# Patient Record
Sex: FEMALE | Race: BLACK OR AFRICAN AMERICAN | NOT HISPANIC OR LATINO | Employment: OTHER | URBAN - METROPOLITAN AREA
[De-identification: names, ages, dates, MRNs, and addresses within clinical notes are randomized per-mention and may not be internally consistent; named-entity substitution may affect disease eponyms.]

---

## 2017-02-07 ENCOUNTER — HOSPITAL ENCOUNTER (EMERGENCY)
Facility: HOSPITAL | Age: 47
Discharge: HOME | End: 2017-02-07
Attending: EMERGENCY MEDICINE
Payer: MEDICAID

## 2017-02-07 VITALS
OXYGEN SATURATION: 94 % | HEIGHT: 62 IN | TEMPERATURE: 99 F | DIASTOLIC BLOOD PRESSURE: 80 MMHG | BODY MASS INDEX: 33.13 KG/M2 | WEIGHT: 180 LBS | RESPIRATION RATE: 18 BRPM | HEART RATE: 83 BPM | SYSTOLIC BLOOD PRESSURE: 113 MMHG

## 2017-02-07 DIAGNOSIS — J98.11 ATELECTASIS: ICD-10-CM

## 2017-02-07 DIAGNOSIS — N63.0 BREAST MASS: Primary | ICD-10-CM

## 2017-02-07 LAB
ALBUMIN SERPL BCP-MCNC: 3.3 G/DL
ALP SERPL-CCNC: 145 U/L
ALT SERPL W/O P-5'-P-CCNC: 26 U/L
ANION GAP SERPL CALC-SCNC: 7 MMOL/L
AST SERPL-CCNC: 26 U/L
B-HCG UR QL: NEGATIVE
BACTERIA #/AREA URNS HPF: NORMAL /HPF
BASOPHILS # BLD AUTO: 0.01 K/UL
BASOPHILS NFR BLD: 0.2 %
BILIRUB SERPL-MCNC: 0.5 MG/DL
BILIRUB UR QL STRIP: NEGATIVE
BUN SERPL-MCNC: 29 MG/DL
CALCIUM SERPL-MCNC: 9.9 MG/DL
CHLORIDE SERPL-SCNC: 104 MMOL/L
CLARITY UR: ABNORMAL
CO2 SERPL-SCNC: 31 MMOL/L
COLOR UR: YELLOW
CREAT SERPL-MCNC: 1.1 MG/DL
CTP QC/QA: YES
DIFFERENTIAL METHOD: ABNORMAL
EOSINOPHIL # BLD AUTO: 0.1 K/UL
EOSINOPHIL NFR BLD: 1.5 %
ERYTHROCYTE [DISTWIDTH] IN BLOOD BY AUTOMATED COUNT: 17.5 %
EST. GFR  (AFRICAN AMERICAN): >60 ML/MIN/1.73 M^2
EST. GFR  (NON AFRICAN AMERICAN): >60 ML/MIN/1.73 M^2
GLUCOSE SERPL-MCNC: 109 MG/DL
GLUCOSE UR QL STRIP: NEGATIVE
HCT VFR BLD AUTO: 52.7 %
HGB BLD-MCNC: 15.9 G/DL
HGB UR QL STRIP: ABNORMAL
KETONES UR QL STRIP: NEGATIVE
LEUKOCYTE ESTERASE UR QL STRIP: ABNORMAL
LYMPHOCYTES # BLD AUTO: 2.1 K/UL
LYMPHOCYTES NFR BLD: 34.6 %
MCH RBC QN AUTO: 27.7 PG
MCHC RBC AUTO-ENTMCNC: 30.2 %
MCV RBC AUTO: 92 FL
MICROSCOPIC COMMENT: NORMAL
MONOCYTES # BLD AUTO: 0.6 K/UL
MONOCYTES NFR BLD: 9.3 %
NEUTROPHILS # BLD AUTO: 3.3 K/UL
NEUTROPHILS NFR BLD: 54.2 %
NITRITE UR QL STRIP: NEGATIVE
PH UR STRIP: 5 [PH] (ref 5–8)
PLATELET # BLD AUTO: 127 K/UL
PMV BLD AUTO: 12.7 FL
POTASSIUM SERPL-SCNC: 4.5 MMOL/L
PROT SERPL-MCNC: 7.9 G/DL
PROT UR QL STRIP: NEGATIVE
RBC # BLD AUTO: 5.75 M/UL
RBC #/AREA URNS HPF: 0 /HPF (ref 0–4)
SODIUM SERPL-SCNC: 142 MMOL/L
SP GR UR STRIP: 1.02 (ref 1–1.03)
SQUAMOUS #/AREA URNS HPF: 4 /HPF
URN SPEC COLLECT METH UR: ABNORMAL
UROBILINOGEN UR STRIP-ACNC: NEGATIVE EU/DL
WBC # BLD AUTO: 6.15 K/UL
WBC #/AREA URNS HPF: 2 /HPF (ref 0–5)

## 2017-02-07 PROCEDURE — 80053 COMPREHEN METABOLIC PANEL: CPT

## 2017-02-07 PROCEDURE — 81000 URINALYSIS NONAUTO W/SCOPE: CPT

## 2017-02-07 PROCEDURE — 25500020 PHARM REV CODE 255: Performed by: EMERGENCY MEDICINE

## 2017-02-07 PROCEDURE — 94799 UNLISTED PULMONARY SVC/PX: CPT

## 2017-02-07 PROCEDURE — 85025 COMPLETE CBC W/AUTO DIFF WBC: CPT

## 2017-02-07 PROCEDURE — 99284 EMERGENCY DEPT VISIT MOD MDM: CPT | Mod: 25

## 2017-02-07 PROCEDURE — 81025 URINE PREGNANCY TEST: CPT | Performed by: EMERGENCY MEDICINE

## 2017-02-07 RX ADMIN — IOHEXOL 70 ML: 350 INJECTION, SOLUTION INTRAVENOUS at 11:02

## 2017-02-07 NOTE — DISCHARGE INSTRUCTIONS
Use the incentive spirometer to take 10 deep breaths each hour.  You should also make a point to get up and walk around more often.  Go see your primary doctor tomorrow at 1:15 PM; we have already made this appointment for you.    Return to the emergency department if you develop difficulty breathing, chest pain, fever higher than 100.4°, or for any new or worsening symptoms.

## 2017-02-07 NOTE — ED AVS SNAPSHOT
[facility]  [address] [place] [address]               Radha Pritchett   2017  9:39 AM   ED    Description:  Female : 1970   Department:  [facility]           Your Care was Coordinated By:     Provider Role From To    Liam Sylvester III, MD Attending Provider 17 0942 --      Reason for Visit     Breast Mass           Diagnoses this Visit        Comments    Breast mass    -  Primary     Atelectasis           ED Disposition     None           To Do List           Follow-up Information     Follow up with Ferny Iglesias MD In 1 day.    Specialty:  Family Medicine    Why:  at 1:15pm    Contact information:    [address] [facility] [address] [address]  [facility] [address]  [phone]        [facility] On Call     [facility] Nurse Care [facility] -  Assistance  Registered nurses in the [facility] provide clinical advisement, health education, appointment booking, and other advisory services.  Call for this free service at [phone].             Medications           Message regarding Medications     Verify the changes and/or additions to your medication regime listed below are the same as discussed with your clinician today.  If any of these changes or additions are incorrect, please notify your healthcare provider.        These medications were administered today        Dose Freq    omnipaque 350 iohexol 70 mL 70 mL IMG once as needed    Sig: Inject 70 mLs into the vein ONCE PRN for contrast.    Class: Normal    Route: Intravenous           Verify that the below list of medications is an accurate representation of the medications you are currently taking.  If none reported, the list may be blank. If incorrect, please contact your healthcare provider. Carry this list with you in case of emergency.           Current Medications     carvedilol (COREG) 3.125 MG tablet Take 1 tablet (3.125 mg total) by mouth 2 (two) times daily with meals.     "hydrochlorothiazide (HYDRODIURIL) 12.5 MG Tab Take 1 tablet (12.5 mg total) by mouth once daily.    lisinopril (PRINIVIL,ZESTRIL) 5 MG tablet Take 1 tablet (5 mg total) by mouth once daily.           Clinical Reference Information           Your Vitals Were     BP Pulse Temp Resp Height Weight    135/85 (BP Location: Right arm, Patient Position: Lying, BP Method: Automatic) 78 98.6 °F (37 °C) (Oral) 18 5' 2" (1.575 m) 81.6 kg (180 lb)    SpO2 BMI             98% 32.92 kg/m2         Allergies as of 2/7/2017     No Known Allergies      Immunizations Administered on Date of Encounter - 2/7/2017     None      ED Micro, Lab, POCT     Start Ordered       Status Ordering Provider    02/07/17 1014 02/07/17 1014  CBC auto differential  STAT      Final result     02/07/17 1014 02/07/17 1014  Comprehensive metabolic panel  STAT      Final result     02/07/17 1014 02/07/17 1014  Urinalysis  STAT      Final result     02/07/17 1014 02/07/17 1014  POCT urine pregnancy  Once      Final result     02/07/17 1014 02/07/17 1014  Urinalysis Microscopic  Once      Final result       ED Imaging Orders     Start Ordered       Status Ordering Provider    02/07/17 1123 02/07/17 1123  CTA Chest Non-Coronary  1 time imaging      Final result     02/07/17 1014 02/07/17 1014  X-Ray Chest PA And Lateral  1 time imaging      Final result         Discharge Instructions       Use the incentive spirometer to take 10 deep breaths each hour.  You should also make a point to get up and walk around more often.  Go see your primary doctor tomorrow at 1:15 PM; we have already made this appointment for you.    Return to the emergency department if you develop difficulty breathing, chest pain, fever higher than 100.4°, or for any new or worsening symptoms.    [facility] Sign-Up     Activating your [facility] account is as easy as 1-2-3!     1) Visit [email or web address], select Sign Up Now, enter this activation code and your date of birth, then select " Next.  [email or web address]  Expires: 3/24/2017  1:35 PM      2) Create a username and password to use when you visit [facility] in the future and select a security question in case you lose your password and select Next.    3) Enter your e-mail address and click Sign Up!    Additional Information  If you have questions, please e-mail [email or web address] or call [phone] to talk to our [facility] staff. Remember, [email or web address] is NOT to be used for urgent needs. For medical emergencies, dial 911.          [facility] complies with applicable Federal civil rights laws and does not discriminate on the basis of race, color, national origin, age, disability, or sex.        Language Assistance Services     ATTENTION: Language assistance services are available, free of charge. Please call [phone].      ATENCIÓN: Si habla adaliañol, tiene a frederick disposición servicios gratuitos de asistencia lingüística. Llame al [phone].     [facility] Ý: N?u b?n nói Ti?ng Vi?t, có các d?ch v? h? tr? ngôn ng? mi?n phí dành cho b?n. G?i s? [phone].        Medications Administered     omnipaque 350 iohexol 70 mL                    Administrations This Visit        Admin Date Action                   omnipaque 350 iohexol 70 mL 02/07/2017 Given                  Administrations This Visit     omnipaque 350 iohexol 70 mL     Admin Date Action Dose Route Administered By             02/07/2017 Given 70 mL Intravenous Tru Harley, RT

## 2017-02-07 NOTE — ED PROVIDER NOTES
Encounter Date: 2/7/2017    SCRIBE #1 NOTE: I, Chay Bagley, am scribing for, and in the presence of,  Liam Sylvester III, MD. I have scribed the following portions of the note - Other sections scribed: HPI and ROS.       History     Chief Complaint   Patient presents with    Breast Mass     pt states she has had a large painless lump in her left breast for 2-3 months.      Review of patient's allergies indicates:  No Known Allergies  HPI Comments: Chief Complaint: Breast Mass    HPI: This 46 y.o. Female with CHF and a learning disability presents to the ED c/o a left breast mass. Mass has been present for approximately 2-3 months. There's no associated pain. Family members noticed symptoms yesterday. Patient has not been evaluated for symptoms. Patient denies SOB.     The history is provided by the patient. No  was used.     Past Medical History   Diagnosis Date    CHF (congestive heart failure)     Learning difficulty      Past Medical History Pertinent Negatives   Diagnosis Date Noted    Coronary artery disease 12/26/2014     Past Surgical History   Procedure Laterality Date    Cyst removal       shoulder      Family History   Problem Relation Age of Onset    Diabetes      Coronary artery disease       Social History   Substance Use Topics    Smoking status: Never Smoker    Smokeless tobacco: Never Used    Alcohol use Yes      Comment: OCCASSIONAL     Review of Systems   Constitutional: Negative for chills and fever.   HENT: Negative for ear pain and sore throat.    Eyes: Negative for visual disturbance.   Respiratory: Negative for cough and shortness of breath.    Cardiovascular: Negative for chest pain.   Gastrointestinal: Negative for abdominal pain, constipation, diarrhea, nausea and vomiting.   Genitourinary: Negative for difficulty urinating and dysuria.   Musculoskeletal: Negative for back pain.   Skin: Negative for rash.   Neurological: Negative for dizziness, weakness,  light-headedness and headaches.       Physical Exam   Initial Vitals   BP Pulse Resp Temp SpO2   02/07/17 0936 02/07/17 0936 02/07/17 0936 02/07/17 0936 02/07/17 0936   135/80 87 18 98.6 °F (37 °C) 84 %     Physical Exam    Nursing note and vitals reviewed.  Constitutional: She appears well-developed and well-nourished. She is not diaphoretic. No distress.   HENT:   Head: Normocephalic and atraumatic.   Nose: Nose normal.   Mouth/Throat: Oropharynx is clear and moist. No oropharyngeal exudate.   Eyes: Conjunctivae and EOM are normal. Pupils are equal, round, and reactive to light. No scleral icterus.   Neck: Normal range of motion. Neck supple. No thyromegaly present. No tracheal deviation present.   Cardiovascular: Normal rate, regular rhythm and normal heart sounds. Exam reveals no gallop and no friction rub.    No murmur heard.  Pulmonary/Chest: Breath sounds normal. No respiratory distress. She has no wheezes. She has no rhonchi. She has no rales.       Abdominal: Soft. Bowel sounds are normal. She exhibits no distension and no mass. There is no tenderness. There is no rebound and no guarding.   Musculoskeletal: Normal range of motion. She exhibits no edema or tenderness.   Lymphadenopathy:     She has no cervical adenopathy.   Neurological: She is alert and oriented to person, place, and time. She has normal strength. No cranial nerve deficit or sensory deficit.   Skin: Skin is warm and dry. No rash noted. No erythema. No pallor.   Psychiatric: She has a normal mood and affect. Her behavior is normal. Thought content normal.         ED Course   Procedures  Labs Reviewed   CBC W/ AUTO DIFFERENTIAL - Abnormal; Notable for the following:        Result Value    RBC 5.75 (*)     Hematocrit 52.7 (*)     MCHC 30.2 (*)     RDW 17.5 (*)     Platelets 127 (*)     All other components within normal limits   COMPREHENSIVE METABOLIC PANEL - Abnormal; Notable for the following:     CO2 31 (*)     BUN, Bld 29 (*)     Albumin  3.3 (*)     Alkaline Phosphatase 145 (*)     Anion Gap 7 (*)     All other components within normal limits   URINALYSIS - Abnormal; Notable for the following:     Appearance, UA Hazy (*)     Occult Blood UA 2+ (*)     Leukocytes, UA Trace (*)     All other components within normal limits   URINALYSIS MICROSCOPIC   POCT URINE PREGNANCY             Medical Decision Making:   Initial Assessment:   46-year-old female brought in by her aunt for evaluation of a painless left breast mass that has reportedly been present for the last 2-3 months.  Patient does not reports the patient never has any medical complaints, even when she is sick, and there is also some kind of learning disability, so history is somewhat limited.  However, currently the patient denies shortness of breath, cough, chest pain, or any other complaint, despite the fact that her room air oxygen saturation is in the low 80s.  No signs of DVT on exam.  Work of breathing normal.  Differential Diagnosis:   Pneumonia, pleural effusion, pneumothorax, pulmonary embolism, breast malignancy  Independently Interpreted Test(s):   I have ordered and independently interpreted X-rays - see summary below.       <> Summary of X-Ray Reading(s): Chest x-ray: No acute abnormality    CT angiogram of the chest: No pulmonary embolism, dependent atelectasis, large breast mass      ED Management:  Chest x-ray unremarkable.  CT angiogram of the chest reveals dependent consolidative changes most suggestive of atelectasis and a breast mass, but no other pulmonary pathology.  Clinically, patient symptoms are not consistent with pneumonia or pulmonary embolism.  Additionally, her oxygen saturation improves with ambulation, increasing to 97% on room air, and also improves with volitional deep breathing and coughing.  Strongly suspect her hypoxia is a result of atelectasis.  Her family provides additional history which seems to confirm this, specifically that the patient spends all day  lying on the couch or sitting in a recliner, never really getting up to move around.  Suspect she has chronic atelectasis and has become accustomed to the resulting hypoxia.    I have spoken with Dr. Iglesias nurse and schedule pt for urgent f/u tomorrow for her large, firm breast mass.             Scribe Attestation:   Scribe #1: I performed the above scribed service and the documentation accurately describes the services I performed. I attest to the accuracy of the note.    Attending Attestation:           Physician Attestation for Scribe:  Physician Attestation Statement for Scribe #1: I, Liam Sylvester III, MD, reviewed documentation, as scribed by Chay Bagley in my presence, and it is both accurate and complete.                 ED Course     Clinical Impression:   The primary encounter diagnosis was Breast mass. A diagnosis of Atelectasis was also pertinent to this visit.          Liam Sylvester III, MD  02/10/17 1987

## 2017-02-07 NOTE — ED NOTES
Pt ambulated on RA.  O2 sats taken while pt standing after ambulating and taking deep breaths.  Pt's O2 sats 94-96% while standing.  Physician notified and at bedside.

## 2017-02-07 NOTE — ED TRIAGE NOTES
Pt presents to ER with left breast mass.  Room air sats 85%.  Pt denies any pain or shortness of breath.

## 2017-02-20 ENCOUNTER — HOSPITAL ENCOUNTER (OUTPATIENT)
Dept: RADIOLOGY | Facility: HOSPITAL | Age: 47
Discharge: HOME | End: 2017-02-20
Attending: GENERAL PRACTICE
Payer: MEDICAID

## 2017-02-20 DIAGNOSIS — D05.82: ICD-10-CM

## 2017-02-20 DIAGNOSIS — R92.8 ABNORMAL MAMMOGRAM, UNSPECIFIED: ICD-10-CM

## 2017-02-20 PROCEDURE — 77062 BREAST TOMOSYNTHESIS BI: CPT | Mod: TC

## 2017-02-20 PROCEDURE — 77062 BREAST TOMOSYNTHESIS BI: CPT | Mod: 26,,, | Performed by: RADIOLOGY

## 2017-02-20 PROCEDURE — 76642 ULTRASOUND BREAST LIMITED: CPT | Mod: TC,LT

## 2017-02-20 PROCEDURE — 77066 DX MAMMO INCL CAD BI: CPT | Mod: 26,,, | Performed by: RADIOLOGY

## 2017-02-20 PROCEDURE — 76642 ULTRASOUND BREAST LIMITED: CPT | Mod: 26,LT,, | Performed by: RADIOLOGY

## 2017-03-10 ENCOUNTER — HOSPITAL ENCOUNTER (OUTPATIENT)
Dept: RADIOLOGY | Facility: HOSPITAL | Age: 47
Discharge: HOME | End: 2017-03-10
Attending: GENERAL PRACTICE
Payer: MEDICAID

## 2017-03-10 DIAGNOSIS — N63.0 LUMP OR MASS IN BREAST: ICD-10-CM

## 2017-03-10 PROCEDURE — 77065 DX MAMMO INCL CAD UNI: CPT | Mod: 26,LT,, | Performed by: RADIOLOGY

## 2017-03-10 PROCEDURE — 19083 BX BREAST 1ST LESION US IMAG: CPT | Mod: ,,, | Performed by: RADIOLOGY

## 2017-03-10 PROCEDURE — 77065 DX MAMMO INCL CAD UNI: CPT | Mod: TC,LT

## 2017-03-10 PROCEDURE — 27201068 US BREAST BIOPSY WITH IMAGING 1ST SITE LEFT

## 2017-03-10 PROCEDURE — 88305 TISSUE EXAM BY PATHOLOGIST: CPT | Mod: 26,,, | Performed by: PATHOLOGY

## 2017-03-10 PROCEDURE — 88305 TISSUE EXAM BY PATHOLOGIST: CPT | Performed by: PATHOLOGY

## 2017-03-10 NOTE — H&P
[facility]  History & Physical    Subjective:      Chief Complaint/Reason for Admission: Left breast mass    Radha Pritchett is a 46 y.o. female.    Past Medical History:   Diagnosis Date    CHF (congestive heart failure)     Learning difficulty      Past Surgical History:   Procedure Laterality Date    CYST REMOVAL      shoulder      Family History   Problem Relation Age of Onset    Diabetes      Coronary artery disease       Social History   Substance Use Topics    Smoking status: Never Smoker    Smokeless tobacco: Never Used    Alcohol use Yes      Comment: OCCASSIONAL         (Not in a hospital admission)  Review of patient's allergies indicates:  No Known Allergies     ROS    Objective:      Vital Signs (Most Recent)       Vital Signs Range (Last 24H):  BP: ()/()   Arterial Line BP: ()/()     Physical Exam         Assessment:      There are no hospital problems to display for this patient.      Plan:    Ultrasound guided core needle biopsy

## 2017-03-10 NOTE — DISCHARGE SUMMARY
14g core needle x 5  Left breast mass vs old hematoma  Clip placed   No immediate complications      OK to D/C

## 2017-03-24 PROBLEM — N64.89 HEMATOMA OF BREAST: Status: ACTIVE | Noted: 2017-03-24

## 2017-03-29 ENCOUNTER — HOSPITAL ENCOUNTER (EMERGENCY)
Facility: HOSPITAL | Age: 47
Discharge: HOME | End: 2017-03-30
Attending: EMERGENCY MEDICINE
Payer: MEDICAID

## 2017-03-29 VITALS
RESPIRATION RATE: 18 BRPM | HEIGHT: 62 IN | HEART RATE: 84 BPM | OXYGEN SATURATION: 95 % | WEIGHT: 180 LBS | TEMPERATURE: 98 F | SYSTOLIC BLOOD PRESSURE: 178 MMHG | DIASTOLIC BLOOD PRESSURE: 98 MMHG | BODY MASS INDEX: 33.13 KG/M2

## 2017-03-29 DIAGNOSIS — N63.20 BREAST MASS, LEFT: ICD-10-CM

## 2017-03-29 DIAGNOSIS — J18.9 PNEUMONIA OF LEFT LOWER LOBE DUE TO INFECTIOUS ORGANISM: Primary | ICD-10-CM

## 2017-03-29 DIAGNOSIS — N64.4 BREAST PAIN, LEFT: ICD-10-CM

## 2017-03-29 DIAGNOSIS — I10 ESSENTIAL HYPERTENSION: ICD-10-CM

## 2017-03-29 LAB
ALBUMIN SERPL BCP-MCNC: 3 G/DL
ALP SERPL-CCNC: 101 U/L
ALT SERPL W/O P-5'-P-CCNC: 36 U/L
ANION GAP SERPL CALC-SCNC: 8 MMOL/L
ANISOCYTOSIS BLD QL SMEAR: SLIGHT
APTT BLDCRRT: 28.4 SEC
AST SERPL-CCNC: 42 U/L
BASOPHILS # BLD AUTO: ABNORMAL K/UL
BASOPHILS NFR BLD: 1 %
BILIRUB SERPL-MCNC: 0.6 MG/DL
BNP SERPL-MCNC: 85 PG/ML
BUN SERPL-MCNC: 14 MG/DL
CALCIUM SERPL-MCNC: 9.7 MG/DL
CHLORIDE SERPL-SCNC: 103 MMOL/L
CO2 SERPL-SCNC: 36 MMOL/L
CREAT SERPL-MCNC: 1 MG/DL
DIFFERENTIAL METHOD: ABNORMAL
EOSINOPHIL # BLD AUTO: ABNORMAL K/UL
EOSINOPHIL NFR BLD: 0 %
ERYTHROCYTE [DISTWIDTH] IN BLOOD BY AUTOMATED COUNT: 17.7 %
EST. GFR  (AFRICAN AMERICAN): >60 ML/MIN/1.73 M^2
EST. GFR  (NON AFRICAN AMERICAN): >60 ML/MIN/1.73 M^2
GIANT PLATELETS BLD QL SMEAR: PRESENT
GLUCOSE SERPL-MCNC: 114 MG/DL
HCT VFR BLD AUTO: 50.6 %
HGB BLD-MCNC: 13.9 G/DL
HYPOCHROMIA BLD QL SMEAR: ABNORMAL
INR PPP: 1.1
LYMPHOCYTES # BLD AUTO: ABNORMAL K/UL
LYMPHOCYTES NFR BLD: 22 %
MCH RBC QN AUTO: 25.7 PG
MCHC RBC AUTO-ENTMCNC: 27.5 %
MCV RBC AUTO: 94 FL
METAMYELOCYTES NFR BLD MANUAL: 1 %
MONOCYTES # BLD AUTO: ABNORMAL K/UL
MONOCYTES NFR BLD: 7 %
NEUTROPHILS NFR BLD: 60 %
NEUTS BAND NFR BLD MANUAL: 9 %
NRBC BLD-RTO: 4 /100 WBC
PLATELET # BLD AUTO: 132 K/UL
PMV BLD AUTO: ABNORMAL FL
POLYCHROMASIA BLD QL SMEAR: ABNORMAL
POTASSIUM SERPL-SCNC: 5.2 MMOL/L
PROT SERPL-MCNC: 7.6 G/DL
PROTHROMBIN TIME: 11.3 SEC
RBC # BLD AUTO: 5.4 M/UL
SODIUM SERPL-SCNC: 147 MMOL/L
TROPONIN I SERPL DL<=0.01 NG/ML-MCNC: 0.01 NG/ML
WBC # BLD AUTO: 5.21 K/UL

## 2017-03-29 PROCEDURE — 85007 BL SMEAR W/DIFF WBC COUNT: CPT

## 2017-03-29 PROCEDURE — 96365 THER/PROPH/DIAG IV INF INIT: CPT

## 2017-03-29 PROCEDURE — 80053 COMPREHEN METABOLIC PANEL: CPT

## 2017-03-29 PROCEDURE — 83880 ASSAY OF NATRIURETIC PEPTIDE: CPT

## 2017-03-29 PROCEDURE — 99285 EMERGENCY DEPT VISIT HI MDM: CPT | Mod: 25

## 2017-03-29 PROCEDURE — 93005 ELECTROCARDIOGRAM TRACING: CPT

## 2017-03-29 PROCEDURE — 87040 BLOOD CULTURE FOR BACTERIA: CPT

## 2017-03-29 PROCEDURE — 63600175 PHARM REV CODE 636 W HCPCS: Performed by: EMERGENCY MEDICINE

## 2017-03-29 PROCEDURE — 25000003 PHARM REV CODE 250: Performed by: EMERGENCY MEDICINE

## 2017-03-29 PROCEDURE — 85610 PROTHROMBIN TIME: CPT

## 2017-03-29 PROCEDURE — 85027 COMPLETE CBC AUTOMATED: CPT

## 2017-03-29 PROCEDURE — 85730 THROMBOPLASTIN TIME PARTIAL: CPT

## 2017-03-29 PROCEDURE — 84484 ASSAY OF TROPONIN QUANT: CPT

## 2017-03-29 RX ORDER — AZITHROMYCIN 250 MG/1
500 TABLET, FILM COATED ORAL
Status: COMPLETED | OUTPATIENT
Start: 2017-03-29 | End: 2017-03-29

## 2017-03-29 RX ADMIN — AZITHROMYCIN 500 MG: 250 TABLET, FILM COATED ORAL at 09:03

## 2017-03-29 RX ADMIN — CEFTRIAXONE 1 G: 1 INJECTION, SOLUTION INTRAVENOUS at 09:03

## 2017-03-29 NOTE — ED AVS SNAPSHOT
[facility]  [address] [place] [address]               Radha Banks   3/29/2017  6:53 PM   ED    Description:  Female : 1970   Department:  [facility]           Your Care was Coordinated By:     Provider Role From To    Rosanna May MD Attending Provider 17 1804 --      Reason for Visit     Breast Pain           Diagnoses this Visit        Comments    Pneumonia of left lower lobe due to infectious organism    -  Primary     Breast pain, left         Breast mass, left           ED Disposition     ED Disposition Condition Comment    Discharge             To Do List           Follow-up Information     Follow up with Ferny Iglesias MD In 1 day.    Specialty:  Family Medicine    Why:  call tomorrow for appointment    Contact information:    [address] [facility] [address] [facility] [id number] [phone]         These Medications        Disp Refills Start End    azithromycin (Z-REGULO) 250 MG tablet 6 tablet 0 3/30/2017     Take 2 tablets on day 1, then one tablet on day 2-5    albuterol 90 mcg/actuation inhaler 18 g 0 3/30/2017 3/30/2018    Inhale 2 puffs into the lungs every 6 (six) hours as needed for Wheezing. Rescue - Inhalation    etodolac (LODINE) 200 MG Cap 10 capsule 0 3/30/2017     Take 1 capsule (200 mg total) by mouth every 8 (eight) hours as needed (pain). - Oral      [facility]     [facility] On Call Nurse Care Line -  Assistance  Registered nurses in the [facility] provide clinical advisement, health education, appointment booking, and other advisory services.  Call for this free service at [phone].             Medications           Message regarding Medications     Verify the changes and/or additions to your medication regime listed below are the same as discussed with your clinician today.  If any of these changes or additions are incorrect, please notify your healthcare provider.         START taking these NEW medications        Refills    azithromycin (Z-REGULO) 250 MG tablet 0    Sig: Take 2 tablets on day 1, then one tablet on day 2-5    Class: Print    albuterol 90 mcg/actuation inhaler 0    Sig: Inhale 2 puffs into the lungs every 6 (six) hours as needed for Wheezing. Rescue    Class: Print    Route: Inhalation    etodolac (LODINE) 200 MG Cap 0    Sig: Take 1 capsule (200 mg total) by mouth every 8 (eight) hours as needed (pain).    Class: Print    Route: Oral      These medications were administered today        Dose Freq    cefTRIAXone (ROCEPHIN) 1 g in dextrose 5 % 50 mL IVPB 1 g ED 1 Time    Sig: Inject 50 mLs (1 g total) into the vein ED 1 Time.    Class: Normal    Route: Intravenous    azithromycin tablet 500 mg 500 mg ED 1 Time    Sig: Take 2 tablets (500 mg total) by mouth ED 1 Time.    Class: Normal    Route: Oral    hydrocodone-acetaminophen 5-325mg per tablet 1 tablet 1 tablet ED 1 Time    Sig: Take 1 tablet by mouth ED 1 Time.    Class: Normal    Route: Oral           Verify that the below list of medications is an accurate representation of the medications you are currently taking.  If none reported, the list may be blank. If incorrect, please contact your healthcare provider. Carry this list with you in case of emergency.           Current Medications     albuterol 90 mcg/actuation inhaler Inhale 2 puffs into the lungs every 6 (six) hours as needed for Wheezing. Rescue    azithromycin (Z-REGULO) 250 MG tablet Take 2 tablets on day 1, then one tablet on day 2-5    carvedilol (COREG) 3.125 MG tablet Take 1 tablet (3.125 mg total) by mouth 2 (two) times daily with meals.    etodolac (LODINE) 200 MG Cap Take 1 capsule (200 mg total) by mouth every 8 (eight) hours as needed (pain).    hydrochlorothiazide (HYDRODIURIL) 12.5 MG Tab Take 1 tablet (12.5 mg total) by mouth once daily.    hydrocodone-acetaminophen 5-325mg per tablet 1 tablet Take 1 tablet by mouth ED 1 Time.    lisinopril  "(PRINIVIL,ZESTRIL) 5 MG tablet Take 1 tablet (5 mg total) by mouth once daily.           Clinical Reference Information           Your Vitals Were     BP Pulse Temp Resp Height Weight    178/98 84 98.2 °F (36.8 °C) (Oral) 18 5' 2" (1.575 m) 81.6 kg (180 lb)    SpO2 BMI             95% 32.92 kg/m2         Allergies as of 3/30/2017     No Known Allergies      Immunizations Administered on Date of Encounter - 3/30/2017     None      ED Micro, Lab, POCT     Start Ordered       Status Ordering Provider    03/29/17 2105 03/29/17 2105  Blood culture  STAT      In process     03/29/17 2105 03/29/17 2105  Blood Culture #2 **CANNOT BE ORDERED STAT**  Once      In process     03/29/17 1932 03/29/17 1931  CBC auto differential  STAT      Final result     03/29/17 1932 03/29/17 1931  Comprehensive metabolic panel  STAT      Final result     03/29/17 1932 03/29/17 1931  Brain natriuretic peptide  STAT      Final result     03/29/17 1932 03/29/17 1931  Troponin I  STAT      Final result     03/29/17 1932 03/29/17 1931  APTT  STAT      Final result     03/29/17 1932 03/29/17 1931  Protime-INR  STAT      Final result       ED Imaging Orders     Start Ordered       Status Ordering Provider    03/29/17 1932 03/29/17 1931  X-Ray Chest 1 View  1 time imaging      Final result         Discharge Instructions       Follow-up with general surgeon for definitive management of left breast mass.  Take antibiotics for pneumonia until all gone.  Use albuterol inhaler as needed for cough.  Try over-the-counter Robitussin as needed for cough.  Follow-up with primary care doctor in 24-48 hours.  Return to emergency department for worsening symptoms, worsening pain, shortness of breath, chest pain, fever, or any other concerns.    Discharge References/Attachments     ADULT, PNEUMONIA (ENGLISH)      Your Scheduled Appointments     Apr 04, 2017 12:00 PM CDT   Pre-Admit Testing Visit with PRE-ADMIT 2, [facility] " ([facility])    [address] [place] [id number] [phone]              Your Future Surgeries/Procedures     Apr 10, 2017   Surgery with Fabio Sandoval MD   [facility] ([facility])    [address] [place] [address]   [phone]              [facility] Sign-Up     Activating your [facility] account is as easy as 1-2-3!     1) Visit [email or web address], select Sign Up Now, enter this activation code and your date of birth, then select Next.  [email or web address]  Expires: 5/14/2017 12:16 AM      2) Create a username and password to use when you visit [facility] in the future and select a security question in case you lose your password and select Next.    3) Enter your e-mail address and click Sign Up!    Additional Information  If you have questions, please e-mail [email or web address] or call [phone] to talk to our [employer] [facility] [employer] staff. Remember, [employer] is NOT to be used for urgent needs. For medical emergencies, dial 911.          [facility] complies with applicable Federal civil rights laws and does not discriminate on the basis of race, color, national origin, age, disability, or sex.        Language Assistance Services     ATTENTION: Language assistance services are available, free of charge. Please call [phone].      ATENCIÓN: Si habla español, tiene a frederick disposición servicios gratuitos de asistencia lingüística. Llame al [phone].     CHÚ Ý: N?u b?n nói Ti?ng Vi?t, có các d?ch v? h? tr? ngôn ng? mi?n phí dành cho b?n. G?i s? [phone].

## 2017-03-30 RX ORDER — HYDROCODONE BITARTRATE AND ACETAMINOPHEN 5; 325 MG/1; MG/1
1 TABLET ORAL
Status: DISCONTINUED | OUTPATIENT
Start: 2017-03-30 | End: 2017-03-30 | Stop reason: HOSPADM

## 2017-03-30 RX ORDER — ETODOLAC 200 MG/1
200 CAPSULE ORAL EVERY 8 HOURS PRN
Qty: 10 CAPSULE | Refills: 0 | Status: ON HOLD | OUTPATIENT
Start: 2017-03-30 | End: 2017-05-08 | Stop reason: HOSPADM

## 2017-03-30 RX ORDER — ALBUTEROL SULFATE 90 UG/1
2 AEROSOL, METERED RESPIRATORY (INHALATION) EVERY 6 HOURS PRN
Qty: 18 G | Refills: 0 | Status: ON HOLD | OUTPATIENT
Start: 2017-03-30 | End: 2017-05-08 | Stop reason: HOSPADM

## 2017-03-30 RX ORDER — AZITHROMYCIN 250 MG/1
TABLET, FILM COATED ORAL
Qty: 6 TABLET | Refills: 0 | Status: ON HOLD | OUTPATIENT
Start: 2017-03-30 | End: 2017-05-08 | Stop reason: HOSPADM

## 2017-03-30 NOTE — ED TRIAGE NOTES
Pt presents to the ed with complaints of left sided breast pain after a fall. Pt states she got dizzy and fell on her breast which has a recent hx of I&D due to cyst. After roomed, pt found to be in the 60's on room air. Pt placed on oxygen and improved to nineties with 6L NC. MD made aware. Pt has a non productive frequent cough. Pt reports 10/10 breast pain. Will continue to be monitored.

## 2017-03-30 NOTE — DISCHARGE INSTRUCTIONS
Follow-up with general surgeon for definitive management of left breast mass.  Take antibiotics for pneumonia until all gone.  Use albuterol inhaler as needed for cough.  Try over-the-counter Robitussin as needed for cough.  Follow-up with primary care doctor in 24-48 hours.  Return to emergency department for worsening symptoms, worsening pain, shortness of breath, chest pain, fever, or any other concerns.

## 2017-03-30 NOTE — ED PROVIDER NOTES
"Encounter Date: 3/29/2017    SCRIBE #1 NOTE: I, Jun Templeton, am scribing for, and in the presence of,  Rosanna May MD. I have scribed the following portions of the note - Other sections scribed: ROS, HPI.       History     Chief Complaint   Patient presents with    Breast Pain     pt states she has a cyst on left breast and fell today and landed on it, now breast is swollen     Review of patient's allergies indicates:  No Known Allergies  HPI Comments: CC: Breast pain    HPI: This 46 y.o. F, who has a past medical history of CHF (congestive heart failure) and Learning difficulty, presents to the ED for evaluation of worsening left breast pain secondary to slip and fall 19.5 hours ago. She was getting up to use the restroom when she lost her balance and fell forward, landing on her chest. She was able to break her fall with both arms. Her breast pain is a constant 9/10 that is worse with palpation. She has a hx of multiple "cysts" to that breast that were biopsied a week ago. She denies head trauma. She denies syncope, shortness of breath, leg swelling, abdominal pain, numbness and weakness. She also reports a cough and subjective fevers x1 week.     The history is provided by the patient.     Past Medical History:   Diagnosis Date    CHF (congestive heart failure)     Learning difficulty      Past Surgical History:   Procedure Laterality Date    BREAST SURGERY Left     biopsy    CYST REMOVAL      shoulder      Family History   Problem Relation Age of Onset    Diabetes      Coronary artery disease       Social History   Substance Use Topics    Smoking status: Never Smoker    Smokeless tobacco: Never Used    Alcohol use Yes      Comment: OCCASSIONAL     Review of Systems   Constitutional: Positive for fever (subjective).   HENT: Negative for sore throat.    Eyes: Negative for visual disturbance.   Respiratory: Positive for cough. Negative for shortness of breath.    Cardiovascular: Positive " for chest pain (left breast). Negative for leg swelling.   Gastrointestinal: Negative for abdominal pain, diarrhea, nausea and vomiting.   Genitourinary: Negative for dysuria.   Musculoskeletal: Negative for back pain.   Skin: Negative for rash.   Neurological: Negative for headaches.       Physical Exam   Initial Vitals   BP Pulse Resp Temp SpO2   03/29/17 1853 03/29/17 1853 03/29/17 1853 03/29/17 1853 03/29/17 1853   143/92 101 18 98.2 °F (36.8 °C) 99 %     Physical Exam    Nursing note and vitals reviewed.  Constitutional: Vital signs are normal. She appears well-developed and well-nourished.  Non-toxic appearance. She does not have a sickly appearance. She does not appear ill.   HENT:   Head: Normocephalic and atraumatic.   Right Ear: External ear normal.   Left Ear: External ear normal.   Nose: Nose normal.   Mouth/Throat: Oropharynx is clear and moist. No oropharyngeal exudate.   Eyes: EOM are normal.   Neck: Neck supple.   Cardiovascular: Normal rate and regular rhythm. Exam reveals no gallop and no friction rub.    No murmur heard.  Pulmonary/Chest: She has no wheezes. She has rales in the right middle field and the left middle field. She exhibits tenderness (left). Left breast exhibits mass (at the 11 o'clock position; indurated) and skin change (rash to left breast).   Left sided chest wall TTP, left breast mass present.   Abdominal: Soft. Bowel sounds are normal. She exhibits no distension and no pulsatile midline mass. There is no tenderness.   Musculoskeletal: Normal range of motion.        Right lower leg: She exhibits edema (1+ pitting).        Left lower leg: She exhibits edema (1+ pitting).   Neurological: She is alert.   Skin: Skin is warm and dry. No rash noted.   Psychiatric: She has a normal mood and affect.         ED Course   Procedures  Labs Reviewed   CBC W/ AUTO DIFFERENTIAL - Abnormal; Notable for the following:        Result Value    Hematocrit 50.6 (*)     MCH 25.7 (*)     MCHC 27.5 (*)      RDW 17.7 (*)     Platelets 132 (*)     nRBC 4 (*)     All other components within normal limits   COMPREHENSIVE METABOLIC PANEL - Abnormal; Notable for the following:     Sodium 147 (*)     Potassium 5.2 (*)     CO2 36 (*)     Glucose 114 (*)     Albumin 3.0 (*)     AST 42 (*)     All other components within normal limits   CULTURE, BLOOD   CULTURE, BLOOD   B-TYPE NATRIURETIC PEPTIDE   TROPONIN I   APTT   PROTIME-INR     EKG Readings: (Independently Interpreted)   Sinus rhythm, rate approximately 96.  No ST elevation or depression.  .  No evidence of acute ischemia or malignant arrhythmia.          Medical Decision Making:   History:   Old Medical Records: I decided to obtain old medical records.  Old Records Summarized: records from clinic visits.       <> Summary of Records: History of left breast mass.  Scheduled for surgery by Dr. Sandoval.   Independently Interpreted Test(s):   I have ordered and independently interpreted EKG Reading(s) - see summary below       <> Summary of EKG Reading(s): EKG independently interpreted by me, is negative for acute ischemia.   46 y.o. female presents emergency department after sustaining a mechanical fall, falling onto her left breast.  Patient has underlying mental disability, making it difficult to obtain history.  Patient reports that she slipped on her slippers today, causing her to fall.  Per chart review she has a history of left breast mass, and is scheduled for surgery by Dr. Sandoval.  She denies chest pain and shortness of breath.  On exam patient has large palpable mass to left breast,l which is not acute per chart review.  There are crackles present to bilateral lungs.  Patient notes a cough over the past week, productive of white sputum.  Therefore CXR and labs ordered. Labs ordered and reveal no leukocytosis.  Hemoglobin and hematocrit are benign.  Creatinine within normal limits.  Potassium mildly elevated at 5.2, no EKG changes.  Labs reveal no elevated  troponin or BNP.  Acute MI, CHF unlikely.  Coags are normal.  Chest x-ray independently interpreted by me shows possible infiltrate to left lower lung.  Due to patient's cough and subjective fever, I will cover for pneumonia.  Blood cultures ordered.  IV Rocephin, and oral azithromycin ordered.  Patient has no leukocytosis.  She is afebrile here.  Patient currently has normal pulse ox on room air.  Heart rate in the 80s.  I believe she is stable for outpatient management.  Azithromycin and albuterol inhaler prescribed.  Lodine prescribed for pain.  I advised close follow-up with primary care doctor and follow up with general surgery as scheduled.  Patient given strict return warnings.  She states understanding discharge plan and is comfortable going home at this time.            Scribe Attestation:   Scribe #1: I performed the above scribed service and the documentation accurately describes the services I performed. I attest to the accuracy of the note.    Attending Attestation:           Physician Attestation for Scribe:  Physician Attestation Statement for Scribe #1: I, Rosanna May MD, reviewed documentation, as scribed by Jun Templeton in my presence, and it is both accurate and complete.                 ED Course     Clinical Impression:   The primary encounter diagnosis was Pneumonia of left lower lobe due to infectious organism. Diagnoses of Breast pain, left, Breast mass, left, and Essential hypertension were also pertinent to this visit.          Rosanna May MD  03/30/17 0050       Rosanna May MD  03/30/17 0052

## 2017-03-30 NOTE — ED NOTES
Pt relative at nurses station stating that pt BP high; upon going in room and re checking pt blood pressure, family member complaining that no body knows her condition and is asking pt to move out of bed when she can't; family member upset; explained to family member that I did not ask her to get out of bed that I am just re assessing her BP; family member states that he wants people that is familiar with all her medical history and conditions to take care of her.

## 2017-04-03 LAB
BACTERIA BLD CULT: NORMAL
BACTERIA BLD CULT: NORMAL

## 2017-04-06 ENCOUNTER — HOSPITAL ENCOUNTER (INPATIENT)
Facility: HOSPITAL | Age: 47
LOS: 32 days | Discharge: NURSING, REHAB OR LONG-TERM CARE | DRG: 004 | End: 2017-05-08
Attending: EMERGENCY MEDICINE | Admitting: EMERGENCY MEDICINE
Payer: MEDICAID

## 2017-04-06 ENCOUNTER — HOSPITAL ENCOUNTER (OUTPATIENT)
Dept: PREADMISSION TESTING | Facility: HOSPITAL | Age: 47
Discharge: HOME | End: 2017-04-06
Attending: SURGERY
Payer: MEDICAID

## 2017-04-06 VITALS
WEIGHT: 176.69 LBS | SYSTOLIC BLOOD PRESSURE: 145 MMHG | HEIGHT: 55 IN | HEART RATE: 120 BPM | TEMPERATURE: 98 F | BODY MASS INDEX: 40.89 KG/M2 | DIASTOLIC BLOOD PRESSURE: 90 MMHG | RESPIRATION RATE: 20 BRPM

## 2017-04-06 DIAGNOSIS — E87.6 HYPOKALEMIA: ICD-10-CM

## 2017-04-06 DIAGNOSIS — R73.9 HYPERGLYCEMIA: ICD-10-CM

## 2017-04-06 DIAGNOSIS — R29.898 UPPER EXTREMITY WEAKNESS: ICD-10-CM

## 2017-04-06 DIAGNOSIS — J96.02 ACUTE RESPIRATORY FAILURE WITH HYPERCAPNIA: ICD-10-CM

## 2017-04-06 DIAGNOSIS — I50.32 CHRONIC DIASTOLIC HEART FAILURE: Chronic | ICD-10-CM

## 2017-04-06 DIAGNOSIS — Z71.89 COUNSELING REGARDING ADVANCED DIRECTIVES AND GOALS OF CARE: ICD-10-CM

## 2017-04-06 DIAGNOSIS — N63.20 MASS OF BREAST, LEFT: ICD-10-CM

## 2017-04-06 DIAGNOSIS — R09.2 RESPIRATORY ARREST: ICD-10-CM

## 2017-04-06 DIAGNOSIS — A41.9 SEPSIS, DUE TO UNSPECIFIED ORGANISM: ICD-10-CM

## 2017-04-06 DIAGNOSIS — I95.9 HYPOTENSION, UNSPECIFIED HYPOTENSION TYPE: ICD-10-CM

## 2017-04-06 DIAGNOSIS — N63.20 BREAST MASS, LEFT: ICD-10-CM

## 2017-04-06 DIAGNOSIS — J18.9 PNEUMONIA OF BOTH LUNGS DUE TO INFECTIOUS ORGANISM, UNSPECIFIED PART OF LUNG: Primary | ICD-10-CM

## 2017-04-06 DIAGNOSIS — E44.0 MODERATE PROTEIN MALNUTRITION: Chronic | ICD-10-CM

## 2017-04-06 DIAGNOSIS — R57.9 SHOCK CIRCULATORY: ICD-10-CM

## 2017-04-06 PROBLEM — I10 ESSENTIAL HYPERTENSION: Chronic | Status: ACTIVE | Noted: 2017-04-06

## 2017-04-06 LAB
ALBUMIN SERPL BCP-MCNC: 2.7 G/DL
ALLENS TEST: ABNORMAL
ALP SERPL-CCNC: 121 U/L
ALT SERPL W/O P-5'-P-CCNC: 41 U/L
ANION GAP SERPL CALC-SCNC: 7 MMOL/L
ANISOCYTOSIS BLD QL SMEAR: SLIGHT
AST SERPL-CCNC: 39 U/L
B-HCG UR QL: NEGATIVE
BASOPHILS # BLD AUTO: 0.04 K/UL
BASOPHILS NFR BLD: 0.5 %
BILIRUB SERPL-MCNC: 1.1 MG/DL
BNP SERPL-MCNC: 138 PG/ML
BUN SERPL-MCNC: 7 MG/DL
CALCIUM SERPL-MCNC: 9.8 MG/DL
CHLORIDE SERPL-SCNC: 100 MMOL/L
CO2 SERPL-SCNC: 36 MMOL/L
CREAT SERPL-MCNC: 0.9 MG/DL
CTP QC/QA: YES
DELSYS: ABNORMAL
DIFFERENTIAL METHOD: ABNORMAL
EOSINOPHIL # BLD AUTO: 0.1 K/UL
EOSINOPHIL NFR BLD: 0.9 %
EP: 5
ERYTHROCYTE [DISTWIDTH] IN BLOOD BY AUTOMATED COUNT: 18.1 %
ERYTHROCYTE [SEDIMENTATION RATE] IN BLOOD BY WESTERGREN METHOD: 20 MM/H
EST. GFR  (AFRICAN AMERICAN): >60 ML/MIN/1.73 M^2
EST. GFR  (NON AFRICAN AMERICAN): >60 ML/MIN/1.73 M^2
FIO2: 70
GLUCOSE SERPL-MCNC: 108 MG/DL
HCO3 UR-SCNC: 40.5 MMOL/L (ref 24–28)
HCT VFR BLD AUTO: 44 %
HGB BLD-MCNC: 12 G/DL
HYPOCHROMIA BLD QL SMEAR: ABNORMAL
INR PPP: 1.1
IP: 10
LYMPHOCYTES # BLD AUTO: 1.8 K/UL
LYMPHOCYTES NFR BLD: 23.2 %
MCH RBC QN AUTO: 24.8 PG
MCHC RBC AUTO-ENTMCNC: 27.3 %
MCV RBC AUTO: 91 FL
MODE: ABNORMAL
MONOCYTES # BLD AUTO: 0.8 K/UL
MONOCYTES NFR BLD: 9.7 %
NEUTROPHILS # BLD AUTO: 5 K/UL
NEUTROPHILS NFR BLD: 65.7 %
PCO2 BLDA: 109.4 MMHG (ref 35–45)
PH SMN: 7.18 [PH] (ref 7.35–7.45)
PLATELET # BLD AUTO: 164 K/UL
PMV BLD AUTO: ABNORMAL FL
PO2 BLDA: 90 MMHG (ref 80–100)
POC BE: 7 MMOL/L
POC SATURATED O2: 93 % (ref 95–100)
POC TCO2: 44 MMOL/L (ref 23–27)
POLYCHROMASIA BLD QL SMEAR: ABNORMAL
POTASSIUM SERPL-SCNC: 4.3 MMOL/L
PROT SERPL-MCNC: 7.5 G/DL
PROTHROMBIN TIME: 11.4 SEC
RBC # BLD AUTO: 4.83 M/UL
SAMPLE: ABNORMAL
SITE: ABNORMAL
SODIUM SERPL-SCNC: 143 MMOL/L
SP02: 99
SPONT RATE: 20
STOMATOCYTES BLD QL SMEAR: PRESENT
TROPONIN I SERPL DL<=0.01 NG/ML-MCNC: 0.03 NG/ML
WBC # BLD AUTO: 7.7 K/UL

## 2017-04-06 PROCEDURE — 81025 URINE PREGNANCY TEST: CPT | Performed by: EMERGENCY MEDICINE

## 2017-04-06 PROCEDURE — 25000003 PHARM REV CODE 250: Performed by: EMERGENCY MEDICINE

## 2017-04-06 PROCEDURE — 85025 COMPLETE CBC W/AUTO DIFF WBC: CPT

## 2017-04-06 PROCEDURE — 5A1955Z RESPIRATORY VENTILATION, GREATER THAN 96 CONSECUTIVE HOURS: ICD-10-PCS | Performed by: INTERNAL MEDICINE

## 2017-04-06 PROCEDURE — 25000003 PHARM REV CODE 250: Performed by: INTERNAL MEDICINE

## 2017-04-06 PROCEDURE — 93005 ELECTROCARDIOGRAM TRACING: CPT

## 2017-04-06 PROCEDURE — 25500020 PHARM REV CODE 255: Performed by: EMERGENCY MEDICINE

## 2017-04-06 PROCEDURE — 63600175 PHARM REV CODE 636 W HCPCS: Performed by: EMERGENCY MEDICINE

## 2017-04-06 PROCEDURE — 94640 AIRWAY INHALATION TREATMENT: CPT

## 2017-04-06 PROCEDURE — 84484 ASSAY OF TROPONIN QUANT: CPT

## 2017-04-06 PROCEDURE — 27000221 HC OXYGEN, UP TO 24 HOURS

## 2017-04-06 PROCEDURE — 96365 THER/PROPH/DIAG IV INF INIT: CPT

## 2017-04-06 PROCEDURE — 63600175 PHARM REV CODE 636 W HCPCS: Performed by: INTERNAL MEDICINE

## 2017-04-06 PROCEDURE — 96366 THER/PROPH/DIAG IV INF ADDON: CPT

## 2017-04-06 PROCEDURE — 80053 COMPREHEN METABOLIC PANEL: CPT

## 2017-04-06 PROCEDURE — 25000242 PHARM REV CODE 250 ALT 637 W/ HCPCS: Performed by: EMERGENCY MEDICINE

## 2017-04-06 PROCEDURE — 85610 PROTHROMBIN TIME: CPT

## 2017-04-06 PROCEDURE — 83880 ASSAY OF NATRIURETIC PEPTIDE: CPT

## 2017-04-06 PROCEDURE — 99285 EMERGENCY DEPT VISIT HI MDM: CPT | Mod: 25

## 2017-04-06 PROCEDURE — 0BH17EZ INSERTION OF ENDOTRACHEAL AIRWAY INTO TRACHEA, VIA NATURAL OR ARTIFICIAL OPENING: ICD-10-PCS | Performed by: INTERNAL MEDICINE

## 2017-04-06 PROCEDURE — 12000002 HC ACUTE/MED SURGE SEMI-PRIVATE ROOM

## 2017-04-06 RX ORDER — RAMELTEON 8 MG/1
8 TABLET ORAL NIGHTLY PRN
Status: DISCONTINUED | OUTPATIENT
Start: 2017-04-06 | End: 2017-05-01

## 2017-04-06 RX ORDER — FAMOTIDINE 20 MG/1
20 TABLET, FILM COATED ORAL DAILY
Status: DISCONTINUED | OUTPATIENT
Start: 2017-04-07 | End: 2017-04-06

## 2017-04-06 RX ORDER — AMOXICILLIN 250 MG
1 CAPSULE ORAL 2 TIMES DAILY
Status: DISCONTINUED | OUTPATIENT
Start: 2017-04-06 | End: 2017-04-06

## 2017-04-06 RX ORDER — CARVEDILOL 3.12 MG/1
3.12 TABLET ORAL 2 TIMES DAILY WITH MEALS
Status: DISCONTINUED | OUTPATIENT
Start: 2017-04-06 | End: 2017-04-22

## 2017-04-06 RX ORDER — HYDROCHLOROTHIAZIDE 12.5 MG/1
12.5 TABLET ORAL DAILY
Status: DISCONTINUED | OUTPATIENT
Start: 2017-04-07 | End: 2017-04-10

## 2017-04-06 RX ORDER — SODIUM CHLORIDE 0.9 % (FLUSH) 0.9 %
3 SYRINGE (ML) INJECTION EVERY 8 HOURS
Status: DISCONTINUED | OUTPATIENT
Start: 2017-04-06 | End: 2017-04-06

## 2017-04-06 RX ORDER — HEPARIN SODIUM 5000 [USP'U]/ML
5000 INJECTION, SOLUTION INTRAVENOUS; SUBCUTANEOUS EVERY 8 HOURS
Status: DISCONTINUED | OUTPATIENT
Start: 2017-04-06 | End: 2017-04-06

## 2017-04-06 RX ORDER — ETODOLAC 200 MG/1
200 CAPSULE ORAL EVERY 8 HOURS PRN
Status: DISCONTINUED | OUTPATIENT
Start: 2017-04-06 | End: 2017-04-06

## 2017-04-06 RX ORDER — CLONIDINE HYDROCHLORIDE 0.1 MG/1
0.1 TABLET ORAL 3 TIMES DAILY PRN
Status: DISCONTINUED | OUTPATIENT
Start: 2017-04-06 | End: 2017-05-01

## 2017-04-06 RX ORDER — ACETAMINOPHEN 325 MG/1
650 TABLET ORAL EVERY 6 HOURS PRN
Status: DISCONTINUED | OUTPATIENT
Start: 2017-04-06 | End: 2017-04-06

## 2017-04-06 RX ORDER — IPRATROPIUM BROMIDE AND ALBUTEROL SULFATE 2.5; .5 MG/3ML; MG/3ML
3 SOLUTION RESPIRATORY (INHALATION) EVERY 4 HOURS
Status: DISCONTINUED | OUTPATIENT
Start: 2017-04-06 | End: 2017-04-06

## 2017-04-06 RX ORDER — ACETAMINOPHEN 500 MG
500 TABLET ORAL EVERY 6 HOURS PRN
Status: DISCONTINUED | OUTPATIENT
Start: 2017-04-06 | End: 2017-05-01

## 2017-04-06 RX ORDER — HYDROCODONE BITARTRATE AND ACETAMINOPHEN 5; 325 MG/1; MG/1
1 TABLET ORAL EVERY 4 HOURS PRN
Status: DISCONTINUED | OUTPATIENT
Start: 2017-04-06 | End: 2017-04-11

## 2017-04-06 RX ORDER — ONDANSETRON 2 MG/ML
8 INJECTION INTRAMUSCULAR; INTRAVENOUS EVERY 8 HOURS PRN
Status: DISCONTINUED | OUTPATIENT
Start: 2017-04-06 | End: 2017-05-01

## 2017-04-06 RX ORDER — LISINOPRIL 5 MG/1
5 TABLET ORAL DAILY
Status: DISCONTINUED | OUTPATIENT
Start: 2017-04-07 | End: 2017-04-10

## 2017-04-06 RX ORDER — HYDROCODONE BITARTRATE AND ACETAMINOPHEN 10; 325 MG/1; MG/1
1 TABLET ORAL EVERY 4 HOURS PRN
Status: DISCONTINUED | OUTPATIENT
Start: 2017-04-06 | End: 2017-04-06

## 2017-04-06 RX ORDER — IPRATROPIUM BROMIDE AND ALBUTEROL SULFATE 2.5; .5 MG/3ML; MG/3ML
3 SOLUTION RESPIRATORY (INHALATION) EVERY 4 HOURS PRN
Status: DISCONTINUED | OUTPATIENT
Start: 2017-04-06 | End: 2017-05-01

## 2017-04-06 RX ORDER — ENOXAPARIN SODIUM 100 MG/ML
40 INJECTION SUBCUTANEOUS EVERY 24 HOURS
Status: DISCONTINUED | OUTPATIENT
Start: 2017-04-06 | End: 2017-05-01

## 2017-04-06 RX ORDER — ONDANSETRON 2 MG/ML
4 INJECTION INTRAMUSCULAR; INTRAVENOUS EVERY 12 HOURS PRN
Status: DISCONTINUED | OUTPATIENT
Start: 2017-04-06 | End: 2017-04-06

## 2017-04-06 RX ADMIN — IPRATROPIUM BROMIDE AND ALBUTEROL SULFATE 3 ML: .5; 3 SOLUTION RESPIRATORY (INHALATION) at 07:04

## 2017-04-06 RX ADMIN — Medication 3 ML: at 10:04

## 2017-04-06 RX ADMIN — IOHEXOL 70 ML: 350 INJECTION, SOLUTION INTRAVENOUS at 04:04

## 2017-04-06 RX ADMIN — PIPERACILLIN SODIUM AND TAZOBACTAM SODIUM 4.5 G: 4; .5 INJECTION, POWDER, LYOPHILIZED, FOR SOLUTION INTRAVENOUS at 06:04

## 2017-04-06 RX ADMIN — HYDROCODONE BITARTRATE AND ACETAMINOPHEN 1 TABLET: 5; 325 TABLET ORAL at 10:04

## 2017-04-06 RX ADMIN — ENOXAPARIN SODIUM 40 MG: 100 INJECTION SUBCUTANEOUS at 10:04

## 2017-04-06 RX ADMIN — CARVEDILOL 3.12 MG: 3.12 TABLET, FILM COATED ORAL at 10:04

## 2017-04-06 RX ADMIN — RAMELTEON 8 MG: 8 TABLET, FILM COATED ORAL at 10:04

## 2017-04-06 NOTE — BRIEF OP NOTE
Patient short of breath and complains of chest pain.    O2 sat   57%      Breathe sounds :crackles at bases          O2 at 2 L via N/C applied .     O2 sat up to 87 %       Transported to ER via W/C

## 2017-04-06 NOTE — ED NOTES
Pt in room lying on right side  Bed in lowest position, side rails up x2  With nonrebreather mask in use pt. NAD noted at this time.

## 2017-04-06 NOTE — IP AVS SNAPSHOT
[place] [address] [place] [address]  Phone: [phone]           Patient Discharge Instructions   Our goal is to set you up for success. This packet includes information on your condition, medications, and your home care.  It will help you care for yourself to prevent having to return to the hospital.     Please ask your nurse if you have any questions.      There are many details to remember when preparing to leave the hospital. Here is what you will need to do:    1. Take your medicine. If you are prescribed medications, review your Medication List on the following pages. You may have new medications to  at the pharmacy and others that you'll need to stop taking. Review the instructions for how and when to take your medications. Talk with your doctor or nurses if you are unsure of what to do.     2. Go to your follow-up appointments. Specific follow-up information is listed in the following pages. Your may be contacted by a nurse or clinical provider about future appointments. Be sure we have all of the phone numbers to reach you. Please contact your provider's office if you are unable to make an appointment.     3. Watch for warning signs. Your doctor or nurse will give you detailed warning signs to watch for and when to call for assistance. These instructions may also include educational information about your condition. If you experience any of warning signs to your health, call your doctor.               ** Verify the list of medication(s) below is accurate and up to date. Carry this with you in case of emergency. If your medications have changed, please notify your healthcare provider.             Medication List      START taking these medications        Additional Info                      acetaminophen 500 MG tablet   Commonly known as:  TYLENOL   Refills:  0   Dose:  500 mg    Last time this was given:  500 mg on 4/16/2017  8:23 AM   Instructions:  Take 1  tablet (500 mg total) by mouth every 6 (six) hours as needed.     Begin Date    AM    Noon    PM    Bedtime       * albuterol-ipratropium 2.5mg-0.5mg/3mL 0.5 mg-3 mg(2.5 mg base)/3 mL nebulizer solution   Commonly known as:  DUO-NEB   Refills:  0   Dose:  3 mL    Last time this was given:  3 mLs on 5/8/2017  8:08 AM   Instructions:  Take 3 mLs by nebulization every 6 (six) hours. Rescue     Begin Date    AM    Noon    PM    Bedtime       * albuterol-ipratropium 2.5mg-0.5mg/3mL 0.5 mg-3 mg(2.5 mg base)/3 mL nebulizer solution   Commonly known as:  DUO-NEB   Refills:  0   Dose:  3 mL    Last time this was given:  3 mLs on 5/8/2017  8:08 AM   Instructions:  Take 3 mLs by nebulization every 4 (four) hours as needed for Wheezing. Rescue     Begin Date    AM    Noon    PM    Bedtime       dextrose 5 % SolP 50 mL with promethazine 25 mg/mL Soln 12.5 mg   Refills:  0   Dose:  12.5 mg    Instructions:  Inject 12.5 mg into the vein every 6 (six) hours as needed.     Begin Date    AM    Noon    PM    Bedtime       enoxaparin 40 mg/0.4 mL Syrg   Commonly known as:  LOVENOX   Refills:  0   Dose:  40 mg    Last time this was given:  40 mg on 5/7/2017  4:48 PM   Instructions:  Inject 0.4 mLs (40 mg total) into the skin once daily at 6am.     Begin Date    AM    Noon    PM    Bedtime       insulin detemir 100 unit/mL (3 mL) Inpn pen   Commonly known as:  LEVEMIR FLEXTOUCH   Refills:  0   Dose:  25 Units    Last time this was given:  35 Units on 5/7/2017  9:11 PM   Instructions:  Inject 25 Units into the skin every evening.     Begin Date    AM    Noon    PM    Bedtime       midodrine 5 MG Tab   Commonly known as:  PROAMATINE   Refills:  0   Dose:  5 mg    Last time this was given:  5 mg on 5/8/2017  6:00 AM   Instructions:  Take 1 tablet (5 mg total) by mouth 3 (three) times daily.     Begin Date    AM    Noon    PM    Bedtime       pantoprazole 40 mg injection   Commonly known as:  PROTONIX   Refills:  0   Dose:  40 mg    Last  time this was given:  40 mg on 5/7/2017  8:33 AM   Instructions:  Inject 40 mg into the vein once daily.     Begin Date    AM    Noon    PM    Bedtime       * Notice:  This list has 2 medication(s) that are the same as other medications prescribed for you. Read the directions carefully, and ask your doctor or other care provider to review them with you.      STOP taking these medications     albuterol 90 mcg/actuation inhaler       azithromycin 250 MG tablet   Commonly known as:  Z-REGULO       carvedilol 3.125 MG tablet   Commonly known as:  COREG       etodolac 200 MG Cap   Commonly known as:  LODINE       hydrochlorothiazide 12.5 MG Tab   Commonly known as:  HYDRODIURIL       lisinopril 5 MG tablet   Commonly known as:  PRINIVIL,ZESTRIL       OXYGEN-AIR DELIVERY SYSTEMS [facility] [place]            Where to Get Your Medications      Information about where to get these medications is not yet available     ! Ask your nurse or doctor about these medications     acetaminophen 500 MG tablet    albuterol-ipratropium 2.5mg-0.5mg/3mL 0.5 mg-3 mg(2.5 mg base)/3 mL nebulizer solution    albuterol-ipratropium 2.5mg-0.5mg/3mL 0.5 mg-3 mg(2.5 mg base)/3 mL nebulizer solution    dextrose 5 % SolP 50 mL with promethazine 25 mg/mL Soln 12.5 mg    enoxaparin 40 mg/0.4 mL Syrg    insulin detemir 100 unit/mL (3 mL) Inpn pen    midodrine 5 MG Tab    pantoprazole 40 mg injection                  Please bring to all follow up appointments:    1. A copy of your discharge instructions.  2. All medicines you are currently taking in their original bottles.  3. Identification and insurance card.    Please arrive 15 minutes ahead of scheduled appointment time.    Please call 24 hours in advance if you must reschedule your appointment and/or time.        Follow-up Information     Follow up with Ferny Iglesias MD In 2 weeks.    Specialty:  Family Medicine    Contact information:    [address]  [facility] [address] [phone]          Follow  "up with Fabio Sandoval MD In 4 weeks.    Specialty:  General Surgery    Contact information:    [address] [place] [address]  [id number] [phone]          Discharge Instructions     Future Orders    Activity as tolerated     Diet general     Questions:    Total calories:      Fat restriction, if any:      Protein restriction, if any:      Na restriction, if any:      Fluid restriction:      Additional restrictions:          Primary Diagnosis     Your primary diagnosis was:  Respiratory Insufficiency      Admission Information     Date & Time Provider Department CSN    4/6/2017  3:19 PM Antonieta Carpenter MD [facility] [id number]      Care Providers     Provider Role Specialty Primary office phone    Antonieta Carpenter MD Attending Provider Hospitalist [phone]    Perico Solis MD Consulting Physician  Pulmonary Disease [phone]    Gus Phillips MD Consulting Physician  Neurology [phone]    Bird Vega MD Surgeon  Pulmonary Disease [phone]    Kory Escamilla MD Consulting Physician  Otolaryngology [phone]    Ricky Knutson MD Consulting Physician  Anesthesiology [phone]    Osvaldo Del Angel MD Consulting Physician  Anesthesiology [phone]    Elia Salcedo MD Surgeon  Otolaryngology [phone]    Kristin Sadler MD Consulting Physician  Infectious Diseases [phone]      Your Vitals Were     BP Pulse Temp Resp Height Weight    90/59 84 99.1 °F (37.3 °C) (Oral) 12 4' 2" (1.27 m) 76.2 kg (167 lb 15.9 oz)    SpO2 BMI             100% 47.24 kg/m2         Recent Lab Values        4/24/2017                           1:18 AM           A1C 7.3 (H)           Comment for A1C at  1:18 AM on 4/24/2017:  According to ADA guidelines, hemoglobin A1C <7.0% represents  optimal control in non-pregnant diabetic patients.  Different  metrics may apply to specific populations.   Standards of Medical Care in Diabetes - 2016.  For the " purpose of screening for the presence of diabetes:  <5.7%     Consistent with the absence of diabetes  5.7-6.4%  Consistent with increasing risk for diabetes   (prediabetes)  >or=6.5%  Consistent with diabetes  Currently no consensus exists for use of hemoglobin A1C  for diagnosis of diabetes for children.        Pending Labs     Order Current Status    Cortisol In process    Blood culture Preliminary result    Blood culture Preliminary result      Allergies as of 5/8/2017     No Known Allergies      [facility]     [facility] Nurse Care Line - 24/7 Assistance  Unless otherwise directed by your provider, please contact [facility], our nurse care line that is available for 24/7 assistance.     Registered nurses in the [facility] provide clinical advisement, health education, appointment booking, and other advisory services.  Call for this free service at [phone].        Advance Directives     An advance directive is a document which, in the event you are no longer able to make decisions for yourself, tells your healthcare team what kind of treatment you do or do not want to receive, or who you would like to make those decisions for you.  If you do not currently have an advance directive, [facility] encourages you to create one.  For more information call:  [phone]), [phone] ([phone]),  or log on to [email or web address].        Language Assistance Services     ATTENTION: Language assistance services are available, free of charge. Please call [phone].      ATENCIÓN: Si habla español, tiene a frederick disposición servicios gratuitos de asistencia lingüística. Llame al [phone].     CHÚ Ý: N?u b?n nói Ti?ng Vi?t, có các d?ch v? h? tr? ngôn ng? mi?n phí dành cho b?n. G?i s? [phone].        Heart Failure Education       Heart Failure: Being Active  You have a condition called heart failure. Being active doesnt mean that you have to wear yourself  out. Even a little movement each day helps to strengthen your heart. If you cant get out to exercise, you can do simple stretching and strengthening exercises at home. These are good ways to keep you well-conditioned and prevent you and your heart from becoming excessively weak.    Ideas to get you started  · Add a little movement to things you do now. Walk to mail letters. Park your car at the far end of the parking lot and walk to the store. Walk up a flight of stairs instead of taking the elevator.  · Choose activities you enjoy. You might walk, swim, or ride an exercise bike. Things like gardening and washing the car count, too. Other possibilities include: washing dishes, walking the dog, walking around the mall, and doing aerobic activities with friends.  · Join a group exercise program at a [facility] or [facility], a senior center, or a community center. Or look into a hospital cardiac rehabilitation program. Ask your doctor if you qualify.  Tips to keep you going  · Get up and get dressed each day. Go to a coffee shop and read a newspaper or go somewhere that you'll be in the presence of other active people. Youll feel more like being active.  · Make a plan. Choose one or more activities that you enjoy and that you can easily do. Then plan to do at least one each day. You might write your plan on a calendar.  · Go with a friend or a group if you like company. This can help you feel supported and stay motivated, too.  · Plan social events that you enjoy. This will keep you mentally engaged as well as physically motivated to do things you find pleasure in.  For your safety  · Talk with your healthcare provider before starting an exercise program.  · Exercise indoors when its too hot or too cold outside, or when the air quality is poor. Try walking at a shopping mall.  · Wear socks and sturdy shoes to maintain your balance and prevent falls.  · Start slowly. Do a few minutes several times a day at first. Increase your  time and speed little by little.  · Stop and rest whenever you feel tired or get short of breath.  · Dont push yourself on days when you dont feel well.  Date Last Reviewed: 3/20/2016  © 6986-1769 [employer]. [address] [place] [address]. All rights reserved. This information is not intended as a substitute for professional medical care. Always follow your healthcare professional's instructions.              Heart Failure: Evaluating Your Heart  You have a condition called heart failure. To evaluate your condition, your doctor will examine you, ask questions, and do some tests. Along with looking for signs of heart failure, the doctor looks for any other health problems that may have led to heart failure. The results of your evaluation will help your doctor form a treatment plan.  Health history and physical exam  Your visit will start with a health history. Tell the doctor about any symptoms youve noticed and about all medicines you take. Then youll have a physical exam. This includes listening to your heartbeat and breathing. Youll also be checked for swelling (edema) in your legs and neck. When you have fluid buildup or fluid in the lungs, it may be called congestive heart failure.  Diagnosing heart failure     During an echocardiogram, sound waves bounce off the heart. These are converted into a picture on the screen.   The following may be done to help your doctor form a diagnosis:  · X-rays show the size and shape of your heart. These pictures can also show fluid in your lungs.  · An electrocardiogram (ECG or EKG) shows the pattern of your heartbeat. Small pads (electrodes) are placed on your chest, arms, and legs. Wires connect the pads to the ECG machine, which records your hearts electrical signals. This can give the doctor information about heart function.  · An echocardiogram uses ultrasound waves to show the structure and movement of your heart muscle. This shows how  well the heart pumps. It also shows the thickness of the heart walls, and if the heart is enlarged. It is one of the most useful, non-invasive tests as it provides information about the heart's general function. This helps your doctor make treatment decisions.  · Lab tests evaluate small amounts of blood or urine for signs of problems. A BNP lab test can help diagnose and evaluate heart failure. BNP stands for B-type natriuretic peptide. The ventricles secrete more BNP when heart failure worsens. Lab tests can also provide information about metabolic dysfunction or heart dysfunction.  Your treatment plan  Based on the results of your evaluation and tests, your doctor will develop a treatment plan. This plan is designed to relieve some of your heart failure symptoms and help make you more comfortable. Your treatment plan may include:  · Medicine to help your heart work better and improve your quality of life  · Changes in what you eat and drink to help prevent fluid from backing up in your body  · Daily monitoring of your weight and heart failure symptoms to see how well your treatment plan is working  · Exercise to help you stay healthy  · Help with quitting smoking  · Emotional and psychological support to help adjust to the changes  · Referrals to other specialists to make sure you are being treated comprehensively  Date Last Reviewed: 3/21/2016  © 3062-2266 [employer]. [address] [place] [address], [place] [address]. All rights reserved. This information is not intended as a substitute for professional medical care. Always follow your healthcare professional's instructions.              Heart Failure: Making Changes to Your Diet  You have a condition called heart failure. When you have heart failure, excess fluid is more likely to build up in your body because your heart isn't working well. This makes the heart work harder to pump blood. Fluid buildup causes symptoms such as shortness of breath and  swelling (edema). This is often referred to as congestive heart failure or CHF. Controlling the amount of salt (sodium) you eat may help stop fluid from building up. Your doctor may also tell you to reduce the amount of fluid you drink.  Reading food labels    Your healthcare provider will tell you how much sodium you can eat each day. Read food labels to keep track. Keep in mind that certain foods are high in salt. These include canned, frozen, and processed foods. Check the amount of sodium in each serving. Watch out for high-sodium ingredients. These include MSG (monosodium glutamate), baking soda, and sodium phosphate.   Eating less salt  Give yourself time to get used to eating less salt. It may take a little while. Here are some tips to help:  · Take the saltshaker off the table. Replace it with salt-free herb mixes and spices.  · Eat fresh or plain frozen vegetables. These have much less salt than canned vegetables.  · Choose low-sodium snacks like sodium-free pretzels, crackers, or air-popped popcorn.  · Dont add salt to your food when youre cooking. Instead, season your foods with pepper, lemon, garlic, or onion.  · When you eat out, ask that your food be cooked without added salt.  · Avoid eating fried foods as these often have a great deal of salt.  If youre told to limit fluids  You may need to limit how much fluid you have to help prevent swelling. This includes anything that is liquid at room temperature, such as ice cream and soup. If your doctor tells you to limit fluid, try these tips:  · Measure drinks in a measuring cup before you drink them. This will help you meet daily goals.  · Chill drinks to make them more refreshing.  · Suck on frozen lemon wedges to quench thirst.  · Only drink when youre thirsty.  · Chew sugarless gum or suck on hard candy to keep your mouth moist.  · Weigh yourself daily to know if your body's fluid content is rising.  My sodium goal  Your healthcare provider may  give you a sodium goal to meet each day. This includes sodium found in food as well as salt that you add. My goal is to eat no more than ___________ mg of sodium per day.     When to call your doctor  Call your doctor right away if you have any symptoms of worsening heart failure. These can include:  · Sudden weight gain  · Increased swelling of your legs or ankles  · Trouble breathing when youre resting or at night  · Increase in the number of pillows you have to sleep on  · Chest pain, pressure, discomfort, or pain in the jaw, neck, or back   Date Last Reviewed: 3/21/2016  © 8744-7397 [employer]. [address] [place] [address], [place] [address]. All rights reserved. This information is not intended as a substitute for professional medical care. Always follow your healthcare professional's instructions.              Heart Failure: Medicines to Help Your Heart    You have a condition called heart failure (also known as congestive heart failure, or CHF). Your doctor will likely prescribe medicines for heart failure and any underlying health problems you have. Most heart failure patients take one or more types of medicinen. Your healthcare provider will work to find the combination of medicines that works best for you.  Heart failure medicines  Here are the most common heart failure medicines:  · ACE inhibitors lower blood pressure and decrease strain on the heart. This makes it easier for the heart to pump. Angiotensin receptor blockers have similar effects. These are prescribed for some patients instead of ACE inhibitors.  · Beta-blockers relieve stress on the heart. They also improve symptoms. They may also improve the heart's pumping action over time.  · Diuretics (also called water pills) help rid your body of excess water. This can help rid your body of swelling (edema). Having less fluid to pump means your heart doesnt have to work as hard. Some diuretics make your body lose a mineral called  potassium. Your doctor will tell you if you need to take supplements or eat more foods high in potassium.  · Digoxin helps your heart pump with more strength. This helps your heart pump more blood with each beat. So, more oxygen-rich blood travels to the rest of the body.  · Aldosterone antagonists help alter hormones and decrease strain on the heart.  · Hydralazine and nitrates are two separate medicines used together to treat heart failure. They may come in one combination pill. They lower blood pressure and decrease how hard the heart has to pump.  Medicines for related conditions  Controlling other heart problems helps keep heart failure under control, too. Depending on other heart problems you have, medicines may be prescribed to:  · Lower blood pressure (antihypertensives).  · Lower cholesterol levels (statins).  · Prevent blood clots (anticoagulants or aspirin).  · Keep the heartbeat steady (antiarrhythmics).  Date Last Reviewed: 3/5/2016  © 5035-4997 [employer]. [address] [place] [address]. All rights reserved. This information is not intended as a substitute for professional medical care. Always follow your healthcare professional's instructions.              Heart Failure: Procedures That May Help    The heart is a muscle that pumps oxygen-rich blood to all parts of the body. When you have heart failure, the heart is not able to pump as well as it should. Blood and fluid may back up into the lungs (congestive heart failure), and some parts of the body dont get enough oxygen-rich blood to work normally. These problems lead to the symptoms of heart failure.     Certain procedures may help the heart pump better in some cases of heart failure. Some procedures are done to treat health problems that may have caused the heart failure such as coronary artery disease or heart rhythm problems. For more serious heart failure, other options are available.  Treating artery and valve  problems  If you have coronary artery disease or valve disease, procedures may be done to improve blood flow. This helps the heart pump better, which can improve heart failure symptoms. First, your doctor may do a cardiac catheterization to help detect clogged blood vessels or valve damage. During this procedure, a  thin tube (catheter) in inserted into a blood vessel and guided to the heart. There a dye is injected and a special type of X-ray (angiogram) is taken of the blood vessels. Procedures to open a blocked artery or fix damaged valves can also be done using catheterization.  · Angioplasty uses a balloon-tipped instrument at the end of the catheter. The balloon is inflated to widen the narrowed artery. In many cases, a stent is expanded to further support the narrowed artery. A stent is a metal mesh tube.  · Valve surgery repairs or replacement of faulty valves can also be done during catheterization so blood can flow properly through the chambers of the heart.  Bypass surgery is another option to help treat blocked arteries. It uses a healthy blood vessel from elsewhere in the body. The healthy blood vessel is attached above and below the blocked area so that blood can flow around the blocked artery.  Treating heart rhythm problems  A device may be placed in the chest to help a weak heart maintain a healthy, heartbeat so the heart can pump more effectively:  · Pacemaker. A pacemaker is an implanted device that regulates your heartbeat electronically. It monitors your heart's rhythm and generates a painless electric impulse that helps the heart beat in a regular rhythm. A pacemaker is programmed to meet your specific heart rhythm needs.  · Biventricular pacing/cardiac resynchronization therapy. A type of pacemaker that paces both pumping chambers of the heart at the same time to coordinate contractions and to improve the heart's function. Some people with heart failure are candidates for this  therapy.  · Implantable cardioverter defibrillator. A device similar to a pacemaker that senses when the heart is beating too fast and delivers an electrical shock to convert the fast rhythm to a normal rhythm. This can be a life saving device.  In severe cases  In more serious cases of heart failure when other treatments no longer work, other options may include:  · Ventricular assist devices (VADs). These are mechanical devices used to take over the pumping function for one or both of the heart's ventricles, or pumping chambers. A VAD may be necessary when heart failure progresses to the point that medicines and other treatments no longer help. In some cases, a VAD may be used as a bridge to transplant.  · Heart transplant. This is replacing the diseased heart with a healthy one from a donor. This is an option for a few people who are very sick. A heart transplant is very serious and not an option for all patients. Your doctor can tell you more.  Date Last Reviewed: 3/20/2016  © 0704-0637 [employer]. [address] [place] [address], [place] [address]. All rights reserved. This information is not intended as a substitute for professional medical care. Always follow your healthcare professional's instructions.              Heart Failure: Tracking Your Weight  You have a condition called heart failure. When you have heart failure, a sudden weight gain or a steady rise in weight is a warning sign that your body is retaining too much water and salt. This could mean your heart failure is getting worse. If left untreated, it can cause problems for your lungs and result in shortness of breath. Weighing yourself each day is the best way to know if youre retaining water. If your weight goes up quickly, call your doctor. You will be given instructions on how to get rid of the excess water. You will likely need medicines and to avoid salt. This will help your heart work better.  Call your doctor if you gain more than 2  pounds in 1 day, more than 5 pounds in 1 week, or whatever weight gain you were told to report by your doctor. This is often a sign of worsening heart failure and needs to be evaluated and treated. Your doctor will tell you what to do next.   Tips for weighing yourself    · Weigh yourself at the same time each morning, wearing the same clothes. Weigh yourself after urinating and before eating.  · Use the same scale each day. Make sure the numbers are easy to read. Put the scale on a flat, hard surface -- not on a rug or carpet.  · Do not stop weighing yourself. If you forget one day, weigh again the next morning.  How to use your weight chart  · Keep your weight chart near the scale. Write your weight on the chart as soon as you get off the scale.  · Fill in the month and the start date on the chart. Then write down your weight each day. Your chart will look like this:    · If you miss a day, leave the space blank. Weigh yourself the next day and write your weight in the next space.  · Take your weight chart with you when you go to see your doctor.  Date Last Reviewed: 3/20/2016  © 6281-2816 [employer]. [address] [place] [address], [place] [address]. All rights reserved. This information is not intended as a substitute for professional medical care. Always follow your healthcare professional's instructions.              Heart Failure: Warning Signs of a Flare-Up  You have a condition called heart failure. Once you have heart failure, flare-ups can happen. Below are signs that can mean your heart failure is getting worse. If you notice any of these warning signs, call your healthcare provider.  Swelling    · Your feet, ankles, or lower legs get puffier.  · You notice skin changes on your lower legs.  · Your shoes feel too tight.  · Your clothes are tighter in the waist.  · You have trouble getting rings on or off your fingers.  Shortness of breath  · You have to breathe harder even when youre doing your  normal activities or when youre resting.  · You are short of breath walking up stairs or even short distances.  · You wake up at night short of breath or coughing.  · You need to use more pillows or sit up to sleep.  · You wake up tired or restless.  Other warning signs  · You feel weaker, dizzy, or more tired.  · You have chest pain or changes in your heartbeat.  · You have a cough that wont go away.  · You cant remember things or dont feel like eating.  Tracking your weight  Gaining weight is often the first warning sign that heart failure is getting worse. Gaining even a few pounds can be a sign that your body is retaining excess water and salt. Weighing yourself each day in the morning after you urinate and before you eat, is the best way to know if you're retaining water. Get a scale that is easy to read and make sure you wear the same clothes and use the same scale every time you weigh. Your healthcare provider will show you how to track your weight. Call your doctor if you gain more than 2 pounds in 1 day, 5 pounds in 1 week, or whatever weight gain you were told to report by your doctor. This is often a sign of worsening heart failure and needs to be evaluated and treated before it compromises your breathing. Your doctor will tell you what to do next.    Date Last Reviewed: 3/15/2016  © 3368-5131 [employer]. [address] [place] [address]. All rights reserved. This information is not intended as a substitute for professional medical care. Always follow your healthcare professional's instructions.              Pneumonmia Discharge Instructions                [facility] Sign-Up     Activating your [facility] account is as easy as 1-2-3!     1) Visit [email or web address], select Sign Up Now, enter this activation code and your date of birth, then select Next.  [email or web address]  Expires: 5/14/2017 12:16 AM      2) Create a username and password to use when you visit [facility] in the future  and select a security question in case you lose your password and select Next.    3) Enter your e-mail address and click Sign Up!    Additional Information  If you have questions, please e-mail [email or web address] or call [phone] to talk to our [facility] staff. Remember, [facility] is NOT to be used for urgent needs. For medical emergencies, dial 911.          [facility] complies with applicable Federal civil rights laws and does not discriminate on the basis of race, color, national origin, age, disability, or sex.

## 2017-04-06 NOTE — ED PROVIDER NOTES
Encounter Date: 4/6/2017    SCRIBE #1 NOTE: I, Chay Bagley, am scribing for, and in the presence of,  Earl Saunders MD. I have scribed the following portions of the note - Other sections scribed: HPI and ROS.       History     Chief Complaint   Patient presents with    Shortness of Breath     short of breath.. was getting preoped and told she had low 02 of 88 and tachycardia.. short of breath when speaking sentences.. patient thinks she has a touch of pneumonia    Chest Pain     midsternal chest pain... no cardiologist     Review of patient's allergies indicates:  No Known Allergies  HPI Comments: Chief Complaint: SOB    HPI: This 46 y.o. Female with CHF and mild MR presents to the ED c/o SOB. Patient was scheduled to received breast mass removal today. However, patient presented low O2 levels during pre-op. Patient was sent to this ED for further evaluation. There's an associated cough and fatigue. Further hx is limited, patient is a poor historian. No alleviating or exacerbating factors.     The history is provided by the patient and a relative. No  was used.     Past Medical History:   Diagnosis Date    CHF (congestive heart failure)     Learning difficulty      Past Surgical History:   Procedure Laterality Date    BREAST SURGERY Left     biopsy    CYST REMOVAL      shoulder      Family History   Problem Relation Age of Onset    Diabetes      Coronary artery disease       Social History   Substance Use Topics    Smoking status: Never Smoker    Smokeless tobacco: Never Used    Alcohol use Yes      Comment: OCCASSIONAL     Review of Systems   Constitutional: Positive for fatigue. Negative for chills and fever.   HENT: Negative for ear pain and sore throat.    Eyes: Negative for visual disturbance.   Respiratory: Positive for cough and shortness of breath.    Cardiovascular: Negative for chest pain.   Gastrointestinal: Negative for abdominal pain, constipation, diarrhea, nausea and  vomiting.   Genitourinary: Negative for difficulty urinating and dysuria.   Musculoskeletal: Negative for back pain.   Skin: Negative for rash.   Neurological: Negative for dizziness, weakness, light-headedness and headaches.       Physical Exam   Initial Vitals   BP Pulse Resp Temp SpO2   04/06/17 1420 04/06/17 1420 04/06/17 1420 04/06/17 1420 04/06/17 1420   131/80 109 24 98.8 °F (37.1 °C) 90 %     Physical Exam    Nursing note and vitals reviewed.  Constitutional: She appears well-developed and well-nourished. No distress.   HENT:   Head: Normocephalic and atraumatic.   Eyes: Conjunctivae and EOM are normal.   Neck: Neck supple.   Cardiovascular: Normal rate and regular rhythm.   Pulmonary/Chest: Breath sounds normal. No respiratory distress. She has no wheezes. She exhibits mass.   Left breast mass present.   Abdominal: Soft. Bowel sounds are normal. She exhibits no distension. There is no tenderness.   Musculoskeletal: Normal range of motion.        Right lower leg: She exhibits edema.        Left lower leg: She exhibits edema.   There's bilateral 2+ pitting edema   Neurological: She is alert and oriented to person, place, and time.   Skin: Skin is warm and dry.   Psychiatric: She has a normal mood and affect. Her behavior is normal.         ED Course   Critical Care  Date/Time: 4/6/2017 7:45 PM  Performed by: KIRAN PAIGE.  Authorized by: KIRAN PAIGE.   Direct patient critical care time: 10 minutes  Additional history critical care time: 10 minutes  Ordering / reviewing critical care time: 10 minutes  Documentation critical care time: 5 minutes  Consulting other physicians critical care time: 5 minutes  Total critical care time (exclusive of procedural time) : 40 minutes  Critical care was necessary to treat or prevent imminent or life-threatening deterioration of the following conditions: sepsis and respiratory failure.  Critical care was time spent personally by me on the following activities: pulse  oximetry, ordering and performing treatments and interventions, evaluation of patient's response to treatment, re-evaluation of patient's condition, ordering and review of laboratory studies, examination of patient, obtaining history from patient or surrogate and ordering and review of radiographic studies.        Labs Reviewed   CBC W/ AUTO DIFFERENTIAL - Abnormal; Notable for the following:        Result Value    MCH 24.8 (*)     MCHC 27.3 (*)     RDW 18.1 (*)     All other components within normal limits   COMPREHENSIVE METABOLIC PANEL - Abnormal; Notable for the following:     CO2 36 (*)     Albumin 2.7 (*)     Total Bilirubin 1.1 (*)     Anion Gap 7 (*)     All other components within normal limits   B-TYPE NATRIURETIC PEPTIDE - Abnormal; Notable for the following:      (*)     All other components within normal limits   TROPONIN I - Abnormal; Notable for the following:     Troponin I 0.034 (*)     All other components within normal limits   PROTIME-INR   POCT URINE PREGNANCY     EKG Readings: (Independently Interpreted)   Rhythm: Sinus Tachycardia. Heart Rate: 106. ST Segments: Normal ST Segments.          Medical Decision Making:   History:   Old Medical Records: I decided to obtain old medical records.  Clinical Tests:   Lab Tests: Ordered and Reviewed  Radiological Study: Ordered and Reviewed  Medical Tests: Ordered and Reviewed  ED Management:  This is an emergent evaluation of a critically ill patient.  The patient is a 46-year-old female with mild mental retardation he was seen at surgery clinic for evaluation for preop.  She was found to have sats of 88%.  She was transported immediately to the emergency department.  Currently, the patient is on a nonrebreather and satting 99%.  She does have coarse cough.  Lung sounds are coarse.  Labs were obtained and her white count is normal.  Chest x-ray shows multilobar airspace opacities.  A CT of the chest PE protocol reveals 3 areas of airspace  opacities concerning for pneumonia.  Given this finding and her cough and hypoxia, she will be admitted to the hospital medicine service for antibiotics and respiratory therapy.  She has no evidence of pneumothorax, pleural effusion, or asthma.  She is stable for the floor.            Scribe Attestation:   Scribe #1: I performed the above scribed service and the documentation accurately describes the services I performed. I attest to the accuracy of the note.    Attending Attestation:           Physician Attestation for Scribe:  Physician Attestation Statement for Scribe #1: I, Earl Saunders MD, reviewed documentation, as scribed by Chay Bagley in my presence, and it is both accurate and complete.                 ED Course     Clinical Impression:   The encounter diagnosis was Pneumonia of both lungs due to infectious organism, unspecified part of lung.    Disposition:   Disposition: Admitted  Condition: Stable       Earl Saunders MD  04/06/17 1948

## 2017-04-06 NOTE — ED TRIAGE NOTES
"Patient comes to ED from home. Patient reports supposed to be having sx today at noon to remove mass from left breast. Patient states, "my oxygen was too low they told me to come here". Patient denies sob or distress. Patient reports left sided chest pain where mass appears to be. Patient reports slight headache. Patient reports intermittent cough. Patient denies fever or chills. Patient states, "they were seen a few weeks ago for pneumonia". Will monitor.  "

## 2017-04-07 PROBLEM — J96.02 ACUTE RESPIRATORY FAILURE WITH HYPERCAPNIA: Status: ACTIVE | Noted: 2017-04-07

## 2017-04-07 LAB
ALBUMIN SERPL BCP-MCNC: 2.4 G/DL
ALLENS TEST: ABNORMAL
ALLENS TEST: ABNORMAL
ALP SERPL-CCNC: 113 U/L
ALT SERPL W/O P-5'-P-CCNC: 40 U/L
ANION GAP SERPL CALC-SCNC: 11 MMOL/L
ANISOCYTOSIS BLD QL SMEAR: SLIGHT
AST SERPL-CCNC: 41 U/L
BASOPHILS # BLD AUTO: ABNORMAL K/UL
BASOPHILS NFR BLD: 0 %
BILIRUB SERPL-MCNC: 1 MG/DL
BUN SERPL-MCNC: 7 MG/DL
CALCIUM SERPL-MCNC: 8.5 MG/DL
CHLORIDE SERPL-SCNC: 100 MMOL/L
CO2 SERPL-SCNC: 34 MMOL/L
CREAT SERPL-MCNC: 0.9 MG/DL
DELSYS: ABNORMAL
DELSYS: ABNORMAL
DIFFERENTIAL METHOD: ABNORMAL
EOSINOPHIL # BLD AUTO: ABNORMAL K/UL
EOSINOPHIL NFR BLD: 1 %
EP: 5
ERYTHROCYTE [DISTWIDTH] IN BLOOD BY AUTOMATED COUNT: 18 %
ERYTHROCYTE [SEDIMENTATION RATE] IN BLOOD BY WESTERGREN METHOD: 20 MM/H
ERYTHROCYTE [SEDIMENTATION RATE] IN BLOOD BY WESTERGREN METHOD: 22 MM/H
EST. GFR  (AFRICAN AMERICAN): >60 ML/MIN/1.73 M^2
EST. GFR  (NON AFRICAN AMERICAN): >60 ML/MIN/1.73 M^2
FIO2: 70
FIO2: 70
GLUCOSE SERPL-MCNC: 119 MG/DL
HCO3 UR-SCNC: 40.3 MMOL/L (ref 24–28)
HCO3 UR-SCNC: 41.5 MMOL/L (ref 24–28)
HCT VFR BLD AUTO: 43.8 %
HGB BLD-MCNC: 11.7 G/DL
HYPOCHROMIA BLD QL SMEAR: ABNORMAL
IP: 12
LYMPHOCYTES # BLD AUTO: ABNORMAL K/UL
LYMPHOCYTES NFR BLD: 16 %
MAGNESIUM SERPL-MCNC: 2 MG/DL
MCH RBC QN AUTO: 25.1 PG
MCHC RBC AUTO-ENTMCNC: 26.7 %
MCV RBC AUTO: 94 FL
MIN VOL: 10.3
MIN VOL: 3
MODE: ABNORMAL
MODE: ABNORMAL
MONOCYTES # BLD AUTO: ABNORMAL K/UL
MONOCYTES NFR BLD: 8 %
NEUTROPHILS NFR BLD: 72 %
NEUTS BAND NFR BLD MANUAL: 3 %
PCO2 BLDA: 125.1 MMHG (ref 35–45)
PCO2 BLDA: 56 MMHG (ref 35–45)
PEEP: 5
PH SMN: 7.12 [PH] (ref 7.35–7.45)
PH SMN: 7.48 [PH] (ref 7.35–7.45)
PHOSPHATE SERPL-MCNC: 4.8 MG/DL
PIP: 29
PLATELET # BLD AUTO: 146 K/UL
PMV BLD AUTO: ABNORMAL FL
PO2 BLDA: 264 MMHG (ref 80–100)
PO2 BLDA: 72 MMHG (ref 80–100)
POC BE: 15 MMOL/L
POC BE: 6 MMOL/L
POC SATURATED O2: 100 % (ref 95–100)
POC SATURATED O2: 85 % (ref 95–100)
POC TCO2: 43 MMOL/L (ref 23–27)
POC TCO2: 44 MMOL/L (ref 23–27)
POLYCHROMASIA BLD QL SMEAR: ABNORMAL
POTASSIUM SERPL-SCNC: 4.7 MMOL/L
PROT SERPL-MCNC: 6.2 G/DL
RBC # BLD AUTO: 4.66 M/UL
SAMPLE: ABNORMAL
SAMPLE: ABNORMAL
SITE: ABNORMAL
SITE: ABNORMAL
SODIUM SERPL-SCNC: 145 MMOL/L
SP02: 100
SP02: 95
VT: 440
WBC # BLD AUTO: 7.27 K/UL

## 2017-04-07 PROCEDURE — 63600175 PHARM REV CODE 636 W HCPCS: Performed by: INTERNAL MEDICINE

## 2017-04-07 PROCEDURE — 27200966 HC CLOSED SUCTION SYSTEM

## 2017-04-07 PROCEDURE — 36600 WITHDRAWAL OF ARTERIAL BLOOD: CPT

## 2017-04-07 PROCEDURE — 02HV33Z INSERTION OF INFUSION DEVICE INTO SUPERIOR VENA CAVA, PERCUTANEOUS APPROACH: ICD-10-PCS | Performed by: HOSPITALIST

## 2017-04-07 PROCEDURE — 20000000 HC ICU ROOM

## 2017-04-07 PROCEDURE — 25000003 PHARM REV CODE 250: Performed by: INTERNAL MEDICINE

## 2017-04-07 PROCEDURE — 27000221 HC OXYGEN, UP TO 24 HOURS

## 2017-04-07 PROCEDURE — 94640 AIRWAY INHALATION TREATMENT: CPT

## 2017-04-07 PROCEDURE — 94003 VENT MGMT INPAT SUBQ DAY: CPT

## 2017-04-07 PROCEDURE — 25000242 PHARM REV CODE 250 ALT 637 W/ HCPCS: Performed by: INTERNAL MEDICINE

## 2017-04-07 PROCEDURE — 25000003 PHARM REV CODE 250: Performed by: HOSPITALIST

## 2017-04-07 PROCEDURE — 80053 COMPREHEN METABOLIC PANEL: CPT

## 2017-04-07 PROCEDURE — 25000003 PHARM REV CODE 250: Performed by: EMERGENCY MEDICINE

## 2017-04-07 PROCEDURE — 99900035 HC TECH TIME PER 15 MIN (STAT)

## 2017-04-07 PROCEDURE — 36415 COLL VENOUS BLD VENIPUNCTURE: CPT

## 2017-04-07 PROCEDURE — B548ZZA ULTRASONOGRAPHY OF SUPERIOR VENA CAVA, GUIDANCE: ICD-10-PCS | Performed by: HOSPITALIST

## 2017-04-07 PROCEDURE — 85007 BL SMEAR W/DIFF WBC COUNT: CPT

## 2017-04-07 PROCEDURE — 27100171 HC OXYGEN HIGH FLOW UP TO 24 HOURS

## 2017-04-07 PROCEDURE — 63600175 PHARM REV CODE 636 W HCPCS

## 2017-04-07 PROCEDURE — 84100 ASSAY OF PHOSPHORUS: CPT

## 2017-04-07 PROCEDURE — 94761 N-INVAS EAR/PLS OXIMETRY MLT: CPT

## 2017-04-07 PROCEDURE — 83735 ASSAY OF MAGNESIUM: CPT

## 2017-04-07 PROCEDURE — 99900026 HC AIRWAY MAINTENANCE (STAT)

## 2017-04-07 PROCEDURE — 82803 BLOOD GASES ANY COMBINATION: CPT

## 2017-04-07 PROCEDURE — 85027 COMPLETE CBC AUTOMATED: CPT

## 2017-04-07 PROCEDURE — 94002 VENT MGMT INPAT INIT DAY: CPT

## 2017-04-07 RX ORDER — SCOLOPAMINE TRANSDERMAL SYSTEM 1 MG/1
1 PATCH, EXTENDED RELEASE TRANSDERMAL
Status: DISCONTINUED | OUTPATIENT
Start: 2017-04-07 | End: 2017-05-02 | Stop reason: SDUPTHER

## 2017-04-07 RX ORDER — SUCCINYLCHOLINE CHLORIDE 20 MG/ML
70 INJECTION INTRAMUSCULAR; INTRAVENOUS ONCE
Status: COMPLETED | OUTPATIENT
Start: 2017-04-07 | End: 2017-04-07

## 2017-04-07 RX ORDER — PANTOPRAZOLE SODIUM 40 MG/1
40 FOR SUSPENSION ORAL DAILY
Status: DISCONTINUED | OUTPATIENT
Start: 2017-04-07 | End: 2017-04-11

## 2017-04-07 RX ORDER — SODIUM CHLORIDE 9 MG/ML
INJECTION, SOLUTION INTRAVENOUS CONTINUOUS
Status: ACTIVE | OUTPATIENT
Start: 2017-04-07 | End: 2017-04-08

## 2017-04-07 RX ORDER — CHLORHEXIDINE GLUCONATE ORAL RINSE 1.2 MG/ML
15 SOLUTION DENTAL 2 TIMES DAILY
Status: DISCONTINUED | OUTPATIENT
Start: 2017-04-07 | End: 2017-04-25 | Stop reason: ALTCHOICE

## 2017-04-07 RX ORDER — SUCCINYLCHOLINE CHLORIDE 20 MG/ML
INJECTION INTRAMUSCULAR; INTRAVENOUS
Status: COMPLETED
Start: 2017-04-07 | End: 2017-04-07

## 2017-04-07 RX ORDER — ETOMIDATE 2 MG/ML
20 INJECTION INTRAVENOUS ONCE
Status: COMPLETED | OUTPATIENT
Start: 2017-04-07 | End: 2017-04-07

## 2017-04-07 RX ORDER — PROPOFOL 10 MG/ML
INJECTION, EMULSION INTRAVENOUS
Status: COMPLETED
Start: 2017-04-07 | End: 2017-04-07

## 2017-04-07 RX ORDER — PROPOFOL 10 MG/ML
5 INJECTION, EMULSION INTRAVENOUS CONTINUOUS
Status: DISCONTINUED | OUTPATIENT
Start: 2017-04-07 | End: 2017-04-21

## 2017-04-07 RX ORDER — ETOMIDATE 2 MG/ML
INJECTION INTRAVENOUS
Status: DISPENSED
Start: 2017-04-07 | End: 2017-04-07

## 2017-04-07 RX ADMIN — PROPOFOL 5 MCG/KG/MIN: 10 INJECTION, EMULSION INTRAVENOUS at 01:04

## 2017-04-07 RX ADMIN — PROPOFOL 40 MCG/KG/MIN: 10 INJECTION, EMULSION INTRAVENOUS at 07:04

## 2017-04-07 RX ADMIN — SUCCINYLCHOLINE CHLORIDE 70 MG: 20 INJECTION, SOLUTION INTRAMUSCULAR; INTRAVENOUS at 01:04

## 2017-04-07 RX ADMIN — ENOXAPARIN SODIUM 40 MG: 100 INJECTION SUBCUTANEOUS at 05:04

## 2017-04-07 RX ADMIN — IPRATROPIUM BROMIDE AND ALBUTEROL SULFATE 3 ML: .5; 3 SOLUTION RESPIRATORY (INHALATION) at 08:04

## 2017-04-07 RX ADMIN — CARVEDILOL 3.12 MG: 3.12 TABLET, FILM COATED ORAL at 09:04

## 2017-04-07 RX ADMIN — SUCCINYLCHOLINE CHLORIDE 70 MG: 20 INJECTION INTRAMUSCULAR; INTRAVENOUS at 01:04

## 2017-04-07 RX ADMIN — SCOPOLAMINE 1.5 MG: 1 PATCH, EXTENDED RELEASE TRANSDERMAL at 07:04

## 2017-04-07 RX ADMIN — LISINOPRIL 5 MG: 5 TABLET ORAL at 09:04

## 2017-04-07 RX ADMIN — AZITHROMYCIN MONOHYDRATE 500 MG: 500 INJECTION, POWDER, LYOPHILIZED, FOR SOLUTION INTRAVENOUS at 09:04

## 2017-04-07 RX ADMIN — PROPOFOL 40 MCG/KG/MIN: 10 INJECTION, EMULSION INTRAVENOUS at 04:04

## 2017-04-07 RX ADMIN — HYDROCHLOROTHIAZIDE 12.5 MG: 12.5 TABLET ORAL at 09:04

## 2017-04-07 RX ADMIN — CEFTRIAXONE 1 G: 1 INJECTION, SOLUTION INTRAVENOUS at 09:04

## 2017-04-07 RX ADMIN — IPRATROPIUM BROMIDE AND ALBUTEROL SULFATE 3 ML: .5; 3 SOLUTION RESPIRATORY (INHALATION) at 07:04

## 2017-04-07 RX ADMIN — CHLORHEXIDINE GLUCONATE 15 ML: 1.2 RINSE ORAL at 09:04

## 2017-04-07 RX ADMIN — PANTOPRAZOLE SODIUM 40 MG: 40 GRANULE, DELAYED RELEASE ORAL at 09:04

## 2017-04-07 RX ADMIN — SODIUM CHLORIDE: 0.9 INJECTION, SOLUTION INTRAVENOUS at 05:04

## 2017-04-07 RX ADMIN — CARVEDILOL 3.12 MG: 3.12 TABLET, FILM COATED ORAL at 05:04

## 2017-04-07 RX ADMIN — PROPOFOL 40 MCG/KG/MIN: 10 INJECTION, EMULSION INTRAVENOUS at 11:04

## 2017-04-07 RX ADMIN — CHLORHEXIDINE GLUCONATE 15 ML: 1.2 RINSE ORAL at 08:04

## 2017-04-07 RX ADMIN — PROPOFOL 30 MCG/KG/MIN: 10 INJECTION, EMULSION INTRAVENOUS at 05:04

## 2017-04-07 RX ADMIN — IPRATROPIUM BROMIDE AND ALBUTEROL SULFATE 3 ML: .5; 3 SOLUTION RESPIRATORY (INHALATION) at 11:04

## 2017-04-07 RX ADMIN — ETOMIDATE 20 MG: 2 INJECTION INTRAVENOUS at 01:04

## 2017-04-07 RX ADMIN — SODIUM CHLORIDE: 0.9 INJECTION, SOLUTION INTRAVENOUS at 04:04

## 2017-04-07 NOTE — CONSULTS
Consult Note  Pulmonology    Consult Requested By: Lakesha Greer MD  Reason for Consult : vent management    SUBJECTIVE: dyspnea     History of Present Illness:  Patient unresponsive;information obtained from chart      Review of patient's allergies indicates:  No Known Allergies    Past Medical History:   Diagnosis Date    CHF (congestive heart failure)     Learning difficulty      Past Surgical History:   Procedure Laterality Date    BREAST SURGERY Left     biopsy    CYST REMOVAL      shoulder      Family History   Problem Relation Age of Onset    Diabetes      Coronary artery disease       Review of systems not obtained due to patient factors ; patient intubated and unable to communicate.    OBJECTIVE:     Vital Signs (Most Recent)  Temp: 98.3 °F (36.8 °C) (04/07/17 0315)  Pulse: 103 (04/07/17 0748)  Resp: 18 (04/07/17 0748)  BP: 122/74 (04/07/17 0630)  SpO2: 100 % (04/07/17 0748)    Vital Signs Range (Last 24H):  Temp:  [97.2 °F (36.2 °C)-98.8 °F (37.1 °C)]   Pulse:  []   Resp:  [12-28]   BP: ()/()   SpO2:  [87 %-100 %]     Physical Exam:    General: no distress,.on the ventilator  Neck: no jugular venous distention and no carotid bruit  Lungs:  diminished breath sounds bilaterally and 7.5 ett in place and rhonchi bilaterally  Heart: regular rate and rhythm and no murmur  Abdomen: soft, non-tender non-distended; bowel sounds normal and right NGT in place  Extremities: no cyanosis or edema, or clubbing  Neurologic: sedated.  Skin-warm, moist. No rash      Laboratory:  Recent Results (from the past 24 hour(s))   CBC auto differential    Collection Time: 04/06/17  2:40 PM   Result Value Ref Range    WBC 7.70 3.90 - 12.70 K/uL    RBC 4.83 4.00 - 5.40 M/uL    Hemoglobin 12.0 12.0 - 16.0 g/dL    Hematocrit 44.0 37.0 - 48.5 %    MCV 91 82 - 98 fL    MCH 24.8 (L) 27.0 - 31.0 pg    MCHC 27.3 (L) 32.0 - 36.0 %    RDW 18.1 (H) 11.5 - 14.5 %    Platelets 164 150 - 350 K/uL    MPV SEE COMMENT 9.2 -  12.9 fL    Gran # 5.0 1.8 - 7.7 K/uL    Lymph # 1.8 1.0 - 4.8 K/uL    Mono # 0.8 0.3 - 1.0 K/uL    Eos # 0.1 0.0 - 0.5 K/uL    Baso # 0.04 0.00 - 0.20 K/uL    Gran% 65.7 38.0 - 73.0 %    Lymph% 23.2 18.0 - 48.0 %    Mono% 9.7 4.0 - 15.0 %    Eosinophil% 0.9 0.0 - 8.0 %    Basophil% 0.5 0.0 - 1.9 %    Aniso Slight     Poly Occasional     Hypo Occasional     Stomatocytes Present     Differential Method Automated    Comprehensive metabolic panel    Collection Time: 04/06/17  2:40 PM   Result Value Ref Range    Sodium 143 136 - 145 mmol/L    Potassium 4.3 3.5 - 5.1 mmol/L    Chloride 100 95 - 110 mmol/L    CO2 36 (H) 23 - 29 mmol/L    Glucose 108 70 - 110 mg/dL    BUN, Bld 7 6 - 20 mg/dL    Creatinine 0.9 0.5 - 1.4 mg/dL    Calcium 9.8 8.7 - 10.5 mg/dL    Total Protein 7.5 6.0 - 8.4 g/dL    Albumin 2.7 (L) 3.5 - 5.2 g/dL    Total Bilirubin 1.1 (H) 0.1 - 1.0 mg/dL    Alkaline Phosphatase 121 55 - 135 U/L    AST 39 10 - 40 U/L    ALT 41 10 - 44 U/L    Anion Gap 7 (L) 8 - 16 mmol/L    eGFR if African American >60 >60 mL/min/1.73 m^2    eGFR if non African American >60 >60 mL/min/1.73 m^2   Brain natriuretic peptide    Collection Time: 04/06/17  2:40 PM   Result Value Ref Range     (H) 0 - 99 pg/mL   Troponin I    Collection Time: 04/06/17  2:40 PM   Result Value Ref Range    Troponin I 0.034 (H) 0.000 - 0.026 ng/mL   Protime-INR    Collection Time: 04/06/17  2:40 PM   Result Value Ref Range    Prothrombin Time 11.4 9.0 - 12.5 sec    INR 1.1 0.8 - 1.2   POCT urine pregnancy    Collection Time: 04/06/17  4:25 PM   Result Value Ref Range    POC Preg Test, Ur Negative Negative     Acceptable Yes    ISTAT PROCEDURE    Collection Time: 04/06/17 11:49 PM   Result Value Ref Range    POC PH 7.177 (LL) 7.35 - 7.45    POC PCO2 109.4 (HH) 35 - 45 mmHg    POC PO2 90 80 - 100 mmHg    POC HCO3 40.5 (H) 24 - 28 mmol/L    POC BE 7 -2 to 2 mmol/L    POC SATURATED O2 93 (L) 95 - 100 %    POC TCO2 44 (H) 23 - 27 mmol/L     Rate 20     Sample ARTERIAL     Site RR     Allens Test Pass     DelSys CPAP/BiPAP     Mode BiPAP     FiO2 70     Spont Rate 20     Sp02 99     IP 10     EP 5    ISTAT PROCEDURE    Collection Time: 04/07/17  1:07 AM   Result Value Ref Range    POC PH 7.116 (LL) 7.35 - 7.45    POC PCO2 125.1 (HH) 35 - 45 mmHg    POC PO2 72 (L) 80 - 100 mmHg    POC HCO3 40.3 (H) 24 - 28 mmol/L    POC BE 6 -2 to 2 mmol/L    POC SATURATED O2 85 (L) 95 - 100 %    POC TCO2 44 (H) 23 - 27 mmol/L    Rate 20     Sample ARTERIAL     Site RR     Allens Test Pass     DelSys CPAP/BiPAP     Mode BiPAP     FiO2 70     Min Vol 3     Sp02 95     IP 12     EP 5    Magnesium    Collection Time: 04/07/17  2:11 AM   Result Value Ref Range    Magnesium 2.0 1.6 - 2.6 mg/dL   Phosphorus    Collection Time: 04/07/17  2:11 AM   Result Value Ref Range    Phosphorus 4.8 (H) 2.7 - 4.5 mg/dL   Comprehensive Metabolic Panel (CMP)    Collection Time: 04/07/17  2:11 AM   Result Value Ref Range    Sodium 145 136 - 145 mmol/L    Potassium 4.7 3.5 - 5.1 mmol/L    Chloride 100 95 - 110 mmol/L    CO2 34 (H) 23 - 29 mmol/L    Glucose 119 (H) 70 - 110 mg/dL    BUN, Bld 7 6 - 20 mg/dL    Creatinine 0.9 0.5 - 1.4 mg/dL    Calcium 8.5 (L) 8.7 - 10.5 mg/dL    Total Protein 6.2 6.0 - 8.4 g/dL    Albumin 2.4 (L) 3.5 - 5.2 g/dL    Total Bilirubin 1.0 0.1 - 1.0 mg/dL    Alkaline Phosphatase 113 55 - 135 U/L    AST 41 (H) 10 - 40 U/L    ALT 40 10 - 44 U/L    Anion Gap 11 8 - 16 mmol/L    eGFR if African American >60 >60 mL/min/1.73 m^2    eGFR if non African American >60 >60 mL/min/1.73 m^2   CBC auto differential    Collection Time: 04/07/17  2:11 AM   Result Value Ref Range    WBC 7.27 3.90 - 12.70 K/uL    RBC 4.66 4.00 - 5.40 M/uL    Hemoglobin 11.7 (L) 12.0 - 16.0 g/dL    Hematocrit 43.8 37.0 - 48.5 %    MCV 94 82 - 98 fL    MCH 25.1 (L) 27.0 - 31.0 pg    MCHC 26.7 (L) 32.0 - 36.0 %    RDW 18.0 (H) 11.5 - 14.5 %    Platelets 146 (L) 150 - 350 K/uL    MPV SEE COMMENT 9.2 -  12.9 fL    Lymph # CANCELED 1.0 - 4.8 K/uL    Mono # CANCELED 0.3 - 1.0 K/uL    Eos # CANCELED 0.0 - 0.5 K/uL    Baso # CANCELED 0.00 - 0.20 K/uL    Gran% 72.0 38.0 - 73.0 %    Lymph% 16.0 (L) 18.0 - 48.0 %    Mono% 8.0 4.0 - 15.0 %    Eosinophil% 1.0 0.0 - 8.0 %    Basophil% 0.0 0.0 - 1.9 %    Bands 3.0 %    Aniso Slight     Poly Occasional     Hypo Occasional     Differential Method Manual    ISTAT PROCEDURE    Collection Time: 04/07/17  2:41 AM   Result Value Ref Range    POC PH 7.478 (H) 7.35 - 7.45    POC PCO2 56.0 (HH) 35 - 45 mmHg    POC PO2 264 (H) 80 - 100 mmHg    POC HCO3 41.5 (H) 24 - 28 mmol/L    POC BE 15 -2 to 2 mmol/L    POC SATURATED O2 100 95 - 100 %    POC TCO2 43 (H) 23 - 27 mmol/L    Rate 22     Sample ARTERIAL     Site RR     Allens Test Pass     DelSys Adult Vent     Mode AC/PRVC     Vt 440     PEEP 5     PiP 29     FiO2 70     Min Vol 10.3     Sp02 100      CBC:   Recent Labs  Lab 04/07/17 0211   WBC 7.27   RBC 4.66   HGB 11.7*   HCT 43.8   *   MCV 94   MCH 25.1*   MCHC 26.7*     BMP:   Recent Labs  Lab 04/07/17 0211   *      K 4.7      CO2 34*   BUN 7   CREATININE 0.9   CALCIUM 8.5*   MG 2.0     CMP:   Recent Labs  Lab 04/07/17 0211   *   CALCIUM 8.5*   ALBUMIN 2.4*   PROT 6.2      K 4.7   CO2 34*      BUN 7   CREATININE 0.9   ALKPHOS 113   ALT 40   AST 41*   BILITOT 1.0     LFTs:   Recent Labs  Lab 04/07/17 0211   ALT 40   AST 41*   ALKPHOS 113   BILITOT 1.0   PROT 6.2   ALBUMIN 2.4*     Coagulation:   Recent Labs  Lab 04/06/17  1440   INR 1.1     Cardiac markers: No results for input(s): CKMB, TROPONINT, MYOGLOBIN in the last 168 hours.  Microbiology Results (last 7 days)     ** No results found for the last 168 hours. **        ABGs:   Recent Labs  Lab 04/07/17  0241   PH 7.478*   PCO2 56.0*   PO2 264*   HCO3 41.5*   POCSATURATED 100   BE 15       Ventilator Setting:  Vent Mode: PRVC  Oxygen Concentration (%):  [] 40  Resp Rate Total:   [18 br/min-22 br/min] 18 br/min  Vt Set:  [440 mL] 440 mL  PEEP/CPAP:  [5 cmH20] 5 cmH20  Mean Airway Pressure:  [13.1 xrA85-91 cmH20] 14 cmH20     Diagnostic Results:   Imaging Results         X-Ray Chest 1 View (Final result) Result time:  04/07/17 02:25:46    Final result by Victor Manuel Tavarez MD (04/07/17 02:25:46)    Impression:        As above.      Electronically signed by: VICTOR MANUEL TAVAREZ MD, MD  Date:     04/07/17  Time:    02:25     Narrative:    COMPARISON: CT thorax and chest radiograph 4/6/17    FINDINGS: AP portable supine view of the chest. Monitoring leads overlie the chest. Resolution is limited by body habitus with underpenetration. Interval placement of endotracheal tube with tip at the level of the clavicular heads. Interval placement of enteric tube with port and tip below the diaphragm coursing caudally out of field-of-view. No large pneumothorax. Slight improved aeration of the bilateral lungs. Otherwise, grossly stable radiographic appearance of the chest.            CTA Chest Non-Coronary (Final result) Result time:  04/06/17 16:47:30    Final result by Vero Cash MD (04/06/17 16:47:30)    Impression:      1. There is a 12 cm left breast hematoma.  2. There are low lung volumes with bibasilar dependent regions of consolidation and patchy opacification of the right upper lung zone. The findings may represent developing pneumonia.  3. There is no evidence pulmonary embolus.          Electronically signed by: VERO CASH MD  Date:     04/06/17  Time:    16:47     Narrative:    CTA chest    There is a 9.7 x 9.2 x 12.5 cm mixed density left breast fluid collection seen within the medial portion of the breast. As result of expansion of the left breast tissue.    There is no evidence of aneurysmal dilatation or dissection of the thoracic aorta. There are no pulmonary arterial filling defects are seen. There are bilateral dependent regions of consolidation.    There is patchy consolidation seen  within the right upper lobe of the lung.    There is no evidence of axillary lymphadenopathy. The cardiac silhouette is enlarged.    This is present the superior abdomen are unremarkable.    The osseous structures are unremarkable.            X-Ray Chest PA And Lateral (Final result) Result time:  04/06/17 15:18:50    Final result by Michael Smith DO (04/06/17 15:18:50)    Impression:      See above      Electronically signed by: MICHAEL SMITH DO  Date:     04/06/17  Time:    15:18     Narrative:    PA and lateral views of the chest    Comparison: 03/29/2017    Results:    Continued ill-defined perihilar and basilar lung opacities similar prior which may represent vascular congestion and edema.  There continued poor definition of the left lung base concerning for superimposed effusion with atelectasis.  No large pneumothorax..  Cardiac mediastinal sella partially obscured by opacities.  Visualized osseous structures grossly intact.  Clinical correlation and followup advised.                  ASSESSMENT/PLAN:     1. Pneumonia of both lungs due to infectious organism, unspecified part of lung    2.      acute respiratory failure  3.      Vent management  4.       Hypoxemia  5.       Hypercapnea      Plan:case reviewed ; pt now on vent with probable pneumonia ?chf. Will wean as tolerated.  Critically ill ;  Critical care 35 min

## 2017-04-07 NOTE — PLAN OF CARE
Problem: Patient Care Overview  Goal: Individualization & Mutuality  Outcome: Ongoing (interventions implemented as appropriate)  Pt resting well, sedated on Propofol. Frequent oral care needed for copious oral secretions. Repositioned Q2H and passive ROM done to extremities. Bed in low position with alarm on.

## 2017-04-07 NOTE — ASSESSMENT & PLAN NOTE
This patient meets criteria for sepsis given tachycardia, tachypnea, and pneumonia; there is no evidence of endo-organ dysfunction.  Blood cultures are pending; will start IV fluid hydration and broad spectrum antibiotics

## 2017-04-07 NOTE — H&P
[facility] Hospital Medicine  History & Physical    Patient Name: Radha Pritchett  MRN: [id number]  Admission Date: 4/6/2017  Attending Physician: Antonieta Carpenter MD   Primary Care Provider: Ferny Iglesias MD         Patient information was obtained from patient.     Subjective:     Principal Problem:Pneumonia of both lungs due to infectious organism    Chief Complaint: Hypoxia today.    HPI: Ms. Radha Pritchett is a 46 y.o. female with essential hypertension, chronic diastolic heart failure (LVEF 55-60% Dec 2014), moderate protein malnutrition who presents to [facility] ED with complaints of dyspnea today.  She was scheduled to undergo removal of a breast mass today but was found to be hypoxic.  She does report that she has been short of breath but cannot specify how long.  She has also had some non-productive coughing and some chills without fevers.  Further history is otherwise limited at this time.    Chart Review:  Previous Hospitalizations  Date Hospital Diagnosis   Dec 2014 [facility] Hypercapnic respiratory failure - BPAP     Outpatient Follow-Up  Date of Visit Physician Service   Mar 2017 Fabio Sandoval MD General Surgery    Jan 2015 Elton Lopez MD Oncology      Past Medical History:   Diagnosis Date    CHF (congestive heart failure)     Learning difficulty        Past Surgical History:   Procedure Laterality Date    BREAST SURGERY Left     biopsy    CYST REMOVAL      shoulder        Review of patient's allergies indicates:  No Known Allergies    No current facility-administered medications on file prior to encounter.      Current Outpatient Prescriptions on File Prior to Encounter   Medication Sig    albuterol 90 mcg/actuation inhaler Inhale 2 puffs into the lungs every 6 (six) hours as needed for Wheezing. Rescue    azithromycin (Z-REGULO) 250 MG tablet Take 2 tablets on day 1, then one tablet on day 2-5    carvedilol (COREG) 3.125 MG tablet Take 1 tablet (3.125 mg total) by  mouth 2 (two) times daily with meals.    etodolac (LODINE) 200 MG Cap Take 1 capsule (200 mg total) by mouth every 8 (eight) hours as needed (pain).    hydrochlorothiazide (HYDRODIURIL) 12.5 MG Tab Take 1 tablet (12.5 mg total) by mouth once daily.    lisinopril (PRINIVIL,ZESTRIL) 5 MG tablet Take 1 tablet (5 mg total) by mouth once daily.    OXYGEN-AIR DELIVERY SYSTEMS MISC by Misc.(Non-Drug; Combo Route) route.     Family History     Problem Relation (Age of Onset)    Coronary artery disease     Diabetes         Social History Main Topics    Smoking status: Never Smoker    Smokeless tobacco: Never Used    Alcohol use Yes      Comment: OCCASSIONAL    Drug use: No    Sexual activity: Not on file     Review of Systems   Unable to perform ROS: Acuity of condition     Objective:     Vital Signs (Most Recent):  Temp: 97.7 °F (36.5 °C) (04/06/17 2131)  Pulse: 100 (04/06/17 2131)  Resp: 20 (04/06/17 2131)  BP: (!) 155/91 (04/06/17 2131)  SpO2: 97 % (04/06/17 2131) Vital Signs (24h Range):  Temp:  [97.7 °F (36.5 °C)-98.8 °F (37.1 °C)] 97.7 °F (36.5 °C)  Pulse:  [] 100  Resp:  [18-26] 20  SpO2:  [90 %-99 %] 97 %  BP: (131-194)/() 155/91     Weight: 78.3 kg (172 lb 9.9 oz)  Body mass index is 48.55 kg/(m^2).    Physical Exam   Constitutional: She appears well-developed and well-nourished. She appears distressed.   obese   HENT:   Head: Normocephalic and atraumatic.   Right Ear: External ear normal.   Left Ear: External ear normal.   Nose: Nose normal.   Eyes: Right eye exhibits no discharge. Left eye exhibits no discharge.   Bilateral scleral edema   Neck: Normal range of motion.   Cardiovascular:   Tachycardic without murmurs   Pulmonary/Chest:   Poor respiratory effort with decreased breath sound bilaterally and inspiratory crackles    Abdominal: Soft. Bowel sounds are normal. She exhibits no distension. There is no tenderness. There is no rebound and no guarding.   Musculoskeletal: Normal range of  motion. She exhibits no edema.   Skin: Skin is warm and dry. She is not diaphoretic. No erythema.   Nursing note and vitals reviewed.       Significant Labs: All pertinent labs within the past 24 hours have been reviewed.    Significant Imaging: I have reviewed and interpreted all pertinent imaging results/findings within the past 24 hours.    Assessment/Plan:     * Pneumonia of both lungs due to infectious organism  The patient has a CURB-65 score of 0, and does not meet criteria for healthcare-associated pneumonia.  I have reviewed the chest X-ray and CT-A chest and it reveals bilateral airspace opacification concerning for pneumonia.  Oxygen saturations are improved but requiring a non-rebreather mask; will switch to BPAP.  Blood and sputum cultures are pending.  Will start empiric antibiotic therapy.    Sepsis  This patient meets criteria for sepsis given tachycardia, tachypnea, and pneumonia; there is no evidence of endo-organ dysfunction.  Blood cultures are pending; will start IV fluid hydration and broad spectrum antibiotics    Essential hypertension  Patient's blood pressure is poorly-controlled; will continue home regimen of carvedilol, hydrochlorothiazide, and lisinopril, and provide as-needed clonidine.    Chronic diastolic heart failure  Patient doesn't appear to be in acute heart failure; will continue to monitor.    Moderate protein malnutrition  Will provide protein supplementation with Boost Plus.    VTE Risk Mitigation         Ordered     enoxaparin injection 40 mg  Daily     Route:  Subcutaneous        04/06/17 2202     Medium Risk of VTE  Once      04/06/17 2202            Total time spent on case: 45 minutes.        Easton Aaron M.D.  Staff [facility] Hospitalist  Department of [facility]  [facility]  Pager: [phone]

## 2017-04-07 NOTE — CONSULTS
[facility]  Adult Nutrition  Consult Note    SUMMARY     Recommendations    Recommendation/Intervention:   1. Rec TF of Impact peptide to goal of 40 ml/hr + 2 packs beneprotein/day.       - Initiate @ 10 ml/hr and advance 10 ml q 3 hrs to goal of 40 ml/hr.       - Hold for residuals > 300 ml        - Fluid flushes for tube patency (30 ml q 6 hrs ) or per MD         -To provide:  1867 kcal (with propofol), 102 g pro, 739 ml fluid)    2. RD to monitor   Goals: Tolerance to TF  Nutrition Goal Status: new  Communication of RD Recs: discussed on rounds    Continuum of Care Plan    Referral to Outpatient Services:  (D/C planning: Too soon to determine)    Reason for Assessment    Reason for Assessment: physician consult  Diagnosis:  (SOB; pneumonia)  Relevent Medical History: CHF   Interdisciplinary Rounds: attended     General Information Comments: Intubated. On propofol. consulted for TF rec's.    Nutrition Prescription Ordered    Current Diet Order: NPO     Evaluation of Received Nutrients/Fluid Intake     Other Calories (kcal): 377 (propofol)      Energy Calories Required: not meeting needs  Protein Required: not meeting needs      IV Fluid (mL): 2400     Fluid Required:  (per MD)         Nutrition Risk Screen     Nutrition Risk Screen: no indicators present    Nutrition/Diet History    Food Preferences: DANIEL    Factors Affecting Nutritional Intake: NPO, on mechanical ventilation     Labs/Tests/Procedures/Meds    Diagnostic Test/Procedure Review: reviewed  Pertinent Labs Reviewed: reviewed  Pertinent Labs Comments: Phos 4.8  Pertinent Medications Reviewed: reviewed  Pertinent Medications Comments: propofol    Physical Findings    Overall Physical Appearance: obese, on ventilator support  Tubes: orogastric tube  Oral/Mouth Cavity: WDL  Skin:  (blister on foot)    Anthropometrics     Height (inches): 62.13 in  Weight Method: Bed Scale  Weight (kg): 79.6 kg     Ideal Body Weight (IBW), Female: 110.65  lb     % Ideal Body Weight, Female (lb): 158.6 lb  BMI (kg/m2): 31.97  BMI Grade: 35 - 39.9 - obesity - grade II     Estimated/Assessed Needs    Weight Used For Calorie Calculations: 79.6 kg (175 lb 7.8 oz)   Height (cm): 157.8 cm (noted possible EPIC error of ht. (5'2 vs. 4'2))     Energy Need Method: [place] (1791 kcal)     RMR (Stevens-St. Jeor Equation): 1394.13      Weight Used For Protein Calculations: 50 kg (110 lb 3.7 oz) (IBW)  Protein Requirements: 100-125 g    Fluid Need Method: RDA Method (per MD)     Nutrition Diagnosis    Problem: Inadequate Energy Intake  Etiology: Mechanical Ventilation  As Evidenced by: NPO/OGT  Nutrition Diagnosis Status: New       Monitor and Evaluation    Food and Nutrient Intake: energy intake, food and beverage intake, enteral nutrition intake  Food and Nutrient Adminstration: diet order, enteral and parenteral nutrition administration  Anthropometric Measurements: weight, weight change  Biochemical Data, Medical Tests and Procedures: electrolyte and renal panel, glucose/endocrine profile, gastrointestinal profile  Nutrition-Focused Physical Findings: overall appearance    Nutrition Risk    Level of Risk:  (2 x week)    Nutrition Follow-Up    RD Follow-up?: Yes    Assessment and Plan    No new Assessment & Plan notes have been filed under this hospital service since the last note was generated.  Service: Nutrition

## 2017-04-07 NOTE — PROGRESS NOTES
Results reported to Dr Aaron.   Results for PINKY OSUNA (MRN [id number]) as of 4/7/2017 04:34   Ref. Range 4/7/2017 02:41   POC PCO2 Latest Ref Range: 35 - 45 mmHg 56.0 (HH)   POC PO2 Latest Ref Range: 80 - 100 mmHg 264 (H)   POC BE Latest Ref Range: -2 to 2 mmol/L 15   POC HCO3 Latest Ref Range: 24 - 28 mmol/L 41.5 (H)   POC SATURATED O2 Latest Ref Range: 95 - 100 % 100   POC TCO2 Latest Ref Range: 23 - 27 mmol/L 43 (H)   FiO2 Unknown 70   Vt Unknown 440   PiP Unknown 29   PEEP Unknown 5   Sample Unknown ARTERIAL   DelSys Unknown Adult Vent   Allens Test Unknown Pass   Site Unknown RR   Mode Unknown AC/PRVC   POC PH Latest Ref Range: 7.35 - 7.45  7.478 (H)

## 2017-04-07 NOTE — PLAN OF CARE
Problem: Patient Care Overview  Goal: Plan of Care Review  Outcome: Ongoing (interventions implemented as appropriate)    04/06/17 2117   Coping/Psychosocial   Plan Of Care Reviewed With patient       Goal: Individualization & Mutuality  Outcome: Ongoing (interventions implemented as appropriate)    04/06/17 2113   Mutuality/Individual Preferences   What Anxieties, Fears, Concerns, or Questions Do You Have About Your Care? NONE   What Information Would Help Us Give You More Personalized Care? NONE       Goal: Discharge Needs Assessment  Outcome: Ongoing (interventions implemented as appropriate)    04/06/17 2117   Living Environment   Transportation Available family or friend will provide

## 2017-04-07 NOTE — PLAN OF CARE
Problem: Patient Care Overview  Goal: Plan of Care Review  Outcome: Ongoing (interventions implemented as appropriate)  Pt transferred from Tele floor to ICU due to respiratory status change. Pt on BiPAP on arrival, became more lethargic. Repeat ABGs showed worsening CO2, Pt then intubated. Propofol started for sedation, infusing at 30 mcg. Pt has large amount of secretions orally and from endotracheal suctioning. Vital signs stable. Urine output minimal after li insertion. No family at the bedside. Pt free of hospital acquired injuries and falls.

## 2017-04-07 NOTE — PLAN OF CARE
Problem: Pressure Ulcer Risk (Derrick Scale) (Adult,Obstetrics,Pediatric)  Goal: Skin Integrity  Patient will demonstrate the desired outcomes by discharge/transition of care.   Outcome: Ongoing (interventions implemented as appropriate)  Pt skin has no new breakdown.

## 2017-04-07 NOTE — PROGRESS NOTES
Pt intubated by Dr Aaron with a 7.5 ETT. Secured with ET Simpson at 23cm at the lip. CO2 detector used. Ambu bag used. Pt set up on Servo-I vent #2 with settings: PRVC, Rate 22, , PEEP 5, FIO2 70%. All alarms on and in use. Will repeat ABG in one hour.

## 2017-04-07 NOTE — PROGRESS NOTES
Cross-Cover Progress Note    Due to Ms. Radha Pritchett's lethargy and previous admission for hypercapnic respiratory failure, I decided to obtain an ABG and it was significant for 7.177  109.4  90  40.5 on BPAP 10  5  70%.  I have transferred her to the ICU and will increased her IPAP setting in hopes of avoiding having to mechanically ventilate her.  Will obtain a repeat ABG in one hour.    I will continue monitor the patient's progress throughout night.      Addendum 0148  Unfortunately, her ABG revealed worsening hypercapnia despite best efforts on BPAP.  Repeat ABG was revealing for 7.116  125.1  72  40.0 on BPAP 12  5  70%.  Her intubation proceeded without complications.  Will consult Pulmonology for ventilator management.      Addendum 0330  Morning ABG was significant for 7.478  56.0  264  41.5 on a ventilator setting of AC 22  440  +5  70%.  I have decreased the respiratory rate and FiO2.  Will defer to Pulmonology for further management.          Total time spent on case: 120 minutes.          Easton Aaron M.D.  Staff Nocturnist  Department of Hospital Medicine  [facility]  Pager: [phone]

## 2017-04-07 NOTE — ASSESSMENT & PLAN NOTE
Patient's blood pressure is poorly-controlled; will continue home regimen of carvedilol, hydrochlorothiazide, and lisinopril, and provide as-needed clonidine.

## 2017-04-07 NOTE — PLAN OF CARE
Recommendations     Recommendation/Intervention:   1. Rec TF of Impact peptide to goal of 40 ml/hr + 2 packs beneprotein/day.   - Initiate @ 10 ml/hr and advance 10 ml q 3 hrs to goal of 40 ml/hr.   - Hold for residuals > 300 ml   - Fluid flushes for tube patency (30 ml q 6 hrs ) or per MD   -To provide: 1867 kcal (with propofol), 102 g pro, 739 ml fluid)   2. RD to monitor   Goals: Tolerance to TF  Nutrition Goal Status: new  Communication of RD Recs: discussed on rounds     Continuum of Care Plan     Referral to Outpatient Services: (D/C planning: Too soon to determine)

## 2017-04-07 NOTE — PROCEDURES
"Radha Pritchett is a 46 y.o. female patient.    Temp: 100 °F (37.8 °C) (04/07/17 1100)  Pulse: 88 (04/07/17 1335)  Resp: 17 (04/07/17 1335)  BP: 127/79 (04/07/17 1335)  SpO2: 100 % (04/07/17 1335)  Weight: 79.6 kg (175 lb 7.8 oz) (04/07/17 0022)  Height: 4' 2" (127 cm) (04/06/17 2100)    PICC  Date/Time: 4/7/2017 3:20 PM  Consent Done: Yes  Time out: Immediately prior to procedure a time out was called to verify the correct patient, procedure, equipment, support staff and site/side marked as required  Indications: med administration and vascular access  Anesthesia: local infiltration  Local anesthetic: lidocaine 1% without epinephrine  Anesthetic Total (mL): 4  Preparation: skin prepped with ChloraPrep  Skin prep agent dried: skin prep agent completely dried prior to procedure  Sterile barriers: all five maximum sterile barriers used - cap, mask, sterile gown, sterile gloves, and large sterile sheet  Hand hygiene: hand hygiene performed prior to central venous catheter insertion  Location details: right basilic  Catheter type: double lumen  Catheter size: 5 Fr  Catheter Length: 34cm    Ultrasound guidance: yes  Vessel Caliber: medium and patent, compressibility normal  Needle advanced into vessel with real time Ultrasound guidance.  Guidewire confirmed in vessel.  Sterile sheath used.  Manometry: esophageal manometry  Number of attempts: 1  Post-procedure: blood return through all ports, chlorhexidine patch and sterile dressing applied  Estimated blood loss (mL): 2          Jun Dhillon  4/7/2017    "

## 2017-04-07 NOTE — PROCEDURES
"Radha Pritchett is a 46 y.o. female patient.    Temp: 97.2 °F (36.2 °C) (04/07/17 0022)  Pulse: 75 (04/07/17 0100)  Resp: 14 (04/07/17 0100)  BP: 93/62 (04/07/17 0100)  SpO2: 96 % (04/07/17 0100)  Weight: 79.6 kg (175 lb 7.8 oz) (04/07/17 0022)  Height: 4' 2" (127 cm) (04/06/17 2100)       Intubation  Date/Time: 4/7/2017 1:44 AM  Performed by: MODE AARON  Authorized by: MODE AARON   Consent Done: Emergent Situation  Indications: respiratory failure,  hypercapnia and  hypoxemia  Intubation method: video-assisted  Patient status: unconscious  Preoxygenation: BVM  Pretreatment medications: none  Sedatives: etomidate  Paralytic: succinylcholine  Laryngoscope size: Mac 3  Tube size: 7.5 mm  Tube type: cuffed  Number of attempts: 1  Cricoid pressure: no  Cords visualized: yes  Post-procedure assessment: CO2 detector  Breath sounds: rales/crackles and equal and absent over the epigastrium  Cuff inflated: yes  ETT to teeth: 23 cm  Tube secured with: ETT truong  Chest x-ray interpreted by me.  Chest x-ray findings: endotracheal tube in appropriate position  Patient tolerance: Patient tolerated the procedure well with no immediate complications  Complications: No  Estimated blood loss (mL): 0        View: Cormack-Lehane Grade II  Cuff inflated to minimally-occlusive pressure  Teeth intact  Complications: None      Mode Aaron M.D.  Staff Nocturnist  Department of Hospital Medicine  [facility]  Pager: [phone]    "

## 2017-04-07 NOTE — SUBJECTIVE & OBJECTIVE
Past Medical History:   Diagnosis Date    CHF (congestive heart failure)     Learning difficulty        Past Surgical History:   Procedure Laterality Date    BREAST SURGERY Left     biopsy    CYST REMOVAL      shoulder        Review of patient's allergies indicates:  No Known Allergies    No current facility-administered medications on file prior to encounter.      Current Outpatient Prescriptions on File Prior to Encounter   Medication Sig    albuterol 90 mcg/actuation inhaler Inhale 2 puffs into the lungs every 6 (six) hours as needed for Wheezing. Rescue    azithromycin (Z-REGULO) 250 MG tablet Take 2 tablets on day 1, then one tablet on day 2-5    carvedilol (COREG) 3.125 MG tablet Take 1 tablet (3.125 mg total) by mouth 2 (two) times daily with meals.    etodolac (LODINE) 200 MG Cap Take 1 capsule (200 mg total) by mouth every 8 (eight) hours as needed (pain).    hydrochlorothiazide (HYDRODIURIL) 12.5 MG Tab Take 1 tablet (12.5 mg total) by mouth once daily.    lisinopril (PRINIVIL,ZESTRIL) 5 MG tablet Take 1 tablet (5 mg total) by mouth once daily.    OXYGEN-AIR DELIVERY SYSTEMS MISC by Misc.(Non-Drug; Combo Route) route.     Family History     Problem Relation (Age of Onset)    Coronary artery disease     Diabetes         Social History Main Topics    Smoking status: Never Smoker    Smokeless tobacco: Never Used    Alcohol use Yes      Comment: OCCASSIONAL    Drug use: No    Sexual activity: Not on file     Review of Systems   Unable to perform ROS: Acuity of condition     Objective:     Vital Signs (Most Recent):  Temp: 97.7 °F (36.5 °C) (04/06/17 2131)  Pulse: 100 (04/06/17 2131)  Resp: 20 (04/06/17 2131)  BP: (!) 155/91 (04/06/17 2131)  SpO2: 97 % (04/06/17 2131) Vital Signs (24h Range):  Temp:  [97.7 °F (36.5 °C)-98.8 °F (37.1 °C)] 97.7 °F (36.5 °C)  Pulse:  [] 100  Resp:  [18-26] 20  SpO2:  [90 %-99 %] 97 %  BP: (131-194)/() 155/91     Weight: 78.3 kg (172 lb 9.9 oz)  Body mass  index is 48.55 kg/(m^2).    Physical Exam   Constitutional: She appears well-developed and well-nourished. She appears distressed.   obese   HENT:   Head: Normocephalic and atraumatic.   Right Ear: External ear normal.   Left Ear: External ear normal.   Nose: Nose normal.   Eyes: Right eye exhibits no discharge. Left eye exhibits no discharge.   Bilateral scleral edema   Neck: Normal range of motion.   Cardiovascular:   Tachycardic without murmurs   Pulmonary/Chest:   Poor respiratory effort with decreased breath sound bilaterally and inspiratory crackles    Abdominal: Soft. Bowel sounds are normal. She exhibits no distension. There is no tenderness. There is no rebound and no guarding.   Musculoskeletal: Normal range of motion. She exhibits no edema.   Skin: Skin is warm and dry. She is not diaphoretic. No erythema.   Nursing note and vitals reviewed.       Significant Labs: All pertinent labs within the past 24 hours have been reviewed.    Significant Imaging: I have reviewed and interpreted all pertinent imaging results/findings within the past 24 hours.

## 2017-04-07 NOTE — ASSESSMENT & PLAN NOTE
The patient has a CURB-65 score of 0, and does not meet criteria for healthcare-associated pneumonia.  I have reviewed the chest X-ray and CT-A chest and it reveals bilateral airspace opacification concerning for pneumonia.  Oxygen saturations are improved but requiring a non-rebreather mask; will switch to BPAP.  Blood and sputum cultures are pending.  Will start empiric antibiotic therapy.

## 2017-04-07 NOTE — PLAN OF CARE
Dynamic infusion, Crystal, notified of ordered PICC line per Dr. Greer. Two hour ETA provided. Assigned Nurse, Deedee Cadena RN, notified.

## 2017-04-07 NOTE — MEDICAL/APP STUDENT
[facility] Hospital Medicine  History & Physical    Patient Name: Radha Pritchett  MRN: [id number]  Admission Date: 4/6/2017  Attending Physician: Antonieta Carpenter MD   Primary Care Provider: Ferny Iglesias MD         Patient information was obtained from patient and ER records.     Subjective:     Principal Problem:Pneumonia of both lungs due to infectious organism    Chief Complaint:   Chief Complaint   Patient presents with    Shortness of Breath     short of breath.. was getting preoped and told she had low 02 of 88 and tachycardia.. short of breath when speaking sentences.. patient thinks she has a touch of pneumonia    Chest Pain     midsternal chest pain... no cardiologist        HPI: Ms. Pritchett is a 46 year old female with CHF, mild MR, and hematoma of breast c/o SOB. Patient was scheduled to receive breast mass removal today. However, patient presented with low O2 levels during pre-op. Patient was sent to ED for further evaluation. There is associated cough and fatigue. Further history is limited as patient was unwilling to speak to me during my evaluation.   I was not able to complete a full ROS as patient did not respond to my questions.       Past Medical History:   Diagnosis Date    CHF (congestive heart failure)     Learning difficulty        Past Surgical History:   Procedure Laterality Date    BREAST SURGERY Left     biopsy    CYST REMOVAL      shoulder        Review of patient's allergies indicates:  No Known Allergies    No current facility-administered medications on file prior to encounter.      Current Outpatient Prescriptions on File Prior to Encounter   Medication Sig    albuterol 90 mcg/actuation inhaler Inhale 2 puffs into the lungs every 6 (six) hours as needed for Wheezing. Rescue    azithromycin (Z-REGULO) 250 MG tablet Take 2 tablets on day 1, then one tablet on day 2-5    carvedilol (COREG) 3.125 MG tablet Take 1 tablet (3.125 mg total) by mouth 2 (two)  times daily with meals.    etodolac (LODINE) 200 MG Cap Take 1 capsule (200 mg total) by mouth every 8 (eight) hours as needed (pain).    hydrochlorothiazide (HYDRODIURIL) 12.5 MG Tab Take 1 tablet (12.5 mg total) by mouth once daily.    lisinopril (PRINIVIL,ZESTRIL) 5 MG tablet Take 1 tablet (5 mg total) by mouth once daily.    OXYGEN-AIR DELIVERY SYSTEMS MISC by Misc.(Non-Drug; Combo Route) route.     Family History     Problem Relation (Age of Onset)    Coronary artery disease     Diabetes         Social History Main Topics    Smoking status: Never Smoker    Smokeless tobacco: Never Used    Alcohol use Yes      Comment: OCCASSIONAL    Drug use: No    Sexual activity: Not on file     Review of Systems  Objective:     Vital Signs (Most Recent):  Temp: 97.7 °F (36.5 °C) (04/06/17 2131)  Pulse: 100 (04/06/17 2131)  Resp: 20 (04/06/17 2131)  BP: (!) 155/91 (04/06/17 2131)  SpO2: 97 % (04/06/17 2131) Vital Signs (24h Range):  Temp:  [97.7 °F (36.5 °C)-98.8 °F (37.1 °C)] 97.7 °F (36.5 °C)  Pulse:  [] 100  Resp:  [18-26] 20  SpO2:  [90 %-99 %] 97 %  BP: (131-194)/() 155/91     Weight: 78.3 kg (172 lb 9.9 oz)  Body mass index is 48.55 kg/(m^2).    Physical Exam   Constitutional: She appears well-developed and well-nourished. She appears listless.   HENT:   Head: Normocephalic and atraumatic.   Neck: Normal range of motion. Neck supple.   Cardiovascular: Normal rate, regular rhythm, S1 normal and S2 normal.    Pulmonary/Chest: Effort normal.   Patient on non-rebreather mask, 15L. Bilateral coarse crackles on auscultation     Abdominal: Soft. Bowel sounds are normal. There is no tenderness.   Musculoskeletal:   Bilateral lower leg 2+ pitting edema   Neurological: She appears listless.   Skin: Skin is warm and dry.   Psychiatric:   Patient did not listen to commands and was uncooperative during examination       Significant Labs:     Recent Labs  Lab 04/06/17  1440      K 4.3      CO2 36*    BUN 7   CREATININE 0.9   CALCIUM 9.8   PROT 7.5   BILITOT 1.1*   ALKPHOS 121   ALT 41   AST 39     BMP:   Recent Labs  Lab 04/06/17  1440         K 4.3      CO2 36*   BUN 7   CREATININE 0.9   CALCIUM 9.8     CBC:   Recent Labs  Lab 04/06/17  1440   WBC 7.70   HGB 12.0   HCT 44.0        Cardiac Markers:   Recent Labs  Lab 04/06/17  1440   *       Significant Imaging:   -CTA Chest Non-coronary Impression:  1. There is a 12 cm left breast hematoma.  2. There are low lung volumes with bibasilar dependent regions of consolidation and patchy opacification of the right upper lung zone. The findings may represent developing pneumonia.  3. There is no evidence pulmonary embolus.    -X-ray Chest PA and Lateral Impression:  Continued ill-defined perihilar and basilar lung opacities similar prior (3/29/17) which may represent vascular congestion and edema.  There continued poor definition of the left lung base concerning for superimposed effusion with atelectasis.  No large pneumothorax..  Cardiac mediastinal sella partially obscured by opacities.  Visualized osseous structures grossly intact.  Clinical correlation and followup advised.    Assessment/Plan:     Ms. Pritchett is a 46 year old female with intellectual disability, CHF, mild MR, and hematoma of breast c/o SOB.    SOB:   -ddx: pneumonia, PE, pleural effusion, and pneumothorax   -Chest x-ray shows multilobar airspace opacities. A CT of the chest PE protocol reveals 3 areas of airspace opacities concerning for community-associated pneumonia.   -no evidence of pneumothorax, pleural effusion, or PE  -normal WBC, blood culture done 3/29 with no growth   -given imaging and cough + hypoxia, continue antibiotics (Ceftriaxone and azithromycin) for pneumonia for 5-7 days if stable and febrile for 48-72 hours  -continue duo-nebs    Essential Hypertension:   -continue lisinopril, stable    CHF:  -stable, continue lisinopril and co-reg    Hematoma  of breast:   -surgery once pneumonia has resolved  -appreciate surgery team recommendations    Active Diagnoses:    Diagnosis Date Noted POA    PRINCIPAL PROBLEM:  Pneumonia of both lungs due to infectious organism [J18.9] 04/06/2017 Yes    Sepsis [A41.9] 04/06/2017 Yes    Essential hypertension [I10] 04/06/2017 Yes     Chronic    Chronic diastolic heart failure [I50.32] 04/06/2017 Yes     Chronic    Moderate protein malnutrition [E44.0] 04/06/2017 Yes     Chronic      Problems Resolved During this Admission:    Diagnosis Date Noted Date Resolved POA     VTE Risk Mitigation         Ordered     enoxaparin injection 40 mg  Daily     Route:  Subcutaneous        04/06/17 2202     Medium Risk of VTE  Once      04/06/17 2202        Nati Schaefer  Department of Hospital Medicine   [facility]

## 2017-04-07 NOTE — PLAN OF CARE
04/07/17 1832   Discharge Assessment   Assessment Type Discharge Planning Assessment   Confirmed/corrected address and phone number on facesheet? Yes   Assessment information obtained from? Caregiver;Medical Record   Expected Length of Stay (days) (uable to assess--ICU)   Communicated expected length of stay with patient/caregiver no   Type of Healthcare Directive Received (none)   Prior to hospitilization cognitive status: Alert/Oriented   Prior to hospitalization functional status: Independent;Assistive Equipment   Current cognitive status: Coma/Sedated/Intubated   Current Functional Status: Completely Dependent   Arrived From home or self-care   Lives With grandparent(s)   Able to Return to Prior Arrangements yes   Is patient able to care for self after discharge? Unable to determine at this time (comments)   How many people do you have in your home that can help with your care after discharge? 0   Who are your caregiver(s) and their phone number(s)? grandmother has been ill--just got out of hospital   Patient's perception of discharge disposition (unable to assess)   Readmission Within The Last 30 Days no previous admission in last 30 days;unable to assess   Patient currently being followed by outpatient case management? Unable to determine (comments)   Patient currently receives home health services? No   Does the patient currently use HME? Yes   Name and contact number for HME provider: private purchase   Patient currently receives private duty nursing? No   Patient currently receives any other outside agency services? No   Equipment Currently Used at Home rollator   Do you have any problems affording any of your prescribed medications? No   Is the patient taking medications as prescribed? (unable to assess)   Do you have any financial concerns preventing you from receiving the healthcare you need? No   Does the patient have transportation to healthcare appointments? (unable to assess which she uses)    Transportation Available other (see comments)  (uncertain which she uses--medicaid elegible)   On Dialysis? No   Does the patient receive services at the [facility]? No   Discharge Plan A Home with family   Discharge Plan B Other  (to be determined)   Patient/Family In Agreement With Plan yes   patient on vent in ICU. Spoke with grandmother on phone who had limited above information. Will continue to follow in ICU, update and assist as needed.

## 2017-04-07 NOTE — PROGRESS NOTES
Results reported to Dr Aaron.   Results for PINKY OSUNA (MRN [id number]) as of 4/7/2017 02:16   Ref. Range 4/7/2017 01:07   POC PCO2 Latest Ref Range: 35 - 45 mmHg 125.1 (HH)   POC PO2 Latest Ref Range: 80 - 100 mmHg 72 (L)   POC BE Latest Ref Range: -2 to 2 mmol/L 6   POC HCO3 Latest Ref Range: 24 - 28 mmol/L 40.3 (H)   POC SATURATED O2 Latest Ref Range: 95 - 100 % 85 (L)   POC TCO2 Latest Ref Range: 23 - 27 mmol/L 44 (H)   FiO2 Unknown 70   Sample Unknown ARTERIAL   DelSys Unknown CPAP/BiPAP   Allens Test Unknown Pass   Site Unknown RR   Mode Unknown BiPAP   POC PH Latest Ref Range: 7.35 - 7.45  7.116 (LL)

## 2017-04-07 NOTE — PLAN OF CARE
Problem: Fall Risk (Adult)  Goal: Absence of Falls  Patient will demonstrate the desired outcomes by discharge/transition of care.   Outcome: Ongoing (interventions implemented as appropriate)  Pt free of falls or other injuries.

## 2017-04-07 NOTE — PLAN OF CARE
Problem: Infection, Risk/Actual (Adult)  Goal: Infection Prevention/Resolution  Patient will demonstrate the desired outcomes by discharge/transition of care.   Outcome: Ongoing (interventions implemented as appropriate)  Patient being treated with Antibiotics. Low grade fever this A.M

## 2017-04-08 PROBLEM — N63.20 BREAST MASS, LEFT: Status: ACTIVE | Noted: 2017-04-08

## 2017-04-08 LAB
ALLENS TEST: ABNORMAL
DELSYS: ABNORMAL
DIASTOLIC DYSFUNCTION: YES
ERYTHROCYTE [SEDIMENTATION RATE] IN BLOOD BY WESTERGREN METHOD: 8 MM/H
ESTIMATED PA SYSTOLIC PRESSURE: 29.14
FIO2: 35
GLOBAL PERICARDIAL EFFUSION: ABNORMAL
HCO3 UR-SCNC: 33 MMOL/L (ref 24–28)
MITRAL VALVE MOBILITY: NORMAL
MODE: ABNORMAL
PCO2 BLDA: 64.8 MMHG (ref 35–45)
PEEP: 5
PH SMN: 7.32 [PH] (ref 7.35–7.45)
PIP: 30
PO2 BLDA: 82 MMHG (ref 80–100)
POC BE: 5 MMOL/L
POC SATURATED O2: 94 % (ref 95–100)
POC TCO2: 35 MMOL/L (ref 23–27)
PS: 10
RETIRED EF AND QEF - SEE NOTES: 65 (ref 55–65)
SAMPLE: ABNORMAL
SITE: ABNORMAL
SP02: 99
TRICUSPID VALVE REGURGITATION: ABNORMAL
VT: 440

## 2017-04-08 PROCEDURE — 82803 BLOOD GASES ANY COMBINATION: CPT

## 2017-04-08 PROCEDURE — 94003 VENT MGMT INPAT SUBQ DAY: CPT

## 2017-04-08 PROCEDURE — 94640 AIRWAY INHALATION TREATMENT: CPT

## 2017-04-08 PROCEDURE — 63600175 PHARM REV CODE 636 W HCPCS: Performed by: INTERNAL MEDICINE

## 2017-04-08 PROCEDURE — 36600 WITHDRAWAL OF ARTERIAL BLOOD: CPT

## 2017-04-08 PROCEDURE — 25000003 PHARM REV CODE 250: Performed by: INTERNAL MEDICINE

## 2017-04-08 PROCEDURE — 25000242 PHARM REV CODE 250 ALT 637 W/ HCPCS: Performed by: INTERNAL MEDICINE

## 2017-04-08 PROCEDURE — 25000003 PHARM REV CODE 250: Performed by: EMERGENCY MEDICINE

## 2017-04-08 PROCEDURE — 99900035 HC TECH TIME PER 15 MIN (STAT)

## 2017-04-08 PROCEDURE — 99900026 HC AIRWAY MAINTENANCE (STAT)

## 2017-04-08 PROCEDURE — 20000000 HC ICU ROOM

## 2017-04-08 PROCEDURE — 93306 TTE W/DOPPLER COMPLETE: CPT | Mod: 26,,, | Performed by: INTERNAL MEDICINE

## 2017-04-08 PROCEDURE — 94761 N-INVAS EAR/PLS OXIMETRY MLT: CPT

## 2017-04-08 PROCEDURE — 27200966 HC CLOSED SUCTION SYSTEM

## 2017-04-08 PROCEDURE — 93306 TTE W/DOPPLER COMPLETE: CPT

## 2017-04-08 RX ADMIN — CARVEDILOL 3.12 MG: 3.12 TABLET, FILM COATED ORAL at 04:04

## 2017-04-08 RX ADMIN — HYDROCHLOROTHIAZIDE 12.5 MG: 12.5 TABLET ORAL at 08:04

## 2017-04-08 RX ADMIN — IPRATROPIUM BROMIDE AND ALBUTEROL SULFATE 3 ML: .5; 3 SOLUTION RESPIRATORY (INHALATION) at 07:04

## 2017-04-08 RX ADMIN — PROPOFOL 30 MCG/KG/MIN: 10 INJECTION, EMULSION INTRAVENOUS at 07:04

## 2017-04-08 RX ADMIN — PANTOPRAZOLE SODIUM 40 MG: 40 GRANULE, DELAYED RELEASE ORAL at 08:04

## 2017-04-08 RX ADMIN — SODIUM CHLORIDE: 0.9 INJECTION, SOLUTION INTRAVENOUS at 01:04

## 2017-04-08 RX ADMIN — CHLORHEXIDINE GLUCONATE 15 ML: 1.2 RINSE ORAL at 08:04

## 2017-04-08 RX ADMIN — ENOXAPARIN SODIUM 40 MG: 100 INJECTION SUBCUTANEOUS at 04:04

## 2017-04-08 RX ADMIN — AZITHROMYCIN MONOHYDRATE 500 MG: 500 INJECTION, POWDER, LYOPHILIZED, FOR SOLUTION INTRAVENOUS at 09:04

## 2017-04-08 RX ADMIN — CEFTRIAXONE 1 G: 1 INJECTION, SOLUTION INTRAVENOUS at 08:04

## 2017-04-08 RX ADMIN — IPRATROPIUM BROMIDE AND ALBUTEROL SULFATE 3 ML: .5; 3 SOLUTION RESPIRATORY (INHALATION) at 08:04

## 2017-04-08 RX ADMIN — CARVEDILOL 3.12 MG: 3.12 TABLET, FILM COATED ORAL at 08:04

## 2017-04-08 RX ADMIN — HYDROCODONE BITARTRATE AND ACETAMINOPHEN 1 TABLET: 5; 325 TABLET ORAL at 10:04

## 2017-04-08 RX ADMIN — LISINOPRIL 5 MG: 5 TABLET ORAL at 08:04

## 2017-04-08 RX ADMIN — PROPOFOL 20 MCG/KG/MIN: 10 INJECTION, EMULSION INTRAVENOUS at 01:04

## 2017-04-08 RX ADMIN — PROPOFOL 29.94 MCG/KG/MIN: 10 INJECTION, EMULSION INTRAVENOUS at 02:04

## 2017-04-08 NOTE — SUBJECTIVE & OBJECTIVE
Interval History: Pt s/p intubation overnight and placed on vent.    Review of Systems   Unable to perform ROS: Intubated     Objective:     Vital Signs (Most Recent):  Temp: 98.4 °F (36.9 °C) (04/07/17 1901)  Pulse: 73 (04/08/17 0015)  Resp: (!) 8 (04/08/17 0015)  BP: (!) 150/85 (04/08/17 0000)  SpO2: 100 % (04/08/17 0015) Vital Signs (24h Range):  Temp:  [98.3 °F (36.8 °C)-100 °F (37.8 °C)] 98.4 °F (36.9 °C)  Pulse:  [] 73  Resp:  [8-28] 8  SpO2:  [95 %-100 %] 100 %  BP: ()/(49-88) 150/85     Weight: 79.6 kg (175 lb 7.8 oz)  Body mass index is 49.35 kg/(m^2).    Intake/Output Summary (Last 24 hours) at 04/08/17 0054  Last data filed at 04/08/17 0000   Gross per 24 hour   Intake          2567.59 ml   Output              965 ml   Net          1602.59 ml      Physical Exam   Constitutional: She appears well-developed and well-nourished.   obese   HENT:   Head: Normocephalic and atraumatic.   Eyes: Conjunctivae are normal.   Bilateral scleral edema   Neck: Neck supple.   Cardiovascular:   Tachycardic without murmurs. Anterior chest with hard, large left breast mass that is palpable.   Pulmonary/Chest:   Poor respiratory effort with decreased breath sound bilaterally and inspiratory crackles    Abdominal: Soft. Bowel sounds are normal. She exhibits no distension. There is no tenderness.   Musculoskeletal: Normal range of motion. She exhibits no edema.   Neurological:   Sedated on vent.   Skin: Skin is warm and dry. No erythema.   Nursing note and vitals reviewed.      Significant Labs: All pertinent labs within the past 24 hours have been reviewed.    Significant Imaging: I have reviewed and interpreted all pertinent imaging results/findings within the past 24 hours.

## 2017-04-08 NOTE — CONSULTS
Consult Note  Pulmonology    Consult Requested By: Lakesha Greer MD  Reason for Consult : vent management    SUBJECTIVE: dyspnea     History of Present Illness:  Patient unresponsive;information obtained from chart      Review of patient's allergies indicates:  No Known Allergies    Past Medical History:   Diagnosis Date    CHF (congestive heart failure)     Learning difficulty      Past Surgical History:   Procedure Laterality Date    BREAST SURGERY Left     biopsy    CYST REMOVAL      shoulder      Family History   Problem Relation Age of Onset    Diabetes      Coronary artery disease       Review of systems not obtained due to patient factors ; patient intubated and unable to communicate.    OBJECTIVE:     Vital Signs (Most Recent)  Temp: 97.9 °F (36.6 °C) (04/08/17 0730)  Pulse: 71 (04/08/17 1124)  Resp: 16 (04/08/17 1124)  BP: 138/85 (04/08/17 1003)  SpO2: 100 % (04/08/17 1124)    Vital Signs Range (Last 24H):  Temp:  [97.9 °F (36.6 °C)-98.9 °F (37.2 °C)]   Pulse:  []   Resp:  [5-56]   BP: (103-167)/()   SpO2:  [82 %-100 %]     Physical Exam:    General: no distress,.on the ventilator  Neck: no jugular venous distention and no carotid bruit  Lungs:  diminished breath sounds bilaterally and 7.5 ett in place and rhonchi bilaterally  Heart: regular rate and rhythm and no murmur  Abdomen: soft, non-tender non-distended; bowel sounds normal and right NGT in place  Extremities: no cyanosis or edema, or clubbing  Neurologic: sedated.  Skin-warm, moist. No rash      Laboratory:  Recent Results (from the past 24 hour(s))   ISTAT PROCEDURE    Collection Time: 04/08/17  4:40 AM   Result Value Ref Range    POC PH 7.315 (L) 7.35 - 7.45    POC PCO2 64.8 (HH) 35 - 45 mmHg    POC PO2 82 80 - 100 mmHg    POC HCO3 33.0 (H) 24 - 28 mmol/L    POC BE 5 -2 to 2 mmol/L    POC SATURATED O2 94 (L) 95 - 100 %    POC TCO2 35 (H) 23 - 27 mmol/L    Rate 8     Sample ARTERIAL     Site LR     Allens Test Pass     DelSys  Adult Vent     Mode SIMV     Vt 440     PEEP 5     PS 10     PiP 30     FiO2 35     Sp02 99      CBC:     Recent Labs  Lab 04/07/17 0211   WBC 7.27   RBC 4.66   HGB 11.7*   HCT 43.8   *   MCV 94   MCH 25.1*   MCHC 26.7*     BMP:     Recent Labs  Lab 04/07/17  0211   *      K 4.7      CO2 34*   BUN 7   CREATININE 0.9   CALCIUM 8.5*   MG 2.0     CMP:     Recent Labs  Lab 04/07/17 0211   *   CALCIUM 8.5*   ALBUMIN 2.4*   PROT 6.2      K 4.7   CO2 34*      BUN 7   CREATININE 0.9   ALKPHOS 113   ALT 40   AST 41*   BILITOT 1.0     LFTs:     Recent Labs  Lab 04/07/17 0211   ALT 40   AST 41*   ALKPHOS 113   BILITOT 1.0   PROT 6.2   ALBUMIN 2.4*     Coagulation:     Recent Labs  Lab 04/06/17  1440   INR 1.1     ABGs:     Recent Labs  Lab 04/08/17  0440   PH 7.315*   PCO2 64.8*   PO2 82   HCO3 33.0*   POCSATURATED 94*   BE 5       Ventilator Setting:  Vent Mode: SIMV (PRVC) + PS  Oxygen Concentration (%):  [] 35  Resp Rate Total:  [8 br/min-29 br/min] 8 br/min  Vt Set:  [440 mL] 440 mL  PEEP/CPAP:  [5 cmH20] 5 cmH20  Pressure Support:  [10 cmH20] 10 cmH20  Mean Airway Pressure:  [5.9 oqS23-29 cmH20] 7.6 cmH20     Diagnostic Results:   Imaging Results         X-Ray Chest AP Portable (Final result) Result time:  04/08/17 05:17:49    Final result by Viktoria Stallings MD (04/08/17 05:17:49)    Impression:        No interval detrimental change.      Electronically signed by: VIKTORIA STALLINGS MD  Date:     04/08/17  Time:    05:17     Narrative:    Technique: Single AP chest radiograph.    Comparison: Radiograph 04/07/2017.    Findings:    ET tube and right-sided PICC line in stable position.  Enteric catheter crosses the diaphragm with tip not seen. Cardiopulmonary status is unchanged noting persistent abnormal increased attenuation within the mid to lower lung zones, left greater than right, concerning for edema, pneumonia or aspiration. No pneumothorax.            X-Ray Chest  1 View (Final result) Result time:  04/07/17 15:52:52    Final result by Michael Smith DO (04/07/17 15:52:52)    Impression:        See Above       Electronically signed by: MICHAEL SMITH DO  Date:     04/07/17  Time:    15:52     Narrative:    Single portable frontal view of the chest    Comparison: 04/07/2017    Results: Interval placement right-sided PICC line with tip projected over the mid SVC.  ET tube and nasogastric tubes stable.  Continued ill-defined bilateral lung opacities somewhat greater on the left with poor definition left lung base suggestive for underlying effusion.  No large pneumothorax.  Clinical correlation and followup advised            US Lower Extremity Veins Bilateral (Final result) Result time:  04/07/17 10:02:35    Final result by Kristi Small MD (04/07/17 10:02:35)    Impression:      1.  No sonographic evidence for deep venous thrombosis in either lower extremity.      Electronically signed by: KRISTI SMALL MD  Date:     04/07/17  Time:    10:02     Narrative:    Comparison: None available    Technique: Transverse and longitudinal grayscale ultrasound images of the bilateral lower extremity veins with color and spectral Doppler analysis.    Findings: The interrogated bilateral common femoral, proximal GSV, proximal profunda, femoral, popliteal, peroneal, anterior and posterior tibial veins are normally compressible and demonstrate color and spectral Doppler patency.            X-Ray Chest 1 View (Final result) Result time:  04/07/17 02:25:46    Final result by Victor Manuel Grant MD (04/07/17 02:25:46)    Impression:        As above.      Electronically signed by: VICTOR MANUEL GRANT MD, MD  Date:     04/07/17  Time:    02:25     Narrative:    COMPARISON: CT thorax and chest radiograph 4/6/17    FINDINGS: AP portable supine view of the chest. Monitoring leads overlie the chest. Resolution is limited by body habitus with underpenetration. Interval placement of endotracheal tube with tip at the  level of the clavicular heads. Interval placement of enteric tube with port and tip below the diaphragm coursing caudally out of field-of-view. No large pneumothorax. Slight improved aeration of the bilateral lungs. Otherwise, grossly stable radiographic appearance of the chest.            CTA Chest Non-Coronary (Final result) Result time:  04/06/17 16:47:30    Final result by Vero Cash MD (04/06/17 16:47:30)    Impression:      1. There is a 12 cm left breast hematoma.  2. There are low lung volumes with bibasilar dependent regions of consolidation and patchy opacification of the right upper lung zone. The findings may represent developing pneumonia.  3. There is no evidence pulmonary embolus.          Electronically signed by: VERO CASH MD  Date:     04/06/17  Time:    16:47     Narrative:    CTA chest    There is a 9.7 x 9.2 x 12.5 cm mixed density left breast fluid collection seen within the medial portion of the breast. As result of expansion of the left breast tissue.    There is no evidence of aneurysmal dilatation or dissection of the thoracic aorta. There are no pulmonary arterial filling defects are seen. There are bilateral dependent regions of consolidation.    There is patchy consolidation seen within the right upper lobe of the lung.    There is no evidence of axillary lymphadenopathy. The cardiac silhouette is enlarged.    This is present the superior abdomen are unremarkable.    The osseous structures are unremarkable.            X-Ray Chest PA And Lateral (Final result) Result time:  04/06/17 15:18:50    Final result by Michael Smith DO (04/06/17 15:18:50)    Impression:      See above      Electronically signed by: MICHAEL SMITH DO  Date:     04/06/17  Time:    15:18     Narrative:    PA and lateral views of the chest    Comparison: 03/29/2017    Results:    Continued ill-defined perihilar and basilar lung opacities similar prior which may represent vascular congestion and edema.   There continued poor definition of the left lung base concerning for superimposed effusion with atelectasis.  No large pneumothorax..  Cardiac mediastinal sella partially obscured by opacities.  Visualized osseous structures grossly intact.  Clinical correlation and followup advised.                  ASSESSMENT/PLAN:     1. Pneumonia of both lungs due to infectious organism, unspecified part of lung    2.      acute respiratory failure  3.      Vent management  4.       Hypoxemia  5.       Hypercapnea      Plan:weaning underway ; did not tolerate this am.Returned to SIMV / RR 10/ PEEP +5/ PRESSURE SUPPORT 10/ FI02 35%. Critical care 35 min

## 2017-04-08 NOTE — PROGRESS NOTES
[facility] Hospital Medicine  Progress Note    Patient Name: Radha Pritchett  MRN: [id number]  Patient Class: IP- Inpatient   Admission Date: 4/6/2017  Length of Stay: 2 days  Attending Physician: Lakesha Greer MD  Primary Care Provider: Ferny Iglesias MD        Subjective:     Principal Problem:Acute respiratory failure with hypercapnia    HPI:  Ms. Radha Pritchett is a 46 y.o. female with essential hypertension, chronic diastolic heart failure (LVEF 55-60% Dec 2014), moderate protein malnutrition who presents to [facility] ED with complaints of dyspnea today.  She was scheduled to undergo removal of a breast mass today but was found to be hypoxic.  She does report that she has been short of breath but cannot specify how long.  She has also had some non-productive coughing and some chills without fevers.  Further history is otherwise limited at this time.    Hospital Course:   was admitted with pneumonia and acutely decompensated and was intubated and placed on vent.    Interval History: Pt s/p intubation overnight and placed on vent.    Review of Systems   Unable to perform ROS: Intubated     Objective:     Vital Signs (Most Recent):  Temp: 98.4 °F (36.9 °C) (04/07/17 1901)  Pulse: 73 (04/08/17 0015)  Resp: (!) 8 (04/08/17 0015)  BP: (!) 150/85 (04/08/17 0000)  SpO2: 100 % (04/08/17 0015) Vital Signs (24h Range):  Temp:  [98.3 °F (36.8 °C)-100 °F (37.8 °C)] 98.4 °F (36.9 °C)  Pulse:  [] 73  Resp:  [8-28] 8  SpO2:  [95 %-100 %] 100 %  BP: ()/(49-88) 150/85     Weight: 79.6 kg (175 lb 7.8 oz)  Body mass index is 49.35 kg/(m^2).    Intake/Output Summary (Last 24 hours) at 04/08/17 0054  Last data filed at 04/08/17 0000   Gross per 24 hour   Intake          2567.59 ml   Output              965 ml   Net          1602.59 ml      Physical Exam   Constitutional: She appears well-developed and well-nourished.   obese   HENT:   Head: Normocephalic and atraumatic.   Eyes: Conjunctivae  are normal.   Bilateral scleral edema   Neck: Neck supple.   Cardiovascular:   Tachycardic without murmurs. Anterior chest with hard, large left breast mass that is palpable.   Pulmonary/Chest:   Poor respiratory effort with decreased breath sound bilaterally and inspiratory crackles    Abdominal: Soft. Bowel sounds are normal. She exhibits no distension. There is no tenderness.   Musculoskeletal: Normal range of motion. She exhibits no edema.   Neurological:   Sedated on vent.   Skin: Skin is warm and dry. No erythema.   Nursing note and vitals reviewed.      Significant Labs: All pertinent labs within the past 24 hours have been reviewed.    Significant Imaging: I have reviewed and interpreted all pertinent imaging results/findings within the past 24 hours.    Assessment/Plan:      * Acute respiratory failure with hypercapnia  Mechanical ventilation  Assessment and Plan:  S/p intubation, possible secondary to pneumonia vs edema, will continue abx, check ECHO and Pulm following for vent management.    Pneumonia of both lungs due to infectious organism  The patient has a CURB-65 score of 0, and does not meet criteria for healthcare-associated pneumonia.  I have reviewed the chest X-ray and CT-A chest and it reveals bilateral airspace opacification concerning for pneumonia.  Oxygen saturations are improved but requiring a non-rebreather mask; then BIPAP and s/p intubation.  Blood culture NGTD..  Continue empiric antibiotic therapy.    Sepsis  This patient meets criteria for sepsis given tachycardia, tachypnea, and pneumonia; there is no evidence of endo-organ dysfunction.  Blood cultures are pending; will start IV fluid hydration and broad spectrum antibiotics    Essential hypertension  Patient's blood pressure is poorly-controlled; will continue home regimen of carvedilol, hydrochlorothiazide, and lisinopril, and provide as-needed clonidine.    Chronic diastolic heart failure  Patient doesn't appear to be in acute  heart failure; but will check ECHO.    Moderate protein malnutrition  Will start tube feedings soon if unable to extubate.    Breast mass, left  Planned for surgery, will place consult to Jaime (his patient).      VTE Risk Mitigation         Ordered     enoxaparin injection 40 mg  Daily     Route:  Subcutaneous        04/06/17 2202     Medium Risk of VTE  Once      04/06/17 2202        Critical care time spent: 40 minutes.      Lakesha Greer MD  Department of Hospital Medicine   [facility]

## 2017-04-08 NOTE — PLAN OF CARE
AT 0330 PATIENT WAS PLACE ON CPAP BUT AT 0415 PATIENT NURSE CALL ME BECAUSE PATIENT DESATED AND HER RR WAS INCREASING. PATIENT WAS PLACE BACK ON VENT WITH PERVIOUS SETTINGS AS FOLLOW SIMV / RR 10/ PEEP +5/ PRESSURE SUPPORT 10/ FI02 35%. LATER ABG WAS OBTAIN AND REPORTED TO DR. ACOSTA. NO CHANGES WAS MADE TO VENT SETTINGS.

## 2017-04-08 NOTE — PROGRESS NOTES
[facility] Hospital Medicine  Progress Note    Patient Name: Radha Pritchett  MRN: [id number]  Patient Class: IP- Inpatient   Admission Date: 4/6/2017  Length of Stay: 2 days  Attending Physician: Lakesha Greer MD  Primary Care Provider: Ferny Iglesias MD        Subjective:     Principal Problem:Acute respiratory failure with hypercapnia    HPI:  Ms. Radha Pritchett is a 46 y.o. female with essential hypertension, chronic diastolic heart failure (LVEF 55-60% Dec 2014), moderate protein malnutrition who presents to [facility] ED with complaints of dyspnea today.  She was scheduled to undergo removal of a breast mass today but was found to be hypoxic.  She does report that she has been short of breath but cannot specify how long.  She has also had some non-productive coughing and some chills without fevers.  Further history is otherwise limited at this time.    Hospital Course:   was admitted with pneumonia and acutely decompensated and was intubated and placed on vent.    Interval History: Pt failed weaning trial this morning; no acute issues.    Review of Systems   Unable to perform ROS: Intubated     Objective:     Vital Signs (Most Recent):  Temp: 98.3 °F (36.8 °C) (04/08/17 1230)  Pulse: 69 (04/08/17 1306)  Resp: (!) 8 (04/08/17 1306)  BP: 124/80 (04/08/17 1303)  SpO2: 100 % (04/08/17 1306) Vital Signs (24h Range):  Temp:  [97.9 °F (36.6 °C)-98.9 °F (37.2 °C)] 98.3 °F (36.8 °C)  Pulse:  [] 69  Resp:  [5-56] 8  SpO2:  [82 %-100 %] 100 %  BP: (100-167)/() 124/80     Weight: 79.6 kg (175 lb 7.8 oz)  Body mass index is 49.35 kg/(m^2).    Intake/Output Summary (Last 24 hours) at 04/08/17 1353  Last data filed at 04/08/17 1300   Gross per 24 hour   Intake          3064.42 ml   Output             1935 ml   Net          1129.42 ml      Physical Exam   Constitutional: She appears well-developed and well-nourished.   obese   HENT:   Head: Normocephalic and atraumatic.   Eyes:  Conjunctivae are normal.   Neck: Neck supple.   Cardiovascular:   Tachycardic without murmurs. Anterior chest with hard, large left breast mass that is palpable.   Pulmonary/Chest:   Poor respiratory effort with decreased breath sound bilaterally and inspiratory crackles    Abdominal: Soft. Bowel sounds are normal. She exhibits no distension. There is no tenderness.   Musculoskeletal: Normal range of motion. She exhibits no edema.   Neurological:   Sedated on vent.   Skin: Skin is warm and dry. No erythema.   Nursing note and vitals reviewed.      Significant Labs: All pertinent labs within the past 24 hours have been reviewed.    Significant Imaging: I have reviewed and interpreted all pertinent imaging results/findings within the past 24 hours.    Assessment/Plan:      * Acute respiratory failure with hypercapnia  Mechanical ventilation  Assessment and Plan:  S/p intubation, possible secondary to pneumonia vs edema, will continue abx, ECHO with diastolic dysfunction but normal EF, and Pulm following for vent management. Failed weaning trial this morning.    Pneumonia of both lungs due to infectious organism  The patient has a CURB-65 score of 0, and does not meet criteria for healthcare-associated pneumonia.  I have reviewed the chest X-ray and CT-A chest and it reveals bilateral airspace opacification concerning for pneumonia.  Oxygen saturations are improved but requiring a non-rebreather mask; then BIPAP and s/p intubation.  Blood culture NGTD.  Continue empiric antibiotic therapy.    Sepsis  This patient meets criteria for sepsis given tachycardia, tachypnea, and pneumonia; there is no evidence of endo-organ dysfunction.  Blood cultures are pending; continue broad spectrum antibiotics    Essential hypertension  Patient's blood pressure is poorly-controlled; will continue home regimen of carvedilol, hydrochlorothiazide, and lisinopril, and provide as-needed clonidine.    Chronic diastolic heart failure  Patient  doesn't appear to be in acute heart failure; checked ECHO.    Moderate protein malnutrition  Will start tube feedings today.    Breast mass, left  Planned for surgery, seen by Dr. Sandoval (his patient) and plan to reschedule as outpatient.      VTE Risk Mitigation         Ordered     enoxaparin injection 40 mg  Daily     Route:  Subcutaneous        04/06/17 2202     Medium Risk of VTE  Once      04/06/17 2202        Critical care time spent: 35 minutes.      Lakesha Greer MD  Department of Hospital Medicine   [facility]

## 2017-04-08 NOTE — ASSESSMENT & PLAN NOTE
Mechanical ventilation  Assessment and Plan:  S/p intubation, possible secondary to pneumonia vs edema, will continue abx, check ECHO and Pulm following for vent management.

## 2017-04-08 NOTE — ASSESSMENT & PLAN NOTE
Mechanical ventilation  Assessment and Plan:  S/p intubation, possible secondary to pneumonia vs edema, will continue abx, ECHO with diastolic dysfunction but normal EF, and Pulm following for vent management. Failed weaning trial this morning.

## 2017-04-08 NOTE — PLAN OF CARE
Problem: Ventilation, Mechanical Invasive (Adult)  Goal: Signs and Symptoms of Listed Potential Problems Will be Absent, Minimized or Managed (Ventilation, Mechanical Invasive)  Signs and symptoms of listed potential problems will be absent, minimized or managed by discharge/transition of care (reference Ventilation, Mechanical Invasive (Adult) CPG).   Outcome: Ongoing (interventions implemented as appropriate)  Patient received on servoi ventilator at 30 degree angle with 7.5 ETT secured and moved to center lip at 23cm with all ventilator alarms  On, set and functioning. ambu bag, mask at bedside. Oral care/suctioning/aerosol treatment give. Will continue to wean as tolerated. No adverse reactions noted.

## 2017-04-08 NOTE — SUBJECTIVE & OBJECTIVE
Interval History: Pt failed weaning trial this morning; no acute issues.    Review of Systems   Unable to perform ROS: Intubated     Objective:     Vital Signs (Most Recent):  Temp: 98.3 °F (36.8 °C) (04/08/17 1230)  Pulse: 69 (04/08/17 1306)  Resp: (!) 8 (04/08/17 1306)  BP: 124/80 (04/08/17 1303)  SpO2: 100 % (04/08/17 1306) Vital Signs (24h Range):  Temp:  [97.9 °F (36.6 °C)-98.9 °F (37.2 °C)] 98.3 °F (36.8 °C)  Pulse:  [] 69  Resp:  [5-56] 8  SpO2:  [82 %-100 %] 100 %  BP: (100-167)/() 124/80     Weight: 79.6 kg (175 lb 7.8 oz)  Body mass index is 49.35 kg/(m^2).    Intake/Output Summary (Last 24 hours) at 04/08/17 1353  Last data filed at 04/08/17 1300   Gross per 24 hour   Intake          3064.42 ml   Output             1935 ml   Net          1129.42 ml      Physical Exam   Constitutional: She appears well-developed and well-nourished.   obese   HENT:   Head: Normocephalic and atraumatic.   Eyes: Conjunctivae are normal.   Neck: Neck supple.   Cardiovascular:   Tachycardic without murmurs. Anterior chest with hard, large left breast mass that is palpable.   Pulmonary/Chest:   Poor respiratory effort with decreased breath sound bilaterally and inspiratory crackles    Abdominal: Soft. Bowel sounds are normal. She exhibits no distension. There is no tenderness.   Musculoskeletal: Normal range of motion. She exhibits no edema.   Neurological:   Sedated on vent.   Skin: Skin is warm and dry. No erythema.   Nursing note and vitals reviewed.      Significant Labs: All pertinent labs within the past 24 hours have been reviewed.    Significant Imaging: I have reviewed and interpreted all pertinent imaging results/findings within the past 24 hours.

## 2017-04-08 NOTE — ASSESSMENT & PLAN NOTE
The patient has a CURB-65 score of 0, and does not meet criteria for healthcare-associated pneumonia.  I have reviewed the chest X-ray and CT-A chest and it reveals bilateral airspace opacification concerning for pneumonia.  Oxygen saturations are improved but requiring a non-rebreather mask; then BIPAP and s/p intubation.  Blood culture NGTD..  Continue empiric antibiotic therapy.

## 2017-04-08 NOTE — ASSESSMENT & PLAN NOTE
This patient meets criteria for sepsis given tachycardia, tachypnea, and pneumonia; there is no evidence of endo-organ dysfunction.  Blood cultures are pending; continue broad spectrum antibiotics

## 2017-04-08 NOTE — PLAN OF CARE
Problem: Patient Care Overview  Goal: Plan of Care Review  Outcome: Ongoing (interventions implemented as appropriate)  Pt remains intubated, able to wean ventilator settings this shift; however, Pt failed CPAP trial. Propofol infusing for sedation. IVF infusing. VSS. Urine output adequate. No bowel movement this shift. Pt's family at the bedside at the beginning of the shift. Family took Pt's clothing home. Family updated on plan of care. Pt free of hospital acquired injuries and falls.

## 2017-04-08 NOTE — PLAN OF CARE
Problem: Patient Care Overview  Goal: Plan of Care Review  Outcome: Ongoing (interventions implemented as appropriate)  Pt continues to require mechanical ventilation.  Failed CPAP trial.  On propofol drip.  VSS with adequate urine output.  Tube feedings started.  Pressure ulcer & fall prevention measures on-going.  No injuries.

## 2017-04-08 NOTE — ASSESSMENT & PLAN NOTE
The patient has a CURB-65 score of 0, and does not meet criteria for healthcare-associated pneumonia.  I have reviewed the chest X-ray and CT-A chest and it reveals bilateral airspace opacification concerning for pneumonia.  Oxygen saturations are improved but requiring a non-rebreather mask; then BIPAP and s/p intubation.  Blood culture NGTD.  Continue empiric antibiotic therapy.

## 2017-04-08 NOTE — CONSULTS
45 yo female with large breast hematoma was scheduled to have surgical drainage.  I will see her and reschedule after she recovers from this episode.

## 2017-04-09 PROBLEM — E87.6 HYPOKALEMIA: Status: ACTIVE | Noted: 2017-04-09

## 2017-04-09 LAB
ALLENS TEST: ABNORMAL
ANION GAP SERPL CALC-SCNC: 9 MMOL/L
BASOPHILS # BLD AUTO: 0.01 K/UL
BASOPHILS NFR BLD: 0.2 %
BUN SERPL-MCNC: 7 MG/DL
CALCIUM SERPL-MCNC: 8.4 MG/DL
CHLORIDE SERPL-SCNC: 102 MMOL/L
CO2 SERPL-SCNC: 32 MMOL/L
CREAT SERPL-MCNC: 0.8 MG/DL
DELSYS: ABNORMAL
DIFFERENTIAL METHOD: ABNORMAL
EOSINOPHIL # BLD AUTO: 0.2 K/UL
EOSINOPHIL NFR BLD: 4.8 %
ERYTHROCYTE [DISTWIDTH] IN BLOOD BY AUTOMATED COUNT: 17.7 %
ERYTHROCYTE [SEDIMENTATION RATE] IN BLOOD BY WESTERGREN METHOD: 8 MM/H
EST. GFR  (AFRICAN AMERICAN): >60 ML/MIN/1.73 M^2
EST. GFR  (NON AFRICAN AMERICAN): >60 ML/MIN/1.73 M^2
FIO2: 35
GLUCOSE SERPL-MCNC: 113 MG/DL
HCO3 UR-SCNC: 34.8 MMOL/L (ref 24–28)
HCT VFR BLD AUTO: 40.8 %
HGB BLD-MCNC: 11.4 G/DL
LYMPHOCYTES # BLD AUTO: 1 K/UL
LYMPHOCYTES NFR BLD: 23.2 %
MAGNESIUM SERPL-MCNC: 1.7 MG/DL
MCH RBC QN AUTO: 25.1 PG
MCHC RBC AUTO-ENTMCNC: 27.9 %
MCV RBC AUTO: 90 FL
MODE: ABNORMAL
MONOCYTES # BLD AUTO: 0.5 K/UL
MONOCYTES NFR BLD: 10.2 %
NEUTROPHILS # BLD AUTO: 2.7 K/UL
NEUTROPHILS NFR BLD: 61.6 %
PCO2 BLDA: 65.4 MMHG (ref 35–45)
PEEP: 5
PH SMN: 7.33 [PH] (ref 7.35–7.45)
PHOSPHATE SERPL-MCNC: 4.7 MG/DL
PIP: 31
PLATELET # BLD AUTO: 135 K/UL
PMV BLD AUTO: ABNORMAL FL
PO2 BLDA: 85 MMHG (ref 80–100)
POC BE: 7 MMOL/L
POC SATURATED O2: 95 % (ref 95–100)
POC TCO2: 37 MMOL/L (ref 23–27)
POTASSIUM SERPL-SCNC: 3.2 MMOL/L
PS: 10
RBC # BLD AUTO: 4.54 M/UL
SAMPLE: ABNORMAL
SITE: ABNORMAL
SODIUM SERPL-SCNC: 143 MMOL/L
SP02: 100
VT: 440
WBC # BLD AUTO: 4.4 K/UL

## 2017-04-09 PROCEDURE — 63600175 PHARM REV CODE 636 W HCPCS: Performed by: HOSPITALIST

## 2017-04-09 PROCEDURE — 99900035 HC TECH TIME PER 15 MIN (STAT)

## 2017-04-09 PROCEDURE — 85025 COMPLETE CBC W/AUTO DIFF WBC: CPT

## 2017-04-09 PROCEDURE — 84100 ASSAY OF PHOSPHORUS: CPT

## 2017-04-09 PROCEDURE — 83735 ASSAY OF MAGNESIUM: CPT

## 2017-04-09 PROCEDURE — 25000003 PHARM REV CODE 250: Performed by: INTERNAL MEDICINE

## 2017-04-09 PROCEDURE — 36415 COLL VENOUS BLD VENIPUNCTURE: CPT

## 2017-04-09 PROCEDURE — 25000003 PHARM REV CODE 250: Performed by: EMERGENCY MEDICINE

## 2017-04-09 PROCEDURE — 94003 VENT MGMT INPAT SUBQ DAY: CPT

## 2017-04-09 PROCEDURE — 80048 BASIC METABOLIC PNL TOTAL CA: CPT

## 2017-04-09 PROCEDURE — 20000000 HC ICU ROOM

## 2017-04-09 PROCEDURE — 99900026 HC AIRWAY MAINTENANCE (STAT)

## 2017-04-09 PROCEDURE — 27100171 HC OXYGEN HIGH FLOW UP TO 24 HOURS

## 2017-04-09 PROCEDURE — 36600 WITHDRAWAL OF ARTERIAL BLOOD: CPT

## 2017-04-09 PROCEDURE — 27200966 HC CLOSED SUCTION SYSTEM

## 2017-04-09 PROCEDURE — 63600175 PHARM REV CODE 636 W HCPCS: Performed by: INTERNAL MEDICINE

## 2017-04-09 RX ORDER — POTASSIUM CHLORIDE 14.9 MG/ML
20 INJECTION INTRAVENOUS
Status: COMPLETED | OUTPATIENT
Start: 2017-04-09 | End: 2017-04-09

## 2017-04-09 RX ADMIN — LISINOPRIL 5 MG: 5 TABLET ORAL at 08:04

## 2017-04-09 RX ADMIN — ENOXAPARIN SODIUM 40 MG: 100 INJECTION SUBCUTANEOUS at 04:04

## 2017-04-09 RX ADMIN — PROPOFOL 40 MCG/KG/MIN: 10 INJECTION, EMULSION INTRAVENOUS at 01:04

## 2017-04-09 RX ADMIN — CARVEDILOL 3.12 MG: 3.12 TABLET, FILM COATED ORAL at 04:04

## 2017-04-09 RX ADMIN — PROPOFOL 39.99 MCG/KG/MIN: 10 INJECTION, EMULSION INTRAVENOUS at 04:04

## 2017-04-09 RX ADMIN — CHLORHEXIDINE GLUCONATE 15 ML: 1.2 RINSE ORAL at 08:04

## 2017-04-09 RX ADMIN — PROPOFOL 40 MCG/KG/MIN: 10 INJECTION, EMULSION INTRAVENOUS at 08:04

## 2017-04-09 RX ADMIN — CARVEDILOL 3.12 MG: 3.12 TABLET, FILM COATED ORAL at 08:04

## 2017-04-09 RX ADMIN — PROPOFOL 40 MCG/KG/MIN: 10 INJECTION, EMULSION INTRAVENOUS at 05:04

## 2017-04-09 RX ADMIN — AZITHROMYCIN MONOHYDRATE 500 MG: 500 INJECTION, POWDER, LYOPHILIZED, FOR SOLUTION INTRAVENOUS at 09:04

## 2017-04-09 RX ADMIN — POTASSIUM CHLORIDE 20 MEQ: 200 INJECTION, SOLUTION INTRAVENOUS at 12:04

## 2017-04-09 RX ADMIN — PANTOPRAZOLE SODIUM 40 MG: 40 GRANULE, DELAYED RELEASE ORAL at 08:04

## 2017-04-09 RX ADMIN — HYDROCHLOROTHIAZIDE 12.5 MG: 12.5 TABLET ORAL at 08:04

## 2017-04-09 RX ADMIN — PROPOFOL 39.99 MCG/KG/MIN: 10 INJECTION, EMULSION INTRAVENOUS at 11:04

## 2017-04-09 RX ADMIN — CEFTRIAXONE 1 G: 1 INJECTION, SOLUTION INTRAVENOUS at 08:04

## 2017-04-09 RX ADMIN — POTASSIUM CHLORIDE 20 MEQ: 200 INJECTION, SOLUTION INTRAVENOUS at 11:04

## 2017-04-09 NOTE — PLAN OF CARE
Problem: Patient Care Overview  Goal: Plan of Care Review  Outcome: Ongoing (interventions implemented as appropriate)  Pt remains intubated, unable to wean ventilator settings this shift. Propofol infusing for sedation. VSS. Urine output adequate. No bowel movement this shift. Pt's tube feeding increased per order, Pt tolerating well, minimal residuals. Pt free of hospital acquired injuries and falls.

## 2017-04-09 NOTE — SUBJECTIVE & OBJECTIVE
Interval History: Pt failed weaning trial this morning; no acute issues.    Review of Systems   Unable to perform ROS: Intubated     Objective:     Vital Signs (Most Recent):  Temp: 98.6 °F (37 °C) (04/09/17 0744)  Pulse: 76 (04/09/17 0942)  Resp: 13 (04/09/17 0942)  BP: 106/63 (04/09/17 0932)  SpO2: 100 % (04/09/17 0942) Vital Signs (24h Range):  Temp:  [97.8 °F (36.6 °C)-98.6 °F (37 °C)] 98.6 °F (37 °C)  Pulse:  [65-97] 76  Resp:  [8-59] 13  SpO2:  [95 %-100 %] 100 %  BP: ()/(58-87) 106/63     Weight: 79.6 kg (175 lb 7.8 oz)  Body mass index is 49.35 kg/(m^2).    Intake/Output Summary (Last 24 hours) at 04/09/17 1022  Last data filed at 04/09/17 0942   Gross per 24 hour   Intake           2164.9 ml   Output             2205 ml   Net            -40.1 ml      Physical Exam   Constitutional: She appears well-developed and well-nourished.   obese   HENT:   Head: Normocephalic and atraumatic.   Eyes: Conjunctivae are normal.   Neck: Neck supple.   Cardiovascular: Normal rate and regular rhythm.    Anterior chest with hard, large left breast mass that is palpable.   Pulmonary/Chest:   Poor respiratory effort with decreased breath sound bilaterally and inspiratory crackles    Abdominal: Soft. Bowel sounds are normal. She exhibits no distension. There is no tenderness.   Musculoskeletal: Normal range of motion. She exhibits no edema.   Neurological:   Sedated on vent.   Skin: Skin is warm and dry. No erythema.   Nursing note and vitals reviewed.      Significant Labs: All pertinent labs within the past 24 hours have been reviewed.    Significant Imaging: I have reviewed and interpreted all pertinent imaging results/findings within the past 24 hours.

## 2017-04-09 NOTE — CONSULTS
Consult Note  Pulmonology    Consult Requested By: Lakesha Greer MD  Reason for Consult : vent management    SUBJECTIVE: dyspnea     History of Present Illness:  Patient unresponsive;information obtained from chart      Review of patient's allergies indicates:  No Known Allergies    Past Medical History:   Diagnosis Date    CHF (congestive heart failure)     Learning difficulty      Past Surgical History:   Procedure Laterality Date    BREAST SURGERY Left     biopsy    CYST REMOVAL      shoulder      Family History   Problem Relation Age of Onset    Diabetes      Coronary artery disease       Review of systems not obtained due to patient factors ; patient intubated and unable to communicate.    OBJECTIVE:     Vital Signs (Most Recent)  Temp: 98.6 °F (37 °C) (04/09/17 0744)  Pulse: 74 (04/09/17 0744)  Resp: 10 (04/09/17 0744)  BP: 115/77 (04/09/17 0732)  SpO2: 100 % (04/09/17 0744)    Vital Signs Range (Last 24H):  Temp:  [97.8 °F (36.6 °C)-98.6 °F (37 °C)]   Pulse:  [65-97]   Resp:  [8-59]   BP: ()/(59-91)   SpO2:  [95 %-100 %]     Physical Exam:    General: no distress,.on the ventilator  Neck: no jugular venous distention and no carotid bruit  Lungs:  diminished breath sounds bilaterally and 7.5 ett in place and rhonchi bilaterally  Heart: regular rate and rhythm and no murmur  Abdomen: soft, non-tender non-distended; bowel sounds normal and right NGT in place  Extremities: no cyanosis or edema, or clubbing  Neurologic: sedated.  Skin-warm, moist. No rash      Laboratory:  Recent Results (from the past 24 hour(s))   2D echo with color flow doppler    Collection Time: 04/08/17 12:00 PM   Result Value Ref Range    EF 65 55 - 65    Diastolic Dysfunction Yes (A)     Est. PA Systolic Pressure 29.14     Pericardial Effusion NONE     Mitral Valve Mobility NORMAL     Tricuspid Valve Regurgitation TRIVIAL    Magnesium    Collection Time: 04/09/17  1:34 AM   Result Value Ref Range    Magnesium 1.7 1.6 - 2.6  mg/dL   Phosphorus    Collection Time: 04/09/17  1:34 AM   Result Value Ref Range    Phosphorus 4.7 (H) 2.7 - 4.5 mg/dL   Basic metabolic panel    Collection Time: 04/09/17  1:34 AM   Result Value Ref Range    Sodium 143 136 - 145 mmol/L    Potassium 3.2 (L) 3.5 - 5.1 mmol/L    Chloride 102 95 - 110 mmol/L    CO2 32 (H) 23 - 29 mmol/L    Glucose 113 (H) 70 - 110 mg/dL    BUN, Bld 7 6 - 20 mg/dL    Creatinine 0.8 0.5 - 1.4 mg/dL    Calcium 8.4 (L) 8.7 - 10.5 mg/dL    Anion Gap 9 8 - 16 mmol/L    eGFR if African American >60 >60 mL/min/1.73 m^2    eGFR if non African American >60 >60 mL/min/1.73 m^2   CBC auto differential    Collection Time: 04/09/17  1:34 AM   Result Value Ref Range    WBC 4.40 3.90 - 12.70 K/uL    RBC 4.54 4.00 - 5.40 M/uL    Hemoglobin 11.4 (L) 12.0 - 16.0 g/dL    Hematocrit 40.8 37.0 - 48.5 %    MCV 90 82 - 98 fL    MCH 25.1 (L) 27.0 - 31.0 pg    MCHC 27.9 (L) 32.0 - 36.0 %    RDW 17.7 (H) 11.5 - 14.5 %    Platelets 135 (L) 150 - 350 K/uL    MPV SEE COMMENT 9.2 - 12.9 fL    Gran # 2.7 1.8 - 7.7 K/uL    Lymph # 1.0 1.0 - 4.8 K/uL    Mono # 0.5 0.3 - 1.0 K/uL    Eos # 0.2 0.0 - 0.5 K/uL    Baso # 0.01 0.00 - 0.20 K/uL    Gran% 61.6 38.0 - 73.0 %    Lymph% 23.2 18.0 - 48.0 %    Mono% 10.2 4.0 - 15.0 %    Eosinophil% 4.8 0.0 - 8.0 %    Basophil% 0.2 0.0 - 1.9 %    Differential Method Automated    ISTAT PROCEDURE    Collection Time: 04/09/17  4:23 AM   Result Value Ref Range    POC PH 7.334 (L) 7.35 - 7.45    POC PCO2 65.4 (HH) 35 - 45 mmHg    POC PO2 85 80 - 100 mmHg    POC HCO3 34.8 (H) 24 - 28 mmol/L    POC BE 7 -2 to 2 mmol/L    POC SATURATED O2 95 95 - 100 %    POC TCO2 37 (H) 23 - 27 mmol/L    Rate 8     Sample ARTERIAL     Site LR     Allens Test Pass     DelSys Adult Vent     Mode SIMV     Vt 440     PEEP 5     PS 10     PiP 31     FiO2 35     Sp02 100      CBC:     Recent Labs  Lab 04/09/17  0134   WBC 4.40   RBC 4.54   HGB 11.4*   HCT 40.8   *   MCV 90   MCH 25.1*   MCHC 27.9*      BMP:     Recent Labs  Lab 04/09/17  0134   *      K 3.2*      CO2 32*   BUN 7   CREATININE 0.8   CALCIUM 8.4*   MG 1.7     CMP:     Recent Labs  Lab 04/07/17  0211 04/09/17  0134   * 113*   CALCIUM 8.5* 8.4*   ALBUMIN 2.4*  --    PROT 6.2  --     143   K 4.7 3.2*   CO2 34* 32*    102   BUN 7 7   CREATININE 0.9 0.8   ALKPHOS 113  --    ALT 40  --    AST 41*  --    BILITOT 1.0  --      LFTs:     Recent Labs  Lab 04/07/17  0211   ALT 40   AST 41*   ALKPHOS 113   BILITOT 1.0   PROT 6.2   ALBUMIN 2.4*     Coagulation:     Recent Labs  Lab 04/06/17  1440   INR 1.1     ABGs:     Recent Labs  Lab 04/09/17  0423   PH 7.334*   PCO2 65.4*   PO2 85   HCO3 34.8*   POCSATURATED 95   BE 7       Ventilator Setting:  Vent Mode: SIMV (PRVC) + PS  Oxygen Concentration (%):  [35] 35  Resp Rate Total:  [7 br/min-16 br/min] 8 br/min  Vt Set:  [440 mL] 440 mL  PEEP/CPAP:  [5 cmH20] 5 cmH20  Pressure Support:  [10 cmH20-15 cmH20] 15 cmH20  Mean Airway Pressure:  [7.6 cmH20-9.8 cmH20] 7.9 cmH20     Diagnostic Results:   Imaging Results         X-Ray Chest AP Portable (Final result) Result time:  04/09/17 07:34:35    Final result by Viktoria Stallings MD (04/09/17 07:34:35)    Impression:        No interval detrimental change.      Electronically signed by: VIKTORIA STALLINGS MD  Date:     04/09/17  Time:    07:34     Narrative:    Technique: Single AP chest radiograph.    Comparison: Radiograph 04/08/2017.    Findings:    ET tube, right-sided PICC line and enteric catheter are in similar position. Cardiopulmonary status is unchanged noting mild groundglass attenuation throughout both lungs and mild associated pulmonary vascular congestion suggesting edema.  There is persistent increased attenuation within the left lung base which may represent atelectasis, aspiration or pneumonia with possibly dependent pleural fluid. No pneumothorax.            X-Ray Chest AP Portable (Final result) Result time:   04/08/17 05:17:49    Final result by Viktoria Stallings MD (04/08/17 05:17:49)    Impression:        No interval detrimental change.      Electronically signed by: VIKTORIA STALLINGS MD  Date:     04/08/17  Time:    05:17     Narrative:    Technique: Single AP chest radiograph.    Comparison: Radiograph 04/07/2017.    Findings:    ET tube and right-sided PICC line in stable position.  Enteric catheter crosses the diaphragm with tip not seen. Cardiopulmonary status is unchanged noting persistent abnormal increased attenuation within the mid to lower lung zones, left greater than right, concerning for edema, pneumonia or aspiration. No pneumothorax.            X-Ray Chest 1 View (Final result) Result time:  04/07/17 15:52:52    Final result by Michael Smith DO (04/07/17 15:52:52)    Impression:        See Above       Electronically signed by: MICHAEL SMITH DO  Date:     04/07/17  Time:    15:52     Narrative:    Single portable frontal view of the chest    Comparison: 04/07/2017    Results: Interval placement right-sided PICC line with tip projected over the mid SVC.  ET tube and nasogastric tubes stable.  Continued ill-defined bilateral lung opacities somewhat greater on the left with poor definition left lung base suggestive for underlying effusion.  No large pneumothorax.  Clinical correlation and followup advised            US Lower Extremity Veins Bilateral (Final result) Result time:  04/07/17 10:02:35    Final result by Kristi Small MD (04/07/17 10:02:35)    Impression:      1.  No sonographic evidence for deep venous thrombosis in either lower extremity.      Electronically signed by: KRISTI SMALL MD  Date:     04/07/17  Time:    10:02     Narrative:    Comparison: None available    Technique: Transverse and longitudinal grayscale ultrasound images of the bilateral lower extremity veins with color and spectral Doppler analysis.    Findings: The interrogated bilateral common femoral, proximal GSV, proximal  profunda, femoral, popliteal, peroneal, anterior and posterior tibial veins are normally compressible and demonstrate color and spectral Doppler patency.            X-Ray Chest 1 View (Final result) Result time:  04/07/17 02:25:46    Final result by Victor Manuel Tavarez MD (04/07/17 02:25:46)    Impression:        As above.      Electronically signed by: VICTOR MANUEL TAVAREZ MD, MD  Date:     04/07/17  Time:    02:25     Narrative:    COMPARISON: CT thorax and chest radiograph 4/6/17    FINDINGS: AP portable supine view of the chest. Monitoring leads overlie the chest. Resolution is limited by body habitus with underpenetration. Interval placement of endotracheal tube with tip at the level of the clavicular heads. Interval placement of enteric tube with port and tip below the diaphragm coursing caudally out of field-of-view. No large pneumothorax. Slight improved aeration of the bilateral lungs. Otherwise, grossly stable radiographic appearance of the chest.            CTA Chest Non-Coronary (Final result) Result time:  04/06/17 16:47:30    Final result by Vero Cash MD (04/06/17 16:47:30)    Impression:      1. There is a 12 cm left breast hematoma.  2. There are low lung volumes with bibasilar dependent regions of consolidation and patchy opacification of the right upper lung zone. The findings may represent developing pneumonia.  3. There is no evidence pulmonary embolus.          Electronically signed by: VERO CASH MD  Date:     04/06/17  Time:    16:47     Narrative:    CTA chest    There is a 9.7 x 9.2 x 12.5 cm mixed density left breast fluid collection seen within the medial portion of the breast. As result of expansion of the left breast tissue.    There is no evidence of aneurysmal dilatation or dissection of the thoracic aorta. There are no pulmonary arterial filling defects are seen. There are bilateral dependent regions of consolidation.    There is patchy consolidation seen within the right upper lobe  of the lung.    There is no evidence of axillary lymphadenopathy. The cardiac silhouette is enlarged.    This is present the superior abdomen are unremarkable.    The osseous structures are unremarkable.            X-Ray Chest PA And Lateral (Final result) Result time:  04/06/17 15:18:50    Final result by Michael Smith DO (04/06/17 15:18:50)    Impression:      See above      Electronically signed by: MICHAEL SMITH DO  Date:     04/06/17  Time:    15:18     Narrative:    PA and lateral views of the chest    Comparison: 03/29/2017    Results:    Continued ill-defined perihilar and basilar lung opacities similar prior which may represent vascular congestion and edema.  There continued poor definition of the left lung base concerning for superimposed effusion with atelectasis.  No large pneumothorax..  Cardiac mediastinal sella partially obscured by opacities.  Visualized osseous structures grossly intact.  Clinical correlation and followup advised.                  ASSESSMENT/PLAN:     1. Pneumonia of both lungs due to infectious organism, unspecified part of lung    2. Respiratory arrest    2.      acute respiratory failure  3.      Vent management  4.       Hypoxemia  5.       Hypercapnea      Plan:clinically much the same ; failed cpap trial this am ; now on SIMV(PRVC)+/8/+5/PS10/FIO2 35% ETT

## 2017-04-09 NOTE — PROGRESS NOTES
Received patient on vent settings SIMV(PRVC)+/8/+5/PS10/FIO2 35% ETT size 7.5 secured at 23cm at the lip Ambu bag and mask at bed side no signs of any distress at this current time

## 2017-04-09 NOTE — PROGRESS NOTES
[facility] Hospital Medicine  Progress Note    Patient Name: Radha Pritchett  MRN: [id number]  Patient Class: IP- Inpatient   Admission Date: 4/6/2017  Length of Stay: 3 days  Attending Physician: Lakesha Greer MD  Primary Care Provider: Ferny Iglesias MD        Subjective:     Principal Problem:Acute respiratory failure with hypercapnia    HPI:  Ms. Radha Pritchett is a 46 y.o. female with essential hypertension, chronic diastolic heart failure (LVEF 55-60% Dec 2014), moderate protein malnutrition who presents to [facility] ED with complaints of dyspnea today.  She was scheduled to undergo removal of a breast mass today but was found to be hypoxic.  She does report that she has been short of breath but cannot specify how long.  She has also had some non-productive coughing and some chills without fevers.  Further history is otherwise limited at this time.    Hospital Course:   was admitted with pneumonia and acutely decompensated and was intubated and placed on vent.    Interval History: Pt failed weaning trial this morning; no acute issues.    Review of Systems   Unable to perform ROS: Intubated     Objective:     Vital Signs (Most Recent):  Temp: 98.6 °F (37 °C) (04/09/17 0744)  Pulse: 76 (04/09/17 0942)  Resp: 13 (04/09/17 0942)  BP: 106/63 (04/09/17 0932)  SpO2: 100 % (04/09/17 0942) Vital Signs (24h Range):  Temp:  [97.8 °F (36.6 °C)-98.6 °F (37 °C)] 98.6 °F (37 °C)  Pulse:  [65-97] 76  Resp:  [8-59] 13  SpO2:  [95 %-100 %] 100 %  BP: ()/(58-87) 106/63     Weight: 79.6 kg (175 lb 7.8 oz)  Body mass index is 49.35 kg/(m^2).    Intake/Output Summary (Last 24 hours) at 04/09/17 1022  Last data filed at 04/09/17 0942   Gross per 24 hour   Intake           2164.9 ml   Output             2205 ml   Net            -40.1 ml      Physical Exam   Constitutional: She appears well-developed and well-nourished.   obese   HENT:   Head: Normocephalic and atraumatic.   Eyes: Conjunctivae are  normal.   Neck: Neck supple.   Cardiovascular: Normal rate and regular rhythm.    Anterior chest with hard, large left breast mass that is palpable.   Pulmonary/Chest:   Poor respiratory effort with decreased breath sound bilaterally and inspiratory crackles    Abdominal: Soft. Bowel sounds are normal. She exhibits no distension. There is no tenderness.   Musculoskeletal: Normal range of motion. She exhibits no edema.   Neurological:   Sedated on vent.   Skin: Skin is warm and dry. No erythema.   Nursing note and vitals reviewed.      Significant Labs: All pertinent labs within the past 24 hours have been reviewed.    Significant Imaging: I have reviewed and interpreted all pertinent imaging results/findings within the past 24 hours.    Assessment/Plan:      * Acute respiratory failure with hypercapnia  Mechanical ventilation  Assessment and Plan:  S/p intubation, possible secondary to pneumonia vs edema, will continue abx, ECHO with diastolic dysfunction but normal EF, and Pulm following for vent management. Failed weaning trial this morning.      Pneumonia of both lungs due to infectious organism  The patient has a CURB-65 score of 0, and does not meet criteria for healthcare-associated pneumonia.  I have reviewed the chest X-ray and CT-A chest and it reveals bilateral airspace opacification concerning for pneumonia.  Oxygen saturations are improved but requiring a non-rebreather mask; then BIPAP and s/p intubation.  Blood culture NGTD.  Continue empiric antibiotic therapy.    Sepsis  This patient met criteria for sepsis given tachycardia, tachypnea, and pneumonia; there is no evidence of endo-organ dysfunction.  Blood cultures are NGTD; continue broad spectrum antibiotics    Essential hypertension  Patient's blood pressure is poorly-controlled; will continue home regimen of carvedilol, hydrochlorothiazide, and lisinopril, and provide as-needed clonidine.    Chronic diastolic heart failure  Patient doesn't appear  to be in acute heart failure; checked ECHO.    Moderate protein malnutrition  Tolerating tube feeds.    Breast mass, left  Planned for surgery, seen by Dr. Sandoval (his patient) and plan to reschedule as outpatient.    Hypokalemia  Will replete and monitor.      VTE Risk Mitigation         Ordered     enoxaparin injection 40 mg  Daily     Route:  Subcutaneous        04/06/17 2202     Medium Risk of VTE  Once      04/06/17 2202        Critical care time spent: 35 minutes.    Lakesha Greer MD  Department of Hospital Medicine   [facility]

## 2017-04-09 NOTE — ASSESSMENT & PLAN NOTE
This patient met criteria for sepsis given tachycardia, tachypnea, and pneumonia; there is no evidence of endo-organ dysfunction.  Blood cultures are NGTD; continue broad spectrum antibiotics

## 2017-04-09 NOTE — PLAN OF CARE
Problem: Fall Risk (Adult)  Goal: Identify Related Risk Factors and Signs and Symptoms  Related risk factors and signs and symptoms are identified upon initiation of Human Response Clinical Practice Guideline (CPG)   Outcome: Ongoing (interventions implemented as appropriate)  Remains on ventilator, unable to wean.  Failed CPAP trial.  Propofol drip as needed to help keep pt calm.  Soft wrist restraints in use bilaterally for safety with good circulation checks.  VSS, afebrile.  Adequate urine output.  Tolerating tube feedings.  No BM.  No injuries.  Pressure ulcer & fall prevention interventions on-going.

## 2017-04-10 LAB
ALLENS TEST: ABNORMAL
ANION GAP SERPL CALC-SCNC: 6 MMOL/L
BASOPHILS # BLD AUTO: 0.01 K/UL
BASOPHILS NFR BLD: 0.2 %
BNP SERPL-MCNC: 73 PG/ML
BUN SERPL-MCNC: 14 MG/DL
CALCIUM SERPL-MCNC: 8.8 MG/DL
CHLORIDE SERPL-SCNC: 103 MMOL/L
CK SERPL-CCNC: 21 U/L
CO2 SERPL-SCNC: 34 MMOL/L
CREAT SERPL-MCNC: 0.8 MG/DL
DELSYS: ABNORMAL
DIFFERENTIAL METHOD: ABNORMAL
EOSINOPHIL # BLD AUTO: 0.2 K/UL
EOSINOPHIL NFR BLD: 4.5 %
ERYTHROCYTE [DISTWIDTH] IN BLOOD BY AUTOMATED COUNT: 18.4 %
ERYTHROCYTE [SEDIMENTATION RATE] IN BLOOD BY WESTERGREN METHOD: 8 MM/H
EST. GFR  (AFRICAN AMERICAN): >60 ML/MIN/1.73 M^2
EST. GFR  (NON AFRICAN AMERICAN): >60 ML/MIN/1.73 M^2
FIO2: 35
GLUCOSE SERPL-MCNC: 139 MG/DL
HCO3 UR-SCNC: 36.3 MMOL/L (ref 24–28)
HCT VFR BLD AUTO: 41.5 %
HGB BLD-MCNC: 11.4 G/DL
LYMPHOCYTES # BLD AUTO: 1.4 K/UL
LYMPHOCYTES NFR BLD: 28 %
MAGNESIUM SERPL-MCNC: 1.8 MG/DL
MCH RBC QN AUTO: 24.7 PG
MCHC RBC AUTO-ENTMCNC: 27.5 %
MCV RBC AUTO: 90 FL
MODE: ABNORMAL
MONOCYTES # BLD AUTO: 0.5 K/UL
MONOCYTES NFR BLD: 9.9 %
NEUTROPHILS # BLD AUTO: 2.8 K/UL
NEUTROPHILS NFR BLD: 56.8 %
PCO2 BLDA: 63.5 MMHG (ref 35–45)
PEEP: 5
PH SMN: 7.37 [PH] (ref 7.35–7.45)
PHOSPHATE SERPL-MCNC: 3.6 MG/DL
PLATELET # BLD AUTO: 176 K/UL
PMV BLD AUTO: 12 FL
PO2 BLDA: 85 MMHG (ref 80–100)
POC BE: 9 MMOL/L
POC SATURATED O2: 96 % (ref 95–100)
POC TCO2: 38 MMOL/L (ref 23–27)
POTASSIUM SERPL-SCNC: 4.1 MMOL/L
PS: 15
RBC # BLD AUTO: 4.62 M/UL
SAMPLE: ABNORMAL
SITE: ABNORMAL
SODIUM SERPL-SCNC: 143 MMOL/L
VT: 440
WBC # BLD AUTO: 4.93 K/UL

## 2017-04-10 PROCEDURE — 94003 VENT MGMT INPAT SUBQ DAY: CPT

## 2017-04-10 PROCEDURE — 36600 WITHDRAWAL OF ARTERIAL BLOOD: CPT

## 2017-04-10 PROCEDURE — 25000003 PHARM REV CODE 250: Performed by: HOSPITALIST

## 2017-04-10 PROCEDURE — 99900026 HC AIRWAY MAINTENANCE (STAT)

## 2017-04-10 PROCEDURE — 94761 N-INVAS EAR/PLS OXIMETRY MLT: CPT

## 2017-04-10 PROCEDURE — 36415 COLL VENOUS BLD VENIPUNCTURE: CPT

## 2017-04-10 PROCEDURE — 83880 ASSAY OF NATRIURETIC PEPTIDE: CPT

## 2017-04-10 PROCEDURE — 25000003 PHARM REV CODE 250: Performed by: EMERGENCY MEDICINE

## 2017-04-10 PROCEDURE — 80048 BASIC METABOLIC PNL TOTAL CA: CPT

## 2017-04-10 PROCEDURE — 84100 ASSAY OF PHOSPHORUS: CPT

## 2017-04-10 PROCEDURE — 63600175 PHARM REV CODE 636 W HCPCS: Performed by: INTERNAL MEDICINE

## 2017-04-10 PROCEDURE — 25000003 PHARM REV CODE 250: Performed by: INTERNAL MEDICINE

## 2017-04-10 PROCEDURE — 82550 ASSAY OF CK (CPK): CPT

## 2017-04-10 PROCEDURE — 83735 ASSAY OF MAGNESIUM: CPT

## 2017-04-10 PROCEDURE — 20000000 HC ICU ROOM

## 2017-04-10 PROCEDURE — 99900035 HC TECH TIME PER 15 MIN (STAT)

## 2017-04-10 PROCEDURE — 85025 COMPLETE CBC W/AUTO DIFF WBC: CPT

## 2017-04-10 PROCEDURE — 99232 SBSQ HOSP IP/OBS MODERATE 35: CPT | Mod: ,,, | Performed by: PSYCHIATRY & NEUROLOGY

## 2017-04-10 PROCEDURE — 27000221 HC OXYGEN, UP TO 24 HOURS

## 2017-04-10 PROCEDURE — 82803 BLOOD GASES ANY COMBINATION: CPT

## 2017-04-10 RX ADMIN — CHLORHEXIDINE GLUCONATE 15 ML: 1.2 RINSE ORAL at 08:04

## 2017-04-10 RX ADMIN — PROPOFOL 35 MCG/KG/MIN: 10 INJECTION, EMULSION INTRAVENOUS at 02:04

## 2017-04-10 RX ADMIN — CEFTRIAXONE 1 G: 1 INJECTION, SOLUTION INTRAVENOUS at 08:04

## 2017-04-10 RX ADMIN — PANTOPRAZOLE SODIUM 40 MG: 40 GRANULE, DELAYED RELEASE ORAL at 08:04

## 2017-04-10 RX ADMIN — CARVEDILOL 3.12 MG: 3.12 TABLET, FILM COATED ORAL at 04:04

## 2017-04-10 RX ADMIN — SCOPOLAMINE 1.5 MG: 1 PATCH, EXTENDED RELEASE TRANSDERMAL at 08:04

## 2017-04-10 RX ADMIN — ENOXAPARIN SODIUM 40 MG: 100 INJECTION SUBCUTANEOUS at 04:04

## 2017-04-10 RX ADMIN — PROPOFOL 30 MCG/KG/MIN: 10 INJECTION, EMULSION INTRAVENOUS at 03:04

## 2017-04-10 RX ADMIN — PROPOFOL 30 MCG/KG/MIN: 10 INJECTION, EMULSION INTRAVENOUS at 07:04

## 2017-04-10 RX ADMIN — PROPOFOL 30 MCG/KG/MIN: 10 INJECTION, EMULSION INTRAVENOUS at 08:04

## 2017-04-10 RX ADMIN — HYDROCODONE BITARTRATE AND ACETAMINOPHEN 1 TABLET: 5; 325 TABLET ORAL at 04:04

## 2017-04-10 RX ADMIN — AZITHROMYCIN MONOHYDRATE 500 MG: 500 INJECTION, POWDER, LYOPHILIZED, FOR SOLUTION INTRAVENOUS at 09:04

## 2017-04-10 RX ADMIN — CARVEDILOL 3.12 MG: 3.12 TABLET, FILM COATED ORAL at 07:04

## 2017-04-10 RX ADMIN — SODIUM CHLORIDE 1000 ML: 0.9 INJECTION, SOLUTION INTRAVENOUS at 08:04

## 2017-04-10 NOTE — CONSULTS
Consult Note  Pulmonology    Consult Requested By: Lakesha Greer MD  Reason for Consult : vent management    SUBJECTIVE: dyspnea     History of Present Illness:  Patient unresponsive;information obtained from chart      Review of patient's allergies indicates:  No Known Allergies    Past Medical History:   Diagnosis Date    CHF (congestive heart failure)     Learning difficulty      Past Surgical History:   Procedure Laterality Date    BREAST SURGERY Left     biopsy    CYST REMOVAL      shoulder      Family History   Problem Relation Age of Onset    Diabetes      Coronary artery disease       Review of systems not obtained due to patient factors ; patient intubated and unable to communicate.    OBJECTIVE:     Vital Signs (Most Recent)  Temp: 98 °F (36.7 °C) (04/10/17 0730)  Pulse: 100 (04/10/17 0900)  Resp: (!) 21 (04/10/17 0900)  BP: (!) 92/57 (04/10/17 0900)  SpO2: 99 % (04/10/17 0900)    Vital Signs Range (Last 24H):  Temp:  [98 °F (36.7 °C)-98.8 °F (37.1 °C)]   Pulse:  []   Resp:  [8-54]   BP: ()/(50-76)   SpO2:  [93 %-100 %]     Physical Exam:    General: no distress,.on the ventilator  Neck: no jugular venous distention and no carotid bruit  Lungs:  diminished breath sounds bilaterally and 7.5 ett in place and rhonchi bilaterally  Heart: regular rate and rhythm and no murmur  Abdomen: soft, non-tender non-distended; bowel sounds normal and right NGT in place  Extremities: no cyanosis or edema, or clubbing  Neurologic: sedated.  Skin-warm, moist. No rash      Laboratory:  Recent Results (from the past 24 hour(s))   Magnesium    Collection Time: 04/10/17  1:41 AM   Result Value Ref Range    Magnesium 1.8 1.6 - 2.6 mg/dL   Phosphorus    Collection Time: 04/10/17  1:41 AM   Result Value Ref Range    Phosphorus 3.6 2.7 - 4.5 mg/dL   Basic metabolic panel    Collection Time: 04/10/17  1:41 AM   Result Value Ref Range    Sodium 143 136 - 145 mmol/L    Potassium 4.1 3.5 - 5.1 mmol/L    Chloride 103  95 - 110 mmol/L    CO2 34 (H) 23 - 29 mmol/L    Glucose 139 (H) 70 - 110 mg/dL    BUN, Bld 14 6 - 20 mg/dL    Creatinine 0.8 0.5 - 1.4 mg/dL    Calcium 8.8 8.7 - 10.5 mg/dL    Anion Gap 6 (L) 8 - 16 mmol/L    eGFR if African American >60 >60 mL/min/1.73 m^2    eGFR if non African American >60 >60 mL/min/1.73 m^2   CBC auto differential    Collection Time: 04/10/17  1:41 AM   Result Value Ref Range    WBC 4.93 3.90 - 12.70 K/uL    RBC 4.62 4.00 - 5.40 M/uL    Hemoglobin 11.4 (L) 12.0 - 16.0 g/dL    Hematocrit 41.5 37.0 - 48.5 %    MCV 90 82 - 98 fL    MCH 24.7 (L) 27.0 - 31.0 pg    MCHC 27.5 (L) 32.0 - 36.0 %    RDW 18.4 (H) 11.5 - 14.5 %    Platelets 176 150 - 350 K/uL    MPV 12.0 9.2 - 12.9 fL    Gran # 2.8 1.8 - 7.7 K/uL    Lymph # 1.4 1.0 - 4.8 K/uL    Mono # 0.5 0.3 - 1.0 K/uL    Eos # 0.2 0.0 - 0.5 K/uL    Baso # 0.01 0.00 - 0.20 K/uL    Gran% 56.8 38.0 - 73.0 %    Lymph% 28.0 18.0 - 48.0 %    Mono% 9.9 4.0 - 15.0 %    Eosinophil% 4.5 0.0 - 8.0 %    Basophil% 0.2 0.0 - 1.9 %    Differential Method Automated    ISTAT PROCEDURE    Collection Time: 04/10/17  5:00 AM   Result Value Ref Range    POC PH 7.366 7.35 - 7.45    POC PCO2 63.5 (HH) 35 - 45 mmHg    POC PO2 85 80 - 100 mmHg    POC HCO3 36.3 (H) 24 - 28 mmol/L    POC BE 9 -2 to 2 mmol/L    POC SATURATED O2 96 95 - 100 %    POC TCO2 38 (H) 23 - 27 mmol/L    Rate 8     Sample ARTERIAL     Site LR     Allens Test Pass     DelSys Adult Vent     Mode SIMV     Vt 440     PEEP 5     PS 15     FiO2 35      CBC:     Recent Labs  Lab 04/10/17  0141   WBC 4.93   RBC 4.62   HGB 11.4*   HCT 41.5      MCV 90   MCH 24.7*   MCHC 27.5*     BMP:     Recent Labs  Lab 04/10/17  0141   *      K 4.1      CO2 34*   BUN 14   CREATININE 0.8   CALCIUM 8.8   MG 1.8     CMP:     Recent Labs  Lab 04/07/17  0211  04/10/17  0141   *  < > 139*   CALCIUM 8.5*  < > 8.8   ALBUMIN 2.4*  --   --    PROT 6.2  --   --      < > 143   K 4.7  < > 4.1   CO2 34*   < > 34*     < > 103   BUN 7  < > 14   CREATININE 0.9  < > 0.8   ALKPHOS 113  --   --    ALT 40  --   --    AST 41*  --   --    BILITOT 1.0  --   --    < > = values in this interval not displayed.  LFTs:     Recent Labs  Lab 04/07/17  0211   ALT 40   AST 41*   ALKPHOS 113   BILITOT 1.0   PROT 6.2   ALBUMIN 2.4*     Coagulation:     Recent Labs  Lab 04/06/17  1440   INR 1.1     ABGs:     Recent Labs  Lab 04/10/17  0500   PH 7.366   PCO2 63.5*   PO2 85   HCO3 36.3*   POCSATURATED 96   BE 9       Ventilator Setting:  Vent Mode: PS/CPAP  Oxygen Concentration (%):  [35] 35  Resp Rate Total:  [7 br/min-23 br/min] 9 br/min  Vt Set:  [440 mL] 440 mL  PEEP/CPAP:  [5 cmH20] 5 cmH20  Pressure Support:  [15 cmH20] 15 cmH20  Mean Airway Pressure:  [7.5 cmH20-9.5 cmH20] 7.8 cmH20     Diagnostic Results:   Imaging Results         X-Ray Chest 1 View (Final result) Result time:  04/10/17 04:18:27    Final result by Viktoria Stallings MD (04/10/17 04:18:27)    Impression:        No interval detrimental change.      Electronically signed by: VIKTORIA STALLINGS MD  Date:     04/10/17  Time:    04:18     Narrative:    Technique: Single AP chest radiograph.    Comparison: Radiograph 04/09/2017.    Findings:    ET tube and right-sided PICC line are in stable position.  Enteric catheter demonstrates the tip projecting over the left upper quadrant. Cardiopulmonary status is unchanged noting persistent increased attenuation within the left lung base concerning for atelectasis, aspiration or pneumonia.  There is mild pulmonary vascular congestion. No pneumothorax.            X-Ray Chest AP Portable (Final result) Result time:  04/09/17 07:34:35    Final result by Viktoria Stallings MD (04/09/17 07:34:35)    Impression:        No interval detrimental change.      Electronically signed by: VIKTORIA STALLINGS MD  Date:     04/09/17  Time:    07:34     Narrative:    Technique: Single AP chest radiograph.    Comparison: Radiograph  04/08/2017.    Findings:    ET tube, right-sided PICC line and enteric catheter are in similar position. Cardiopulmonary status is unchanged noting mild groundglass attenuation throughout both lungs and mild associated pulmonary vascular congestion suggesting edema.  There is persistent increased attenuation within the left lung base which may represent atelectasis, aspiration or pneumonia with possibly dependent pleural fluid. No pneumothorax.            X-Ray Chest AP Portable (Final result) Result time:  04/08/17 05:17:49    Final result by Viktoria Stallings MD (04/08/17 05:17:49)    Impression:        No interval detrimental change.      Electronically signed by: VIKTORIA STALLINGS MD  Date:     04/08/17  Time:    05:17     Narrative:    Technique: Single AP chest radiograph.    Comparison: Radiograph 04/07/2017.    Findings:    ET tube and right-sided PICC line in stable position.  Enteric catheter crosses the diaphragm with tip not seen. Cardiopulmonary status is unchanged noting persistent abnormal increased attenuation within the mid to lower lung zones, left greater than right, concerning for edema, pneumonia or aspiration. No pneumothorax.            X-Ray Chest 1 View (Final result) Result time:  04/07/17 15:52:52    Final result by Michael Smith DO (04/07/17 15:52:52)    Impression:        See Above       Electronically signed by: MICHAEL SMITH DO  Date:     04/07/17  Time:    15:52     Narrative:    Single portable frontal view of the chest    Comparison: 04/07/2017    Results: Interval placement right-sided PICC line with tip projected over the mid SVC.  ET tube and nasogastric tubes stable.  Continued ill-defined bilateral lung opacities somewhat greater on the left with poor definition left lung base suggestive for underlying effusion.  No large pneumothorax.  Clinical correlation and followup advised            US Lower Extremity Veins Bilateral (Final result) Result time:  04/07/17 10:02:35    Final  result by Kristi Small MD (04/07/17 10:02:35)    Impression:      1.  No sonographic evidence for deep venous thrombosis in either lower extremity.      Electronically signed by: KRISTI SMALL MD  Date:     04/07/17  Time:    10:02     Narrative:    Comparison: None available    Technique: Transverse and longitudinal grayscale ultrasound images of the bilateral lower extremity veins with color and spectral Doppler analysis.    Findings: The interrogated bilateral common femoral, proximal GSV, proximal profunda, femoral, popliteal, peroneal, anterior and posterior tibial veins are normally compressible and demonstrate color and spectral Doppler patency.            X-Ray Chest 1 View (Final result) Result time:  04/07/17 02:25:46    Final result by Victor Manuel Grant MD (04/07/17 02:25:46)    Impression:        As above.      Electronically signed by: VICTOR MANUEL GRANT MD, MD  Date:     04/07/17  Time:    02:25     Narrative:    COMPARISON: CT thorax and chest radiograph 4/6/17    FINDINGS: AP portable supine view of the chest. Monitoring leads overlie the chest. Resolution is limited by body habitus with underpenetration. Interval placement of endotracheal tube with tip at the level of the clavicular heads. Interval placement of enteric tube with port and tip below the diaphragm coursing caudally out of field-of-view. No large pneumothorax. Slight improved aeration of the bilateral lungs. Otherwise, grossly stable radiographic appearance of the chest.            CTA Chest Non-Coronary (Final result) Result time:  04/06/17 16:47:30    Final result by Lizzie Cash MD (04/06/17 16:47:30)    Impression:      1. There is a 12 cm left breast hematoma.  2. There are low lung volumes with bibasilar dependent regions of consolidation and patchy opacification of the right upper lung zone. The findings may represent developing pneumonia.  3. There is no evidence pulmonary embolus.          Electronically signed by: LIZZIE CASH  MD  Date:     04/06/17  Time:    16:47     Narrative:    CTA chest    There is a 9.7 x 9.2 x 12.5 cm mixed density left breast fluid collection seen within the medial portion of the breast. As result of expansion of the left breast tissue.    There is no evidence of aneurysmal dilatation or dissection of the thoracic aorta. There are no pulmonary arterial filling defects are seen. There are bilateral dependent regions of consolidation.    There is patchy consolidation seen within the right upper lobe of the lung.    There is no evidence of axillary lymphadenopathy. The cardiac silhouette is enlarged.    This is present the superior abdomen are unremarkable.    The osseous structures are unremarkable.            X-Ray Chest PA And Lateral (Final result) Result time:  04/06/17 15:18:50    Final result by Michael Smith DO (04/06/17 15:18:50)    Impression:      See above      Electronically signed by: MICHAEL SMITH DO  Date:     04/06/17  Time:    15:18     Narrative:    PA and lateral views of the chest    Comparison: 03/29/2017    Results:    Continued ill-defined perihilar and basilar lung opacities similar prior which may represent vascular congestion and edema.  There continued poor definition of the left lung base concerning for superimposed effusion with atelectasis.  No large pneumothorax..  Cardiac mediastinal sella partially obscured by opacities.  Visualized osseous structures grossly intact.  Clinical correlation and followup advised.                  ASSESSMENT/PLAN:     1. Pneumonia of both lungs due to infectious organism, unspecified part of lung    2. Respiratory arrest    3. Acute respiratory failure with hypercapnia    2.      acute respiratory failure  3.      Vent management  4.       Hypoxemia  5.       Hypercapnea      Plan:clinically much the same ; failed cpap trial this am ; now on SIMV(PRVC)+/8/+5/PS10/FIO2 35% ETT

## 2017-04-10 NOTE — PLAN OF CARE
Problem: Patient Care Overview  Goal: Plan of Care Review  04/10/2017     Recommendations     Recommendation/Intervention: 1) TF recs:  Peptamen VHP (Bariatric) @ 45 cc/hr with 2 packets of Beneprotein daily, provides 1130 calories (1507 with propofol), 111 g protein, 907 cc free water 2) Flushes per MD 3) Check for residuals Q4 hours. Hold if > 250 cc. Use Reglan if needed   Goals: 1) Patient will meet >=85 - 115% of EEN & EPN 2) Pt to tolerate feedings  Nutrition Goal Status: new  Communication of RD Recs: reviewed with physician     Vida Amezcua, MPH, RD, LDN

## 2017-04-10 NOTE — PLAN OF CARE
04/10/17 1045   Discharge Reassessment   Assessment Type Discharge Planning Reassessment   Discharge Plan A Home with family   Discharge Plan B Other  (TBD. Pt currently on a vent will assess once medically stable. Per surgery they will discuss surgery as an outpatient.)   Change in patient condition or support system Yes   Involved the patient/caregiver in establishing a new discharge plan: No

## 2017-04-10 NOTE — PLAN OF CARE
Problem: Patient Care Overview  Goal: Plan of Care Review  Outcome: Ongoing (interventions implemented as appropriate)  Failed CPAP trial due to agitation, tachycardia and decrease in pulse ox.  Bronch in AM. Comfortable on propofol. Restraints continue

## 2017-04-10 NOTE — CONSULTS
[facility]  Neurology  Consult Note    Patient Name: Radha Pritchett  MRN: [id number]  Admission Date: 4/6/2017  Hospital Length of Stay: 4 days  Code Status: Full Code   Attending Provider: Jf Greer MD   Consulting Provider: Gus Hawkins MD  Primary Care Physician: Ferny Iglesias MD  Principal Problem:Acute respiratory failure with hypercapnia    Inpatient consult to Neurology  Consult performed by: GUS HAWKINS  Consult ordered by: JF GREER        Subjective:     Chief Complaint:  Weakness    HPI: 45 y/o female with medical Hx as listed below who was admitted for shortness of breath. Pt was found to have a pneumonia. She decompensasated and is now intubated. Pt has been difficult to to wean off ventilator. It has been reported that pt has been experiencing upper extremity weakness for a while but is perhaps a generalized weakness chronically. No other details at his moment. Pt also has Hx of a left breast mass that according to pathology is benign, possibly a hematoma. Pt has at baseline a learning difficulty.    Past Medical History:   Diagnosis Date    CHF (congestive heart failure)     Learning difficulty        Past Surgical History:   Procedure Laterality Date    BREAST SURGERY Left     biopsy    CYST REMOVAL      shoulder        Review of patient's allergies indicates:  No Known Allergies    Current Neurological Medications:     No current facility-administered medications on file prior to encounter.      Current Outpatient Prescriptions on File Prior to Encounter   Medication Sig    albuterol 90 mcg/actuation inhaler Inhale 2 puffs into the lungs every 6 (six) hours as needed for Wheezing. Rescue    azithromycin (Z-REGULO) 250 MG tablet Take 2 tablets on day 1, then one tablet on day 2-5    carvedilol (COREG) 3.125 MG tablet Take 1 tablet (3.125 mg total) by mouth 2 (two) times daily with meals.    etodolac (LODINE) 200 MG Cap Take 1 capsule (200 mg total) by mouth  every 8 (eight) hours as needed (pain).    hydrochlorothiazide (HYDRODIURIL) 12.5 MG Tab Take 1 tablet (12.5 mg total) by mouth once daily.    lisinopril (PRINIVIL,ZESTRIL) 5 MG tablet Take 1 tablet (5 mg total) by mouth once daily.    OXYGEN-AIR DELIVERY SYSTEMS MISC by Misc.(Non-Drug; Combo Route) route.      Family History     Problem Relation (Age of Onset)    Coronary artery disease     Diabetes         Social History Main Topics    Smoking status: Never Smoker    Smokeless tobacco: Never Used    Alcohol use Yes      Comment: OCCASSIONAL    Drug use: No    Sexual activity: Not on file     Review of Systems   Unable to obtain as pt is intubated    Objective:     Vital Signs (Most Recent):  Temp: 98 °F (36.7 °C) (04/10/17 0730)  Pulse: 92 (04/10/17 1430)  Resp: 12 (04/10/17 1430)  BP: (!) 87/51 (04/10/17 1430)  SpO2: 100 % (04/10/17 1430) Vital Signs (24h Range):  Temp:  [98 °F (36.7 °C)-98.7 °F (37.1 °C)] 98 °F (36.7 °C)  Pulse:  [] 92  Resp:  [8-54] 12  SpO2:  [93 %-100 %] 100 %  BP: ()/(50-76) 87/51     Weight: 81.8 kg (180 lb 5.4 oz)  Body mass index is 50.72 kg/(m^2).    Physical Exam  Constitutional: intubated  ENT: no discharge  Head: Normocephalic.   Eyes: No icteric sclereae  Neck: Neck supple.   Cardiovascular: Normal rate.   Pulmonary/Chest: symmetrical expansion of chest.   Extremities: no edema  Neurological: Partially opens eyes upon noxious stimulation  Pupils are round at 3 mm and reactive to ligt; corneal responses are present bilaterally; gag reflex present and cough reflex present  No rigidity  DTR's: unable to ellicit in biceps; 1+ patellars  Withdraws LE's upon noxious stimulation       Significant Labs:   CBC:   Recent Labs  Lab 04/09/17  0134 04/10/17  0141   WBC 4.40 4.93   HGB 11.4* 11.4*   HCT 40.8 41.5   * 176     CMP:   Recent Labs  Lab 04/09/17  0134 04/10/17  0141   * 139*    143   K 3.2* 4.1    103   CO2 32* 34*   BUN 7 14   CREATININE  0.8 0.8   CALCIUM 8.4* 8.8   MG 1.7 1.8   ANIONGAP 9 6*   EGFRNONAA >60 >60           Assessment and Plan:     45 y/o female consulted for UE weakness:    1. UE weakness: at this point etiology is uncertain but if the inability to extubate is linked to this weakness pt could have an underlying neuromuscular disorder (myopathy, MG, among others). A critical illness myopathy/polyneuropathy usually develops around a week after critical illness more so if steroids had been used.   -Will order CPK.   -MG panel.    Active Diagnoses:    Diagnosis Date Noted POA    PRINCIPAL PROBLEM:  Acute respiratory failure with hypercapnia [J96.02] 04/07/2017 Yes    Hypokalemia [E87.6] 04/09/2017 Yes    Breast mass, left [N63] 04/08/2017 Yes    Pneumonia of both lungs due to infectious organism [J18.9] 04/06/2017 Yes    Sepsis [A41.9] 04/06/2017 Yes    Essential hypertension [I10] 04/06/2017 Yes     Chronic    Chronic diastolic heart failure [I50.32] 04/06/2017 Yes     Chronic    Moderate protein malnutrition [E44.0] 04/06/2017 Yes     Chronic      Problems Resolved During this Admission:    Diagnosis Date Noted Date Resolved POA       VTE Risk Mitigation         Ordered     enoxaparin injection 40 mg  Daily     Route:  Subcutaneous        04/06/17 2202     Medium Risk of VTE  Once      04/06/17 2202          Thank you for your consult. I will follow-up with patient. Please contact us if you have any additional questions.    Gus Phillips MD  Neurology  [facility]

## 2017-04-10 NOTE — CONSULTS
Consult Note  Pulmonology    Consult Requested By: Lakesha Greer MD  Reason for Consult : vent management    SUBJECTIVE:resp failure     History of Present Illness:  Patient unresponsive;information obtained from chart      Review of patient's allergies indicates:  No Known Allergies    Past Medical History:   Diagnosis Date    CHF (congestive heart failure)     Learning difficulty      Past Surgical History:   Procedure Laterality Date    BREAST SURGERY Left     biopsy    CYST REMOVAL      shoulder      Family History   Problem Relation Age of Onset    Diabetes      Coronary artery disease         Review of systems not obtained due to patient factors ; patient intubated and unable to communicate.  OBJECTIVE:     Vital Signs (Most Recent)  Temp: 98 °F (36.7 °C) (04/10/17 0730)  Pulse: 100 (04/10/17 0900)  Resp: (!) 21 (04/10/17 0900)  BP: (!) 92/57 (04/10/17 0900)  SpO2: 99 % (04/10/17 0900)    Vital Signs Range (Last 24H):  Temp:  [98 °F (36.7 °C)-98.8 °F (37.1 °C)]   Pulse:  []   Resp:  [8-54]   BP: ()/(50-76)   SpO2:  [93 %-100 %]     Physical Exam:    General: no distress,.on the ventilator  Neck: no jugular venous distention and no carotid bruit  Lungs:  diminished breath sounds bilaterally and 7.5 ett in place and rhonchi bilaterally  Heart: regular rate and rhythm and no murmur  Abdomen: soft, non-tender non-distended; bowel sounds normal and right NGT in place  Extremities: no cyanosis or edema, or clubbing  Neurologic: sedated.  Skin-warm, moist. No rash      Laboratory:  Recent Results (from the past 24 hour(s))   Magnesium    Collection Time: 04/10/17  1:41 AM   Result Value Ref Range    Magnesium 1.8 1.6 - 2.6 mg/dL   Phosphorus    Collection Time: 04/10/17  1:41 AM   Result Value Ref Range    Phosphorus 3.6 2.7 - 4.5 mg/dL   Basic metabolic panel    Collection Time: 04/10/17  1:41 AM   Result Value Ref Range    Sodium 143 136 - 145 mmol/L    Potassium 4.1 3.5 - 5.1 mmol/L    Chloride  103 95 - 110 mmol/L    CO2 34 (H) 23 - 29 mmol/L    Glucose 139 (H) 70 - 110 mg/dL    BUN, Bld 14 6 - 20 mg/dL    Creatinine 0.8 0.5 - 1.4 mg/dL    Calcium 8.8 8.7 - 10.5 mg/dL    Anion Gap 6 (L) 8 - 16 mmol/L    eGFR if African American >60 >60 mL/min/1.73 m^2    eGFR if non African American >60 >60 mL/min/1.73 m^2   CBC auto differential    Collection Time: 04/10/17  1:41 AM   Result Value Ref Range    WBC 4.93 3.90 - 12.70 K/uL    RBC 4.62 4.00 - 5.40 M/uL    Hemoglobin 11.4 (L) 12.0 - 16.0 g/dL    Hematocrit 41.5 37.0 - 48.5 %    MCV 90 82 - 98 fL    MCH 24.7 (L) 27.0 - 31.0 pg    MCHC 27.5 (L) 32.0 - 36.0 %    RDW 18.4 (H) 11.5 - 14.5 %    Platelets 176 150 - 350 K/uL    MPV 12.0 9.2 - 12.9 fL    Gran # 2.8 1.8 - 7.7 K/uL    Lymph # 1.4 1.0 - 4.8 K/uL    Mono # 0.5 0.3 - 1.0 K/uL    Eos # 0.2 0.0 - 0.5 K/uL    Baso # 0.01 0.00 - 0.20 K/uL    Gran% 56.8 38.0 - 73.0 %    Lymph% 28.0 18.0 - 48.0 %    Mono% 9.9 4.0 - 15.0 %    Eosinophil% 4.5 0.0 - 8.0 %    Basophil% 0.2 0.0 - 1.9 %    Differential Method Automated    ISTAT PROCEDURE    Collection Time: 04/10/17  5:00 AM   Result Value Ref Range    POC PH 7.366 7.35 - 7.45    POC PCO2 63.5 (HH) 35 - 45 mmHg    POC PO2 85 80 - 100 mmHg    POC HCO3 36.3 (H) 24 - 28 mmol/L    POC BE 9 -2 to 2 mmol/L    POC SATURATED O2 96 95 - 100 %    POC TCO2 38 (H) 23 - 27 mmol/L    Rate 8     Sample ARTERIAL     Site LR     Allens Test Pass     DelSys Adult Vent     Mode SIMV     Vt 440     PEEP 5     PS 15     FiO2 35      CBC:   Recent Labs  Lab 04/10/17  0141   WBC 4.93   RBC 4.62   HGB 11.4*   HCT 41.5      MCV 90   MCH 24.7*   MCHC 27.5*     BMP:   Recent Labs  Lab 04/10/17  0141   *      K 4.1      CO2 34*   BUN 14   CREATININE 0.8   CALCIUM 8.8   MG 1.8     CMP:   Recent Labs  Lab 04/07/17  0211  04/10/17  0141   *  < > 139*   CALCIUM 8.5*  < > 8.8   ALBUMIN 2.4*  --   --    PROT 6.2  --   --      < > 143   K 4.7  < > 4.1   CO2 34*  <  > 34*     < > 103   BUN 7  < > 14   CREATININE 0.9  < > 0.8   ALKPHOS 113  --   --    ALT 40  --   --    AST 41*  --   --    BILITOT 1.0  --   --    < > = values in this interval not displayed.  LFTs:   Recent Labs  Lab 04/07/17  0211   ALT 40   AST 41*   ALKPHOS 113   BILITOT 1.0   PROT 6.2   ALBUMIN 2.4*     Coagulation:   Recent Labs  Lab 04/06/17  1440   INR 1.1     Cardiac markers: No results for input(s): CKMB, TROPONINT, MYOGLOBIN in the last 168 hours.  Microbiology Results (last 7 days)     ** No results found for the last 168 hours. **        ABGs:   Recent Labs  Lab 04/10/17  0500   PH 7.366   PCO2 63.5*   PO2 85   HCO3 36.3*   POCSATURATED 96   BE 9       Ventilator Setting:  Vent Mode: SIMV (PRVC) + PS  Oxygen Concentration (%):  [35] 35  Resp Rate Total:  [7 br/min-23 br/min] 9 br/min  Vt Set:  [440 mL] 440 mL  PEEP/CPAP:  [5 cmH20] 5 cmH20  Pressure Support:  [15 cmH20] 15 cmH20  Mean Airway Pressure:  [7.5 cmH20-9.5 cmH20] 7.8 cmH20     Diagnostic Results:     ET tube and right-sided PICC line are in stable position.  Enteric catheter demonstrates the tip projecting over the left upper quadrant. Cardiopulmonary status is unchanged noting persistent increased attenuation within the left lung base concerning for atelectasis, aspiration or pneumonia.  There is mild pulmonary vascular congestion. No pneumothorax.      ASSESSMENT/PLAN:     1. Pneumonia of both lungs due to infectious organism, unspecified part of lung    2. Respiratory arrest    3. Acute respiratory failure with hypercapnia            Plan::  Difficult to wean . Will plan bronchoscopy for tomorrow am .

## 2017-04-10 NOTE — PROGRESS NOTES
[facility]  Adult Nutrition  Progress Note    SUMMARY     Recommendations    Recommendation/Intervention: 1) TF recs:  Peptamen VHP (Bariatric) @ 45 cc/hr with 2 packets of Beneprotein daily, provides 1130 calories (1507 with propofol), 111 g protein, 907 cc free water 2) Flushes per MD 3) Check for residuals Q4 hours. Hold if > 250 cc. Use Reglan if needed   Goals: 1) Patient will meet >=85 - 115% of EEN & EPN 2) Pt to tolerate feedings  Nutrition Goal Status: new  Communication of RD Recs: reviewed with physician    Continuum of Care Plan    Referral to Outpatient Services:  (D/C planning: Too soon to determine)    Reason for Assessment    Reason for Assessment: RD follow-up  Diagnosis:  (SOB; pneumonia)  Relevent Medical History: CHF   Interdisciplinary Rounds: attended  General Information Comments: Intubated. TF running at goal rate. Resp. states patient failed several CPAP trials. Vent setting changing and [place] energy needs decreased estimated calorie needs. Spoke with physician about changing formula.     Nutrition Prescription Ordered    Current Diet Order: NPO     Current Nutrition Support Formula Ordered: Impact Peptide 1.5  Current Nutrition Support Rate Ordered: 40 (ml)  Current Nutrition Support Frequency Ordered: continuous  Oral Nutrition Supplement: 2 packets of Beneprotein      Evaluation of Received Nutrients/Fluid Intake    Enteral Calories (kcal): 1670  Enteral Protein (gm): 113  Enteral (Free Water) Fluid (mL): 832  Free Water Flush Fluid (mL): 900    Other Calories (kcal): 377 (propofol)  Total Calories (kcal): 2047     Energy Calories Required: exceed needs  % Kcal Needs: 144    Protein Required: meeting needs  % Protein Needs: 100     IV Fluid (mL): 1200  Fluid Required: exceed needs (Net I/O +520.2)  Comments: 15 cc residuals noted  Tolerance: tolerating     Nutrition Risk Screen     Nutrition Risk Screen: no indicators present    Nutrition/Diet History    Food  Preferences: DANIEL    Factors Affecting Nutritional Intake: NPO, on mechanical ventilation    Labs/Tests/Procedures/Meds    Diagnostic Test/Procedure Review: reviewed, pertinent  Pertinent Labs Reviewed: reviewed  Pertinent Medications Reviewed: reviewed  Pertinent Medications Comments: propofol    Physical Findings    Overall Physical Appearance: obese, on ventilator support  Tubes: orogastric tube  Oral/Mouth Cavity: WDL  Skin:  (blister on foot)    Anthropometrics    Height (inches): 62.13 in  Weight Method: Bed Scale  Weight (kg): 79.6 kg  Ideal Body Weight (IBW), Female: 110.65 lb  % Ideal Body Weight, Female (lb): 158.6 lb  BMI (kg/m2): 31.97  BMI Grade: 35 - 39.9 - obesity - grade II    Estimated/Assessed Needs    Weight Used For Calorie Calculations: 79.6 kg (175 lb 7.8 oz)   Height (cm): 157.8 cm (noted possible EPIC error of ht. (5'2 vs. 4'2))  Energy Need Method: [facility] (1417)  RMR ([place]-St. Jeor Equation): 1394.13  Weight Used For Protein Calculations: 50 kg (110 lb 3.7 oz) (IBW)  Protein Requirements: 100-125 g  Fluid Need Method: RDA Method (per MD)     Nutrition Diagnosis     Problem: Inadequate Energy Intake  Etiology: Mechanical Ventilation  As Evidenced by: NPO/OGT  Nutrition Diagnosis Status: improving (TF running at goal rate)    Monitor and Evaluation    Food and Nutrient Intake: energy intake, food and beverage intake, enteral nutrition intake  Food and Nutrient Adminstration: diet order, enteral and parenteral nutrition administration  Anthropometric Measurements: weight, weight change  Biochemical Data, Medical Tests and Procedures: electrolyte and renal panel, glucose/endocrine profile, gastrointestinal profile  Nutrition-Focused Physical Findings: overall appearance    Nutrition Risk    Level of Risk: other (see comments) (f/u 2x weekly)    Nutrition Follow-Up    RD Follow-up?: Yes    Assessment and Plan    No new Assessment & Plan notes have been filed under this hospital service  since the last note was generated.  Service: Nutrition

## 2017-04-10 NOTE — PLAN OF CARE
Problem: Patient Care Overview  Goal: Plan of Care Review  Outcome: Ongoing (interventions implemented as appropriate)  Pt remains intubated, unable to wean ventilator settings this shift. Propofol infusing for sedation. VSS. Urine output adequate. Multiple bowel movements this shift. Pt's tube feeding infusing per order, Pt tolerating well, minimal residuals. No family at the bedside this shift. Pt free of hospital acquired injuries and falls.

## 2017-04-11 LAB
ANION GAP SERPL CALC-SCNC: 6 MMOL/L
BASOPHILS # BLD AUTO: 0.01 K/UL
BASOPHILS NFR BLD: 0.2 %
BUN SERPL-MCNC: 15 MG/DL
CALCIUM SERPL-MCNC: 8.4 MG/DL
CHLORIDE SERPL-SCNC: 105 MMOL/L
CO2 SERPL-SCNC: 32 MMOL/L
CREAT SERPL-MCNC: 0.7 MG/DL
DELSYS: ABNORMAL
DIFFERENTIAL METHOD: ABNORMAL
EOSINOPHIL # BLD AUTO: 0.2 K/UL
EOSINOPHIL NFR BLD: 5.2 %
ERYTHROCYTE [DISTWIDTH] IN BLOOD BY AUTOMATED COUNT: 18.4 %
ERYTHROCYTE [SEDIMENTATION RATE] IN BLOOD BY WESTERGREN METHOD: 8 MM/H
EST. GFR  (AFRICAN AMERICAN): >60 ML/MIN/1.73 M^2
EST. GFR  (NON AFRICAN AMERICAN): >60 ML/MIN/1.73 M^2
FIO2: 35
GLUCOSE SERPL-MCNC: 121 MG/DL
HCO3 UR-SCNC: 39.6 MMOL/L (ref 24–28)
HCT VFR BLD AUTO: 37.8 %
HGB BLD-MCNC: 10.3 G/DL
LYMPHOCYTES # BLD AUTO: 1.3 K/UL
LYMPHOCYTES NFR BLD: 29.3 %
MAGNESIUM SERPL-MCNC: 1.8 MG/DL
MCH RBC QN AUTO: 24.8 PG
MCHC RBC AUTO-ENTMCNC: 27.2 %
MCV RBC AUTO: 91 FL
MODE: ABNORMAL
MONOCYTES # BLD AUTO: 0.4 K/UL
MONOCYTES NFR BLD: 9.6 %
NEUTROPHILS # BLD AUTO: 2.4 K/UL
NEUTROPHILS NFR BLD: 55.7 %
PCO2 BLDA: 41.1 MMHG (ref 35–45)
PEEP: 5
PH SMN: 7.59 [PH] (ref 7.35–7.45)
PHOSPHATE SERPL-MCNC: 3.4 MG/DL
PLATELET # BLD AUTO: 169 K/UL
PMV BLD AUTO: ABNORMAL FL
PO2 BLDA: 182 MMHG (ref 80–100)
POC BE: 16 MMOL/L
POC SATURATED O2: 100 % (ref 95–100)
POC TCO2: 41 MMOL/L (ref 23–27)
POTASSIUM SERPL-SCNC: 4.2 MMOL/L
PS: 15
RBC # BLD AUTO: 4.16 M/UL
SAMPLE: ABNORMAL
SITE: ABNORMAL
SODIUM SERPL-SCNC: 143 MMOL/L
SP02: 100
VT: 440
WBC # BLD AUTO: 4.27 K/UL

## 2017-04-11 PROCEDURE — 83735 ASSAY OF MAGNESIUM: CPT

## 2017-04-11 PROCEDURE — 87205 SMEAR GRAM STAIN: CPT

## 2017-04-11 PROCEDURE — 99900025 HC BRONCHOSCOPY-ASST (STAT)

## 2017-04-11 PROCEDURE — 27200966 HC CLOSED SUCTION SYSTEM

## 2017-04-11 PROCEDURE — 63600175 PHARM REV CODE 636 W HCPCS: Performed by: HOSPITALIST

## 2017-04-11 PROCEDURE — 99900026 HC AIRWAY MAINTENANCE (STAT)

## 2017-04-11 PROCEDURE — 25000003 PHARM REV CODE 250: Performed by: PSYCHIATRY & NEUROLOGY

## 2017-04-11 PROCEDURE — 36600 WITHDRAWAL OF ARTERIAL BLOOD: CPT

## 2017-04-11 PROCEDURE — 0BBJ8ZX EXCISION OF LEFT LOWER LUNG LOBE, VIA NATURAL OR ARTIFICIAL OPENING ENDOSCOPIC, DIAGNOSTIC: ICD-10-PCS | Performed by: INTERNAL MEDICINE

## 2017-04-11 PROCEDURE — 20000000 HC ICU ROOM

## 2017-04-11 PROCEDURE — 85025 COMPLETE CBC W/AUTO DIFF WBC: CPT

## 2017-04-11 PROCEDURE — 25000242 PHARM REV CODE 250 ALT 637 W/ HCPCS: Performed by: INTERNAL MEDICINE

## 2017-04-11 PROCEDURE — 94761 N-INVAS EAR/PLS OXIMETRY MLT: CPT

## 2017-04-11 PROCEDURE — 63600175 PHARM REV CODE 636 W HCPCS: Performed by: PSYCHIATRY & NEUROLOGY

## 2017-04-11 PROCEDURE — 36415 COLL VENOUS BLD VENIPUNCTURE: CPT

## 2017-04-11 PROCEDURE — 25000003 PHARM REV CODE 250: Performed by: INTERNAL MEDICINE

## 2017-04-11 PROCEDURE — 25000003 PHARM REV CODE 250: Performed by: HOSPITALIST

## 2017-04-11 PROCEDURE — 80048 BASIC METABOLIC PNL TOTAL CA: CPT

## 2017-04-11 PROCEDURE — 99232 SBSQ HOSP IP/OBS MODERATE 35: CPT | Mod: ,,, | Performed by: PSYCHIATRY & NEUROLOGY

## 2017-04-11 PROCEDURE — 25000003 PHARM REV CODE 250

## 2017-04-11 PROCEDURE — 63600175 PHARM REV CODE 636 W HCPCS: Performed by: INTERNAL MEDICINE

## 2017-04-11 PROCEDURE — 99900035 HC TECH TIME PER 15 MIN (STAT)

## 2017-04-11 PROCEDURE — 94003 VENT MGMT INPAT SUBQ DAY: CPT

## 2017-04-11 PROCEDURE — 87070 CULTURE OTHR SPECIMN AEROBIC: CPT

## 2017-04-11 PROCEDURE — 27000221 HC OXYGEN, UP TO 24 HOURS

## 2017-04-11 PROCEDURE — 94640 AIRWAY INHALATION TREATMENT: CPT

## 2017-04-11 PROCEDURE — 83519 RIA NONANTIBODY: CPT

## 2017-04-11 PROCEDURE — 83519 RIA NONANTIBODY: CPT | Mod: 59

## 2017-04-11 PROCEDURE — 31622 DX BRONCHOSCOPE/WASH: CPT | Performed by: INTERNAL MEDICINE

## 2017-04-11 PROCEDURE — 84100 ASSAY OF PHOSPHORUS: CPT

## 2017-04-11 RX ORDER — PANTOPRAZOLE SODIUM 40 MG/10ML
40 INJECTION, POWDER, LYOPHILIZED, FOR SOLUTION INTRAVENOUS DAILY
Status: DISCONTINUED | OUTPATIENT
Start: 2017-04-11 | End: 2017-05-08 | Stop reason: HOSPADM

## 2017-04-11 RX ORDER — LIDOCAINE HYDROCHLORIDE 40 MG/ML
SOLUTION TOPICAL
Status: COMPLETED
Start: 2017-04-11 | End: 2017-04-11

## 2017-04-11 RX ORDER — PREDNISONE 20 MG/1
60 TABLET ORAL DAILY
Status: DISCONTINUED | OUTPATIENT
Start: 2017-04-11 | End: 2017-04-18

## 2017-04-11 RX ORDER — PYRIDOSTIGMINE BROMIDE 60 MG/1
60 TABLET ORAL EVERY 8 HOURS
Status: DISCONTINUED | OUTPATIENT
Start: 2017-04-11 | End: 2017-04-13

## 2017-04-11 RX ORDER — MORPHINE SULFATE 10 MG/ML
2 INJECTION INTRAMUSCULAR; INTRAVENOUS; SUBCUTANEOUS EVERY 4 HOURS PRN
Status: DISCONTINUED | OUTPATIENT
Start: 2017-04-11 | End: 2017-04-21

## 2017-04-11 RX ADMIN — PYRIDOSTIGMINE BROMIDE 60 MG: 60 TABLET ORAL at 09:04

## 2017-04-11 RX ADMIN — PANTOPRAZOLE SODIUM 40 MG: 40 GRANULE, DELAYED RELEASE ORAL at 08:04

## 2017-04-11 RX ADMIN — IPRATROPIUM BROMIDE AND ALBUTEROL SULFATE 3 ML: .5; 3 SOLUTION RESPIRATORY (INHALATION) at 07:04

## 2017-04-11 RX ADMIN — LIDOCAINE HYDROCHLORIDE: 40 SOLUTION TOPICAL at 11:04

## 2017-04-11 RX ADMIN — PROPOFOL 30 MCG/KG/MIN: 10 INJECTION, EMULSION INTRAVENOUS at 01:04

## 2017-04-11 RX ADMIN — PREDNISONE 60 MG: 20 TABLET ORAL at 01:04

## 2017-04-11 RX ADMIN — SODIUM CHLORIDE 500 ML: 0.9 INJECTION, SOLUTION INTRAVENOUS at 06:04

## 2017-04-11 RX ADMIN — MORPHINE SULFATE 2 MG: 10 INJECTION INTRAVENOUS at 09:04

## 2017-04-11 RX ADMIN — ENOXAPARIN SODIUM 40 MG: 100 INJECTION SUBCUTANEOUS at 05:04

## 2017-04-11 RX ADMIN — CHLORHEXIDINE GLUCONATE 15 ML: 1.2 RINSE ORAL at 09:04

## 2017-04-11 RX ADMIN — PROPOFOL 39.99 MCG/KG/MIN: 10 INJECTION, EMULSION INTRAVENOUS at 05:04

## 2017-04-11 RX ADMIN — CHLORHEXIDINE GLUCONATE 15 ML: 1.2 RINSE ORAL at 08:04

## 2017-04-11 RX ADMIN — PYRIDOSTIGMINE BROMIDE 60 MG: 60 TABLET ORAL at 01:04

## 2017-04-11 RX ADMIN — PROPOFOL 40 MCG/KG/MIN: 10 INJECTION, EMULSION INTRAVENOUS at 09:04

## 2017-04-11 RX ADMIN — IPRATROPIUM BROMIDE AND ALBUTEROL SULFATE 3 ML: .5; 3 SOLUTION RESPIRATORY (INHALATION) at 03:04

## 2017-04-11 RX ADMIN — CEFTRIAXONE 1 G: 1 INJECTION, SOLUTION INTRAVENOUS at 08:04

## 2017-04-11 RX ADMIN — PROPOFOL 35 MCG/KG/MIN: 10 INJECTION, EMULSION INTRAVENOUS at 06:04

## 2017-04-11 RX ADMIN — PROPOFOL 39.99 MCG/KG/MIN: 10 INJECTION, EMULSION INTRAVENOUS at 01:04

## 2017-04-11 RX ADMIN — AZITHROMYCIN MONOHYDRATE 500 MG: 500 INJECTION, POWDER, LYOPHILIZED, FOR SOLUTION INTRAVENOUS at 08:04

## 2017-04-11 NOTE — PHYSICIAN QUERY
"PT Name: Radha Pritchett  MR #: [id number]    Physician Query Form - Pneumonia Clarification     CDS/: Nya Bee RN, CCDS               Contact information: [phone]  This form is a permanent document in the medical record.    Query Date:  April 11, 2017    By submitting this query, we are merely seeking further clarification of documentation. Please utilize your independent clinical judgment when addressing the question(s) below.    The Medical record contains the following:   Indicators   Supporting Clinical Findings Location in Medical Record   x "Pneumonia" documented Pneumonia   H&P   x Chest X-Ray: · Cardiopulmonary status is unchanged noting  persistent increased attenuation within the left lung base concerning for atelectasis, aspiration or pneumonia.  ·  Pulm &  Neuro CN 4/10    PaO2    PaCO2     O2 sat      Cultures      x Treatment  · admitted with pneumonia and acutely decompensated and was intubated and placed on vent.   Prim PN 4/7   x Supplemental O2 Intubated   ED MD Note   x Other Albuterol-ipratropium Neb  Azithromycin IV  Rocephin IV  Piperacillin tazobactam IV   MAR       Provider, please specify type of pneumonia.    [ x ] Bacterial Pneumonia (Specify organism): ______________________  [  ] Bacterial, Gram Negative organism Pneumonia  [  ] Viral Pneumonia (Specify virus): _______________________  [  ] Fungal Pneumonia (Specify organism): _______________________  [  ] Aspiration Pneumonia  [  ] Other type of pneumonia (please specify): ______________________________________  [  ] Clinically undetermined    Please document in your progress notes daily for the duration of treatment, until resolved, and include in your discharge summary.    .                                                                                    "

## 2017-04-11 NOTE — PLAN OF CARE
Problem: Restraint, Nonbehavioral (nonviolent)  Goal: Absence of Injury/Harm  Outcome: Ongoing (interventions implemented as appropriate)  Pt still requiring restraints due to attempts to pull out ETT..

## 2017-04-11 NOTE — PROGRESS NOTES
[facility] Hospital Medicine  Progress Note    Patient Name: Radha Pritchett  MRN: [id number]  Patient Class: IP- Inpatient   Admission Date: 4/6/2017  Length of Stay: 5 days  Attending Physician: Lakesha Greer MD  Primary Care Provider: Ferny Iglesias MD        Subjective:     Principal Problem:Acute respiratory failure with hypercapnia    HPI:  Ms. Radha Pritchett is a 46 y.o. female with essential hypertension, chronic diastolic heart failure (LVEF 55-60% Dec 2014), moderate protein malnutrition who presents to [facility] ED with complaints of dyspnea today.  She was scheduled to undergo removal of a breast mass today but was found to be hypoxic.  She does report that she has been short of breath but cannot specify how long.  She has also had some non-productive coughing and some chills without fevers.  Further history is otherwise limited at this time.    Hospital Course:   was admitted with pneumonia and acutely decompensated and was intubated and placed on vent. Pt with UE weakness prior to presentation on this admission which may be contributing to inability to extubate. Neurology consulted.    Interval History: Pt failed weaning trial this morning; Neurology following.    Review of Systems   Unable to perform ROS: Intubated     Objective:     Vital Signs (Most Recent):  Temp: 98.4 °F (36.9 °C) (04/11/17 1500)  Pulse: 72 (04/11/17 1800)  Resp: 16 (04/11/17 1800)  BP: (!) 85/53 (04/11/17 1800)  SpO2: 98 % (04/11/17 1800) Vital Signs (24h Range):  Temp:  [97.9 °F (36.6 °C)-98.6 °F (37 °C)] 98.4 °F (36.9 °C)  Pulse:  [1-99] 72  Resp:  [6-29] 16  SpO2:  [98 %-100 %] 98 %  BP: ()/(44-74) 85/53     Weight: 82.1 kg (181 lb)  Body mass index is 50.9 kg/(m^2).    Intake/Output Summary (Last 24 hours) at 04/11/17 1825  Last data filed at 04/11/17 1800   Gross per 24 hour   Intake          2150.76 ml   Output             1320 ml   Net           830.76 ml      Physical Exam    Constitutional: She appears well-developed and well-nourished.   obese   HENT:   Head: Normocephalic and atraumatic.   Eyes: Conjunctivae are normal.   Neck: Neck supple.   Cardiovascular: Normal rate and regular rhythm.    Anterior chest with hard, large left breast mass that is palpable.   Pulmonary/Chest:   Poor respiratory effort with decreased breath sound bilaterally and inspiratory crackles    Abdominal: Soft. Bowel sounds are normal. She exhibits no distension. There is no tenderness.   Musculoskeletal: Normal range of motion. She exhibits no edema.   Neurological:   Sedated on vent.   Skin: Skin is warm and dry. No erythema.   Nursing note and vitals reviewed.      Significant Labs: All pertinent labs within the past 24 hours have been reviewed.    Significant Imaging: I have reviewed and interpreted all pertinent imaging results/findings within the past 24 hours.    Assessment/Plan:      * Acute respiratory failure with hypercapnia  Mechanical ventilation  Assessment and Plan:  S/p intubation, possible secondary to pneumonia vs edema, will continue abx, ECHO with diastolic dysfunction but normal EF, and Pulm following for vent management. Family reported UE weakness for a few months prior to this admission and Neurology consulted, possible MG and patient undergoing trial of prednisone and pyridostigmine. Appreciate Neurology and Pulmonary input.    Pneumonia of both lungs due to infectious organism  The patient has a CURB-65 score of 0, and does not meet criteria for healthcare-associated pneumonia.  I have reviewed the chest X-ray and CT-A chest and it reveals bilateral airspace opacification concerning for pneumonia.  Oxygen saturations are improved but requiring a non-rebreather mask; then BIPAP and s/p intubation.  Blood culture NGTD.  Continue empiric antibiotic therapy.    Sepsis  This patient met criteria for sepsis given tachycardia, tachypnea, and pneumonia; there is no evidence of endo-organ  dysfunction.  Blood cultures are NGTD; continue broad spectrum antibiotics    Essential hypertension  Pt's BP on low end of normal, and hydrochlorothiazide and lisinopril stopped. Hold parameter placed on  Carvedilol.    Chronic diastolic heart failure  Patient doesn't appear to be in acute heart failure; checked ECHO with EF=65% but with diastolic dysfunction.    Moderate protein malnutrition  Tolerating tube feeds.    Breast mass, left  Planned for surgery, seen by Dr. Sandoval (his patient) and plan to reschedule as outpatient.    Hypokalemia  Corrected, and monitor.      VTE Risk Mitigation         Ordered     enoxaparin injection 40 mg  Daily     Route:  Subcutaneous        04/06/17 2202     Medium Risk of VTE  Once      04/06/17 2202        Critical care time spent: 40 minutes.      Lakesha Greer MD  Department of Hospital Medicine   [facility]

## 2017-04-11 NOTE — PROGRESS NOTES
Pt remains on servoi vent with alarms on and functioning.Tube truong changed.Pt tube switched to right side 23 cm at lip.No breakdown noted.

## 2017-04-11 NOTE — PROGRESS NOTES
[facility]  Neurology  Progress Note    Patient Name: Radha Pritchett  MRN: [id number]  Admission Date: 4/6/2017  Hospital Length of Stay: 5 days  Code Status: Full Code   Attending Provider: Lakesha Greer MD  Primary Care Physician: Ferny Iglesias MD   Principal Problem:Acute respiratory failure with hypercapnia    Subjective:     Interval History: 47 y/o female with medical Hx as listed below who was admitted for shortness of breath. Pt was found to have a pneumonia. She decompensasated and is now intubated. Pt has been difficult to to wean off ventilator. It has been reported that pt has been experiencing upper extremity weakness for a while but is perhaps a generalized weakness chronically. No other details at his moment. Pt also has Hx of a left breast mass that according to pathology is benign, possibly a hematoma. Pt has at baseline a learning difficulty.    -4/11/17: Pt remains mechanically ventilated. NIF repoerted -10.    Current Neurological Medications:     Current Facility-Administered Medications   Medication Dose Route Frequency Provider Last Rate Last Dose    acetaminophen tablet 500 mg  500 mg Oral Q6H PRN Easton Aaron MD        albuterol-ipratropium 2.5mg-0.5mg/3mL nebulizer solution 3 mL  3 mL Nebulization Q4H PRN Easton Aaron MD   3 mL at 04/11/17 0731    cefTRIAXone (ROCEPHIN) 1 g in dextrose 5 % 50 mL IVPB  1 g Intravenous Q24H Easton Aaron MD   1 g at 04/11/17 0846    And    azithromycin 500 mg in dextrose 5 % 250 mL IVPB (ready to mix system)  500 mg Intravenous Q24H Easton Aaron  mL/hr at 04/11/17 0846 500 mg at 04/11/17 0846    carvedilol tablet 3.125 mg  3.125 mg Oral BID  Earl Saunders MD   3.125 mg at 04/10/17 1635    chlorhexidine 0.12 % solution 15 mL  15 mL Mouth/Throat BID Easton Aaron MD   15 mL at 04/11/17 0845    cloNIDine tablet 0.1 mg  0.1 mg Oral TID PRN Easton Aaron MD        enoxaparin injection 40 mg  40 mg Subcutaneous Daily Easton SAMUELS  MD Galen   40 mg at 04/10/17 1635    lidocaine HCL 4% (XYLOCAINE) 4 % (40 mg/mL) external solution             morphine injection 2 mg  2 mg Intravenous Q4H PRN Lakesha Greer MD   2 mg at 04/11/17 0939    ondansetron injection 8 mg  8 mg Intravenous Q8H PRN Easton Aaron MD        pantoprazole injection 40 mg  40 mg Intravenous Daily Lakesha Greer MD        promethazine (PHENERGAN) 12.5 mg in dextrose 5 % 50 mL IVPB  12.5 mg Intravenous Q6H PRN Easton Aaron MD        propofol (DIPRIVAN) 10 mg/mL infusion  5 mcg/kg/min Intravenous Continuous Easton Aaron MD 16.7 mL/hr at 04/11/17 0651 35 mcg/kg/min at 04/11/17 0651    ramelteon tablet 8 mg  8 mg Oral Nightly PRN Easton Aaron MD   8 mg at 04/06/17 2239    scopolamine 1.5 mg (1 mg over 3 days) 1.5 mg  1 patch Transdermal Q3 Days Lakesha Greer MD   1.5 mg at 04/10/17 2019       Review of Systems   Unable to obtianed as pt is intubated    Objective:     Vital Signs (Most Recent):  Temp: 97.9 °F (36.6 °C) (04/11/17 1101)  Pulse: 77 (04/11/17 1147)  Resp: 20 (04/11/17 1147)  BP: (!) 80/53 (04/11/17 1147)  SpO2: 100 % (04/11/17 1147) Vital Signs (24h Range):  Temp:  [97.9 °F (36.6 °C)-98.6 °F (37 °C)] 97.9 °F (36.6 °C)  Pulse:  [1-103] 77  Resp:  [6-29] 20  SpO2:  [98 %-100 %] 100 %  BP: ()/(44-76) 80/53     Weight: 82.1 kg (181 lb)  Body mass index is 50.9 kg/(m^2).    Physical Exam  Constitutional: intubated  ENT: no discharge  Head: Normocephalic.   Eyes: No icteric sclereae  Neck: Neck supple.   Cardiovascular: Normal rate.   Pulmonary/Chest: symmetrical expansion of chest.   Extremities: no edema  Neurological: Partially opens eyes upon noxious stimulation  Pupils are round at 3 mm and reactive to ligt; corneal responses are present bilaterally; gag reflex present and cough reflex present  No rigidity  DTR's: unable to ellicit in biceps; 1+ patellars  Withdraws LE's upon noxious stimulation       Significant Labs:   CBC:   Recent Labs  Lab  04/10/17  0141 04/11/17  0124   WBC 4.93 4.27   HGB 11.4* 10.3*   HCT 41.5 37.8    169     CMP:   Recent Labs  Lab 04/10/17  0141 04/11/17  0124   * 121*    143   K 4.1 4.2    105   CO2 34* 32*   BUN 14 15   CREATININE 0.8 0.7   CALCIUM 8.8 8.4*   MG 1.8 1.8   ANIONGAP 6* 6*   EGFRNONAA >60 >60           Assessment and Plan:     47 y/o female consulted for UE weakness:     1. UE weakness: at this point etiology is uncertain but if the inability to extubate is linked to this weakness pt could have an underlying neuromuscular disorder (myopathy, MG, among others). A critical illness myopathy/polyneuropathy usually develops around a week after critical illness. NIF is very low.  -CPK in normal range.  -MG panel ordered.  -Trial of prednisone 60 mg daily and pyridostigmine 60 three time daily.    Active Diagnoses:    Diagnosis Date Noted POA    PRINCIPAL PROBLEM:  Acute respiratory failure with hypercapnia [J96.02] 04/07/2017 Yes    Hypokalemia [E87.6] 04/09/2017 Yes    Breast mass, left [N63] 04/08/2017 Yes    Pneumonia of both lungs due to infectious organism [J18.9] 04/06/2017 Yes    Sepsis [A41.9] 04/06/2017 Yes    Essential hypertension [I10] 04/06/2017 Yes     Chronic    Chronic diastolic heart failure [I50.32] 04/06/2017 Yes     Chronic    Moderate protein malnutrition [E44.0] 04/06/2017 Yes     Chronic      Problems Resolved During this Admission:    Diagnosis Date Noted Date Resolved POA       VTE Risk Mitigation         Ordered     enoxaparin injection 40 mg  Daily     Route:  Subcutaneous        04/06/17 2202     Medium Risk of VTE  Once      04/06/17 2202          Gus Phillips MD  Neurology  [facility]

## 2017-04-11 NOTE — PHYSICIAN QUERY
PT Name: Radha Pritchett  MR #: [id number]     Physician Query Form - Documentation Clarification      CDS/: Nya Bee RN, CCDS               Contact information:  [phone]    This form is a permanent document in the medical record.     Query Date: April 11, 2017    By submitting this query, we are merely seeking further clarification of documentation. Please utilize your independent clinical judgment when addressing the question(s) below.    The Medical record reflects the following:    Supporting Clinical Findings Location in Medical Record   Blister Left Dorsal Foot    POA = yes    Blister on foot   Nurse Assessment      RD PN 4/10 & RD CN 4/7   Pressure ulcer & fall prevention measures on-going    Repositioned Q2H and passive ROM done to extremities     Nurse Care Plan 4/8    Nurse Care Plan 4/7                                                                            Doctor, Please specify diagnosis or diagnoses associated with above clinical findings.    ( x ) Blister, Left Dorsal Foot, POA - yes    (  ) Other diagnosis & POA status: __________________    (  ) Undeterminable      Provider Use Only                                                                                                                               [  ] Clinically undetermined

## 2017-04-11 NOTE — PLAN OF CARE
Problem: Infection, Risk/Actual (Adult)  Goal: Infection Prevention/Resolution  Patient will demonstrate the desired outcomes by discharge/transition of care.   Outcome: Ongoing (interventions implemented as appropriate)  Pt being treated with antibiotics, no fever noted.

## 2017-04-11 NOTE — SUBJECTIVE & OBJECTIVE
Interval History: Pt failed weaning trial this morning; Neurology following.    Review of Systems   Unable to perform ROS: Intubated     Objective:     Vital Signs (Most Recent):  Temp: 98.4 °F (36.9 °C) (04/11/17 1500)  Pulse: 72 (04/11/17 1800)  Resp: 16 (04/11/17 1800)  BP: (!) 85/53 (04/11/17 1800)  SpO2: 98 % (04/11/17 1800) Vital Signs (24h Range):  Temp:  [97.9 °F (36.6 °C)-98.6 °F (37 °C)] 98.4 °F (36.9 °C)  Pulse:  [1-99] 72  Resp:  [6-29] 16  SpO2:  [98 %-100 %] 98 %  BP: ()/(44-74) 85/53     Weight: 82.1 kg (181 lb)  Body mass index is 50.9 kg/(m^2).    Intake/Output Summary (Last 24 hours) at 04/11/17 1825  Last data filed at 04/11/17 1800   Gross per 24 hour   Intake          2150.76 ml   Output             1320 ml   Net           830.76 ml      Physical Exam   Constitutional: She appears well-developed and well-nourished.   obese   HENT:   Head: Normocephalic and atraumatic.   Eyes: Conjunctivae are normal.   Neck: Neck supple.   Cardiovascular: Normal rate and regular rhythm.    Anterior chest with hard, large left breast mass that is palpable.   Pulmonary/Chest:   Poor respiratory effort with decreased breath sound bilaterally and inspiratory crackles    Abdominal: Soft. Bowel sounds are normal. She exhibits no distension. There is no tenderness.   Musculoskeletal: Normal range of motion. She exhibits no edema.   Neurological:   Sedated on vent.   Skin: Skin is warm and dry. No erythema.   Nursing note and vitals reviewed.      Significant Labs: All pertinent labs within the past 24 hours have been reviewed.    Significant Imaging: I have reviewed and interpreted all pertinent imaging results/findings within the past 24 hours.

## 2017-04-11 NOTE — PROGRESS NOTES
[facility] Hospital Medicine  Progress Note    Patient Name: Radha Pritchett  MRN: [id number]  Patient Class: IP- Inpatient   Admission Date: 4/6/2017  Attending Physician: Lakesha Greer MD  Primary Care Provider: Ferny Iglesias MD      Subjective:     Principal Problem:Acute respiratory failure with hypercapnia    HPI:  Ms. Radha Pritchett is a 46 y.o. female with essential hypertension, chronic diastolic heart failure (LVEF 55-60% Dec 2014), moderate protein malnutrition who presents to [facility] ED with complaints of dyspnea today.  She was scheduled to undergo removal of a breast mass today but was found to be hypoxic.  She does report that she has been short of breath but cannot specify how long.  She has also had some non-productive coughing and some chills without fevers.  Further history is otherwise limited at this time.    Hospital Course:   was admitted with pneumonia and acutely decompensated and was intubated and placed on vent. Pt with UE weakness prior to presentation on this admission which may be contributing to inability to extubate.    Interval History: Pt failed weaning trial this morning; no acute events    Review of Systems   Unable to perform ROS: Intubated     Objective:     Vital signs: Reviewed    Weight: 82.1 kg (181 lb)  Body mass index is 50.9 kg/(m^2).    Intake/Output Summary (Last 24 hours): Reviewed     Physical Exam   Constitutional: She appears well-developed and well-nourished.   obese   HENT:   Head: Normocephalic and atraumatic.   Eyes: Conjunctivae are normal.   Neck: Neck supple.   Cardiovascular: Normal rate and regular rhythm.    Anterior chest with hard, large left breast mass that is palpable.   Pulmonary/Chest:   Poor respiratory effort with decreased breath sound bilaterally and inspiratory crackles    Abdominal: Soft. Bowel sounds are normal. She exhibits no distension. There is no tenderness.   Musculoskeletal: Normal range of motion. She  exhibits no edema.   Neurological:   Sedated on vent.   Skin: Skin is warm and dry. No erythema.   Nursing note and vitals reviewed.      Significant Labs: All pertinent labs within the past 24 hours have been reviewed.    Significant Imaging: I have reviewed and interpreted all pertinent imaging results/findings within the past 24 hours.    Assessment/Plan:      * Acute respiratory failure with hypercapnia  Mechanical ventilation  Upper extremity weakness  Assessment and Plan:  S/p intubation, possible secondary to pneumonia vs edema, will continue abx, ECHO with diastolic dysfunction but normal EF, and Pulm following for vent management. Family reported UE weakness for a few months prior to this admission and this may be contributing to why patient has been difficult to extubate, will consult Neurology.    Pneumonia of both lungs due to infectious organism  The patient has a CURB-65 score of 0, and does not meet criteria for healthcare-associated pneumonia.  I have reviewed the chest X-ray and CT-A chest and it reveals bilateral airspace opacification concerning for pneumonia.  Oxygen saturations are improved but requiring a non-rebreather mask; then BIPAP and s/p intubation.  Blood culture NGTD.  Continue empiric antibiotic therapy.    Sepsis  This patient met criteria for sepsis given tachycardia, tachypnea, and pneumonia; there is no evidence of endo-organ dysfunction.  Blood cultures are NGTD; continue broad spectrum antibiotics    Essential hypertension  Pt's BP on low end of normal, and hydrochlorothiazide and lisinopril stopped. Continue carvedilol.    Chronic diastolic heart failure  Patient doesn't appear to be in acute heart failure; checked ECHO with EF=65% but with diastolic dysfunction.    Moderate protein malnutrition  Tolerating tube feeds.    Breast mass, left  Planned for surgery, seen by Dr. Sandoval (his patient) and plan to reschedule as outpatient.    Hypokalemia  Corrected, and  monitor.      VTE Risk Mitigation         Ordered     enoxaparin injection 40 mg  Daily     Route:  Subcutaneous        04/06/17 2202     Medium Risk of VTE  Once      04/06/17 2202        Critical care time spent: 40 minutes.    Lakesha Greer MD  Department of Hospital Medicine   [facility]

## 2017-04-11 NOTE — PROGRESS NOTES
Pt failed Cpap trials vent went in backup mode twice.Pt NIF -10.Pt very weak. placed pt on previous settings.Bronch later.Pt alarms on and functioning.

## 2017-04-11 NOTE — ASSESSMENT & PLAN NOTE
Mechanical ventilation  Assessment and Plan:  S/p intubation, possible secondary to pneumonia vs edema, will continue abx, ECHO with diastolic dysfunction but normal EF, and Pulm following for vent management. Family reported UE weakness for a few months prior to this admission and Neurology consulted, possible MG and patient undergoing trial of prednisone and pyridostigmine. Appreciate Neurology and Pulmonary input.

## 2017-04-11 NOTE — ASSESSMENT & PLAN NOTE
Pt's BP on low end of normal, and hydrochlorothiazide and lisinopril stopped. Hold parameter placed on  Carvedilol.

## 2017-04-11 NOTE — PROGRESS NOTES
Patient remains intubated on the servo-i vent with the following settings: SIMV, 8. 440, PEEP 5, PS 15, 35%. AMBU bag at [facility], all alarms are set and functioning . Will continue to monitor.

## 2017-04-11 NOTE — PLAN OF CARE
Problem: Patient Care Overview  Goal: Individualization & Mutuality  Outcome: Ongoing (interventions implemented as appropriate)  Pt resting well on Propofol, respiration even and nonlabored. Pt free of injury, bed in low position with alarms on.

## 2017-04-11 NOTE — CONSULTS
Consult Note  Pulmonology    Consult Requested By: Lakesha Greer MD  Reason for Consult : vent management    SUBJECTIVE:resp failure     History of Present Illness:  Patient unresponsive;information obtained from chart      Review of patient's allergies indicates:  No Known Allergies    Past Medical History:   Diagnosis Date    CHF (congestive heart failure)     Learning difficulty      Past Surgical History:   Procedure Laterality Date    BREAST SURGERY Left     biopsy    CYST REMOVAL      shoulder      Family History   Problem Relation Age of Onset    Diabetes      Coronary artery disease         Review of systems not obtained due to patient factors ; patient intubated and unable to communicate.  OBJECTIVE:     Vital Signs (Most Recent)  Temp: 98.6 °F (37 °C) (04/11/17 0326)  Pulse: 75 (04/11/17 0731)  Resp: 12 (04/11/17 0731)  BP: (!) 87/55 (04/11/17 0532)  SpO2: 100 % (04/11/17 0731)    Vital Signs Range (Last 24H):  Temp:  [97.9 °F (36.6 °C)-98.6 °F (37 °C)]   Pulse:  [1-103]   Resp:  [8-23]   BP: ()/(44-76)   SpO2:  [98 %-100 %]     Physical Exam:    General: no distress,.on the ventilator  Neck: no jugular venous distention and no carotid bruit  Lungs:  diminished breath sounds bilaterally and 7.5 ett in place and rhonchi bilaterally  Heart: regular rate and rhythm and no murmur  Abdomen: soft, non-tender non-distended; bowel sounds normal and right NGT in place  Extremities: no cyanosis or edema, or clubbing  Neurologic: sedated.  Skin-warm, moist. No rash      Laboratory:  Recent Results (from the past 24 hour(s))   CK    Collection Time: 04/10/17  2:51 PM   Result Value Ref Range    CPK 21 20 - 180 U/L   Brain natriuretic peptide    Collection Time: 04/10/17  9:54 PM   Result Value Ref Range    BNP 73 0 - 99 pg/mL   Magnesium    Collection Time: 04/11/17  1:24 AM   Result Value Ref Range    Magnesium 1.8 1.6 - 2.6 mg/dL   Phosphorus    Collection Time: 04/11/17  1:24 AM   Result Value Ref Range     Phosphorus 3.4 2.7 - 4.5 mg/dL   Basic metabolic panel    Collection Time: 04/11/17  1:24 AM   Result Value Ref Range    Sodium 143 136 - 145 mmol/L    Potassium 4.2 3.5 - 5.1 mmol/L    Chloride 105 95 - 110 mmol/L    CO2 32 (H) 23 - 29 mmol/L    Glucose 121 (H) 70 - 110 mg/dL    BUN, Bld 15 6 - 20 mg/dL    Creatinine 0.7 0.5 - 1.4 mg/dL    Calcium 8.4 (L) 8.7 - 10.5 mg/dL    Anion Gap 6 (L) 8 - 16 mmol/L    eGFR if African American >60 >60 mL/min/1.73 m^2    eGFR if non African American >60 >60 mL/min/1.73 m^2   CBC auto differential    Collection Time: 04/11/17  1:24 AM   Result Value Ref Range    WBC 4.27 3.90 - 12.70 K/uL    RBC 4.16 4.00 - 5.40 M/uL    Hemoglobin 10.3 (L) 12.0 - 16.0 g/dL    Hematocrit 37.8 37.0 - 48.5 %    MCV 91 82 - 98 fL    MCH 24.8 (L) 27.0 - 31.0 pg    MCHC 27.2 (L) 32.0 - 36.0 %    RDW 18.4 (H) 11.5 - 14.5 %    Platelets 169 150 - 350 K/uL    MPV SEE COMMENT 9.2 - 12.9 fL    Gran # 2.4 1.8 - 7.7 K/uL    Lymph # 1.3 1.0 - 4.8 K/uL    Mono # 0.4 0.3 - 1.0 K/uL    Eos # 0.2 0.0 - 0.5 K/uL    Baso # 0.01 0.00 - 0.20 K/uL    Gran% 55.7 38.0 - 73.0 %    Lymph% 29.3 18.0 - 48.0 %    Mono% 9.6 4.0 - 15.0 %    Eosinophil% 5.2 0.0 - 8.0 %    Basophil% 0.2 0.0 - 1.9 %    Differential Method Automated    ISTAT PROCEDURE    Collection Time: 04/11/17  4:45 AM   Result Value Ref Range    POC PH 7.592 (HH) 7.35 - 7.45    POC PCO2 41.1 35 - 45 mmHg    POC PO2 182 (H) 80 - 100 mmHg    POC HCO3 39.6 (H) 24 - 28 mmol/L    POC BE 16 -2 to 2 mmol/L    POC SATURATED O2 100 95 - 100 %    POC TCO2 41 (H) 23 - 27 mmol/L    Rate 8     Sample ARTERIAL     Site LR     DelSys Adult Vent     Mode SIMV     Vt 440     PEEP 5     PS 15     FiO2 35     Sp02 100      CBC:     Recent Labs  Lab 04/11/17  0124   WBC 4.27   RBC 4.16   HGB 10.3*   HCT 37.8      MCV 91   MCH 24.8*   MCHC 27.2*     BMP:     Recent Labs  Lab 04/11/17 0124   *      K 4.2      CO2 32*   BUN 15   CREATININE 0.7   CALCIUM  8.4*   MG 1.8     CMP:   Recent Labs  Lab 04/07/17  0211  04/11/17  0124   *  < > 121*   CALCIUM 8.5*  < > 8.4*   ALBUMIN 2.4*  --   --    PROT 6.2  --   --      < > 143   K 4.7  < > 4.2   CO2 34*  < > 32*     < > 105   BUN 7  < > 15   CREATININE 0.9  < > 0.7   ALKPHOS 113  --   --    ALT 40  --   --    AST 41*  --   --    BILITOT 1.0  --   --    < > = values in this interval not displayed.  LFTs:     Recent Labs  Lab 04/07/17  0211   ALT 40   AST 41*   ALKPHOS 113   BILITOT 1.0   PROT 6.2   ALBUMIN 2.4*     Coagulation:     Recent Labs  Lab 04/06/17  1440   INR 1.1     Cardiac markers: No results for input(s): CKMB, TROPONINT, MYOGLOBIN in the last 168 hours.  Microbiology Results (last 7 days)     ** No results found for the last 168 hours. **        ABGs:     Recent Labs  Lab 04/11/17  0445   PH 7.592*   PCO2 41.1   PO2 182*   HCO3 39.6*   POCSATURATED 100   BE 16       Ventilator Setting:  Vent Mode: SIMV (PRVC) + PS  Oxygen Concentration (%):  [35] 35  Resp Rate Total:  [8 br/min-15 br/min] 8 br/min  Vt Set:  [440 mL] 440 mL  PEEP/CPAP:  [5 cmH20] 5 cmH20  Pressure Support:  [15 cmH20] 15 cmH20  Mean Airway Pressure:  [7.8 cmH20-8 cmH20] 7.9 cmH20     Diagnostic Results:     ET tube and right-sided PICC line are in stable position.  Enteric catheter demonstrates the tip projecting over the left upper quadrant. Cardiopulmonary status is unchanged noting persistent increased attenuation within the left lung base concerning for atelectasis, aspiration or pneumonia.  There is mild pulmonary vascular congestion. No pneumothorax.      ASSESSMENT/PLAN:     1. Pneumonia of both lungs due to infectious organism, unspecified part of lung    2. Respiratory arrest    3. Acute respiratory failure with hypercapnia    4. Sepsis, due to unspecified organism            Plan::  For bronch today in view of persisting LLL infiltrate.

## 2017-04-11 NOTE — SUBJECTIVE & OBJECTIVE
Interval History: Pt failed weaning trial this morning; Neurology following.    Review of Systems   Unable to perform ROS: Intubated     Objective:     Vital Signs (Most Recent):  Temp: 98.4 °F (36.9 °C) (04/11/17 1500)  Pulse: 72 (04/11/17 1800)  Resp: 16 (04/11/17 1800)  BP: (!) 85/53 (04/11/17 1800)  SpO2: 98 % (04/11/17 1800) Vital Signs (24h Range):  Temp:  [97.9 °F (36.6 °C)-98.6 °F (37 °C)] 98.4 °F (36.9 °C)  Pulse:  [1-99] 72  Resp:  [6-29] 16  SpO2:  [98 %-100 %] 98 %  BP: ()/(44-74) 85/53     Weight: 82.1 kg (181 lb)  Body mass index is 50.9 kg/(m^2).    Intake/Output Summary (Last 24 hours) at 04/11/17 1834  Last data filed at 04/11/17 1800   Gross per 24 hour   Intake          2150.76 ml   Output             1320 ml   Net           830.76 ml      Physical Exam   Constitutional: She appears well-developed and well-nourished.   obese   HENT:   Head: Normocephalic and atraumatic.   Eyes: Conjunctivae are normal.   Neck: Neck supple.   Cardiovascular: Normal rate and regular rhythm.    Anterior chest with hard, large left breast mass that is palpable.   Pulmonary/Chest:   Poor respiratory effort with decreased breath sound bilaterally and inspiratory crackles    Abdominal: Soft. Bowel sounds are normal. She exhibits no distension. There is no tenderness.   Musculoskeletal: Normal range of motion. She exhibits no edema.   Neurological:   Sedated on vent.   Skin: Skin is warm and dry. No erythema.   Nursing note and vitals reviewed.      Significant Labs: All pertinent labs within the past 24 hours have been reviewed.    Significant Imaging: I have reviewed and interpreted all pertinent imaging results/findings within the past 24 hours.

## 2017-04-11 NOTE — EICU
EICU    Called for hypotension with BP 70/40's. The patient recently had surgery. Hb stable this am. Not on any pressors. Known diastolic dysfunction on echo. CXR reviewed. 1 L bolus given. Will cont to monitor. Low threshold to start pressors.    Jered Edwards MD

## 2017-04-11 NOTE — ASSESSMENT & PLAN NOTE
Patient doesn't appear to be in acute heart failure; checked ECHO with EF=65% but with diastolic dysfunction.

## 2017-04-12 LAB
ALLENS TEST: ABNORMAL
ANION GAP SERPL CALC-SCNC: 5 MMOL/L
BASOPHILS # BLD AUTO: 0 K/UL
BASOPHILS NFR BLD: 0 %
BUN SERPL-MCNC: 16 MG/DL
CALCIUM SERPL-MCNC: 8.7 MG/DL
CHLORIDE SERPL-SCNC: 107 MMOL/L
CO2 SERPL-SCNC: 30 MMOL/L
CREAT SERPL-MCNC: 0.8 MG/DL
DELSYS: ABNORMAL
DIFFERENTIAL METHOD: ABNORMAL
EOSINOPHIL # BLD AUTO: 0 K/UL
EOSINOPHIL NFR BLD: 0 %
ERYTHROCYTE [DISTWIDTH] IN BLOOD BY AUTOMATED COUNT: 17.9 %
ERYTHROCYTE [SEDIMENTATION RATE] IN BLOOD BY WESTERGREN METHOD: 8 MM/H
EST. GFR  (AFRICAN AMERICAN): >60 ML/MIN/1.73 M^2
EST. GFR  (NON AFRICAN AMERICAN): >60 ML/MIN/1.73 M^2
FIO2: 40
GLUCOSE SERPL-MCNC: 245 MG/DL
HCO3 UR-SCNC: 32.8 MMOL/L (ref 24–28)
HCT VFR BLD AUTO: 39.2 %
HGB BLD-MCNC: 10.9 G/DL
LYMPHOCYTES # BLD AUTO: 0.6 K/UL
LYMPHOCYTES NFR BLD: 11.9 %
MAGNESIUM SERPL-MCNC: 1.6 MG/DL
MCH RBC QN AUTO: 25.1 PG
MCHC RBC AUTO-ENTMCNC: 27.8 %
MCV RBC AUTO: 90 FL
MODE: ABNORMAL
MONOCYTES # BLD AUTO: 0.1 K/UL
MONOCYTES NFR BLD: 2.1 %
NEUTROPHILS # BLD AUTO: 4.2 K/UL
NEUTROPHILS NFR BLD: 85.6 %
PCO2 BLDA: 80.8 MMHG (ref 35–45)
PEEP: 5
PH SMN: 7.22 [PH] (ref 7.35–7.45)
PHOSPHATE SERPL-MCNC: 3.9 MG/DL
PIP: 32
PLATELET # BLD AUTO: 201 K/UL
PMV BLD AUTO: 12.7 FL
PO2 BLDA: 102 MMHG (ref 80–100)
POC BE: 3 MMOL/L
POC SATURATED O2: 96 % (ref 95–100)
POC TCO2: 35 MMOL/L (ref 23–27)
POTASSIUM SERPL-SCNC: 5.2 MMOL/L
PS: 15
RBC # BLD AUTO: 4.35 M/UL
SAMPLE: ABNORMAL
SITE: ABNORMAL
SODIUM SERPL-SCNC: 142 MMOL/L
SP02: 100
VT: 440
WBC # BLD AUTO: 4.86 K/UL

## 2017-04-12 PROCEDURE — 94003 VENT MGMT INPAT SUBQ DAY: CPT

## 2017-04-12 PROCEDURE — 63600175 PHARM REV CODE 636 W HCPCS: Performed by: INTERNAL MEDICINE

## 2017-04-12 PROCEDURE — 99900035 HC TECH TIME PER 15 MIN (STAT)

## 2017-04-12 PROCEDURE — 20000000 HC ICU ROOM

## 2017-04-12 PROCEDURE — 99232 SBSQ HOSP IP/OBS MODERATE 35: CPT | Mod: ,,, | Performed by: PSYCHIATRY & NEUROLOGY

## 2017-04-12 PROCEDURE — 83735 ASSAY OF MAGNESIUM: CPT

## 2017-04-12 PROCEDURE — 25000003 PHARM REV CODE 250: Performed by: INTERNAL MEDICINE

## 2017-04-12 PROCEDURE — 94761 N-INVAS EAR/PLS OXIMETRY MLT: CPT

## 2017-04-12 PROCEDURE — 94640 AIRWAY INHALATION TREATMENT: CPT

## 2017-04-12 PROCEDURE — 27200966 HC CLOSED SUCTION SYSTEM

## 2017-04-12 PROCEDURE — 63600175 PHARM REV CODE 636 W HCPCS: Performed by: HOSPITALIST

## 2017-04-12 PROCEDURE — 99900026 HC AIRWAY MAINTENANCE (STAT)

## 2017-04-12 PROCEDURE — 25000003 PHARM REV CODE 250: Performed by: HOSPITALIST

## 2017-04-12 PROCEDURE — 85025 COMPLETE CBC W/AUTO DIFF WBC: CPT

## 2017-04-12 PROCEDURE — 84100 ASSAY OF PHOSPHORUS: CPT

## 2017-04-12 PROCEDURE — C9113 INJ PANTOPRAZOLE SODIUM, VIA: HCPCS | Performed by: HOSPITALIST

## 2017-04-12 PROCEDURE — 80048 BASIC METABOLIC PNL TOTAL CA: CPT

## 2017-04-12 PROCEDURE — 63600175 PHARM REV CODE 636 W HCPCS: Performed by: PSYCHIATRY & NEUROLOGY

## 2017-04-12 PROCEDURE — 36415 COLL VENOUS BLD VENIPUNCTURE: CPT

## 2017-04-12 PROCEDURE — 25000003 PHARM REV CODE 250: Performed by: PSYCHIATRY & NEUROLOGY

## 2017-04-12 PROCEDURE — 36600 WITHDRAWAL OF ARTERIAL BLOOD: CPT

## 2017-04-12 PROCEDURE — 25000242 PHARM REV CODE 250 ALT 637 W/ HCPCS: Performed by: INTERNAL MEDICINE

## 2017-04-12 RX ORDER — MODAFINIL 100 MG/1
200 TABLET ORAL DAILY
Status: DISCONTINUED | OUTPATIENT
Start: 2017-04-12 | End: 2017-04-24

## 2017-04-12 RX ORDER — FUROSEMIDE 10 MG/ML
40 INJECTION INTRAMUSCULAR; INTRAVENOUS 2 TIMES DAILY
Status: DISCONTINUED | OUTPATIENT
Start: 2017-04-12 | End: 2017-04-15

## 2017-04-12 RX ADMIN — FUROSEMIDE 40 MG: 10 INJECTION, SOLUTION INTRAMUSCULAR; INTRAVENOUS at 10:04

## 2017-04-12 RX ADMIN — ACETAZOLAMIDE 250 MG: 500 INJECTION, POWDER, LYOPHILIZED, FOR SOLUTION INTRAVENOUS at 10:04

## 2017-04-12 RX ADMIN — PREDNISONE 60 MG: 20 TABLET ORAL at 08:04

## 2017-04-12 RX ADMIN — IPRATROPIUM BROMIDE AND ALBUTEROL SULFATE 3 ML: .5; 3 SOLUTION RESPIRATORY (INHALATION) at 11:04

## 2017-04-12 RX ADMIN — PROPOFOL 39.78 MCG/KG/MIN: 10 INJECTION, EMULSION INTRAVENOUS at 05:04

## 2017-04-12 RX ADMIN — MORPHINE SULFATE 2 MG: 10 INJECTION INTRAVENOUS at 08:04

## 2017-04-12 RX ADMIN — FUROSEMIDE 40 MG: 10 INJECTION, SOLUTION INTRAMUSCULAR; INTRAVENOUS at 05:04

## 2017-04-12 RX ADMIN — PYRIDOSTIGMINE BROMIDE 60 MG: 60 TABLET ORAL at 06:04

## 2017-04-12 RX ADMIN — ENOXAPARIN SODIUM 40 MG: 100 INJECTION SUBCUTANEOUS at 05:04

## 2017-04-12 RX ADMIN — ACETAZOLAMIDE 250 MG: 500 INJECTION, POWDER, LYOPHILIZED, FOR SOLUTION INTRAVENOUS at 08:04

## 2017-04-12 RX ADMIN — CHLORHEXIDINE GLUCONATE 15 ML: 1.2 RINSE ORAL at 08:04

## 2017-04-12 RX ADMIN — CEFTRIAXONE 1 G: 1 INJECTION, SOLUTION INTRAVENOUS at 08:04

## 2017-04-12 RX ADMIN — PYRIDOSTIGMINE BROMIDE 60 MG: 60 TABLET ORAL at 01:04

## 2017-04-12 RX ADMIN — PROPOFOL 39.99 MCG/KG/MIN: 10 INJECTION, EMULSION INTRAVENOUS at 01:04

## 2017-04-12 RX ADMIN — PROPOFOL 39.99 MCG/KG/MIN: 10 INJECTION, EMULSION INTRAVENOUS at 08:04

## 2017-04-12 RX ADMIN — IPRATROPIUM BROMIDE AND ALBUTEROL SULFATE 3 ML: .5; 3 SOLUTION RESPIRATORY (INHALATION) at 07:04

## 2017-04-12 RX ADMIN — PROPOFOL 40 MCG/KG/MIN: 10 INJECTION, EMULSION INTRAVENOUS at 09:04

## 2017-04-12 RX ADMIN — AZITHROMYCIN MONOHYDRATE 500 MG: 500 INJECTION, POWDER, LYOPHILIZED, FOR SOLUTION INTRAVENOUS at 08:04

## 2017-04-12 RX ADMIN — PYRIDOSTIGMINE BROMIDE 60 MG: 60 TABLET ORAL at 09:04

## 2017-04-12 RX ADMIN — PROPOFOL 40 MCG/KG/MIN: 10 INJECTION, EMULSION INTRAVENOUS at 03:04

## 2017-04-12 RX ADMIN — MODAFINIL 200 MG: 100 TABLET ORAL at 10:04

## 2017-04-12 RX ADMIN — CARVEDILOL 3.12 MG: 3.12 TABLET, FILM COATED ORAL at 08:04

## 2017-04-12 RX ADMIN — PANTOPRAZOLE SODIUM 40 MG: 40 INJECTION, POWDER, FOR SOLUTION INTRAVENOUS at 08:04

## 2017-04-12 NOTE — PROGRESS NOTES
[facility]  Neurology  Progress Note    Patient Name: Radha Pritchett  MRN: [id number]  Admission Date: 4/6/2017  Hospital Length of Stay: 6 days  Code Status: Full Code   Attending Provider: Lakesha Greer MD  Primary Care Physician: Freny Iglesias MD   Principal Problem:Acute respiratory failure with hypercapnia    Subjective:     Interval History: 45 y/o female with medical Hx as listed below who was admitted for shortness of breath. Pt was found to have a pneumonia. She decompensasated and is now intubated. Pt has been difficult to to wean off ventilator. It has been reported that pt has been experiencing upper extremity weakness for a while but is perhaps a generalized weakness chronically. No other details at his moment. Pt also has Hx of a left breast mass that according to pathology is benign, possibly a hematoma. Pt has at baseline a learning difficulty.     -4/11/17: Pt remains mechanically ventilated. NIF reported -10.    -4/12/17: Remains mechanically ventilated. Upon sedation vacation follow commands.    Current Neurological Medications:     Current Facility-Administered Medications   Medication Dose Route Frequency Provider Last Rate Last Dose    acetaminophen tablet 500 mg  500 mg Oral Q6H PRN Easton Aaron MD        acetaZOLAMIDE (DIAMOX) 250 mg in dextrose 5 % 50 mL  250 mg Intravenous Q12H Bird Vega MD   250 mg at 04/12/17 1000    albuterol-ipratropium 2.5mg-0.5mg/3mL nebulizer solution 3 mL  3 mL Nebulization Q4H PRN Easton Aaron MD   3 mL at 04/12/17 1110    cefTRIAXone (ROCEPHIN) 1 g in dextrose 5 % 50 mL IVPB  1 g Intravenous Q24H Easton Aaron MD   1 g at 04/12/17 0831    And    azithromycin 500 mg in dextrose 5 % 250 mL IVPB (ready to mix system)  500 mg Intravenous Q24H Easton Aaron  mL/hr at 04/12/17 0831 500 mg at 04/12/17 0831    carvedilol tablet 3.125 mg  3.125 mg Oral BID WM Lakesha Greer MD   3.125 mg at 04/12/17 0832    chlorhexidine 0.12 %  solution 15 mL  15 mL Mouth/Throat BID Easton Aaron MD   15 mL at 04/12/17 0830    cloNIDine tablet 0.1 mg  0.1 mg Oral TID PRN Easton Aaron MD        enoxaparin injection 40 mg  40 mg Subcutaneous Daily Easton Aaron MD   40 mg at 04/11/17 1744    furosemide injection 40 mg  40 mg Intravenous BID Bird Vega MD   40 mg at 04/12/17 1008    modafinil tablet 200 mg  200 mg Oral Daily Bird Vega MD   200 mg at 04/12/17 1008    morphine injection 2 mg  2 mg Intravenous Q4H PRN Lakesha Greer MD   2 mg at 04/12/17 0830    ondansetron injection 8 mg  8 mg Intravenous Q8H PRN Easton Aaron MD        pantoprazole injection 40 mg  40 mg Intravenous Daily Lakesha Greer MD   40 mg at 04/12/17 0831    predniSONE tablet 60 mg  60 mg Oral Daily Gus Phillips MD   60 mg at 04/12/17 0831    promethazine (PHENERGAN) 12.5 mg in dextrose 5 % 50 mL IVPB  12.5 mg Intravenous Q6H PRN Easton Aaron MD        propofol (DIPRIVAN) 10 mg/mL infusion  5 mcg/kg/min Intravenous Continuous Easton Aaron MD 19.1 mL/hr at 04/12/17 1358 39.992 mcg/kg/min at 04/12/17 1358    pyridostigmine tablet 60 mg  60 mg Oral Q8H Gus Phillips MD   60 mg at 04/12/17 1359    ramelteon tablet 8 mg  8 mg Oral Nightly PRN Easton Aaron MD   8 mg at 04/06/17 2239    scopolamine 1.5 mg (1 mg over 3 days) 1.5 mg  1 patch Transdermal Q3 Days Lakesha Greer MD   1.5 mg at 04/10/17 2019       Review of Systems   Intubated; unable to obtain    Objective:     Vital Signs (Most Recent):  Temp: 98.2 °F (36.8 °C) (04/12/17 1100)  Pulse: 79 (04/12/17 1130)  Resp: 16 (04/12/17 1130)  BP: 124/77 (04/12/17 1130)  SpO2: 98 % (04/12/17 1130) Vital Signs (24h Range):  Temp:  [97.8 °F (36.6 °C)-98.6 °F (37 °C)] 98.2 °F (36.8 °C)  Pulse:  [53-99] 79  Resp:  [8-24] 16  SpO2:  [96 %-100 %] 98 %  BP: ()/(51-95) 124/77     Weight: 82.8 kg (182 lb 8.7 oz)  Body mass index is 51.34 kg/(m^2).    Physical Exam  Constitutional: intubated  ENT: no  discharge  Head: Normocephalic.   Eyes: No icteric sclereae  Neck: Neck supple.   Cardiovascular: Normal rate.   Pulmonary/Chest: symmetrical expansion of chest.   Extremities: no edema  Neurological: Partially opens eyes upon noxious stimulation  Pupils are round at 3 mm and reactive to ligt; corneal responses are present bilaterally; gag reflex present and cough reflex present  No rigidity  DTR's: unable to ellicit in biceps; 1+ patellars  Withdraws LE's upon noxious stimulation       Significant Labs:   CBC:   Recent Labs  Lab 04/11/17 0124 04/12/17  0141   WBC 4.27 4.86   HGB 10.3* 10.9*   HCT 37.8 39.2    201     CMP:   Recent Labs  Lab 04/11/17 0124 04/12/17 0141   * 245*    142   K 4.2 5.2*    107   CO2 32* 30*   BUN 15 16   CREATININE 0.7 0.8   CALCIUM 8.4* 8.7   MG 1.8 1.6   ANIONGAP 6* 5*   EGFRNONAA >60 >60         Assessment and Plan:     47 y/o female consulted for UE weakness:      1. UE weakness: at this point etiology is uncertain but if the inability to extubate is linked to this weakness pt could have an underlying neuromuscular disorder (myopathy, MG, among others). A critical illness myopathy/polyneuropathy usually develops around a week after critical illness. NIF is very low.  -CPK in normal range.  -MG panel is pending.  -Trial of prednisone 60 mg daily and pyridostigmine 60 three times daily for now.    Active Diagnoses:    Diagnosis Date Noted POA    PRINCIPAL PROBLEM:  Acute respiratory failure with hypercapnia [J96.02] 04/07/2017 Yes    Hypokalemia [E87.6] 04/09/2017 Yes    Breast mass, left [N63] 04/08/2017 Yes    Pneumonia of both lungs due to infectious organism [J18.9] 04/06/2017 Yes    Sepsis [A41.9] 04/06/2017 Yes    Essential hypertension [I10] 04/06/2017 Yes     Chronic    Chronic diastolic heart failure [I50.32] 04/06/2017 Yes     Chronic    Moderate protein malnutrition [E44.0] 04/06/2017 Yes     Chronic      Problems Resolved During this  Admission:    Diagnosis Date Noted Date Resolved POA       VTE Risk Mitigation         Ordered     enoxaparin injection 40 mg  Daily     Route:  Subcutaneous        04/06/17 2202     Medium Risk of VTE  Once      04/06/17 2202          Gus Phillips MD  Neurology  [facility]

## 2017-04-12 NOTE — PLAN OF CARE
Problem: Ventilation, Mechanical Invasive (Adult)  Goal: Signs and Symptoms of Listed Potential Problems Will be Absent, Minimized or Managed (Ventilation, Mechanical Invasive)  Signs and symptoms of listed potential problems will be absent, minimized or managed by discharge/transition of care (reference Ventilation, Mechanical Invasive (Adult) CPG).   Outcome: Ongoing (interventions implemented as appropriate)  Pt remains on ventilator despite weaning trials.

## 2017-04-12 NOTE — CONSULTS
Derrick Score 13 with risk for development of pressure injury due to immobility and shear/friction. On Isolibrium mattress. Applied Convatec Aquacel Pro Foam Sacral dressing with Convatec No Sting Film Barrier for pressure injury prevention. Nursing to continue Pressure Injury Prevention Interventions with frequent pressure shifts.

## 2017-04-12 NOTE — SUBJECTIVE & OBJECTIVE
Interval History: Pt failed weaning trial this morning; Neurology following. No acute events.    Review of Systems   Unable to perform ROS: Intubated     Objective:     Vital Signs (Most Recent):  Temp: 98 °F (36.7 °C) (04/12/17 1500)  Pulse: 60 (04/12/17 1830)  Resp: 12 (04/12/17 1830)  BP: 110/68 (04/12/17 1830)  SpO2: 99 % (04/12/17 1830) Vital Signs (24h Range):  Temp:  [97.8 °F (36.6 °C)-98.6 °F (37 °C)] 98 °F (36.7 °C)  Pulse:  [] 60  Resp:  [0-32] 12  SpO2:  [96 %-100 %] 99 %  BP: ()/(51-95) 110/68     Weight: 82.8 kg (182 lb 8.7 oz)  Body mass index is 51.34 kg/(m^2).    Intake/Output Summary (Last 24 hours) at 04/12/17 1857  Last data filed at 04/12/17 1800   Gross per 24 hour   Intake          1558.39 ml   Output             4071 ml   Net         -2512.61 ml      Physical Exam   Constitutional: She appears well-developed and well-nourished.   obese   HENT:   Head: Normocephalic and atraumatic.   Eyes: Conjunctivae are normal.   Neck: Neck supple.   Cardiovascular: Normal rate and regular rhythm.    Anterior chest with hard, large left breast mass that is palpable.   Pulmonary/Chest:   Poor respiratory effort with decreased breath sound bilaterally and inspiratory crackles    Abdominal: Soft. Bowel sounds are normal. She exhibits no distension. There is no tenderness.   Musculoskeletal: Normal range of motion. She exhibits no edema.   Neurological:   Sedated on vent.   Skin: Skin is warm and dry. No erythema.   Nursing note and vitals reviewed.      Significant Labs: All pertinent labs within the past 24 hours have been reviewed.    Significant Imaging: I have reviewed and interpreted all pertinent imaging results/findings within the past 24 hours.

## 2017-04-12 NOTE — PROGRESS NOTES
Patient with desats to low 80's and resp distress within minutes of placing on cpap trial. Placed back on previous settings. RN/Deedee aware of changes.

## 2017-04-12 NOTE — CONSULTS
Consult Note  Pulmonology    Consult Requested By: Lakesha Greer MD  Reason for Consult : vent management    SUBJECTIVE:resp failure     History of Present Illness:  Patient unresponsive;information obtained from chart      Review of patient's allergies indicates:  No Known Allergies    Past Medical History:   Diagnosis Date    CHF (congestive heart failure)     Learning difficulty      Past Surgical History:   Procedure Laterality Date    BREAST SURGERY Left     biopsy    CYST REMOVAL      shoulder      Family History   Problem Relation Age of Onset    Diabetes      Coronary artery disease         Review of systems not obtained due to patient factors ; patient intubated and unable to communicate.  OBJECTIVE:     Vital Signs (Most Recent)  Temp: 98.4 °F (36.9 °C) (04/12/17 0700)  Pulse: 77 (04/12/17 0728)  Resp: 16 (04/12/17 0728)  BP: (!) 142/76 (04/12/17 0725)  SpO2: 100 % (04/12/17 0728)    Vital Signs Range (Last 24H):  Temp:  [97.8 °F (36.6 °C)-98.6 °F (37 °C)]   Pulse:  [53-99]   Resp:  [8-29]   BP: ()/(50-79)   SpO2:  [97 %-100 %]     Physical Exam:    General: no distress,.on the ventilator  Neck: no jugular venous distention and no carotid bruit  Lungs:  diminished breath sounds bilaterally and 7.5 ett in place and rhonchi bilaterally  Heart: regular rate and rhythm and no murmur  Abdomen: soft, non-tender non-distended; bowel sounds normal and right NGT in place  Extremities: no cyanosis or edema, or clubbing  Neurologic: sedated.  Skin-warm, moist. No rash      Laboratory:  Recent Results (from the past 24 hour(s))   Culture, Respiratory with Gram Stain    Collection Time: 04/11/17 12:00 PM   Result Value Ref Range    Gram Stain (Respiratory) Moderate WBC's     Gram Stain (Respiratory) No organisms seen    Magnesium    Collection Time: 04/12/17  1:41 AM   Result Value Ref Range    Magnesium 1.6 1.6 - 2.6 mg/dL   Phosphorus    Collection Time: 04/12/17  1:41 AM   Result Value Ref Range     Phosphorus 3.9 2.7 - 4.5 mg/dL   Basic metabolic panel    Collection Time: 04/12/17  1:41 AM   Result Value Ref Range    Sodium 142 136 - 145 mmol/L    Potassium 5.2 (H) 3.5 - 5.1 mmol/L    Chloride 107 95 - 110 mmol/L    CO2 30 (H) 23 - 29 mmol/L    Glucose 245 (H) 70 - 110 mg/dL    BUN, Bld 16 6 - 20 mg/dL    Creatinine 0.8 0.5 - 1.4 mg/dL    Calcium 8.7 8.7 - 10.5 mg/dL    Anion Gap 5 (L) 8 - 16 mmol/L    eGFR if African American >60 >60 mL/min/1.73 m^2    eGFR if non African American >60 >60 mL/min/1.73 m^2   CBC auto differential    Collection Time: 04/12/17  1:41 AM   Result Value Ref Range    WBC 4.86 3.90 - 12.70 K/uL    RBC 4.35 4.00 - 5.40 M/uL    Hemoglobin 10.9 (L) 12.0 - 16.0 g/dL    Hematocrit 39.2 37.0 - 48.5 %    MCV 90 82 - 98 fL    MCH 25.1 (L) 27.0 - 31.0 pg    MCHC 27.8 (L) 32.0 - 36.0 %    RDW 17.9 (H) 11.5 - 14.5 %    Platelets 201 150 - 350 K/uL    MPV 12.7 9.2 - 12.9 fL    Gran # 4.2 1.8 - 7.7 K/uL    Lymph # 0.6 (L) 1.0 - 4.8 K/uL    Mono # 0.1 (L) 0.3 - 1.0 K/uL    Eos # 0.0 0.0 - 0.5 K/uL    Baso # 0.00 0.00 - 0.20 K/uL    Gran% 85.6 (H) 38.0 - 73.0 %    Lymph% 11.9 (L) 18.0 - 48.0 %    Mono% 2.1 (L) 4.0 - 15.0 %    Eosinophil% 0.0 0.0 - 8.0 %    Basophil% 0.0 0.0 - 1.9 %    Differential Method Automated    ISTAT PROCEDURE    Collection Time: 04/12/17  4:11 AM   Result Value Ref Range    POC PH 7.217 (LL) 7.35 - 7.45    POC PCO2 80.8 (HH) 35 - 45 mmHg    POC PO2 102 (H) 80 - 100 mmHg    POC HCO3 32.8 (H) 24 - 28 mmol/L    POC BE 3 -2 to 2 mmol/L    POC SATURATED O2 96 95 - 100 %    POC TCO2 35 (H) 23 - 27 mmol/L    Rate 8     Sample ARTERIAL     Site LR     Allens Test Pass     DelSys Adult Vent     Mode SIMV     Vt 440     PEEP 5     PS 15     PiP 32     FiO2 40     Sp02 100      CBC:     Recent Labs  Lab 04/12/17 0141   WBC 4.86   RBC 4.35   HGB 10.9*   HCT 39.2      MCV 90   MCH 25.1*   MCHC 27.8*     BMP:     Recent Labs  Lab 04/12/17 0141   *      K 5.2*       CO2 30*   BUN 16   CREATININE 0.8   CALCIUM 8.7   MG 1.6     CMP:   Recent Labs  Lab 04/07/17  0211  04/12/17  0141   *  < > 245*   CALCIUM 8.5*  < > 8.7   ALBUMIN 2.4*  --   --    PROT 6.2  --   --      < > 142   K 4.7  < > 5.2*   CO2 34*  < > 30*     < > 107   BUN 7  < > 16   CREATININE 0.9  < > 0.8   ALKPHOS 113  --   --    ALT 40  --   --    AST 41*  --   --    BILITOT 1.0  --   --    < > = values in this interval not displayed.  LFTs:     Recent Labs  Lab 04/07/17  0211   ALT 40   AST 41*   ALKPHOS 113   BILITOT 1.0   PROT 6.2   ALBUMIN 2.4*     Coagulation:     Recent Labs  Lab 04/06/17  1440   INR 1.1     Cardiac markers: No results for input(s): CKMB, TROPONINT, MYOGLOBIN in the last 168 hours.  Microbiology Results (last 7 days)     Procedure Component Value Units Date/Time    Culture, Respiratory with Gram Stain [[id number]] Collected:  04/11/17 1200    Order Status:  Completed Specimen:  Respiratory from Bronchial Wash Updated:  04/11/17 1521     Gram Stain (Respiratory) Moderate WBC's     Gram Stain (Respiratory) No organisms seen    Narrative:       Clinical Hx: HTN  Breast Mass  Pre-op Diagnosis: Pneumonia  Post-op Diagnosis: Bronchial Washings  Jar #1: Bronchial Washings    Gram stain [[id number]] Collected:  04/11/17 1157    Order Status:  Canceled Specimen:  Bronchial Wash from Lung, RUL Updated:  04/11/17 1230    Narrative:       Clinical Hx:  HTN  Breast Mass  Pre-op Diagnosis: Pneumonia  Post-op Diagnosis  Jar #1: Bronchial Washings  Gram Stain was cancelled on 04/11/2017 at 13:47 by JGO; Duplicate   order, test included in another profile. 04/11/2017  13:47        ABGs:     Recent Labs  Lab 04/12/17  0411   PH 7.217*   PCO2 80.8*   PO2 102*   HCO3 32.8*   POCSATURATED 96   BE 3       Ventilator Setting:  Vent Mode: PRVC  Oxygen Concentration (%):  [] 40  Resp Rate Total:  [8 br/min-23 br/min] 16 br/min  Vt Set:  [440 mL] 440 mL  PEEP/CPAP:  [5 cmH20] 5 cmH20  Pressure  Support:  [10 cmH20-15 cmH20] 15 cmH20  Mean Airway Pressure:  [6 psS37-67.3 cmH20] 12.3 cmH20     Diagnostic Results:     Increased central vascular congestion/edema slightly worsened since the previous day study.  There is obscuration of the left hemidiaphragm, either from a left basilar infiltrate or left pleural effusion.  Cardiomegaly persists.    The position of the endotracheal tube, the right upper extremity PICC line remains without change.  There is an enteric catheter with the tip of which is not included on the radiograph.               ASSESSMENT/PLAN:     1. Pneumonia of both lungs due to infectious organism, unspecified part of lung    2. Respiratory arrest    3. Acute respiratory failure with hypercapnia    4. Sepsis, due to unspecified organism            Plan::  Tolerated bronch well ; weakness persisits ; MG may be culprit ? Will add diamox and provigil.

## 2017-04-12 NOTE — PROGRESS NOTES
Received patient on vent settings SIMV(PRVC)+/8/+5/PS15/FIO2 40% ETT size 7.5 secured at 24cm at the lip Ambu bag and mask at bed side no signs of any distress at this current time

## 2017-04-12 NOTE — PLAN OF CARE
Problem: Restraint, Nonbehavioral (nonviolent)  Goal: Absence of Injury/Harm  Outcome: Ongoing (interventions implemented as appropriate)  Pt remains restrained. Still attempts to pull at ETT during repositioning and awakenings.

## 2017-04-12 NOTE — PLAN OF CARE
Problem: Patient Care Overview  Goal: Individualization & Mutuality  Outcome: Ongoing (interventions implemented as appropriate)  Pt resting well on current Propofol setting. O2 sat 99% on 40% FIO2. Frequent oral care needed for copious secretions. Bed in low position. JOB at 35 degree. Tube feeding infusing without difficulty.

## 2017-04-12 NOTE — PLAN OF CARE
Problem: Pressure Ulcer Risk (Derrick Scale) (Adult,Obstetrics,Pediatric)  Goal: Skin Integrity  Patient will demonstrate the desired outcomes by discharge/transition of care.   Outcome: Ongoing (interventions implemented as appropriate)  Skin intact to sacral area. Protective tegaderm applied to pressure site.

## 2017-04-12 NOTE — PLAN OF CARE
Problem: Ventilation, Mechanical Invasive (Adult)  Goal: Signs and Symptoms of Listed Potential Problems Will be Absent, Minimized or Managed (Ventilation, Mechanical Invasive)  Signs and symptoms of listed potential problems will be absent, minimized or managed by discharge/transition of care (reference Ventilation, Mechanical Invasive (Adult) CPG).   Outcome: Ongoing (interventions implemented as appropriate)  Patient unable to handle cpap settings @ this time

## 2017-04-13 LAB
ALLENS TEST: ABNORMAL
ANION GAP SERPL CALC-SCNC: 5 MMOL/L
BACTERIA SPEC AEROBE CULT: NO GROWTH
BUN SERPL-MCNC: 25 MG/DL
CALCIUM SERPL-MCNC: 8.7 MG/DL
CHLORIDE SERPL-SCNC: 103 MMOL/L
CO2 SERPL-SCNC: 31 MMOL/L
CREAT SERPL-MCNC: 0.9 MG/DL
DELSYS: ABNORMAL
ERYTHROCYTE [SEDIMENTATION RATE] IN BLOOD BY WESTERGREN METHOD: 12 MM/H
EST. GFR  (AFRICAN AMERICAN): >60 ML/MIN/1.73 M^2
EST. GFR  (NON AFRICAN AMERICAN): >60 ML/MIN/1.73 M^2
FIO2: 40
GLUCOSE SERPL-MCNC: 384 MG/DL
GRAM STN SPEC: NORMAL
GRAM STN SPEC: NORMAL
HCO3 UR-SCNC: 28.3 MMOL/L (ref 24–28)
MAGNESIUM SERPL-MCNC: 2.1 MG/DL
MODE: ABNORMAL
PCO2 BLDA: 46.9 MMHG (ref 35–45)
PEEP: 5
PH SMN: 7.39 [PH] (ref 7.35–7.45)
PHOSPHATE SERPL-MCNC: 3.3 MG/DL
PO2 BLDA: 92 MMHG (ref 80–100)
POC BE: 3 MMOL/L
POC SATURATED O2: 97 % (ref 95–100)
POC TCO2: 30 MMOL/L (ref 23–27)
POCT GLUCOSE: 456 MG/DL (ref 70–110)
POTASSIUM SERPL-SCNC: 3.7 MMOL/L
SAMPLE: ABNORMAL
SITE: ABNORMAL
SODIUM SERPL-SCNC: 139 MMOL/L
VT: 440

## 2017-04-13 PROCEDURE — 25000003 PHARM REV CODE 250: Performed by: HOSPITALIST

## 2017-04-13 PROCEDURE — 63600175 PHARM REV CODE 636 W HCPCS: Performed by: INTERNAL MEDICINE

## 2017-04-13 PROCEDURE — 84100 ASSAY OF PHOSPHORUS: CPT

## 2017-04-13 PROCEDURE — 83735 ASSAY OF MAGNESIUM: CPT

## 2017-04-13 PROCEDURE — 94761 N-INVAS EAR/PLS OXIMETRY MLT: CPT

## 2017-04-13 PROCEDURE — 25000003 PHARM REV CODE 250: Performed by: INTERNAL MEDICINE

## 2017-04-13 PROCEDURE — 36415 COLL VENOUS BLD VENIPUNCTURE: CPT

## 2017-04-13 PROCEDURE — 94640 AIRWAY INHALATION TREATMENT: CPT

## 2017-04-13 PROCEDURE — 94003 VENT MGMT INPAT SUBQ DAY: CPT

## 2017-04-13 PROCEDURE — 20000000 HC ICU ROOM

## 2017-04-13 PROCEDURE — 63600175 PHARM REV CODE 636 W HCPCS: Performed by: PSYCHIATRY & NEUROLOGY

## 2017-04-13 PROCEDURE — 99232 SBSQ HOSP IP/OBS MODERATE 35: CPT | Mod: ,,, | Performed by: PSYCHIATRY & NEUROLOGY

## 2017-04-13 PROCEDURE — 25000003 PHARM REV CODE 250: Performed by: PSYCHIATRY & NEUROLOGY

## 2017-04-13 PROCEDURE — 25000242 PHARM REV CODE 250 ALT 637 W/ HCPCS: Performed by: INTERNAL MEDICINE

## 2017-04-13 PROCEDURE — 80048 BASIC METABOLIC PNL TOTAL CA: CPT

## 2017-04-13 PROCEDURE — 99900026 HC AIRWAY MAINTENANCE (STAT)

## 2017-04-13 PROCEDURE — 63600175 PHARM REV CODE 636 W HCPCS: Performed by: HOSPITALIST

## 2017-04-13 PROCEDURE — C9113 INJ PANTOPRAZOLE SODIUM, VIA: HCPCS | Performed by: HOSPITALIST

## 2017-04-13 RX ORDER — INSULIN ASPART 100 [IU]/ML
1-10 INJECTION, SOLUTION INTRAVENOUS; SUBCUTANEOUS EVERY 6 HOURS PRN
Status: DISCONTINUED | OUTPATIENT
Start: 2017-04-13 | End: 2017-05-08 | Stop reason: HOSPADM

## 2017-04-13 RX ORDER — GLUCAGON 1 MG
1 KIT INJECTION
Status: DISCONTINUED | OUTPATIENT
Start: 2017-04-13 | End: 2017-05-08 | Stop reason: HOSPADM

## 2017-04-13 RX ADMIN — PYRIDOSTIGMINE BROMIDE 60 MG: 60 TABLET ORAL at 03:04

## 2017-04-13 RX ADMIN — CARVEDILOL 3.12 MG: 3.12 TABLET, FILM COATED ORAL at 05:04

## 2017-04-13 RX ADMIN — PANTOPRAZOLE SODIUM 40 MG: 40 INJECTION, POWDER, FOR SOLUTION INTRAVENOUS at 08:04

## 2017-04-13 RX ADMIN — PROPOFOL 50 MCG/KG/MIN: 10 INJECTION, EMULSION INTRAVENOUS at 03:04

## 2017-04-13 RX ADMIN — PYRIDOSTIGMINE BROMIDE 60 MG: 60 TABLET ORAL at 06:04

## 2017-04-13 RX ADMIN — INSULIN ASPART 10 UNITS: 100 INJECTION, SOLUTION INTRAVENOUS; SUBCUTANEOUS at 08:04

## 2017-04-13 RX ADMIN — SCOPOLAMINE 1.5 MG: 1 PATCH, EXTENDED RELEASE TRANSDERMAL at 07:04

## 2017-04-13 RX ADMIN — PROPOFOL 40 MCG/KG/MIN: 10 INJECTION, EMULSION INTRAVENOUS at 06:04

## 2017-04-13 RX ADMIN — CARVEDILOL 3.12 MG: 3.12 TABLET, FILM COATED ORAL at 08:04

## 2017-04-13 RX ADMIN — ENOXAPARIN SODIUM 40 MG: 100 INJECTION SUBCUTANEOUS at 05:04

## 2017-04-13 RX ADMIN — ACETAZOLAMIDE 250 MG: 500 INJECTION, POWDER, LYOPHILIZED, FOR SOLUTION INTRAVENOUS at 10:04

## 2017-04-13 RX ADMIN — CHLORHEXIDINE GLUCONATE 15 ML: 1.2 RINSE ORAL at 08:04

## 2017-04-13 RX ADMIN — ACETAZOLAMIDE 250 MG: 500 INJECTION, POWDER, LYOPHILIZED, FOR SOLUTION INTRAVENOUS at 08:04

## 2017-04-13 RX ADMIN — MODAFINIL 200 MG: 100 TABLET ORAL at 08:04

## 2017-04-13 RX ADMIN — PREDNISONE 60 MG: 20 TABLET ORAL at 08:04

## 2017-04-13 RX ADMIN — PROPOFOL 40 MCG/KG/MIN: 10 INJECTION, EMULSION INTRAVENOUS at 01:04

## 2017-04-13 RX ADMIN — IPRATROPIUM BROMIDE AND ALBUTEROL SULFATE 3 ML: .5; 3 SOLUTION RESPIRATORY (INHALATION) at 08:04

## 2017-04-13 RX ADMIN — INSULIN ASPART 5 UNITS: 100 INJECTION, SOLUTION INTRAVENOUS; SUBCUTANEOUS at 11:04

## 2017-04-13 RX ADMIN — AZITHROMYCIN MONOHYDRATE 500 MG: 500 INJECTION, POWDER, LYOPHILIZED, FOR SOLUTION INTRAVENOUS at 09:04

## 2017-04-13 RX ADMIN — PROPOFOL 50 MCG/KG/MIN: 10 INJECTION, EMULSION INTRAVENOUS at 10:04

## 2017-04-13 RX ADMIN — FUROSEMIDE 40 MG: 10 INJECTION, SOLUTION INTRAMUSCULAR; INTRAVENOUS at 05:04

## 2017-04-13 RX ADMIN — IPRATROPIUM BROMIDE AND ALBUTEROL SULFATE 3 ML: .5; 3 SOLUTION RESPIRATORY (INHALATION) at 03:04

## 2017-04-13 RX ADMIN — FUROSEMIDE 40 MG: 10 INJECTION, SOLUTION INTRAMUSCULAR; INTRAVENOUS at 08:04

## 2017-04-13 RX ADMIN — CEFTRIAXONE 1 G: 1 INJECTION, SOLUTION INTRAVENOUS at 08:04

## 2017-04-13 NOTE — PLAN OF CARE
Problem: Patient Care Overview  Goal: Plan of Care Review  Outcome: Ongoing (interventions implemented as appropriate)  Remains on ventilator, failed CPAP trial with sedation vacation today. Diprivan drip resumed.  Tolerating tube feedings.  VSS, afebrile.  Pressure ulcer & fall prevention interventions on-going.  No injuries.

## 2017-04-13 NOTE — PLAN OF CARE
04/13/17 1735   Discharge Reassessment   Assessment Type Discharge Planning Reassessment   Can the patient answer the patient profile reliably? No, cognitively impaired  (vent, sedated)   How does the patient rate their overall health at the present time? Good  (record)   Describe the patient's ability to walk at the present time. Does not walk or unable to take any steps at all   How often would a person be available to care for the patient? Occasionally  (lives with grandmother who has been ill recently, not sure of care that she can provide)   Number of comorbid conditions (as recorded on the chart) Five or more   During the past month, has the patient often been bothered by feeling down, depressed or hopeless? No  (record)   During the past month, has the patient often been bothered by little interest or pleasure in doing things? No   Discharge plan remains the same: Yes   Provided patient/caregiver education on the expected discharge date and the discharge plan No   Discharge Plan A Home with family   Discharge Plan B Other  (to be determined)   Change in patient condition or support system No   Patient choice form signed by patient/caregiver No   Explained to the the patient/caregiver why the discharge planned changed: No   Involved the patient/caregiver in establishing a new discharge plan: No   patient remains in ICU on vent support. Due to weakness and now length of stay in bed, may benefit from some type of post acute rehab. SW will continue to follow in ICU and assist as needed.

## 2017-04-13 NOTE — PROGRESS NOTES
Nursing reports Aquacel Pro Foam sacral dressing remains in place for pressure injury prevention. Provided nursing with additional dressing if dressing change needed.

## 2017-04-13 NOTE — PROGRESS NOTES
[facility] Hospital Medicine  Progress Note    Patient Name: Radha Pritchett  MRN: [id number]  Patient Class: IP- Inpatient   Admission Date: 4/6/2017  Length of Stay: 7 days  Attending Physician: Lakesha Greer MD  Primary Care Provider: Ferny Iglesias MD        Subjective:     Principal Problem:Acute respiratory failure with hypercapnia    HPI:  Ms. Radha Pritchett is a 46 y.o. female with essential hypertension, chronic diastolic heart failure (LVEF 55-60% Dec 2014), moderate protein malnutrition who presents to [facility] ED with complaints of dyspnea today.  She was scheduled to undergo removal of a breast mass today but was found to be hypoxic.  She does report that she has been short of breath but cannot specify how long.  She has also had some non-productive coughing and some chills without fevers.  Further history is otherwise limited at this time.    Hospital Course:   was admitted with pneumonia and acutely decompensated and was intubated and placed on vent. Pt with UE weakness prior to presentation on this admission which may be contributing to inability to extubate. Neurology following and considering possible MG and patient on therapeutic trial of prednisone and pyridostigmine. Pt s/p bronch on that was unrevealing on 4/11.    Interval History: Pt failed weaning trial this morning; Neurology following. No acute events.    Review of Systems   Unable to perform ROS: Intubated     Objective:     Vital Signs (Most Recent):  Temp: 97.6 °F (36.4 °C) (04/13/17 0730)  Pulse: 75 (04/13/17 1119)  Resp: 10 (04/13/17 1119)  BP: 111/62 (04/13/17 0902)  SpO2: 100 % (04/13/17 1119) Vital Signs (24h Range):  Temp:  [97.6 °F (36.4 °C)-98.6 °F (37 °C)] 97.6 °F (36.4 °C)  Pulse:  [] 75  Resp:  [0-48] 10  SpO2:  [97 %-100 %] 100 %  BP: ()/(51-78) 111/62     Weight: 82.8 kg (182 lb 8.7 oz)  Body mass index is 51.34 kg/(m^2).    Intake/Output Summary (Last 24 hours) at 04/13/17  1319  Last data filed at 04/13/17 0900   Gross per 24 hour   Intake          1825.34 ml   Output             3261 ml   Net         -1435.66 ml      Physical Exam   Constitutional: She appears well-developed and well-nourished.   obese   HENT:   Head: Normocephalic and atraumatic.   Eyes: Conjunctivae are normal.   Neck: Neck supple.   Cardiovascular: Normal rate and regular rhythm.    Anterior chest with hard, large left breast mass that is palpable.   Pulmonary/Chest:   Poor respiratory effort with decreased breath sound bilaterally and inspiratory crackles    Abdominal: Soft. Bowel sounds are normal. She exhibits no distension. There is no tenderness.   Musculoskeletal: Normal range of motion. She exhibits no edema.   Neurological:   Sedated on vent.   Skin: Skin is warm and dry. No erythema.   Nursing note and vitals reviewed.      Significant Labs: All pertinent labs within the past 24 hours have been reviewed.    Significant Imaging: I have reviewed and interpreted all pertinent imaging results/findings within the past 24 hours.    Assessment/Plan:      * Acute respiratory failure with hypercapnia  Mechanical ventilation  Assessment and Plan:  S/p intubation, possible secondary to pneumonia vs edema, will continue abx, ECHO with diastolic dysfunction but normal EF, and Pulm following for vent management. Family reported UE weakness for a few months prior to this admission and Neurology consulted, possible MG and patient undergoing trial of prednisone and pyridostigmine. Appreciate Neurology and Pulmonary input.    Pneumonia of both lungs due to infectious organism  The patient has a CURB-65 score of 0, and does not meet criteria for healthcare-associated pneumonia.  I have reviewed the chest X-ray and CT-A chest and it reveals bilateral airspace opacification concerning for pneumonia.  Oxygen saturations are improved but requiring a non-rebreather mask; then BIPAP and s/p intubation.  Blood culture NGTD.   Continue empiric antibiotic therapy.    Sepsis  This patient met criteria for sepsis given tachycardia, tachypnea, and pneumonia; there is no evidence of endo-organ dysfunction.  Blood cultures are NGTD; continue broad spectrum antibiotics    Essential hypertension  Pt's BP on low end of normal, and hydrochlorothiazide and lisinopril stopped. Hold parameter placed on  Carvedilol.    Chronic diastolic heart failure  Patient doesn't appear to be in acute heart failure; checked ECHO with EF=65% but with diastolic dysfunction.    Moderate protein malnutrition  Tolerating tube feeds.    Breast mass, left  Planned for surgery, seen by Dr. Sandoval (his patient) and plan to reschedule as outpatient.    Hypokalemia  Slightly high today, monitor.      VTE Risk Mitigation         Ordered     enoxaparin injection 40 mg  Daily     Route:  Subcutaneous        04/06/17 2202     Medium Risk of VTE  Once      04/06/17 2202        Critical care time spent: 30 minutes.      Lakesha Greer MD  Department of Hospital Medicine   [facility]

## 2017-04-13 NOTE — PLAN OF CARE
Problem: Patient Care Overview  Goal: Plan of Care Review  Patient remain on Servoi ventilator settings are SIMV, rate 10, tidal volume 400, +5 PEEP, pressure support 10 and fio2 40%.  Patient did not tolerate CPAP trials today due to tachypnea.  Patient mode was changed from PRVC to SIMV per MD order.  Patient tolerated well.

## 2017-04-13 NOTE — SUBJECTIVE & OBJECTIVE
Interval History: Pt failed weaning trial this morning; Neurology following. No acute events.    Review of Systems   Unable to perform ROS: Intubated     Objective:     Vital Signs (Most Recent):  Temp: 97.6 °F (36.4 °C) (04/13/17 0730)  Pulse: 75 (04/13/17 1119)  Resp: 10 (04/13/17 1119)  BP: 111/62 (04/13/17 0902)  SpO2: 100 % (04/13/17 1119) Vital Signs (24h Range):  Temp:  [97.6 °F (36.4 °C)-98.6 °F (37 °C)] 97.6 °F (36.4 °C)  Pulse:  [] 75  Resp:  [0-48] 10  SpO2:  [97 %-100 %] 100 %  BP: ()/(51-78) 111/62     Weight: 82.8 kg (182 lb 8.7 oz)  Body mass index is 51.34 kg/(m^2).    Intake/Output Summary (Last 24 hours) at 04/13/17 1319  Last data filed at 04/13/17 0900   Gross per 24 hour   Intake          1825.34 ml   Output             3261 ml   Net         -1435.66 ml      Physical Exam   Constitutional: She appears well-developed and well-nourished.   obese   HENT:   Head: Normocephalic and atraumatic.   Eyes: Conjunctivae are normal.   Neck: Neck supple.   Cardiovascular: Normal rate and regular rhythm.    Anterior chest with hard, large left breast mass that is palpable.   Pulmonary/Chest:   Poor respiratory effort with decreased breath sound bilaterally and inspiratory crackles    Abdominal: Soft. Bowel sounds are normal. She exhibits no distension. There is no tenderness.   Musculoskeletal: Normal range of motion. She exhibits no edema.   Neurological:   Sedated on vent.   Skin: Skin is warm and dry. No erythema.   Nursing note and vitals reviewed.      Significant Labs: All pertinent labs within the past 24 hours have been reviewed.    Significant Imaging: I have reviewed and interpreted all pertinent imaging results/findings within the past 24 hours.

## 2017-04-13 NOTE — PROGRESS NOTES
Received patient orally intubated and on a Servo I Ventilator with settings as follows:  PRVC 12/ 440/ +5 PEEP/ 40%.  Patient has a size 7.5 ET tube in place which is secured at the 23 cm chad at the lips on the right side of the patient's mouth.  ET tube was rotated to the center of the patient's mouth.  Ventilator alarms are set and functional and AMBU bag is at the HOB.

## 2017-04-13 NOTE — CONSULTS
Consult Note  Pulmonology    Consult Requested By: Lakesha Greer MD  Reason for Consult : vent management    SUBJECTIVE:resp failure     History of Present Illness:  Patient unresponsive;information obtained from chart      Review of patient's allergies indicates:  No Known Allergies    Past Medical History:   Diagnosis Date    CHF (congestive heart failure)     Learning difficulty      Past Surgical History:   Procedure Laterality Date    BREAST SURGERY Left     biopsy    CYST REMOVAL      shoulder      Family History   Problem Relation Age of Onset    Diabetes      Coronary artery disease         Review of systems not obtained due to patient factors ; patient intubated and unable to communicate.  OBJECTIVE:     Vital Signs (Most Recent)  Temp: 97.6 °F (36.4 °C) (04/13/17 0400)  Pulse: 67 (04/13/17 0637)  Resp: (!) 22 (04/13/17 0637)  BP: 113/67 (04/13/17 0632)  SpO2: 100 % (04/13/17 0637)    Vital Signs Range (Last 24H):  Temp:  [97.6 °F (36.4 °C)-98.6 °F (37 °C)]   Pulse:  [51-86]   Resp:  [0-48]   BP: ()/(51-89)   SpO2:  [96 %-100 %]     Physical Exam:    General: no distress,.on the ventilator  Neck: no jugular venous distention and no carotid bruit  Lungs:  diminished breath sounds bilaterally and 7.5 ett in place and rhonchi bilaterally  Heart: regular rate and rhythm and no murmur  Abdomen: soft, non-tender non-distended; bowel sounds normal and right NGT in place  Extremities: no cyanosis or edema, or clubbing  Neurologic: sedated.  Skin-warm, moist. No rash      Laboratory:  Recent Results (from the past 24 hour(s))   Magnesium    Collection Time: 04/13/17  1:43 AM   Result Value Ref Range    Magnesium 2.1 1.6 - 2.6 mg/dL   Phosphorus    Collection Time: 04/13/17  1:43 AM   Result Value Ref Range    Phosphorus 3.3 2.7 - 4.5 mg/dL   Basic metabolic panel    Collection Time: 04/13/17  1:43 AM   Result Value Ref Range    Sodium 139 136 - 145 mmol/L    Potassium 3.7 3.5 - 5.1 mmol/L    Chloride 103  95 - 110 mmol/L    CO2 31 (H) 23 - 29 mmol/L    Glucose 384 (H) 70 - 110 mg/dL    BUN, Bld 25 (H) 6 - 20 mg/dL    Creatinine 0.9 0.5 - 1.4 mg/dL    Calcium 8.7 8.7 - 10.5 mg/dL    Anion Gap 5 (L) 8 - 16 mmol/L    eGFR if African American >60 >60 mL/min/1.73 m^2    eGFR if non African American >60 >60 mL/min/1.73 m^2   ISTAT PROCEDURE    Collection Time: 04/13/17  4:00 AM   Result Value Ref Range    POC PH 7.388 7.35 - 7.45    POC PCO2 46.9 (H) 35 - 45 mmHg    POC PO2 92 80 - 100 mmHg    POC HCO3 28.3 (H) 24 - 28 mmol/L    POC BE 3 -2 to 2 mmol/L    POC SATURATED O2 97 95 - 100 %    POC TCO2 30 (H) 23 - 27 mmol/L    Rate 12     Sample ARTERIAL     Site LR     Allens Test Pass     DelSys Adult Vent     Mode AC/PRVC     Vt 440     PEEP 5     FiO2 40      CBC:     Recent Labs  Lab 04/12/17  0141   WBC 4.86   RBC 4.35   HGB 10.9*   HCT 39.2      MCV 90   MCH 25.1*   MCHC 27.8*     BMP:     Recent Labs  Lab 04/13/17  0143   *      K 3.7      CO2 31*   BUN 25*   CREATININE 0.9   CALCIUM 8.7   MG 2.1     CMP:   Recent Labs  Lab 04/07/17  0211  04/13/17  0143   *  < > 384*   CALCIUM 8.5*  < > 8.7   ALBUMIN 2.4*  --   --    PROT 6.2  --   --      < > 139   K 4.7  < > 3.7   CO2 34*  < > 31*     < > 103   BUN 7  < > 25*   CREATININE 0.9  < > 0.9   ALKPHOS 113  --   --    ALT 40  --   --    AST 41*  --   --    BILITOT 1.0  --   --    < > = values in this interval not displayed.  LFTs:     Recent Labs  Lab 04/07/17  0211   ALT 40   AST 41*   ALKPHOS 113   BILITOT 1.0   PROT 6.2   ALBUMIN 2.4*     Coagulation:     Recent Labs  Lab 04/06/17  1440   INR 1.1     Cardiac markers: No results for input(s): CKMB, TROPONINT, MYOGLOBIN in the last 168 hours.  Microbiology Results (last 7 days)     Procedure Component Value Units Date/Time    Culture, Respiratory with Gram Stain [[id number]] Collected:  04/11/17 1200    Order Status:  Completed Specimen:  Respiratory from Bronchial Wash Updated:   04/12/17 1105     Respiratory Culture No Growth     Gram Stain (Respiratory) Moderate WBC's     Gram Stain (Respiratory) No organisms seen    Narrative:       Clinical Hx: HTN  Breast Mass  Pre-op Diagnosis: Pneumonia  Post-op Diagnosis: Bronchial Washings  Jar #1: Bronchial Washings    Gram stain [[id number]] Collected:  04/11/17 1157    Order Status:  Canceled Specimen:  Bronchial Wash from Lung, RUL Updated:  04/11/17 1230    Narrative:       Clinical Hx:  HTN  Breast Mass  Pre-op Diagnosis: Pneumonia  Post-op Diagnosis  Jar #1: Bronchial Washings  Gram Stain was cancelled on 04/11/2017 at 13:47 by JGO; Duplicate   order, test included in another profile. 04/11/2017  13:47        ABGs:     Recent Labs  Lab 04/13/17  0400   PH 7.388   PCO2 46.9*   PO2 92   HCO3 28.3*   POCSATURATED 97   BE 3       Ventilator Setting:  Vent Mode: PRVC  Oxygen Concentration (%):  [40] 40  Resp Rate Total:  [12 br/min-16 br/min] 12 br/min  Vt Set:  [440 mL] 440 mL  PEEP/CPAP:  [5 cmH20] 5 cmH20  Mean Airway Pressure:  [10.1 cmH20-15.2 cmH20] 11.4 cmH20     Diagnostic Results:     Increased central vascular congestion/edema slightly worsened since the previous day study.  There is obscuration of the left hemidiaphragm, either from a left basilar infiltrate or left pleural effusion.  Cardiomegaly persists.    The position of the endotracheal tube, the right upper extremity PICC line remains without change.  There is an enteric catheter with the tip of which is not included on the radiograph.               ASSESSMENT/PLAN:     1. Pneumonia of both lungs due to infectious organism, unspecified part of lung    2. Respiratory arrest    3. Acute respiratory failure with hypercapnia    4. Sepsis, due to unspecified organism            Plan::  Not much difference in vent status ; unable to tolerate decreased vent rate. Continue follow up but trach seems imminent

## 2017-04-13 NOTE — PROGRESS NOTES
[facility] Hospital Medicine  Progress Note    Patient Name: Radha Pritchett  MRN: [id number]  Patient Class: IP- Inpatient   Admission Date: 4/6/2017  Length of Stay: 6 days  Attending Physician: Lakesha Greer MD  Primary Care Provider: Ferny Iglesias MD        Subjective:     Principal Problem:Acute respiratory failure with hypercapnia    HPI:  Ms. Radha Pritchett is a 46 y.o. female with essential hypertension, chronic diastolic heart failure (LVEF 55-60% Dec 2014), moderate protein malnutrition who presents to [facility] ED with complaints of dyspnea today.  She was scheduled to undergo removal of a breast mass today but was found to be hypoxic.  She does report that she has been short of breath but cannot specify how long.  She has also had some non-productive coughing and some chills without fevers.  Further history is otherwise limited at this time.    Hospital Course:   was admitted with pneumonia and acutely decompensated and was intubated and placed on vent. Pt with UE weakness prior to presentation on this admission which may be contributing to inability to extubate. Neurology following and considering possible MG and patient on therapeutic trial of prednisone and pyridostigmine. Pt s/p bronch on that was unrevealing on 4/11.    Interval History: Pt failed weaning trial this morning; Neurology following. No acute events.    Review of Systems   Unable to perform ROS: Intubated     Objective:     Vital Signs (Most Recent):  Temp: 98 °F (36.7 °C) (04/12/17 1500)  Pulse: 60 (04/12/17 1830)  Resp: 12 (04/12/17 1830)  BP: 110/68 (04/12/17 1830)  SpO2: 99 % (04/12/17 1830) Vital Signs (24h Range):  Temp:  [97.8 °F (36.6 °C)-98.6 °F (37 °C)] 98 °F (36.7 °C)  Pulse:  [] 60  Resp:  [0-32] 12  SpO2:  [96 %-100 %] 99 %  BP: ()/(51-95) 110/68     Weight: 82.8 kg (182 lb 8.7 oz)  Body mass index is 51.34 kg/(m^2).    Intake/Output Summary (Last 24 hours) at 04/12/17 4978  Last data  filed at 04/12/17 1800   Gross per 24 hour   Intake          1558.39 ml   Output             4071 ml   Net         -2512.61 ml      Physical Exam   Constitutional: She appears well-developed and well-nourished.   obese   HENT:   Head: Normocephalic and atraumatic.   Eyes: Conjunctivae are normal.   Neck: Neck supple.   Cardiovascular: Normal rate and regular rhythm.    Anterior chest with hard, large left breast mass that is palpable.   Pulmonary/Chest:   Poor respiratory effort with decreased breath sound bilaterally and inspiratory crackles    Abdominal: Soft. Bowel sounds are normal. She exhibits no distension. There is no tenderness.   Musculoskeletal: Normal range of motion. She exhibits no edema.   Neurological:   Sedated on vent.   Skin: Skin is warm and dry. No erythema.   Nursing note and vitals reviewed.      Significant Labs: All pertinent labs within the past 24 hours have been reviewed.    Significant Imaging: I have reviewed and interpreted all pertinent imaging results/findings within the past 24 hours.    Assessment/Plan:      * Acute respiratory failure with hypercapnia  Mechanical ventilation  Assessment and Plan:  S/p intubation, possible secondary to pneumonia vs edema, will continue abx, ECHO with diastolic dysfunction but normal EF, and Pulm following for vent management. Family reported UE weakness for a few months prior to this admission and Neurology consulted, possible MG and patient undergoing trial of prednisone and pyridostigmine. Appreciate Neurology and Pulmonary input.    Pneumonia of both lungs due to infectious organism  The patient has a CURB-65 score of 0, and does not meet criteria for healthcare-associated pneumonia.  I have reviewed the chest X-ray and CT-A chest and it reveals bilateral airspace opacification concerning for pneumonia.  Oxygen saturations are improved but requiring a non-rebreather mask; then BIPAP and s/p intubation.  Blood culture NGTD.  Continue empiric  antibiotic therapy.    Sepsis  This patient met criteria for sepsis given tachycardia, tachypnea, and pneumonia; there is no evidence of endo-organ dysfunction.  Blood cultures are NGTD; continue broad spectrum antibiotics    Essential hypertension  Pt's BP on low end of normal, and hydrochlorothiazide and lisinopril stopped. Hold parameter placed on  Carvedilol.    Chronic diastolic heart failure  Patient doesn't appear to be in acute heart failure; checked ECHO with EF=65% but with diastolic dysfunction.    Moderate protein malnutrition  Tolerating tube feeds.    Breast mass, left  Planned for surgery, seen by Dr. Sandoval (his patient) and plan to reschedule as outpatient.    Hypokalemia  Slightly high today, monitor.      VTE Risk Mitigation         Ordered     enoxaparin injection 40 mg  Daily     Route:  Subcutaneous        04/06/17 2202     Medium Risk of VTE  Once      04/06/17 2202          Critical care time spent: 40 minutes.    Lakesha Greer MD  Department of Hospital Medicine   [facility]

## 2017-04-14 PROBLEM — Z71.89 COUNSELING REGARDING ADVANCED DIRECTIVES AND GOALS OF CARE: Status: ACTIVE | Noted: 2017-04-14

## 2017-04-14 LAB
ALLENS TEST: ABNORMAL
ANION GAP SERPL CALC-SCNC: 4 MMOL/L
BUN SERPL-MCNC: 36 MG/DL
C DIFF GDH STL QL: NEGATIVE
C DIFF TOX A+B STL QL IA: NEGATIVE
CALCIUM SERPL-MCNC: 9.5 MG/DL
CHLORIDE SERPL-SCNC: 101 MMOL/L
CO2 SERPL-SCNC: 33 MMOL/L
CREAT SERPL-MCNC: 1 MG/DL
DELSYS: ABNORMAL
ERYTHROCYTE [SEDIMENTATION RATE] IN BLOOD BY WESTERGREN METHOD: 10 MM/H
EST. GFR  (AFRICAN AMERICAN): >60 ML/MIN/1.73 M^2
EST. GFR  (NON AFRICAN AMERICAN): >60 ML/MIN/1.73 M^2
FIO2: 40
GLUCOSE SERPL-MCNC: 363 MG/DL
HCO3 UR-SCNC: 31.4 MMOL/L (ref 24–28)
MAGNESIUM SERPL-MCNC: 2.1 MG/DL
MODE: ABNORMAL
PCO2 BLDA: 58.4 MMHG (ref 35–45)
PEEP: 5
PH SMN: 7.34 [PH] (ref 7.35–7.45)
PHOSPHATE SERPL-MCNC: 3.4 MG/DL
PO2 BLDA: 114 MMHG (ref 80–100)
POC BE: 4 MMOL/L
POC SATURATED O2: 98 % (ref 95–100)
POC TCO2: 33 MMOL/L (ref 23–27)
POCT GLUCOSE: 263 MG/DL (ref 70–110)
POCT GLUCOSE: 299 MG/DL (ref 70–110)
POCT GLUCOSE: 333 MG/DL (ref 70–110)
POCT GLUCOSE: 387 MG/DL (ref 70–110)
POTASSIUM SERPL-SCNC: 3.8 MMOL/L
PS: 10
SAMPLE: ABNORMAL
SITE: ABNORMAL
SODIUM SERPL-SCNC: 138 MMOL/L
VT: 440

## 2017-04-14 PROCEDURE — 84100 ASSAY OF PHOSPHORUS: CPT

## 2017-04-14 PROCEDURE — 63600175 PHARM REV CODE 636 W HCPCS: Performed by: PSYCHIATRY & NEUROLOGY

## 2017-04-14 PROCEDURE — 99900035 HC TECH TIME PER 15 MIN (STAT)

## 2017-04-14 PROCEDURE — 83735 ASSAY OF MAGNESIUM: CPT

## 2017-04-14 PROCEDURE — 94003 VENT MGMT INPAT SUBQ DAY: CPT

## 2017-04-14 PROCEDURE — 80048 BASIC METABOLIC PNL TOTAL CA: CPT

## 2017-04-14 PROCEDURE — 25000003 PHARM REV CODE 250: Performed by: INTERNAL MEDICINE

## 2017-04-14 PROCEDURE — 25000003 PHARM REV CODE 250: Performed by: HOSPITALIST

## 2017-04-14 PROCEDURE — 25000242 PHARM REV CODE 250 ALT 637 W/ HCPCS: Performed by: INTERNAL MEDICINE

## 2017-04-14 PROCEDURE — 63600175 PHARM REV CODE 636 W HCPCS: Performed by: INTERNAL MEDICINE

## 2017-04-14 PROCEDURE — 36600 WITHDRAWAL OF ARTERIAL BLOOD: CPT

## 2017-04-14 PROCEDURE — 94761 N-INVAS EAR/PLS OXIMETRY MLT: CPT

## 2017-04-14 PROCEDURE — 99232 SBSQ HOSP IP/OBS MODERATE 35: CPT | Mod: ,,, | Performed by: PSYCHIATRY & NEUROLOGY

## 2017-04-14 PROCEDURE — 27200966 HC CLOSED SUCTION SYSTEM

## 2017-04-14 PROCEDURE — 20000000 HC ICU ROOM

## 2017-04-14 PROCEDURE — 89220 SPUTUM SPECIMEN COLLECTION: CPT

## 2017-04-14 PROCEDURE — 94640 AIRWAY INHALATION TREATMENT: CPT

## 2017-04-14 PROCEDURE — 99900026 HC AIRWAY MAINTENANCE (STAT)

## 2017-04-14 PROCEDURE — 27000221 HC OXYGEN, UP TO 24 HOURS

## 2017-04-14 PROCEDURE — 87449 NOS EACH ORGANISM AG IA: CPT

## 2017-04-14 PROCEDURE — C9113 INJ PANTOPRAZOLE SODIUM, VIA: HCPCS | Performed by: HOSPITALIST

## 2017-04-14 PROCEDURE — 82803 BLOOD GASES ANY COMBINATION: CPT

## 2017-04-14 PROCEDURE — 63600175 PHARM REV CODE 636 W HCPCS: Performed by: HOSPITALIST

## 2017-04-14 PROCEDURE — 36415 COLL VENOUS BLD VENIPUNCTURE: CPT

## 2017-04-14 RX ADMIN — CARVEDILOL 3.12 MG: 3.12 TABLET, FILM COATED ORAL at 08:04

## 2017-04-14 RX ADMIN — AZITHROMYCIN MONOHYDRATE 500 MG: 500 INJECTION, POWDER, LYOPHILIZED, FOR SOLUTION INTRAVENOUS at 08:04

## 2017-04-14 RX ADMIN — CHLORHEXIDINE GLUCONATE 15 ML: 1.2 RINSE ORAL at 08:04

## 2017-04-14 RX ADMIN — PREDNISONE 60 MG: 20 TABLET ORAL at 08:04

## 2017-04-14 RX ADMIN — PROPOFOL 30 MCG/KG/MIN: 10 INJECTION, EMULSION INTRAVENOUS at 12:04

## 2017-04-14 RX ADMIN — INSULIN ASPART 8 UNITS: 100 INJECTION, SOLUTION INTRAVENOUS; SUBCUTANEOUS at 11:04

## 2017-04-14 RX ADMIN — ENOXAPARIN SODIUM 40 MG: 100 INJECTION SUBCUTANEOUS at 05:04

## 2017-04-14 RX ADMIN — PANTOPRAZOLE SODIUM 40 MG: 40 INJECTION, POWDER, FOR SOLUTION INTRAVENOUS at 08:04

## 2017-04-14 RX ADMIN — ACETAZOLAMIDE 250 MG: 500 INJECTION, POWDER, LYOPHILIZED, FOR SOLUTION INTRAVENOUS at 08:04

## 2017-04-14 RX ADMIN — FUROSEMIDE 40 MG: 10 INJECTION, SOLUTION INTRAMUSCULAR; INTRAVENOUS at 08:04

## 2017-04-14 RX ADMIN — PROPOFOL 20.1 MCG/KG/MIN: 10 INJECTION, EMULSION INTRAVENOUS at 05:04

## 2017-04-14 RX ADMIN — CARVEDILOL 3.12 MG: 3.12 TABLET, FILM COATED ORAL at 04:04

## 2017-04-14 RX ADMIN — INSULIN ASPART 6 UNITS: 100 INJECTION, SOLUTION INTRAVENOUS; SUBCUTANEOUS at 05:04

## 2017-04-14 RX ADMIN — PROPOFOL 20 MCG/KG/MIN: 10 INJECTION, EMULSION INTRAVENOUS at 05:04

## 2017-04-14 RX ADMIN — IPRATROPIUM BROMIDE AND ALBUTEROL SULFATE 3 ML: .5; 3 SOLUTION RESPIRATORY (INHALATION) at 07:04

## 2017-04-14 RX ADMIN — INSULIN ASPART 6 UNITS: 100 INJECTION, SOLUTION INTRAVENOUS; SUBCUTANEOUS at 06:04

## 2017-04-14 RX ADMIN — FUROSEMIDE 40 MG: 10 INJECTION, SOLUTION INTRAMUSCULAR; INTRAVENOUS at 05:04

## 2017-04-14 RX ADMIN — MODAFINIL 200 MG: 100 TABLET ORAL at 08:04

## 2017-04-14 RX ADMIN — CEFTRIAXONE 1 G: 1 INJECTION, SOLUTION INTRAVENOUS at 08:04

## 2017-04-14 NOTE — PROGRESS NOTES
Progress Note  Pulmonology    Admit Date: 4/6/2017   LOS: 8 days       SUBJECTIVE: acute respiratory failure  On vent     History of Present Illness:see chart      Review of patient's allergies indicates:  No Known Allergies    Past Medical History:   Diagnosis Date    CHF (congestive heart failure)     Learning difficulty      Past Surgical History:   Procedure Laterality Date    BREAST SURGERY Left     biopsy    CYST REMOVAL      shoulder      Family History   Problem Relation Age of Onset    Diabetes      Coronary artery disease         ROS:patient  unable to communicate because intubated, on vent, and sedated     OBJECTIVE:     Vital Signs (Most Recent)  Temp: 98.9 °F (37.2 °C) (04/14/17 0730)  Pulse: 87 (04/14/17 0846)  Resp: 12 (04/14/17 0846)  BP: 105/62 (04/14/17 0832)  SpO2: 99 % (04/14/17 0846)    Vital Signs Range (Last 24H):  Temp:  [97.8 °F (36.6 °C)-100.1 °F (37.8 °C)]   Pulse:  []   Resp:  [10-52]   BP: ()/(49-76)   SpO2:  [98 %-100 %]     Physical Exam:  Intubated on vent  Head: normocephalic, atraumatic  Eyes:  conjunctivae/corneas clear. PERRL.  Throat: lips, mucosa, and tongue normal; teeth and gums normal and no throat erythema  Neck: no jugular venous distention, no adenopathy, no carotid bruit and thyroid not enlarged, symmetric, no tenderness/mass/nodules, trachea midline  Lungs:  rales bilaterally and rhonchi bilaterally  Chest Wall: no tenderness  Heart: regular rate and rhythm and no murmur  Abdomen: soft, non-tender non-distended; bowel sounds normal  Extremities: no cyanosis or edema, or clubbing  Skin: No rashes or lesions or good skin turgor  Neurologic: sedated    Laboratory:    Recent Results (from the past 24 hour(s))   POCT glucose    Collection Time: 04/13/17  8:51 PM   Result Value Ref Range    POCT Glucose 456 (HH) 70 - 110 mg/dL   POCT glucose    Collection Time: 04/13/17 11:23 PM   Result Value Ref Range    POCT Glucose 387 (H) 70 - 110 mg/dL   Magnesium     Collection Time: 04/14/17  3:00 AM   Result Value Ref Range    Magnesium 2.1 1.6 - 2.6 mg/dL   Phosphorus    Collection Time: 04/14/17  3:00 AM   Result Value Ref Range    Phosphorus 3.4 2.7 - 4.5 mg/dL   Basic metabolic panel    Collection Time: 04/14/17  3:00 AM   Result Value Ref Range    Sodium 138 136 - 145 mmol/L    Potassium 3.8 3.5 - 5.1 mmol/L    Chloride 101 95 - 110 mmol/L    CO2 33 (H) 23 - 29 mmol/L    Glucose 363 (H) 70 - 110 mg/dL    BUN, Bld 36 (H) 6 - 20 mg/dL    Creatinine 1.0 0.5 - 1.4 mg/dL    Calcium 9.5 8.7 - 10.5 mg/dL    Anion Gap 4 (L) 8 - 16 mmol/L    eGFR if African American >60 >60 mL/min/1.73 m^2    eGFR if non African American >60 >60 mL/min/1.73 m^2   ISTAT PROCEDURE    Collection Time: 04/14/17  4:15 AM   Result Value Ref Range    POC PH 7.339 (L) 7.35 - 7.45    POC PCO2 58.4 (HH) 35 - 45 mmHg    POC PO2 114 (H) 80 - 100 mmHg    POC HCO3 31.4 (H) 24 - 28 mmol/L    POC BE 4 -2 to 2 mmol/L    POC SATURATED O2 98 95 - 100 %    POC TCO2 33 (H) 23 - 27 mmol/L    Rate 10     Sample ARTERIAL     Site LR     Allens Test Pass     DelSys Adult Vent     Mode SIMV     Vt 440     PEEP 5     PS 10     FiO2 40    POCT glucose    Collection Time: 04/14/17  5:36 AM   Result Value Ref Range    POCT Glucose 299 (H) 70 - 110 mg/dL     CBC:   Recent Labs  Lab 04/12/17  0141   WBC 4.86   RBC 4.35   HGB 10.9*   HCT 39.2      MCV 90   MCH 25.1*   MCHC 27.8*     BMP:   Recent Labs  Lab 04/14/17  0300   *      K 3.8      CO2 33*   BUN 36*   CREATININE 1.0   CALCIUM 9.5   MG 2.1     CMP:   Recent Labs  Lab 04/14/17  0300   *   CALCIUM 9.5      K 3.8   CO2 33*      BUN 36*   CREATININE 1.0     LFTs: No results for input(s): ALT, AST, ALKPHOS, BILITOT, PROT, ALBUMIN in the last 168 hours.  Coagulation: No results for input(s): INR, APTT in the last 168 hours.    Invalid input(s): PT  Cardiac markers: No results for input(s): CKMB, TROPONINT, MYOGLOBIN in the last 168  hours.  Microbiology Results (last 7 days)     Procedure Component Value Units Date/Time    Clostridium difficile EIA [[id number]] Collected:  04/14/17 0015    Order Status:  Sent Specimen:  Stool from Stool Updated:  04/14/17 0023    Culture, Respiratory with Gram Stain [[id number]] Collected:  04/11/17 1200    Order Status:  Completed Specimen:  Respiratory from Bronchial Wash Updated:  04/13/17 0911     Respiratory Culture No growth     Gram Stain (Respiratory) Moderate WBC's     Gram Stain (Respiratory) No organisms seen    Narrative:       Clinical Hx: HTN  Breast Mass  Pre-op Diagnosis: Pneumonia  Post-op Diagnosis: Bronchial Washings  Jar #1: Bronchial Washings    Gram stain [[id number]] Collected:  04/11/17 1157    Order Status:  Canceled Specimen:  Bronchial Wash from Lung, RUL Updated:  04/11/17 1230    Narrative:       Clinical Hx:  HTN  Breast Mass  Pre-op Diagnosis: Pneumonia  Post-op Diagnosis  Jar #1: Bronchial Washings  Gram Stain was cancelled on 04/11/2017 at 13:47 by JGO; Duplicate   order, test included in another profile. 04/11/2017  13:47        ABGs:   Recent Labs  Lab 04/14/17  0415   PH 7.339*   PCO2 58.4*   PO2 114*   HCO3 31.4*   POCSATURATED 98   BE 4       Ventilator Setting:  Vent Mode: SIMV (PRVC) + PS  Oxygen Concentration (%):  [] 40  Resp Rate Total:  [9 br/min-15 br/min] 10 br/min  Vt Set:  [440 mL] 440 mL  PEEP/CPAP:  [5 cmH20] 5 cmH20  Pressure Support:  [10 cmH20] 10 cmH20  Mean Airway Pressure:  [6.6 fnT03-71.9 cmH20] 9.3 cmH20     Diagnostic Results:  There is persistent parenchymal opacification within the retrocardiac region/left lung base although the consolidation appears be slightly less dense when compared to prior exam.  There is some persistent hazy/interstitial opacification noted within the right lung base as well.  A right-sided PICC line catheter, endotracheal tube and NG tube remain in place and are not significant changed in position.The cardiomediastinal  silhouette is stable..    ASSESSMENT/PLAN:     1. Pneumonia of both lungs due to infectious organism, unspecified part of lung    2. Respiratory arrest    3. Acute respiratory failure with hypercapnia    4. Sepsis, due to unspecified organism          Plan:consult ENT for trach. Pt canot be weaned.

## 2017-04-14 NOTE — ASSESSMENT & PLAN NOTE
Planned for surgery, seen by Dr. Sandoval (his patient) and plan to reschedule as outpatient. It is reportedly a hematoma.

## 2017-04-14 NOTE — PLAN OF CARE
Problem: Patient Care Overview  Goal: Plan of Care Review  Outcome: Ongoing (interventions implemented as appropriate)  Patient remains on ventilator.  Propofol continued for sedation. Vital signs stable. Patient tolerating 40cc/hr tube feeding. Patient had adequate urine output. Patient started on blood glucose monitoring q 6 h per MD order; PRN insulin ordered. Flexiseal inserted for loose watery stools; stool specimen sent for CDIFF evaluation; patient placed on contact precautions until ruled out. Patient remains free from falls and other hospital related injuries this shift.

## 2017-04-14 NOTE — ASSESSMENT & PLAN NOTE
The patient has a CURB-65 score of 0, and does not meet criteria for healthcare-associated pneumonia.  I have reviewed the chest X-ray and CT-A chest and it reveals bilateral airspace opacification concerning for pneumonia.  Oxygen saturations are improved but requiring a non-rebreather mask; then BIPAP and s/p intubation.  Blood culture NGTD.  PT complete full course of Azithromycin, and it will be stopped today, continue Rocephin for now.

## 2017-04-14 NOTE — PROGRESS NOTES
Pt not placed on CPAP due to pt on minimal sedation on a rate of 10 not breathing over vent.Pt co2 elevated.Will discuss with pulmonologist.

## 2017-04-14 NOTE — CONSULTS
Consult Note  Pulmonology    Consult Requested By: Lakesha Greer MD  Reason for Consult : vent management    SUBJECTIVE:resp failure     History of Present Illness:  Patient unresponsive;information obtained from chart      Review of patient's allergies indicates:  No Known Allergies    Past Medical History:   Diagnosis Date    CHF (congestive heart failure)     Learning difficulty      Past Surgical History:   Procedure Laterality Date    BREAST SURGERY Left     biopsy    CYST REMOVAL      shoulder      Family History   Problem Relation Age of Onset    Diabetes      Coronary artery disease         Review of systems not obtained due to patient factors ; patient intubated and unable to communicate.  OBJECTIVE:     Vital Signs (Most Recent)  Temp: 98.9 °F (37.2 °C) (04/14/17 0730)  Pulse: 87 (04/14/17 0846)  Resp: 12 (04/14/17 0846)  BP: 105/62 (04/14/17 0832)  SpO2: 99 % (04/14/17 0846)    Vital Signs Range (Last 24H):  Temp:  [97.8 °F (36.6 °C)-100.1 °F (37.8 °C)]   Pulse:  []   Resp:  [10-52]   BP: ()/(49-76)   SpO2:  [98 %-100 %]     Physical Exam:    General: no distress,.on the ventilator  Neck: no jugular venous distention and no carotid bruit  Lungs:  diminished breath sounds bilaterally and 7.5 ett in place and rhonchi bilaterally  Heart: regular rate and rhythm and no murmur  Abdomen: soft, non-tender non-distended; bowel sounds normal and right NGT in place  Extremities: no cyanosis or edema, or clubbing  Neurologic: sedated.  Skin-warm, moist. No rash      Laboratory:  Recent Results (from the past 24 hour(s))   POCT glucose    Collection Time: 04/13/17  8:51 PM   Result Value Ref Range    POCT Glucose 456 (HH) 70 - 110 mg/dL   POCT glucose    Collection Time: 04/13/17 11:23 PM   Result Value Ref Range    POCT Glucose 387 (H) 70 - 110 mg/dL   Magnesium    Collection Time: 04/14/17  3:00 AM   Result Value Ref Range    Magnesium 2.1 1.6 - 2.6 mg/dL   Phosphorus    Collection Time: 04/14/17   3:00 AM   Result Value Ref Range    Phosphorus 3.4 2.7 - 4.5 mg/dL   Basic metabolic panel    Collection Time: 04/14/17  3:00 AM   Result Value Ref Range    Sodium 138 136 - 145 mmol/L    Potassium 3.8 3.5 - 5.1 mmol/L    Chloride 101 95 - 110 mmol/L    CO2 33 (H) 23 - 29 mmol/L    Glucose 363 (H) 70 - 110 mg/dL    BUN, Bld 36 (H) 6 - 20 mg/dL    Creatinine 1.0 0.5 - 1.4 mg/dL    Calcium 9.5 8.7 - 10.5 mg/dL    Anion Gap 4 (L) 8 - 16 mmol/L    eGFR if African American >60 >60 mL/min/1.73 m^2    eGFR if non African American >60 >60 mL/min/1.73 m^2   ISTAT PROCEDURE    Collection Time: 04/14/17  4:15 AM   Result Value Ref Range    POC PH 7.339 (L) 7.35 - 7.45    POC PCO2 58.4 (HH) 35 - 45 mmHg    POC PO2 114 (H) 80 - 100 mmHg    POC HCO3 31.4 (H) 24 - 28 mmol/L    POC BE 4 -2 to 2 mmol/L    POC SATURATED O2 98 95 - 100 %    POC TCO2 33 (H) 23 - 27 mmol/L    Rate 10     Sample ARTERIAL     Site LR     Allens Test Pass     DelSys Adult Vent     Mode SIMV     Vt 440     PEEP 5     PS 10     FiO2 40    POCT glucose    Collection Time: 04/14/17  5:36 AM   Result Value Ref Range    POCT Glucose 299 (H) 70 - 110 mg/dL     CBC:     Recent Labs  Lab 04/12/17  0141   WBC 4.86   RBC 4.35   HGB 10.9*   HCT 39.2      MCV 90   MCH 25.1*   MCHC 27.8*     BMP:     Recent Labs  Lab 04/14/17  0300   *      K 3.8      CO2 33*   BUN 36*   CREATININE 1.0   CALCIUM 9.5   MG 2.1     CMP:     Recent Labs  Lab 04/14/17  0300   *   CALCIUM 9.5      K 3.8   CO2 33*      BUN 36*   CREATININE 1.0     LFTs:   No results for input(s): ALT, AST, ALKPHOS, BILITOT, PROT, ALBUMIN in the last 168 hours.  Coagulation:   No results for input(s): INR, APTT in the last 168 hours.    Invalid input(s): PT  Cardiac markers: No results for input(s): CKMB, TROPONINT, MYOGLOBIN in the last 168 hours.  Microbiology Results (last 7 days)     Procedure Component Value Units Date/Time    Clostridium difficile EIA  [[id number]] Collected:  04/14/17 0015    Order Status:  Sent Specimen:  Stool from Stool Updated:  04/14/17 0023    Culture, Respiratory with Gram Stain [[id number]] Collected:  04/11/17 1200    Order Status:  Completed Specimen:  Respiratory from Bronchial Wash Updated:  04/13/17 0911     Respiratory Culture No growth     Gram Stain (Respiratory) Moderate WBC's     Gram Stain (Respiratory) No organisms seen    Narrative:       Clinical Hx: HTN  Breast Mass  Pre-op Diagnosis: Pneumonia  Post-op Diagnosis: Bronchial Washings  Jar #1: Bronchial Washings    Gram stain [[id number]] Collected:  04/11/17 1157    Order Status:  Canceled Specimen:  Bronchial Wash from Lung, RUL Updated:  04/11/17 1230    Narrative:       Clinical Hx:  HTN  Breast Mass  Pre-op Diagnosis: Pneumonia  Post-op Diagnosis  Jar #1: Bronchial Washings  Gram Stain was cancelled on 04/11/2017 at 13:47 by JGO; Duplicate   order, test included in another profile. 04/11/2017  13:47        ABGs:     Recent Labs  Lab 04/14/17  0415   PH 7.339*   PCO2 58.4*   PO2 114*   HCO3 31.4*   POCSATURATED 98   BE 4       Ventilator Setting:  Vent Mode: SIMV (PRVC) + PS  Oxygen Concentration (%):  [] 40  Resp Rate Total:  [9 br/min-15 br/min] 10 br/min  Vt Set:  [440 mL] 440 mL  PEEP/CPAP:  [5 cmH20] 5 cmH20  Pressure Support:  [10 cmH20] 10 cmH20  Mean Airway Pressure:  [6.6 gbC22-30.9 cmH20] 9.3 cmH20     Diagnostic Results:     Increased central vascular congestion/edema slightly worsened since the previous day study.  There is obscuration of the left hemidiaphragm, either from a left basilar infiltrate or left pleural effusion.  Cardiomegaly persists.    The position of the endotracheal tube, the right upper extremity PICC line remains without change.  There is an enteric catheter with the tip of which is not included on the radiograph.               ASSESSMENT/PLAN:     1. Pneumonia of both lungs due to infectious organism, unspecified part of lung    2.  Respiratory arrest    3. Acute respiratory failure with hypercapnia    4. Sepsis, due to unspecified organism            Plan::  Weaning failure ; she will need tracheostomy . Will consult ENT

## 2017-04-14 NOTE — SUBJECTIVE & OBJECTIVE
Interval History: Pt failed weaning trial this morning; Neurology following. No acute events.    Review of Systems   Unable to perform ROS: Intubated     Objective:     Vital Signs (Most Recent):  Temp: 98.5 °F (36.9 °C) (04/14/17 1507)  Pulse: 96 (04/14/17 1507)  Resp: 19 (04/14/17 1507)  BP: 111/72 (04/14/17 1502)  SpO2: 98 % (04/14/17 1507) Vital Signs (24h Range):  Temp:  [98.1 °F (36.7 °C)-100.1 °F (37.8 °C)] 98.5 °F (36.9 °C)  Pulse:  [] 96  Resp:  [10-38] 19  SpO2:  [97 %-100 %] 98 %  BP: ()/(52-82) 111/72     Weight: 80 kg (176 lb 5.9 oz)  Body mass index is 49.6 kg/(m^2).    Intake/Output Summary (Last 24 hours) at 04/14/17 1534  Last data filed at 04/14/17 1500   Gross per 24 hour   Intake           2796.8 ml   Output             3780 ml   Net           -983.2 ml      Physical Exam   Constitutional: She appears well-developed and well-nourished.   obese   HENT:   Head: Normocephalic and atraumatic.   Eyes: Conjunctivae are normal.   Neck: Neck supple.   Cardiovascular: Normal rate and regular rhythm.    Anterior chest with hard, large left breast mass that is palpable.   Pulmonary/Chest:   Poor respiratory effort with decreased breath sound bilaterally and inspiratory crackles    Abdominal: Soft. Bowel sounds are normal. She exhibits no distension. There is no tenderness.   Musculoskeletal: Normal range of motion. She exhibits no edema.   Neurological:   Sedated on vent.   Skin: Skin is warm and dry. No erythema.   Nursing note and vitals reviewed.      Significant Labs: All pertinent labs within the past 24 hours have been reviewed.    Significant Imaging: I have reviewed and interpreted all pertinent imaging results/findings within the past 24 hours.

## 2017-04-14 NOTE — PROGRESS NOTES
[facility] [place]ult [place]  Consult Note    SUMMARY     Recommendations    Recommendation/Intervention:   1. Patient trending lower for EEN x 4 days. Currently at upper end for TF rec's. Will f/u with progress and if REE continues to trend in 1400's will consider TF change   2. Consider l.bacillus to aid with diarrhea   3. RD to monitor    Goals: Tolerance to TF; patient ot meet % EEN  Nutrition Goal Status: new  Communication of RD Recs: discussed on rounds    Continuum of Care Plan    Referral to Outpatient Services:  (D/C planning: Too soon to determine)    Reason for Assessment    Reason for Assessment: RD follow-up  Diagnosis:  (SOB; pneumonia)  Relevent Medical History: CHF   Interdisciplinary Rounds: attended     General Information Comments: Intubated. Propofol. TF being tolerated. Checking for C.diff       Nutrition Prescription Ordered    Current Diet Order: NPO     Current Nutrition Support Formula Ordered: Impact Peptide 1.5  Current Nutrition Support Rate Ordered: 40 (ml)  Current Nutrition Support Frequency Ordered: continuous  Oral Nutrition Supplement: 2 packets of Beneprotein      Evaluation of Received Nutrients/Fluid Intake    Enteral Calories (kcal): 1440  Enteral Protein (gm): 90  Enteral (Free Water) Fluid (mL): 739  Free Water Flush Fluid (mL): 900      Other Calories (kcal): 303 (propofol and beneprotein)  Total Calories (kcal): 1743     Energy Calories Required: meeting needs  % Kcal Needs: 121%     Other Protein (gm): 12     Total Protein (gm/kg): 103  Protein Required: meeting needs  % Protein Needs: 100     IV Fluid (mL): 1200       Fluid Required: meeting needs  Comments: 125 ml  Tolerance: tolerating     Nutrition Risk Screen     Nutrition Risk Screen: no indicators present    Nutrition/Diet History     Food Preferences: DANIEL  Factors Affecting Nutritional Intake: NPO, on mechanical ventilation     Labs/Tests/Procedures/Meds    Diagnostic Test/Procedure  Review: reviewed, pertinent  Pertinent Labs Reviewed: reviewed  Pertinent Labs Comments: BUN 36  Pertinent Medications Reviewed: reviewed  Pertinent Medications Comments: propofol    Physical Findings    Overall Physical Appearance: obese, on ventilator support  Tubes: orogastric tube  Oral/Mouth Cavity: WDL  Skin:  (blister on foot)    Anthropometrics     Height (inches): 62.13 in  Weight Method: Bed Scale  Weight (kg): 79.6 kg     Ideal Body Weight (IBW), Female: 110.65 lb     % Ideal Body Weight, Female (lb): 158.6 lb  BMI (kg/m2): 31.97  BMI Grade: 35 - 39.9 - obesity - grade II     Estimated/Assessed Needs    Weight Used For Calorie Calculations: 79.6 kg (175 lb 7.8 oz)   Height (cm): 157.8 cm (noted possible EPIC error of ht. (5'2 vs. 4'2))     Energy Need Method: [place] (1440 kcal)    RMR (Dale-St. Jeor Equation): 1394.13     Weight Used For Protein Calculations: 50 kg (110 lb 3.7 oz) (IBW)  Protein Requirements: 100-125 g    Fluid Need Method: RDA Method (per MD)     Nutrition Diagnosis    Problem: Inadequate Energy Intake  Etiology: Mechanical Ventilation   As Evidenced by: NPO/TF initiated  Nutrition Diagnosis Status: Continues     Monitor and Evaluation    Food and Nutrient Intake: energy intake, food and beverage intake, enteral nutrition intake  Food and Nutrient Adminstration: diet order, enteral and parenteral nutrition administration  Anthropometric Measurements: weight, weight change  Biochemical Data, Medical Tests and Procedures: electrolyte and renal panel, glucose/endocrine profile, gastrointestinal profile  Nutrition-Focused Physical Findings: overall appearance    Nutrition Risk    Level of Risk: other (see comments) (f/u 2x weekly)    Nutrition Follow-Up    RD Follow-up?: Yes

## 2017-04-14 NOTE — PROGRESS NOTES
[facility]  Neurology  Progress Note    Patient Name: Radha Pritchett  MRN: [id number]  Admission Date: 4/6/2017  Hospital Length of Stay: 8 days  Code Status: Full Code   Attending Provider: Lakesha Greer MD  Primary Care Physician: Ferny Iglesias MD   Principal Problem:Acute respiratory failure with hypercapnia    Subjective:     Interval History: 45 y/o female with medical Hx as listed below who was admitted for shortness of breath. Pt was found to have a pneumonia. She decompensasated and is now intubated. Pt has been difficult to to wean off ventilator. It has been reported that pt has been experiencing upper extremity weakness for a while but is perhaps a generalized weakness chronically. No other details at his moment. Pt also has Hx of a left breast mass that according to pathology is benign, possibly a hematoma. Pt has at baseline a learning difficulty.      -4/11/17: Pt remains mechanically ventilated. NIF reported -10.      -4/12/17: Remains mechanically ventilated. Upon sedation vacation follow commands.     -4/13/17: Still unable to wean off vent.     -4/14/17: No improvement reported. Pt remains mechanically ventilated. In previous days pt reported to reach for the ETT when off of sedation.    Current Neurological Medications:     Current Facility-Administered Medications   Medication Dose Route Frequency Provider Last Rate Last Dose    acetaminophen tablet 500 mg  500 mg Oral Q6H PRN Easton Aaron MD        acetaZOLAMIDE (DIAMOX) 250 mg in dextrose 5 % 50 mL  250 mg Intravenous Q12H Bird Vega MD   250 mg at 04/14/17 0801    albuterol-ipratropium 2.5mg-0.5mg/3mL nebulizer solution 3 mL  3 mL Nebulization Q4H PRN Easton Aaron MD   3 mL at 04/14/17 0751    carvedilol tablet 3.125 mg  3.125 mg Oral BID WM Lakesha Greer MD   3.125 mg at 04/14/17 0801    cefTRIAXone (ROCEPHIN) 1 g in dextrose 5 % 50 mL IVPB  1 g Intravenous Q24H Easton Aaron MD   1 g at 04/14/17 0801     chlorhexidine 0.12 % solution 15 mL  15 mL Mouth/Throat BID Easton Aaron MD   15 mL at 04/14/17 0802    cloNIDine tablet 0.1 mg  0.1 mg Oral TID PRN Easton Aaron MD        dextrose 50% injection 12.5 g  12.5 g Intravenous PRN Easton Aaron MD        enoxaparin injection 40 mg  40 mg Subcutaneous Daily Easton Aaron MD   40 mg at 04/13/17 1743    furosemide injection 40 mg  40 mg Intravenous BID Bird Vega MD   40 mg at 04/14/17 0802    glucagon (human recombinant) injection 1 mg  1 mg Intramuscular PRN Easton Aaron MD        insulin aspart pen 1-10 Units  1-10 Units Subcutaneous Q6H PRN Easton Aaron MD   8 Units at 04/14/17 1126    modafinil tablet 200 mg  200 mg Oral Daily Bird Vega MD   200 mg at 04/14/17 0801    morphine injection 2 mg  2 mg Intravenous Q4H PRN Lakesha Greer MD   2 mg at 04/12/17 0830    ondansetron injection 8 mg  8 mg Intravenous Q8H PRN Easton Aaron MD        pantoprazole injection 40 mg  40 mg Intravenous Daily Lakesha Greer MD   40 mg at 04/14/17 0802    predniSONE tablet 60 mg  60 mg Oral Daily Gus Phillips MD   60 mg at 04/14/17 0801    promethazine (PHENERGAN) 12.5 mg in dextrose 5 % 50 mL IVPB  12.5 mg Intravenous Q6H PRN Easton Aaron MD        propofol (DIPRIVAN) 10 mg/mL infusion  5 mcg/kg/min Intravenous Continuous Easton Aaron MD 9.6 mL/hr at 04/14/17 1500 20.101 mcg/kg/min at 04/14/17 1500    ramelteon tablet 8 mg  8 mg Oral Nightly PRN Easton Aaron MD   8 mg at 04/06/17 2239    scopolamine 1.5 mg (1 mg over 3 days) 1.5 mg  1 patch Transdermal Q3 Days Lakesha Greer MD   1.5 mg at 04/13/17 1935       Review of Systems   Unable to obtain as pt is intubated and sedated    Objective:     Vital Signs (Most Recent):  Temp: 98.5 °F (36.9 °C) (04/14/17 1507)  Pulse: 89 (04/14/17 1536)  Resp: 12 (04/14/17 1536)  BP: 115/72 (04/14/17 1532)  SpO2: 99 % (04/14/17 1536) Vital Signs (24h Range):  Temp:  [98.1 °F (36.7 °C)-100.1 °F (37.8 °C)] 98.5 °F (36.9  °C)  Pulse:  [] 89  Resp:  [10-38] 12  SpO2:  [97 %-100 %] 99 %  BP: ()/(52-82) 115/72     Weight: 80 kg (176 lb 5.9 oz)  Body mass index is 49.6 kg/(m^2).    Physical Exam  Constitutional: intubated  ENT: no discharge  Head: Normocephalic.   Eyes: No icteric sclereae  Neck: Neck supple.   Cardiovascular: Normal rate.   Pulmonary/Chest: symmetrical expansion of chest.   Extremities: no edema  Neurological: Sedated  Pupils are round at 3 mm and reactive to light; corneal responses are present bilaterally; gag reflex present and cough reflex present  No rigidity         Significant Labs:   CBC: No results for input(s): WBC, HGB, HCT, PLT in the last 48 hours.  CMP:   Recent Labs  Lab 04/13/17  0143 04/14/17  0300   * 363*    138   K 3.7 3.8    101   CO2 31* 33*   BUN 25* 36*   CREATININE 0.9 1.0   CALCIUM 8.7 9.5   MG 2.1 2.1   ANIONGAP 5* 4*   EGFRNONAA >60 >60       Assessment and Plan:     45 y/o female consulted for UE weakness:      1. UE weakness: at this point etiology is uncertain but if the inability to extubate is linked to this weakness pt could have an underlying neuromuscular disorder (myopathy, MG, among others). A critical illness myopathy/polyneuropathy usually develops around a week after critical illness. NIF is very low.  -CPK in normal range.  -MG panel is pending.  -Trial of prednisone 60 mg daily with no favorable results.   -Obtaining head CT to see if any findings suggestive of a central process    Active Diagnoses:    Diagnosis Date Noted POA    PRINCIPAL PROBLEM:  Acute respiratory failure with hypercapnia [J96.02] 04/07/2017 Yes    Hypokalemia [E87.6] 04/09/2017 Yes    Breast mass, left [N63] 04/08/2017 Yes    Pneumonia of both lungs due to infectious organism [J18.9] 04/06/2017 Yes    Sepsis [A41.9] 04/06/2017 Yes    Essential hypertension [I10] 04/06/2017 Yes     Chronic    Chronic diastolic heart failure [I50.32] 04/06/2017 Yes     Chronic     Moderate protein malnutrition [E44.0] 04/06/2017 Yes     Chronic      Problems Resolved During this Admission:    Diagnosis Date Noted Date Resolved POA       VTE Risk Mitigation         Ordered     enoxaparin injection 40 mg  Daily     Route:  Subcutaneous        04/06/17 2202     Medium Risk of VTE  Once      04/06/17 2202          Gus Phillips MD  Neurology  [facility]

## 2017-04-14 NOTE — PROGRESS NOTES
[facility] Hospital Medicine  Progress Note    Patient Name: Radha Pritchett  MRN: [id number]  Patient Class: IP- Inpatient   Admission Date: 4/6/2017  Length of Stay: 8 days  Attending Physician: Lakesha Greer MD  Primary Care Provider: Ferny Iglesias MD        Subjective:     Principal Problem:Acute respiratory failure with hypercapnia    HPI:  Ms. Radha Pritchett is a 46 y.o. female with essential hypertension, chronic diastolic heart failure (LVEF 55-60% Dec 2014), moderate protein malnutrition who presents to [facility] ED with complaints of dyspnea today.  She was scheduled to undergo removal of a breast mass today but was found to be hypoxic.  She does report that she has been short of breath but cannot specify how long.  She has also had some non-productive coughing and some chills without fevers.  Further history is otherwise limited at this time.    Hospital Course:   was admitted with pneumonia and acutely decompensated and was intubated and placed on vent. Pt with UE weakness prior to presentation on this admission which may be contributing to inability to extubate. Neurology following and considering possible MG and patient on therapeutic trial of prednisone and pyridostigmine. Pt s/p bronch that was unrevealing on 4/11. Unable to wean off vent.    Interval History: Pt failed weaning trial this morning; Neurology following. No acute events.    Review of Systems   Unable to perform ROS: Intubated     Objective:     Vital Signs (Most Recent):  Temp: 98.5 °F (36.9 °C) (04/14/17 1507)  Pulse: 96 (04/14/17 1507)  Resp: 19 (04/14/17 1507)  BP: 111/72 (04/14/17 1502)  SpO2: 98 % (04/14/17 1507) Vital Signs (24h Range):  Temp:  [98.1 °F (36.7 °C)-100.1 °F (37.8 °C)] 98.5 °F (36.9 °C)  Pulse:  [] 96  Resp:  [10-38] 19  SpO2:  [97 %-100 %] 98 %  BP: ()/(52-82) 111/72     Weight: 80 kg (176 lb 5.9 oz)  Body mass index is 49.6 kg/(m^2).    Intake/Output Summary (Last 24 hours)  at 04/14/17 1534  Last data filed at 04/14/17 1500   Gross per 24 hour   Intake           2796.8 ml   Output             3780 ml   Net           -983.2 ml      Physical Exam   Constitutional: She appears well-developed and well-nourished.   obese   HENT:   Head: Normocephalic and atraumatic.   Eyes: Conjunctivae are normal.   Neck: Neck supple.   Cardiovascular: Normal rate and regular rhythm.    Anterior chest with hard, large left breast mass that is palpable.   Pulmonary/Chest:   Poor respiratory effort with decreased breath sound bilaterally and inspiratory crackles    Abdominal: Soft. Bowel sounds are normal. She exhibits no distension. There is no tenderness.   Musculoskeletal: Normal range of motion. She exhibits no edema.   Neurological:   Sedated on vent.   Skin: Skin is warm and dry. No erythema.   Nursing note and vitals reviewed.      Significant Labs: All pertinent labs within the past 24 hours have been reviewed.    Significant Imaging: I have reviewed and interpreted all pertinent imaging results/findings within the past 24 hours.    Assessment/Plan:      * Acute respiratory failure with hypercapnia  Mechanical ventilation  Weakness  Metabolic encephalopathy  Assessment and Plan:  S/p intubation, possible secondary to pneumonia vs edema, will continue abx, ECHO with diastolic dysfunction but normal EF, and Pulm following for vent management. Family reported UE weakness for a few months prior to this admission and Neurology consulted, possible MG and patient undergoing trial of prednisone and pyridostigmine. This weakness likely limiting extubation, possible paraneoplastic syndrome vs MG. Unclear source of encephalopathy and case discussed with Neurology, getting CT scan today of the head that was unrevealing. Prognosis diminishing with continued lack of progress. Appreciate Neurology and Pulmonary input.    Pneumonia of both lungs due to infectious organism  The patient has a CURB-65 score of 0, and  does not meet criteria for healthcare-associated pneumonia.  I have reviewed the chest X-ray and CT-A chest and it reveals bilateral airspace opacification concerning for pneumonia.  Oxygen saturations are improved but requiring a non-rebreather mask; then BIPAP and s/p intubation.  Blood culture NGTD.  PT complete full course of Azithromycin, and it will be stopped today, continue Rocephin for now.    Sepsis  This patient met criteria for sepsis given tachycardia, tachypnea, and pneumonia; there is no evidence of endo-organ dysfunction.  Blood cultures are NGTD;  Antibiotics as discussed above.    Essential hypertension  Pt's BP on low end of normal, and hydrochlorothiazide and lisinopril stopped. Hold parameter placed on  Carvedilol.    Chronic diastolic heart failure  Patient doesn't appear to be in acute heart failure; checked ECHO with EF=65% but with diastolic dysfunction.    Moderate protein malnutrition  Tolerating tube feeds.    Breast mass, left  Planned for surgery, seen by Dr. Sandoval (his patient) and plan to reschedule as outpatient. It is reportedly a hematoma.    Hypokalemia  Corrected, monitor.    Counseling regarding advanced directives and goals of care  Extensive conversations done with family yesterday and today. Pt to remain full code. Pt is still  but estranged from the  and reportedly the grandmother is the major decision maker. Will consult palliative care to help clarify the legal decision maker and to continue conversations in regards to goals of care given if the patient makes no clinical progress soon, decisions regarding trach, etc are soon looming in the horizon. Prognosis guarded.      VTE Risk Mitigation         Ordered     enoxaparin injection 40 mg  Daily     Route:  Subcutaneous        04/06/17 2202     Medium Risk of VTE  Once      04/06/17 2202        Critical care time spent: 40 minutes.    Lakesha Greer MD  Department of Hospital Medicine   [facility] [place]

## 2017-04-14 NOTE — PROGRESS NOTES
Pt rec ieved on servoi vent in SIMV rate 10/440 tv/+5 peep/10 ps and 40% fio2 sats 100%the patient has 7.5 et tube moved to right lip taped 23 cm.Pt has coarse bs with white secretions.Pt alarms on and functioning.[facility]u bag at [facility].Pt tolerated respiratory treatment with SOLE.

## 2017-04-14 NOTE — PLAN OF CARE
Problem: Patient Care Overview  Goal: Plan of Care Review  Outcome: Ongoing (interventions implemented as appropriate)  Remains on ventilator, unable to wean.  Failed CPAP trial.  Tolerating tube feedings.  Attempts to follow simple commands with sedation vacation.  CT of the head done with no acute intracranial pathology.  Liquid stools with pending C-diff result.  Flexiseal intact. VSS, afebrile, adequate urine output.  Pressure ulcer & fall prevention interventions on-going.

## 2017-04-14 NOTE — PROGRESS NOTES
[facility] [place]  Neurology  Progress Note    Patient Name: Radha Pritchett  MRN: [id number]  Admission Date: 4/6/2017  Hospital Length of Stay: 7 days  Code Status: Full Code   Attending Provider: Lakesha Greer MD  Primary Care Physician: Ferny Iglesias MD   Principal Problem:Acute respiratory failure with hypercapnia    Subjective:     Interval History: 45 y/o female with medical Hx as listed below who was admitted for shortness of breath. Pt was found to have a pneumonia. She decompensasated and is now intubated. Pt has been difficult to to wean off ventilator. It has been reported that pt has been experiencing upper extremity weakness for a while but is perhaps a generalized weakness chronically. No other details at his moment. Pt also has Hx of a left breast mass that according to pathology is benign, possibly a hematoma. Pt has at baseline a learning difficulty.      -4/11/17: Pt remains mechanically ventilated. NIF reported -10.     -4/12/17: Remains mechanically ventilated. Upon sedation vacation follow commands.    -4/13/17: Still unable to wean off vent.    Current Neurological Medications:     Current Facility-Administered Medications   Medication Dose Route Frequency Provider Last Rate Last Dose    acetaminophen tablet 500 mg  500 mg Oral Q6H PRN Easton Aaron MD        acetaZOLAMIDE (DIAMOX) 250 mg in dextrose 5 % 50 mL  250 mg Intravenous Q12H Bird Vega MD   250 mg at 04/13/17 1000    albuterol-ipratropium 2.5mg-0.5mg/3mL nebulizer solution 3 mL  3 mL Nebulization Q4H PRN Easton Aaron MD   3 mL at 04/13/17 0809    cefTRIAXone (ROCEPHIN) 1 g in dextrose 5 % 50 mL IVPB  1 g Intravenous Q24H Easton Aaron MD   1 g at 04/13/17 0820    And    azithromycin 500 mg in dextrose 5 % 250 mL IVPB (ready to mix system)  500 mg Intravenous Q24H Easton Aaron  mL/hr at 04/13/17 0900 500 mg at 04/13/17 0900    carvedilol tablet 3.125 mg  3.125 mg Oral BID WM Lakesah Greer MD   3.125  mg at 04/13/17 1743    chlorhexidine 0.12 % solution 15 mL  15 mL Mouth/Throat BID Easton Aaron MD   15 mL at 04/13/17 0808    cloNIDine tablet 0.1 mg  0.1 mg Oral TID PRN Easton Aaron MD        enoxaparin injection 40 mg  40 mg Subcutaneous Daily Easton Aaron MD   40 mg at 04/13/17 1743    furosemide injection 40 mg  40 mg Intravenous BID Bird Vega MD   40 mg at 04/13/17 1744    modafinil tablet 200 mg  200 mg Oral Daily Bird Vega MD   200 mg at 04/13/17 0808    morphine injection 2 mg  2 mg Intravenous Q4H PRN Lakesha Greer MD   2 mg at 04/12/17 0830    ondansetron injection 8 mg  8 mg Intravenous Q8H PRN Easton Aaron MD        pantoprazole injection 40 mg  40 mg Intravenous Daily Lakesha Greer MD   40 mg at 04/13/17 0807    predniSONE tablet 60 mg  60 mg Oral Daily Gus Phillips MD   60 mg at 04/13/17 0808    promethazine (PHENERGAN) 12.5 mg in dextrose 5 % 50 mL IVPB  12.5 mg Intravenous Q6H PRN Easton Aaron MD        propofol (DIPRIVAN) 10 mg/mL infusion  5 mcg/kg/min Intravenous Continuous Easton Aaron MD 19.1 mL/hr at 04/13/17 1836 40 mcg/kg/min at 04/13/17 1836    ramelteon tablet 8 mg  8 mg Oral Nightly PRN Easton Aaron MD   8 mg at 04/06/17 2239    scopolamine 1.5 mg (1 mg over 3 days) 1.5 mg  1 patch Transdermal Q3 Days Lakesha Greer MD   1.5 mg at 04/10/17 2019       Review of Systems   Unable to obtained as pt is intubted    Objective:     Vital Signs (Most Recent):  Temp: 99 °F (37.2 °C) (04/13/17 1523)  Pulse: 94 (04/13/17 1832)  Resp: 10 (04/13/17 1832)  BP: (!) 93/52 (04/13/17 1832)  SpO2: 100 % (04/13/17 1832) Vital Signs (24h Range):  Temp:  [97.6 °F (36.4 °C)-99 °F (37.2 °C)] 99 °F (37.2 °C)  Pulse:  [] 94  Resp:  [10-52] 10  SpO2:  [97 %-100 %] 100 %  BP: ()/(49-72) 93/52     Weight: 82.8 kg (182 lb 8.7 oz)  Body mass index is 51.34 kg/(m^2).    Physical Exam  Constitutional: intubated  ENT: no discharge  Head: Normocephalic.   Eyes: No icteric  sclereae  Neck: Neck supple.   Cardiovascular: Normal rate.   Pulmonary/Chest: symmetrical expansion of chest.   Extremities: no edema  Neurological: Partially opens eyes upon noxious stimulation  Pupils are round at 3 mm and reactive to ligt; corneal responses are present bilaterally; gag reflex present and cough reflex present  No rigidity  DTR's: unable to ellicit in biceps; 1+ patellars  Withdraws LE's upon noxious stimulation       Significant Labs:   CBC:   Recent Labs  Lab 04/12/17 0141   WBC 4.86   HGB 10.9*   HCT 39.2        CMP:   Recent Labs  Lab 04/12/17 0141 04/13/17 0143   * 384*    139   K 5.2* 3.7    103   CO2 30* 31*   BUN 16 25*   CREATININE 0.8 0.9   CALCIUM 8.7 8.7   MG 1.6 2.1   ANIONGAP 5* 5*   EGFRNONAA >60 >60         Assessment and Plan:     45 y/o female consulted for UE weakness:      1. UE weakness: at this point etiology is uncertain but if the inability to extubate is linked to this weakness pt could have an underlying neuromuscular disorder (myopathy, MG, among others). A critical illness myopathy/polyneuropathy usually develops around a week after critical illness. NIF is very low.  -CPK in normal range.  -MG panel is pending.  -Trial of prednisone 60 mg daily. Will hold Mestinon for now to avoid increase of secretions.    Active Diagnoses:    Diagnosis Date Noted POA    PRINCIPAL PROBLEM:  Acute respiratory failure with hypercapnia [J96.02] 04/07/2017 Yes    Hypokalemia [E87.6] 04/09/2017 Yes    Breast mass, left [N63] 04/08/2017 Yes    Pneumonia of both lungs due to infectious organism [J18.9] 04/06/2017 Yes    Sepsis [A41.9] 04/06/2017 Yes    Essential hypertension [I10] 04/06/2017 Yes     Chronic    Chronic diastolic heart failure [I50.32] 04/06/2017 Yes     Chronic    Moderate protein malnutrition [E44.0] 04/06/2017 Yes     Chronic      Problems Resolved During this Admission:    Diagnosis Date Noted Date Resolved POA       VTE Risk Mitigation          Ordered     enoxaparin injection 40 mg  Daily     Route:  Subcutaneous        04/06/17 2202     Medium Risk of VTE  Once      04/06/17 2202          Gus Phillips MD  Neurology  [facility]

## 2017-04-14 NOTE — ASSESSMENT & PLAN NOTE
Mechanical ventilation  Weakness  Metabolic encephalopathy  Assessment and Plan:  S/p intubation, possible secondary to pneumonia vs edema, will continue abx, ECHO with diastolic dysfunction but normal EF, and Pulm following for vent management. Family reported UE weakness for a few months prior to this admission and Neurology consulted, possible MG and patient undergoing trial of prednisone and pyridostigmine. This weakness likely limiting extubation, possible paraneoplastic syndrome vs MG. Unclear source of encephalopathy and case discussed with Neurology, getting CT scan today of the head that was unrevealing. Prognosis diminishing with continued lack of progress. Appreciate Neurology and Pulmonary input.

## 2017-04-14 NOTE — ASSESSMENT & PLAN NOTE
Extensive conversations done with family yesterday and today. Pt to remain full code. Pt is still  but estranged from the  and reportedly the grandmother is the major decision maker. Will consult palliative care to help clarify the legal decision maker and to continue conversations in regards to goals of care given if the patient makes no clinical progress soon, decisions regarding trach, etc are soon looming in the horizon. Prognosis diminishing daily with continued lack of progress.

## 2017-04-15 ENCOUNTER — ANESTHESIA (OUTPATIENT)
Dept: SURGERY | Facility: HOSPITAL | Age: 47
DRG: 004 | End: 2017-04-15
Payer: MEDICAID

## 2017-04-15 ENCOUNTER — ANESTHESIA EVENT (OUTPATIENT)
Dept: SURGERY | Facility: HOSPITAL | Age: 47
DRG: 004 | End: 2017-04-15
Payer: MEDICAID

## 2017-04-15 LAB
ALLENS TEST: ABNORMAL
ANION GAP SERPL CALC-SCNC: 8 MMOL/L
BUN SERPL-MCNC: 39 MG/DL
CALCIUM SERPL-MCNC: 10.2 MG/DL
CHLORIDE SERPL-SCNC: 100 MMOL/L
CO2 SERPL-SCNC: 32 MMOL/L
CREAT SERPL-MCNC: 0.9 MG/DL
DELSYS: ABNORMAL
EP: 5
EP: 5
ERYTHROCYTE [SEDIMENTATION RATE] IN BLOOD BY WESTERGREN METHOD: 10 MM/H
ERYTHROCYTE [SEDIMENTATION RATE] IN BLOOD BY WESTERGREN METHOD: 14 MM/H
EST. GFR  (AFRICAN AMERICAN): >60 ML/MIN/1.73 M^2
EST. GFR  (NON AFRICAN AMERICAN): >60 ML/MIN/1.73 M^2
FIO2: 35
FIO2: 40
FIO2: 50
FLOW: 12
GLUCOSE SERPL-MCNC: 175 MG/DL
HCO3 UR-SCNC: 23.8 MMOL/L (ref 24–28)
HCO3 UR-SCNC: 31.3 MMOL/L (ref 24–28)
HCO3 UR-SCNC: 31.3 MMOL/L (ref 24–28)
HCO3 UR-SCNC: 31.5 MMOL/L (ref 24–28)
HCO3 UR-SCNC: 34.2 MMOL/L (ref 24–28)
IP: 12
IP: 18
MAGNESIUM SERPL-MCNC: 2 MG/DL
MIN VOL: 10.6
MIN VOL: 11.7
MIN VOL: 6.4
MODE: ABNORMAL
PCO2 BLDA: 124.1 MMHG (ref 35–45)
PCO2 BLDA: 25.4 MMHG (ref 35–45)
PCO2 BLDA: 51 MMHG (ref 35–45)
PCO2 BLDA: 77.7 MMHG (ref 35–45)
PCO2 BLDA: 90.5 MMHG (ref 35–45)
PEEP: 5
PEEP: 5
PH SMN: 7.05 [PH] (ref 7.35–7.45)
PH SMN: 7.15 [PH] (ref 7.35–7.45)
PH SMN: 7.21 [PH] (ref 7.35–7.45)
PH SMN: 7.4 [PH] (ref 7.35–7.45)
PH SMN: 7.58 [PH] (ref 7.35–7.45)
PHOSPHATE SERPL-MCNC: 2.4 MG/DL
PIP: 16
PO2 BLDA: 102 MMHG (ref 80–100)
PO2 BLDA: 62 MMHG (ref 80–100)
PO2 BLDA: 74 MMHG (ref 80–100)
PO2 BLDA: 88 MMHG (ref 80–100)
PO2 BLDA: 90 MMHG (ref 80–100)
POC BE: -1 MMOL/L
POC BE: -1 MMOL/L
POC BE: 1 MMOL/L
POC BE: 3 MMOL/L
POC BE: 5 MMOL/L
POC SATURATED O2: 76 % (ref 95–100)
POC SATURATED O2: 92 % (ref 95–100)
POC SATURATED O2: 94 % (ref 95–100)
POC SATURATED O2: 97 % (ref 95–100)
POC SATURATED O2: 98 % (ref 95–100)
POC TCO2: 25 MMOL/L (ref 23–27)
POC TCO2: 33 MMOL/L (ref 23–27)
POC TCO2: 34 MMOL/L (ref 23–27)
POC TCO2: 34 MMOL/L (ref 23–27)
POC TCO2: 38 MMOL/L (ref 23–27)
POCT GLUCOSE: 186 MG/DL (ref 70–110)
POCT GLUCOSE: 243 MG/DL (ref 70–110)
POCT GLUCOSE: 279 MG/DL (ref 70–110)
POCT GLUCOSE: 292 MG/DL (ref 70–110)
POTASSIUM SERPL-SCNC: 3.3 MMOL/L
PS: 10
PS: 15
SAMPLE: ABNORMAL
SITE: ABNORMAL
SODIUM SERPL-SCNC: 140 MMOL/L
SP02: 85
SP02: 94
SP02: 95
SP02: 96
SP02: 99
SPONT RATE: 16
SPONT RATE: 22
SPONT RATE: 36
VT: 440

## 2017-04-15 PROCEDURE — 25000242 PHARM REV CODE 250 ALT 637 W/ HCPCS: Performed by: INTERNAL MEDICINE

## 2017-04-15 PROCEDURE — 94002 VENT MGMT INPAT INIT DAY: CPT

## 2017-04-15 PROCEDURE — 83735 ASSAY OF MAGNESIUM: CPT

## 2017-04-15 PROCEDURE — 80048 BASIC METABOLIC PNL TOTAL CA: CPT

## 2017-04-15 PROCEDURE — 63600175 PHARM REV CODE 636 W HCPCS: Performed by: HOSPITALIST

## 2017-04-15 PROCEDURE — 25000003 PHARM REV CODE 250: Performed by: INTERNAL MEDICINE

## 2017-04-15 PROCEDURE — 94660 CPAP INITIATION&MGMT: CPT

## 2017-04-15 PROCEDURE — 82803 BLOOD GASES ANY COMBINATION: CPT

## 2017-04-15 PROCEDURE — 63600175 PHARM REV CODE 636 W HCPCS: Performed by: PSYCHIATRY & NEUROLOGY

## 2017-04-15 PROCEDURE — 84100 ASSAY OF PHOSPHORUS: CPT

## 2017-04-15 PROCEDURE — 25000003 PHARM REV CODE 250: Performed by: HOSPITALIST

## 2017-04-15 PROCEDURE — 63600175 PHARM REV CODE 636 W HCPCS

## 2017-04-15 PROCEDURE — 94003 VENT MGMT INPAT SUBQ DAY: CPT

## 2017-04-15 PROCEDURE — 99900035 HC TECH TIME PER 15 MIN (STAT)

## 2017-04-15 PROCEDURE — 94640 AIRWAY INHALATION TREATMENT: CPT

## 2017-04-15 PROCEDURE — 63600175 PHARM REV CODE 636 W HCPCS: Performed by: INTERNAL MEDICINE

## 2017-04-15 PROCEDURE — 36600 WITHDRAWAL OF ARTERIAL BLOOD: CPT

## 2017-04-15 PROCEDURE — 63600175 PHARM REV CODE 636 W HCPCS: Performed by: ANESTHESIOLOGY

## 2017-04-15 PROCEDURE — 27000190 HC CPAP FULL FACE MASK W/VALVE

## 2017-04-15 PROCEDURE — 27200966 HC CLOSED SUCTION SYSTEM

## 2017-04-15 PROCEDURE — 27000221 HC OXYGEN, UP TO 24 HOURS

## 2017-04-15 PROCEDURE — C9113 INJ PANTOPRAZOLE SODIUM, VIA: HCPCS | Performed by: HOSPITALIST

## 2017-04-15 PROCEDURE — 99232 SBSQ HOSP IP/OBS MODERATE 35: CPT | Mod: ,,, | Performed by: PSYCHIATRY & NEUROLOGY

## 2017-04-15 PROCEDURE — 31500 INSERT EMERGENCY AIRWAY: CPT | Mod: ,,, | Performed by: ANESTHESIOLOGY

## 2017-04-15 PROCEDURE — 20000000 HC ICU ROOM

## 2017-04-15 PROCEDURE — 99900026 HC AIRWAY MAINTENANCE (STAT)

## 2017-04-15 PROCEDURE — 99900017 HC EXTUBATION W/PARAMETERS (STAT)

## 2017-04-15 PROCEDURE — 0BH17EZ INSERTION OF ENDOTRACHEAL AIRWAY INTO TRACHEA, VIA NATURAL OR ARTIFICIAL OPENING: ICD-10-PCS | Performed by: INTERNAL MEDICINE

## 2017-04-15 PROCEDURE — 36415 COLL VENOUS BLD VENIPUNCTURE: CPT

## 2017-04-15 RX ORDER — PROPOFOL 10 MG/ML
INJECTION, EMULSION INTRAVENOUS
Status: DISPENSED
Start: 2017-04-15 | End: 2017-04-16

## 2017-04-15 RX ORDER — DEXMEDETOMIDINE HYDROCHLORIDE 4 UG/ML
0.4 INJECTION, SOLUTION INTRAVENOUS CONTINUOUS PRN
Status: DISCONTINUED | OUTPATIENT
Start: 2017-04-15 | End: 2017-04-21

## 2017-04-15 RX ORDER — FUROSEMIDE 10 MG/ML
20 INJECTION INTRAMUSCULAR; INTRAVENOUS ONCE
Status: COMPLETED | OUTPATIENT
Start: 2017-04-15 | End: 2017-04-15

## 2017-04-15 RX ORDER — IPRATROPIUM BROMIDE AND ALBUTEROL SULFATE 2.5; .5 MG/3ML; MG/3ML
3 SOLUTION RESPIRATORY (INHALATION) EVERY 6 HOURS
Status: DISCONTINUED | OUTPATIENT
Start: 2017-04-15 | End: 2017-05-08 | Stop reason: HOSPADM

## 2017-04-15 RX ORDER — PROPOFOL 10 MG/ML
VIAL (ML) INTRAVENOUS
Status: DISCONTINUED | OUTPATIENT
Start: 2017-04-15 | End: 2017-04-15 | Stop reason: HOSPADM

## 2017-04-15 RX ORDER — SUCCINYLCHOLINE CHLORIDE 20 MG/ML
INJECTION INTRAMUSCULAR; INTRAVENOUS
Status: COMPLETED
Start: 2017-04-15 | End: 2017-04-15

## 2017-04-15 RX ORDER — POTASSIUM CHLORIDE 20 MEQ/15ML
40 SOLUTION ORAL ONCE
Status: COMPLETED | OUTPATIENT
Start: 2017-04-15 | End: 2017-04-15

## 2017-04-15 RX ADMIN — IPRATROPIUM BROMIDE AND ALBUTEROL SULFATE 3 ML: .5; 3 SOLUTION RESPIRATORY (INHALATION) at 01:04

## 2017-04-15 RX ADMIN — PROPOFOL 5 MCG/KG/MIN: 10 INJECTION, EMULSION INTRAVENOUS at 09:04

## 2017-04-15 RX ADMIN — DEXMEDETOMIDINE HYDROCHLORIDE 0.4 MCG/KG/HR: 4 INJECTION, SOLUTION INTRAVENOUS at 08:04

## 2017-04-15 RX ADMIN — CHLORHEXIDINE GLUCONATE 15 ML: 1.2 RINSE ORAL at 08:04

## 2017-04-15 RX ADMIN — PANTOPRAZOLE SODIUM 40 MG: 40 INJECTION, POWDER, FOR SOLUTION INTRAVENOUS at 08:04

## 2017-04-15 RX ADMIN — SUCCINYLCHOLINE CHLORIDE 100 MG: 20 INJECTION, SOLUTION INTRAMUSCULAR; INTRAVENOUS at 08:04

## 2017-04-15 RX ADMIN — IPRATROPIUM BROMIDE AND ALBUTEROL SULFATE 3 ML: .5; 3 SOLUTION RESPIRATORY (INHALATION) at 07:04

## 2017-04-15 RX ADMIN — INSULIN ASPART 3 UNITS: 100 INJECTION, SOLUTION INTRAVENOUS; SUBCUTANEOUS at 12:04

## 2017-04-15 RX ADMIN — INSULIN ASPART 4 UNITS: 100 INJECTION, SOLUTION INTRAVENOUS; SUBCUTANEOUS at 11:04

## 2017-04-15 RX ADMIN — CARVEDILOL 3.12 MG: 3.12 TABLET, FILM COATED ORAL at 08:04

## 2017-04-15 RX ADMIN — PROPOFOL 100 MG: 10 INJECTION, EMULSION INTRAVENOUS at 08:04

## 2017-04-15 RX ADMIN — POTASSIUM CHLORIDE 40 MEQ: 20 SOLUTION ORAL at 08:04

## 2017-04-15 RX ADMIN — ENOXAPARIN SODIUM 40 MG: 100 INJECTION SUBCUTANEOUS at 04:04

## 2017-04-15 RX ADMIN — PROPOFOL 20 MCG/KG/MIN: 10 INJECTION, EMULSION INTRAVENOUS at 01:04

## 2017-04-15 RX ADMIN — FUROSEMIDE 20 MG: 10 INJECTION, SOLUTION INTRAMUSCULAR; INTRAVENOUS at 09:04

## 2017-04-15 RX ADMIN — MODAFINIL 200 MG: 100 TABLET ORAL at 08:04

## 2017-04-15 RX ADMIN — INSULIN ASPART 2 UNITS: 100 INJECTION, SOLUTION INTRAVENOUS; SUBCUTANEOUS at 06:04

## 2017-04-15 RX ADMIN — ACETAZOLAMIDE 250 MG: 500 INJECTION, POWDER, LYOPHILIZED, FOR SOLUTION INTRAVENOUS at 10:04

## 2017-04-15 RX ADMIN — CEFTRIAXONE 1 G: 1 INJECTION, SOLUTION INTRAVENOUS at 08:04

## 2017-04-15 RX ADMIN — IPRATROPIUM BROMIDE AND ALBUTEROL SULFATE 3 ML: .5; 3 SOLUTION RESPIRATORY (INHALATION) at 03:04

## 2017-04-15 RX ADMIN — FUROSEMIDE 40 MG: 10 INJECTION, SOLUTION INTRAMUSCULAR; INTRAVENOUS at 08:04

## 2017-04-15 RX ADMIN — PREDNISONE 60 MG: 20 TABLET ORAL at 08:04

## 2017-04-15 RX ADMIN — DEXMEDETOMIDINE HYDROCHLORIDE 0.2 MCG/KG/HR: 4 INJECTION, SOLUTION INTRAVENOUS at 09:04

## 2017-04-15 RX ADMIN — CHLORHEXIDINE GLUCONATE 15 ML: 1.2 RINSE ORAL at 10:04

## 2017-04-15 RX ADMIN — INSULIN ASPART 6 UNITS: 100 INJECTION, SOLUTION INTRAVENOUS; SUBCUTANEOUS at 05:04

## 2017-04-15 RX ADMIN — ACETAZOLAMIDE 250 MG: 500 INJECTION, POWDER, LYOPHILIZED, FOR SOLUTION INTRAVENOUS at 08:04

## 2017-04-15 NOTE — PROGRESS NOTES
Progress Note  Pulmonology    Admit Date: 4/6/2017   LOS: 9 days     SUBJECTIVE:     Follow-up For:  Vent management. Intubated, sedated.    Continuous Infusions:   propofol 20.101 mcg/kg/min (04/15/17 0600)     Scheduled Meds:   acetaZOLAMIDE (DIAMOX) IVPB  250 mg Intravenous Q12H    carvedilol  3.125 mg Oral BID WM    cefTRIAXone (ROCEPHIN) IVPB  1 g Intravenous Q24H    chlorhexidine  15 mL Mouth/Throat BID    enoxaparin  40 mg Subcutaneous Daily    furosemide  40 mg Intravenous BID    modafinil  200 mg Oral Daily    pantoprazole  40 mg Intravenous Daily    predniSONE  60 mg Oral Daily    scopolamine  1 patch Transdermal Q3 Days     Review of Systems:  Review of systems not obtained due to patient factors intubated and sedated.    OBJECTIVE:     Vital Signs Range (Last 24H):  Temp:  [98.1 °F (36.7 °C)-98.9 °F (37.2 °C)]   Pulse:  []   Resp:  [10-29]   BP: ()/(50-90)   SpO2:  [97 %-100 %]     I & O (Last 24H):  Intake/Output Summary (Last 24 hours) at 04/15/17 0646  Last data filed at 04/15/17 0600   Gross per 24 hour   Intake          2818.96 ml   Output             4635 ml   Net         -1816.04 ml     Physical Exam:  General: no distress, moderately obese  Neck: no carotid bruit  Lungs:  normal respiratory effort, diminished breath sounds bilaterally and posterior - bilateral and 7.5 ett  Heart: regular rate and rhythm and no murmur  Abdomen: soft, non-tender non-distended; bowel sounds normal and OGT and rectal tube  Extremities: no cyanosis or edema, or clubbing and SCDs  Neurologic: intubated and sedated    Laboratory:  CBC:   Recent Labs  Lab 04/12/17  0141   WBC 4.86   RBC 4.35   HGB 10.9*   HCT 39.2      MCV 90   MCH 25.1*   MCHC 27.8*     BMP:   Recent Labs  Lab 04/15/17  0435   *      K 3.3*      CO2 32*   BUN 39*   CREATININE 0.9   CALCIUM 10.2   MG 2.0     Vent Mode: SIMV (PRVC) + PS  Oxygen Concentration (%):  [40] 40  Resp Rate Total:  [9 br/min-20  br/min] 10 br/min  Vt Set:  [440 mL] 440 mL  PEEP/CPAP:  [5 cmH20] 5 cmH20  Pressure Support:  [10 cmH20] 10 cmH20  Mean Airway Pressure:  [6.7 cmH20-15.7 cmH20] 9.8 cmH20    ABGs:   Recent Labs  Lab 04/15/17  0419   PH 7.396   PCO2 51.0*   PO2 102*   HCO3 31.3*   POCSATURATED 98   BE 5     Microbiology Results (last 7 days)     Procedure Component Value Units Date/Time    Clostridium difficile EIA [[id number]] Collected:  04/14/17 0015    Order Status:  Completed Specimen:  Stool from Stool Updated:  04/14/17 1416     C. diff Antigen Negative     C difficile Toxins A+B, EIA Negative      Testing not recommended for children <24 months old.       Culture, Respiratory with Gram Stain [[id number]] Collected:  04/11/17 1200    Order Status:  Completed Specimen:  Respiratory from Bronchial Wash Updated:  04/13/17 0911     Respiratory Culture No growth     Gram Stain (Respiratory) Moderate WBC's     Gram Stain (Respiratory) No organisms seen    Narrative:       Clinical Hx: HTN  Breast Mass  Pre-op Diagnosis: Pneumonia  Post-op Diagnosis: Bronchial Washings  Jar #1: Bronchial Washings    Gram stain [[id number]] Collected:  04/11/17 1157    Order Status:  Canceled Specimen:  Bronchial Wash from Lung, RUL Updated:  04/11/17 1230    Narrative:       Clinical Hx:  HTN  Breast Mass  Pre-op Diagnosis: Pneumonia  Post-op Diagnosis  Jar #1: Bronchial Washings  Gram Stain was cancelled on 04/11/2017 at 13:47 by JGO; Duplicate   order, test included in another profile. 04/11/2017  13:47        Chest X-Ray: I personally reviewed the films and findings are: ett in place. Small volumes. Mild increased in interstitial edema bilaterally    Diagnostic Results:  Echo: Reviewed 4/8/17    1 - TDS, large mass/tumor of left breast.     2 - Normal left ventricular systolic function (EF 60-65%).  Normal wall motion.     3 - Left ventricular diastolic dysfunction.     4 - Right ventricular enlargement with low normal to mildly depressed systolic  function.     5 - Trivial tricuspid regurgitation.     6 - The estimated PA systolic pressure is 29 mmHg.     CT head 4/14/17  1. No acute intracranial pathology  2.  Remaining findings as discussed above.    CTA 4/6/17  1. There is a 12 cm left breast hematoma.  2. There are low lung volumes with bibasilar dependent regions of consolidation and patchy opacification of the right upper lung zone. The findings may represent developing pneumonia.  3. There is no evidence pulmonary embolus.    ASSESSMENT/PLAN:     1) Acute respiratory failure - in 46 overweight female who presented with weakness and pneumonia. Progressed to intubation and difficult weaning. Sedated at present. CXR as above. ABG with fair gas exchange. Negative balance. Afebrile. On ceftriaxone and cultures as above. Will change to imv with higher ps and see if we can wean today. Add bronchodilators. Would continue with diuresis. Check bnp in am.  2) Weakness - UE. Neurology following. ?MS. On steroids. Would wean when ok with neurology.  3) History of HTN  4) Hypokalemia - replace.  5) Anemia - stable. Monitor.    Renae Gunderson

## 2017-04-15 NOTE — PLAN OF CARE
Problem: Patient Care Overview  Goal: Plan of Care Review  Outcome: Ongoing (interventions implemented as appropriate)  Pt is intubated and sedated, follows some commands. Propofol infusing. Pt tolerating tube feeding well. Carter remains in place w/ adequate urine output. Flexi seal in place as well. No signs of pain. Pt remains free of falls. Bed in lowest position. Will continue to monitor.

## 2017-04-15 NOTE — PROGRESS NOTES
Extubation orders received from Dr. Gunderson.  Pt extubated and placed on 40% VM Pulse ox 94%.  Weak cough with excess oral secretions.

## 2017-04-15 NOTE — PROGRESS NOTES
Results for PINKY OSUNA (MRN [id number]) as of 4/15/2017 04:57   Ref. Range 4/15/2017 04:19   POC PH Latest Ref Range: 7.35 - 7.45  7.396   POC PCO2 Latest Ref Range: 35 - 45 mmHg 51.0 (H)   POC PO2 Latest Ref Range: 80 - 100 mmHg 102 (H)   POC BE Latest Ref Range: -2 to 2 mmol/L 5   POC HCO3 Latest Ref Range: 24 - 28 mmol/L 31.3 (H)   POC SATURATED O2 Latest Ref Range: 95 - 100 % 98   POC TCO2 Latest Ref Range: 23 - 27 mmol/L 33 (H)   FiO2 Unknown 40   Vt Unknown 440   PiP Unknown 16   PEEP Unknown 5   Sample Unknown ARTERIAL   DelSys Unknown Adult Vent   Allens Test Unknown Pass   Site Unknown LR   Mode Unknown SIMV   PS Unknown 10   Rate Unknown 10   Sp02 Unknown 96

## 2017-04-15 NOTE — PLAN OF CARE
Problem: Patient Care Overview  Goal: Plan of Care Review  Outcome: Ongoing (interventions implemented as appropriate)  Extubated to .  Within 2 hours became more somnolent and repeat ABG with PCO2 80's and ph 7.1.  Now BIPAP and will re intubate if ABG not improved.  No sedation now.

## 2017-04-16 LAB
ALLENS TEST: ABNORMAL
ANION GAP SERPL CALC-SCNC: 14 MMOL/L
BNP SERPL-MCNC: 79 PG/ML
BUN SERPL-MCNC: 47 MG/DL
CALCIUM SERPL-MCNC: 11 MG/DL
CHLORIDE SERPL-SCNC: 102 MMOL/L
CO2 SERPL-SCNC: 27 MMOL/L
CREAT SERPL-MCNC: 1 MG/DL
DELSYS: ABNORMAL
ERYTHROCYTE [SEDIMENTATION RATE] IN BLOOD BY WESTERGREN METHOD: 15 MM/H
EST. GFR  (AFRICAN AMERICAN): >60 ML/MIN/1.73 M^2
EST. GFR  (NON AFRICAN AMERICAN): >60 ML/MIN/1.73 M^2
FIO2: 100
GLUCOSE SERPL-MCNC: 134 MG/DL
HCO3 UR-SCNC: 28.2 MMOL/L (ref 24–28)
MAGNESIUM SERPL-MCNC: 2.2 MG/DL
MIN VOL: 6.6
MODE: ABNORMAL
PCO2 BLDA: 37.8 MMHG (ref 35–45)
PEEP: 5
PH SMN: 7.48 [PH] (ref 7.35–7.45)
PHOSPHATE SERPL-MCNC: 1.8 MG/DL
PIP: 25
PO2 BLDA: 337 MMHG (ref 80–100)
POC BE: 5 MMOL/L
POC SATURATED O2: 100 % (ref 95–100)
POC TCO2: 29 MMOL/L (ref 23–27)
POCT GLUCOSE: 135 MG/DL (ref 70–110)
POCT GLUCOSE: 145 MG/DL (ref 70–110)
POCT GLUCOSE: 255 MG/DL (ref 70–110)
POCT GLUCOSE: 307 MG/DL (ref 70–110)
POTASSIUM SERPL-SCNC: 3.3 MMOL/L
SAMPLE: ABNORMAL
SITE: ABNORMAL
SODIUM SERPL-SCNC: 143 MMOL/L
SP02: 100
VT: 440

## 2017-04-16 PROCEDURE — 99900035 HC TECH TIME PER 15 MIN (STAT)

## 2017-04-16 PROCEDURE — 99232 SBSQ HOSP IP/OBS MODERATE 35: CPT | Mod: ,,, | Performed by: PSYCHIATRY & NEUROLOGY

## 2017-04-16 PROCEDURE — 25000003 PHARM REV CODE 250: Performed by: INTERNAL MEDICINE

## 2017-04-16 PROCEDURE — C9113 INJ PANTOPRAZOLE SODIUM, VIA: HCPCS | Performed by: HOSPITALIST

## 2017-04-16 PROCEDURE — 63600175 PHARM REV CODE 636 W HCPCS: Performed by: INTERNAL MEDICINE

## 2017-04-16 PROCEDURE — 27000221 HC OXYGEN, UP TO 24 HOURS

## 2017-04-16 PROCEDURE — 87186 SC STD MICRODIL/AGAR DIL: CPT

## 2017-04-16 PROCEDURE — 94640 AIRWAY INHALATION TREATMENT: CPT

## 2017-04-16 PROCEDURE — 80048 BASIC METABOLIC PNL TOTAL CA: CPT

## 2017-04-16 PROCEDURE — 25000242 PHARM REV CODE 250 ALT 637 W/ HCPCS: Performed by: INTERNAL MEDICINE

## 2017-04-16 PROCEDURE — 36415 COLL VENOUS BLD VENIPUNCTURE: CPT

## 2017-04-16 PROCEDURE — 87088 URINE BACTERIA CULTURE: CPT

## 2017-04-16 PROCEDURE — 25000003 PHARM REV CODE 250: Performed by: HOSPITALIST

## 2017-04-16 PROCEDURE — 87070 CULTURE OTHR SPECIMN AEROBIC: CPT

## 2017-04-16 PROCEDURE — 31720 CLEARANCE OF AIRWAYS: CPT

## 2017-04-16 PROCEDURE — 87040 BLOOD CULTURE FOR BACTERIA: CPT

## 2017-04-16 PROCEDURE — 87205 SMEAR GRAM STAIN: CPT

## 2017-04-16 PROCEDURE — 87086 URINE CULTURE/COLONY COUNT: CPT

## 2017-04-16 PROCEDURE — 87077 CULTURE AEROBIC IDENTIFY: CPT

## 2017-04-16 PROCEDURE — 82803 BLOOD GASES ANY COMBINATION: CPT

## 2017-04-16 PROCEDURE — 20000000 HC ICU ROOM

## 2017-04-16 PROCEDURE — 94003 VENT MGMT INPAT SUBQ DAY: CPT

## 2017-04-16 PROCEDURE — 83880 ASSAY OF NATRIURETIC PEPTIDE: CPT

## 2017-04-16 PROCEDURE — 63600175 PHARM REV CODE 636 W HCPCS: Performed by: PSYCHIATRY & NEUROLOGY

## 2017-04-16 PROCEDURE — 63600175 PHARM REV CODE 636 W HCPCS: Performed by: HOSPITALIST

## 2017-04-16 PROCEDURE — 99900026 HC AIRWAY MAINTENANCE (STAT)

## 2017-04-16 PROCEDURE — 84100 ASSAY OF PHOSPHORUS: CPT

## 2017-04-16 PROCEDURE — 36600 WITHDRAWAL OF ARTERIAL BLOOD: CPT

## 2017-04-16 PROCEDURE — 83735 ASSAY OF MAGNESIUM: CPT

## 2017-04-16 RX ORDER — LINEZOLID 2 MG/ML
600 INJECTION, SOLUTION INTRAVENOUS
Status: DISCONTINUED | OUTPATIENT
Start: 2017-04-16 | End: 2017-04-22

## 2017-04-16 RX ORDER — POTASSIUM CHLORIDE 20 MEQ/15ML
40 SOLUTION ORAL ONCE
Status: COMPLETED | OUTPATIENT
Start: 2017-04-16 | End: 2017-04-16

## 2017-04-16 RX ADMIN — INSULIN ASPART 3 UNITS: 100 INJECTION, SOLUTION INTRAVENOUS; SUBCUTANEOUS at 11:04

## 2017-04-16 RX ADMIN — CHLORHEXIDINE GLUCONATE 15 ML: 1.2 RINSE ORAL at 08:04

## 2017-04-16 RX ADMIN — ACETAMINOPHEN 500 MG: 500 TABLET ORAL at 08:04

## 2017-04-16 RX ADMIN — PREDNISONE 60 MG: 20 TABLET ORAL at 08:04

## 2017-04-16 RX ADMIN — POTASSIUM CHLORIDE 40 MEQ: 20 SOLUTION ORAL at 08:04

## 2017-04-16 RX ADMIN — PROPOFOL 25 MCG/KG/MIN: 10 INJECTION, EMULSION INTRAVENOUS at 01:04

## 2017-04-16 RX ADMIN — CARVEDILOL 3.12 MG: 3.12 TABLET, FILM COATED ORAL at 05:04

## 2017-04-16 RX ADMIN — LINEZOLID 600 MG: 600 INJECTION, SOLUTION INTRAVENOUS at 08:04

## 2017-04-16 RX ADMIN — SCOPOLAMINE 1.5 MG: 1 PATCH, EXTENDED RELEASE TRANSDERMAL at 08:04

## 2017-04-16 RX ADMIN — ACETAZOLAMIDE 250 MG: 500 INJECTION, POWDER, LYOPHILIZED, FOR SOLUTION INTRAVENOUS at 08:04

## 2017-04-16 RX ADMIN — MODAFINIL 200 MG: 100 TABLET ORAL at 08:04

## 2017-04-16 RX ADMIN — PIPERACILLIN AND TAZOBACTAM 4.5 G: 4; .5 INJECTION, POWDER, LYOPHILIZED, FOR SOLUTION INTRAVENOUS; PARENTERAL at 05:04

## 2017-04-16 RX ADMIN — PIPERACILLIN AND TAZOBACTAM 4.5 G: 4; .5 INJECTION, POWDER, LYOPHILIZED, FOR SOLUTION INTRAVENOUS; PARENTERAL at 09:04

## 2017-04-16 RX ADMIN — INSULIN ASPART 8 UNITS: 100 INJECTION, SOLUTION INTRAVENOUS; SUBCUTANEOUS at 05:04

## 2017-04-16 RX ADMIN — IPRATROPIUM BROMIDE AND ALBUTEROL SULFATE 3 ML: .5; 3 SOLUTION RESPIRATORY (INHALATION) at 07:04

## 2017-04-16 RX ADMIN — INSULIN ASPART 6 UNITS: 100 INJECTION, SOLUTION INTRAVENOUS; SUBCUTANEOUS at 11:04

## 2017-04-16 RX ADMIN — CARVEDILOL 3.12 MG: 3.12 TABLET, FILM COATED ORAL at 08:04

## 2017-04-16 RX ADMIN — PROPOFOL 30 MCG/KG/MIN: 10 INJECTION, EMULSION INTRAVENOUS at 06:04

## 2017-04-16 RX ADMIN — ENOXAPARIN SODIUM 40 MG: 100 INJECTION SUBCUTANEOUS at 05:04

## 2017-04-16 RX ADMIN — IPRATROPIUM BROMIDE AND ALBUTEROL SULFATE 3 ML: .5; 3 SOLUTION RESPIRATORY (INHALATION) at 01:04

## 2017-04-16 RX ADMIN — IPRATROPIUM BROMIDE AND ALBUTEROL SULFATE 3 ML: .5; 3 SOLUTION RESPIRATORY (INHALATION) at 12:04

## 2017-04-16 RX ADMIN — PANTOPRAZOLE SODIUM 40 MG: 40 INJECTION, POWDER, FOR SOLUTION INTRAVENOUS at 09:04

## 2017-04-16 RX ADMIN — PROPOFOL 20 MCG/KG/MIN: 10 INJECTION, EMULSION INTRAVENOUS at 10:04

## 2017-04-16 NOTE — ASSESSMENT & PLAN NOTE
This patient met criteria for sepsis given tachycardia, tachypnea, and pneumonia; there is no evidence of endo-organ dysfunction.  Blood cultures are NGTD and repeat studies done;  antibiotics as discussed above.

## 2017-04-16 NOTE — PROGRESS NOTES
[facility]  Neurology  Progress Note    Patient Name: Radha Pritchett  MRN: [id number]  Admission Date: 4/6/2017  Hospital Length of Stay: 10 days  Code Status: Full Code   Attending Provider: Lakesha Greer MD  Primary Care Physician: Ferny Iglesias MD   Principal Problem:Acute respiratory failure with hypercapnia    Subjective:     Interval History: 45 y/o female with medical Hx as listed below who was admitted for shortness of breath. Pt was found to have a pneumonia. She decompensasated and is now intubated. Pt has been difficult to to wean off ventilator. It has been reported that pt has been experiencing upper extremity weakness for a while but is perhaps a generalized weakness chronically. No other details at his moment. Pt also has Hx of a left breast mass that according to pathology is benign, possibly a hematoma. Pt has at baseline a learning difficulty.      -4/11/17: Pt remains mechanically ventilated. NIF reported -10.      -4/12/17: Remains mechanically ventilated. Upon sedation vacation follow commands.      -4/13/17: Still unable to wean off vent.       -4/14/17: No improvement reported. Pt remains mechanically ventilated. In previous days pt reported to reach for the ETT when off of sedation.     -4/15/17: Pt tolerating CPAP trial. Moving her extremities.    -4/16/17: Pt extubated yesterday but began to present shallow respirations and somnolence. Markedly hypercapnic on ABG's. Reintubated.     Current Neurological Medications:     Current Facility-Administered Medications   Medication Dose Route Frequency Provider Last Rate Last Dose    acetaminophen tablet 500 mg  500 mg Oral Q6H PRN Easton Aaron MD   500 mg at 04/16/17 0823    acetaZOLAMIDE (DIAMOX) 250 mg in dextrose 5 % 50 mL  250 mg Intravenous Q12H Bird eVga MD   250 mg at 04/16/17 0808    albuterol-ipratropium 2.5mg-0.5mg/3mL nebulizer solution 3 mL  3 mL Nebulization Q4H PRN Easton Aaron MD   3 mL at 04/15/17  0323    albuterol-ipratropium 2.5mg-0.5mg/3mL nebulizer solution 3 mL  3 mL Nebulization Q6H Renae Gunderson MD   3 mL at 04/16/17 0720    carvedilol tablet 3.125 mg  3.125 mg Oral BID WM Lakesha Greer MD   3.125 mg at 04/16/17 0812    chlorhexidine 0.12 % solution 15 mL  15 mL Mouth/Throat BID Easton Aaron MD   15 mL at 04/16/17 0813    cloNIDine tablet 0.1 mg  0.1 mg Oral TID PRN Easton Aaron MD        dexmedetomidine (PRECEDEX) 400mcg/100mL 0.9% NaCL infusion  0.4 mcg/kg/hr Intravenous Continuous PRN Renae Gunderson MD   Stopped at 04/15/17 2149    dextrose 50% injection 12.5 g  12.5 g Intravenous PRN Easton Aaron MD        enoxaparin injection 40 mg  40 mg Subcutaneous Daily Easton Aaron MD   40 mg at 04/15/17 1659    glucagon (human recombinant) injection 1 mg  1 mg Intramuscular PRN Easton Aaron MD        insulin aspart pen 1-10 Units  1-10 Units Subcutaneous Q6H PRN Easton Aaron MD   6 Units at 04/16/17 1113    linezolid 600mg/300ml IVPB 600 mg  600 mg Intravenous Q12H Renae Gunderson  mL/hr at 04/16/17 0827 600 mg at 04/16/17 0827    modafinil tablet 200 mg  200 mg Oral Daily Bird Vega MD   200 mg at 04/16/17 0813    morphine injection 2 mg  2 mg Intravenous Q4H PRN Lakesha Greer MD   2 mg at 04/12/17 0830    ondansetron injection 8 mg  8 mg Intravenous Q8H PRN Easton Aaron MD        pantoprazole injection 40 mg  40 mg Intravenous Daily Lakesha Greer MD   40 mg at 04/16/17 0900    piperacillin-tazobactam 4.5 g in dextrose 5 % 100 mL IVPB (ready to mix system)  4.5 g Intravenous Q8H Renae Gunderson MD 25 mL/hr at 04/16/17 0957 4.5 g at 04/16/17 0957    predniSONE tablet 60 mg  60 mg Oral Daily Gus Phillips MD   60 mg at 04/16/17 0813    promethazine (PHENERGAN) 12.5 mg in dextrose 5 % 50 mL IVPB  12.5 mg Intravenous Q6H PRN Easton Aaron MD        propofol (DIPRIVAN) 10 mg/mL infusion  5 mcg/kg/min Intravenous Continuous Easton Aaron MD 11.9 mL/hr at 04/16/17 1119 70  mcg/kg/min at 04/16/17 1119    ramelteon tablet 8 mg  8 mg Oral Nightly PRN Easton Aaron MD   8 mg at 04/06/17 2239    scopolamine 1.5 mg (1 mg over 3 days) 1.5 mg  1 patch Transdermal Q3 Days Lakesha Greer MD   1.5 mg at 04/13/17 1935       Review of Systems   Unable to obtain as pt intubated    Objective:     Vital Signs (Most Recent):  Temp: (!) 100.4 °F (38 °C) (04/16/17 1100)  Pulse: 98 (04/16/17 1100)  Resp: 10 (04/16/17 1100)  BP: (!) 86/49 (04/16/17 1100)  SpO2: 99 % (04/16/17 1100) Vital Signs (24h Range):  Temp:  [98 °F (36.7 °C)-101.2 °F (38.4 °C)] 100.4 °F (38 °C)  Pulse:  [] 98  Resp:  [10-52] 10  SpO2:  [83 %-100 %] 99 %  BP: ()/() 86/49     Weight: 75.9 kg (167 lb 5.3 oz)  Body mass index is 47.06 kg/(m^2).    Physical Exam  Constitutional: intubated  ENT: no discharge  Head: Normocephalic.   Eyes: No icteric sclereae  Neck: Neck supple.   Cardiovascular: Normal rate.   Pulmonary/Chest: symmetrical expansion of chest.   Extremities: no edema  Neurological: partially open eyes   Pupils are round at 3 mm and reactive to light; corneal responses are present bilaterally; gag reflex present and cough reflex present  Withdraws four extremities to noxious stimulation       Significant Labs:   CBC: No results for input(s): WBC, HGB, HCT, PLT in the last 48 hours.  CMP:   Recent Labs  Lab 04/15/17  0435 04/16/17  0400   * 134*    143   K 3.3* 3.3*    102   CO2 32* 27   BUN 39* 47*   CREATININE 0.9 1.0   CALCIUM 10.2 11.0*   MG 2.0 2.2   ANIONGAP 8 14   EGFRNONAA >60 >60       Significant Imaging:      Assessment and Plan:     47 y/o female consulted for UE weakness:      1. UE weakness: at this point etiology is uncertain but if the inability to extubate is linked to this weakness pt could have an underlying neuromuscular disorder (myopathy, MG, among others). Failed extubation.  -MG panel pending.  -Glycopyrrolate1 mg three times daily for secretions.    Active  Diagnoses:    Diagnosis Date Noted POA    PRINCIPAL PROBLEM:  Acute respiratory failure with hypercapnia [J96.02] 04/07/2017 Yes    Counseling regarding advanced directives and goals of care [Z71.89] 04/14/2017 Not Applicable    Hypokalemia [E87.6] 04/09/2017 Yes    Breast mass, left [N63] 04/08/2017 Yes    Pneumonia of both lungs due to infectious organism [J18.9] 04/06/2017 Yes    Sepsis [A41.9] 04/06/2017 Yes    Essential hypertension [I10] 04/06/2017 Yes     Chronic    Chronic diastolic heart failure [I50.32] 04/06/2017 Yes     Chronic    Moderate protein malnutrition [E44.0] 04/06/2017 Yes     Chronic      Problems Resolved During this Admission:    Diagnosis Date Noted Date Resolved POA       VTE Risk Mitigation         Ordered     enoxaparin injection 40 mg  Daily     Route:  Subcutaneous        04/06/17 2202     Medium Risk of VTE  Once      04/06/17 2202          Gus Phillips MD  Neurology  [facility]

## 2017-04-16 NOTE — PROGRESS NOTES
Results for PINKY OSUNA (MRN [id number]) as of 4/16/2017 05:15   Ref. Range 4/16/2017 04:45   POC PH Latest Ref Range: 7.35 - 7.45  7.481 (H)   POC PCO2 Latest Ref Range: 35 - 45 mmHg 37.8   POC PO2 Latest Ref Range: 80 - 100 mmHg 337 (H)   POC BE Latest Ref Range: -2 to 2 mmol/L 5   POC HCO3 Latest Ref Range: 24 - 28 mmol/L 28.2 (H)   POC SATURATED O2 Latest Ref Range: 95 - 100 % 100   POC TCO2 Latest Ref Range: 23 - 27 mmol/L 29 (H)   FiO2 Unknown 100   Vt Unknown 440   PiP Unknown 25   PEEP Unknown 5   Sample Unknown ARTERIAL   DelSys Unknown Adult Vent   Allens Test Unknown Pass   Site Unknown LR   Mode Unknown AC/PRVC

## 2017-04-16 NOTE — PLAN OF CARE
Problem: Patient Care Overview  Goal: Plan of Care Review  Outcome: Ongoing (interventions implemented as appropriate)  Pt re-intubated last night. Pt sedated, BP dropped w/predcex therefore sedation was switched to propofol. Pt follows some commands. Carter remains in place w/ adequate urine output. Flexi seal in place as well. No signs of pain. Pt remains free of falls. Bed in lowest position. Will continue to monitor.

## 2017-04-16 NOTE — ASSESSMENT & PLAN NOTE
Mechanical ventilation  Weakness  Metabolic encephalopathy  Fever  Assessment and Plan:  Pt with respiratory failure that required intubation, possibly secondary to pneumonia vs edema, treated empirically with Rocephin/Azithromycin, ECHO with diastolic dysfunction but normal EF, and Pulm following for vent management. Family reported UE weakness for a few months prior to this admission and Neurology consulted, possible MG and patient undergoing trial of prednisone and pyridostigmine with confirmatory lab still pending. This weakness likely limited extubation and necessitated reintubation; patient was extubated on 4/16 but had to be re-intubated that same day. Unclear source of encephalopathy and case discussed with Neurology, CT scan of the head was unrevealing. Also, patient completed treatment with Azithromycin and was on Rocephin alone, but she spiked a fever overnight and antibiotics changed to Zosyn/Zyvox today, and repeat cultures done, f/u cultures. Prognosis diminishing with continued lack of progress. Appreciate Neurology and Pulmonary input.

## 2017-04-16 NOTE — PLAN OF CARE
Problem: Patient Care Overview  Goal: Plan of Care Review  Outcome: Ongoing (interventions implemented as appropriate)  Temp spike, cultures of blood, urine and sputum done.  Antibiotics changed.  Fio2 down to 40%.  Trache planned for Tuesday.  Comfortable on low dose propofol

## 2017-04-16 NOTE — ASSESSMENT & PLAN NOTE
Planned for surgery, seen by Dr. Sandoval (his patient) and plan to reschedule as outpatient. It is reportedly a hematoma by biopsy, but suspicion for CA not completely ruled out given paraneoplastic syndrome a consideration.

## 2017-04-16 NOTE — PROGRESS NOTES
Pt intubated by Dr Siddiqui with 7.5 Ett secured with ET Simpson at 25cm at the lip. Placed on Servo-I vent with settings: PRVC, Rate 15, , Peep 5, FIO2 100%. Co2 detector used.

## 2017-04-16 NOTE — ASSESSMENT & PLAN NOTE
The patient has a CURB-65 score of 0, and does not meet criteria for healthcare-associated pneumonia initially. CTA chest with bilateral airspace opacification concerning for pneumonia.  Oxygen saturations are improved but requiring a non-rebreather mask; then BIPAP and s/p intubation.  Blood culture NGTD.  Pt completed full course of Azithromycin, and was continued Rocephin until today with expansion of coverage to Zosyn/Zyvox, and f/u cultures.

## 2017-04-16 NOTE — ANESTHESIA PROCEDURE NOTES
Intubation    Diagnosis: Respiratory failure  Doctor requesting consult: Neptali  Patient location during procedure: ICU  Procedure start time: 4/15/2017 8:36 PM  Timeout: 4/15/2017 8:35 PM  Procedure end time: 4/15/2017 8:39 PM  Staffing  Anesthesiologist: WANDA KRAUSE  Performed by: anesthesiologist   Anesthesiologist was present at the time of the procedure.  Preanesthetic Checklist  Completed: patient identified, site marked, surgical consent, pre-op evaluation, timeout performed, IV checked, risks and benefits discussed, monitors and equipment checked and anesthesia consent given  Intubation  Indication: respiratory failure  Pre-oxygenation. Induction: intravenous, mask ventilation: n/a (RSI).  Intubation: postinduction, laryngoscopy direct, Lopez 2.  Endotracheal Tube: oral, 7.5 mm ID, cuffed (inflated to minimal occlusive pressure)  Attempts: 1, Grade I - full view of cords  Tube secured at 21 cm at the lips.  Findings post-intubation: bilateral breath sounds, positive ETCO2, atraumatic / condition of teeth unchanged  Position Confirmation: auscultation

## 2017-04-16 NOTE — CONSULTS
DATE OF CONSULTATION:  04/16/2017    CLINICAL HISTORY:  I was asked to see Ms. Radha Pritchett, who is a 46-year-old   black female, who is in ICU on ventilatory support for respiratory failure.    She has hypertension, chronic diastolic heart failure and presented with   pneumonia and a left breast mass, which was apparently hematoma.  She was found   to be hypercapnic and short of breath and has had chronic respiratory distress.    She is being worked up for neurologic disease to account for some coordination   of problems with her limbs and she also might possibly have mild mental   retardation as well.    She is currently intubated and on ventilatory support.  An attempt to extubate   her in the last 24 hours was unsuccessful.  She needs a tracheotomy and I will   discuss this with her family and schedule her probably on Tuesday 04/18/2017,   after discussion with her family and informed consent.      PRABHAKAR  dd: 04/16/2017 07:18:32 (CDT)  td: 04/16/2017 07:38:20 (CDT)  Doc ID   #[id number]  Job ID #[id number]    CC:

## 2017-04-16 NOTE — PROGRESS NOTES
[facility] Hospital Medicine  Progress Note    Patient Name: Radha Pritchett  MRN: [id number]  Patient Class: IP- Inpatient   Admission Date: 4/6/2017  Length of Stay: 10 days  Attending Physician: Lakesha Greer MD  Primary Care Provider: Ferny Iglesias MD        Subjective:     Principal Problem:Acute respiratory failure with hypercapnia    HPI:  Ms. Radha Pritchett is a 46 y.o. female with essential hypertension, chronic diastolic heart failure (LVEF 55-60% Dec 2014), moderate protein malnutrition who presents to [facility] ED with complaints of dyspnea today.  She was scheduled to undergo removal of a breast mass today but was found to be hypoxic.  She does report that she has been short of breath but cannot specify how long.  She has also had some non-productive coughing and some chills without fevers.  Further history is otherwise limited at this time.    Hospital Course:   was admitted with pneumonia and acutely decompensated and was intubated and placed on vent. Pt with UE weakness prior to presentation on this admission which may be contributing to inability to extubate. Neurology following and considering possible MG and patient on therapeutic trial of prednisone and pyridostigmine. Pt s/p bronch that was unrevealing on 4/11. Pt was extubated on 4/15 but was re-intubated later the same day.    Interval History: Pt was failed extubation yesterday and had to be re-intubated. Pt also spiked fever overnight and repeat cultures drawn.    Review of Systems   Unable to perform ROS: Intubated     Objective:     Vital Signs (Most Recent):  Temp: 99.8 °F (37.7 °C) (04/16/17 1500)  Pulse: 98 (04/16/17 1730)  Resp: 11 (04/16/17 1730)  BP: 116/69 (04/16/17 1730)  SpO2: 100 % (04/16/17 1730) Vital Signs (24h Range):  Temp:  [98 °F (36.7 °C)-101.2 °F (38.4 °C)] 99.8 °F (37.7 °C)  Pulse:  [] 98  Resp:  [10-52] 11  SpO2:  [83 %-100 %] 100 %  BP: ()/() 116/69     Weight: 75.9 kg  (167 lb 5.3 oz)  Body mass index is 47.06 kg/(m^2).    Intake/Output Summary (Last 24 hours) at 04/16/17 1748  Last data filed at 04/16/17 1725   Gross per 24 hour   Intake           1108.5 ml   Output             1215 ml   Net           -106.5 ml      Physical Exam   Constitutional: She appears well-developed and well-nourished.   obese   HENT:   Head: Normocephalic and atraumatic.   Eyes: Conjunctivae are normal.   Neck: Neck supple.   Cardiovascular: Normal rate and regular rhythm.    Anterior chest with hard, large left breast mass that is palpable.   Pulmonary/Chest:   Poor respiratory effort with decreased breath sound bilaterally and inspiratory crackles    Abdominal: Soft. Bowel sounds are normal. She exhibits no distension. There is no tenderness.   Musculoskeletal: Normal range of motion. She exhibits no edema.   Neurological:   Sedated on vent.   Skin: Skin is warm and dry. No erythema.   Nursing note and vitals reviewed.      Significant Labs: All pertinent labs within the past 24 hours have been reviewed.    Significant Imaging: I have reviewed and interpreted all pertinent imaging results/findings within the past 24 hours.    Assessment/Plan:      * Acute respiratory failure with hypercapnia  Mechanical ventilation  Weakness  Metabolic encephalopathy  Fever  Assessment and Plan:  Pt with respiratory failure that required intubation, possibly secondary to pneumonia vs edema, treated empirically with Rocephin/Azithromycin, ECHO with diastolic dysfunction but normal EF, and Pulm following for vent management. Family reported UE weakness for a few months prior to this admission and Neurology consulted, possible MG and patient undergoing trial of prednisone and pyridostigmine with confirmatory lab still pending. This weakness likely limited extubation and necessitated reintubation; patient was extubated on 4/16 but had to be re-intubated that same day. Unclear source of encephalopathy and case discussed  with Neurology, CT scan of the head was unrevealing. Also, patient completed treatment with Azithromycin and was on Rocephin alone, but she spiked a fever overnight and antibiotics changed to Zosyn/Zyvox today, and repeat cultures done, f/u cultures. Prognosis diminishing with continued lack of progress. Appreciate Neurology and Pulmonary input.    Pneumonia of both lungs due to infectious organism  The patient has a CURB-65 score of 0, and does not meet criteria for healthcare-associated pneumonia initially. CTA chest with bilateral airspace opacification concerning for pneumonia.  Oxygen saturations are improved but requiring a non-rebreather mask; then BIPAP and s/p intubation.  Blood culture NGTD.  Pt completed full course of Azithromycin, and was continued Rocephin until today with expansion of coverage to Zosyn/Zyvox, and f/u cultures.    Sepsis  This patient met criteria for sepsis given tachycardia, tachypnea, and pneumonia; there is no evidence of endo-organ dysfunction.  Blood cultures are NGTD and repeat studies done;  antibiotics as discussed above.    Essential hypertension  Pt's BP on low end of normal, and hydrochlorothiazide and lisinopril stopped. Hold parameter placed on  Carvedilol.    Chronic diastolic heart failure  Patient doesn't appear to be in acute heart failure; checked ECHO with EF=65% but with diastolic dysfunction.    Moderate protein malnutrition  Tolerating tube feeds.    Breast mass, left  Planned for surgery, seen by Dr. Sandoval (his patient) and plan to reschedule as outpatient. It is reportedly a hematoma by biopsy, but suspicion for CA not completely ruled out given paraneoplastic syndrome a consideration.    Hypokalemia  Will replete, monitor.    Counseling regarding advanced directives and goals of care  Extensive conversations done with family. Pt to remain full code. Pt is still  but estranged from the  and reportedly the grandmother is the major decision maker.  Will consult palliative care to help clarify the legal decision maker and to continue conversations in regards to goals of care given if the patient makes no clinical progress soon, decisions regarding trach, etc are soon looming in the horizon. Prognosis diminishing daily with continued lack of progress.      VTE Risk Mitigation         Ordered     enoxaparin injection 40 mg  Daily     Route:  Subcutaneous        04/06/17 2202     Medium Risk of VTE  Once      04/06/17 2202          Critical care time spent: 45 minutes.    Lakesha Greer MD  Department of Hospital Medicine   [facility]

## 2017-04-16 NOTE — PROGRESS NOTES
Results reported to Dr Gunderson.   Results for PINKY OSUNA (MRN [id number]) as of 4/16/2017 01:43   Ref. Range 4/15/2017 19:48   POC PH Latest Ref Range: 7.35 - 7.45  7.048 (LL)   POC PCO2 Latest Ref Range: 35 - 45 mmHg 124.1 (HH)   POC PO2 Latest Ref Range: 80 - 100 mmHg 62 (L)   POC BE Latest Ref Range: -2 to 2 mmol/L -1   POC HCO3 Latest Ref Range: 24 - 28 mmol/L 34.2 (H)   POC SATURATED O2 Latest Ref Range: 95 - 100 % 76 (L)   POC TCO2 Latest Ref Range: 23 - 27 mmol/L 38 (H)   FiO2 Unknown 40   Sample Unknown ARTERIAL   DelSys Unknown CPAP/BiPAP   Allens Test Unknown Pass   Site Unknown LR   Mode Unknown BiPAP

## 2017-04-16 NOTE — ASSESSMENT & PLAN NOTE
Extensive conversations done with family. Pt to remain full code. Pt is still  but estranged from the  and reportedly the grandmother is the major decision maker. Will consult palliative care to help clarify the legal decision maker and to continue conversations in regards to goals of care given if the patient makes no clinical progress soon, decisions regarding trach, etc are soon looming in the horizon. Prognosis diminishing daily with continued lack of progress.

## 2017-04-16 NOTE — SUBJECTIVE & OBJECTIVE
Interval History: Pt was failed extubation yesterday and had to be re-intubated. Pt also spiked fever overnight and repeat cultures drawn.    Review of Systems   Unable to perform ROS: Intubated     Objective:     Vital Signs (Most Recent):  Temp: 99.8 °F (37.7 °C) (04/16/17 1500)  Pulse: 98 (04/16/17 1730)  Resp: 11 (04/16/17 1730)  BP: 116/69 (04/16/17 1730)  SpO2: 100 % (04/16/17 1730) Vital Signs (24h Range):  Temp:  [98 °F (36.7 °C)-101.2 °F (38.4 °C)] 99.8 °F (37.7 °C)  Pulse:  [] 98  Resp:  [10-52] 11  SpO2:  [83 %-100 %] 100 %  BP: ()/() 116/69     Weight: 75.9 kg (167 lb 5.3 oz)  Body mass index is 47.06 kg/(m^2).    Intake/Output Summary (Last 24 hours) at 04/16/17 1748  Last data filed at 04/16/17 1725   Gross per 24 hour   Intake           1108.5 ml   Output             1215 ml   Net           -106.5 ml      Physical Exam   Constitutional: She appears well-developed and well-nourished.   obese   HENT:   Head: Normocephalic and atraumatic.   Eyes: Conjunctivae are normal.   Neck: Neck supple.   Cardiovascular: Normal rate and regular rhythm.    Anterior chest with hard, large left breast mass that is palpable.   Pulmonary/Chest:   Poor respiratory effort with decreased breath sound bilaterally and inspiratory crackles    Abdominal: Soft. Bowel sounds are normal. She exhibits no distension. There is no tenderness.   Musculoskeletal: Normal range of motion. She exhibits no edema.   Neurological:   Sedated on vent.   Skin: Skin is warm and dry. No erythema.   Nursing note and vitals reviewed.      Significant Labs: All pertinent labs within the past 24 hours have been reviewed.    Significant Imaging: I have reviewed and interpreted all pertinent imaging results/findings within the past 24 hours.

## 2017-04-16 NOTE — PROGRESS NOTES
Progress Note  Pulmonology    Admit Date: 4/6/2017   LOS: 10 days     SUBJECTIVE:     Follow-up For:  Vent management. Intubated, sedated. Failed extubation yesterday. On 75% currently.    Continuous Infusions:   dexmedetomidine (PRECEDEX) infusion Stopped (04/15/17 2149)    propofol 30 mcg/kg/min (04/16/17 0611)     Scheduled Meds:   acetaZOLAMIDE (DIAMOX) IVPB  250 mg Intravenous Q12H    albuterol-ipratropium 2.5mg-0.5mg/3mL  3 mL Nebulization Q6H    carvedilol  3.125 mg Oral BID WM    cefTRIAXone (ROCEPHIN) IVPB  1 g Intravenous Q24H    chlorhexidine  15 mL Mouth/Throat BID    enoxaparin  40 mg Subcutaneous Daily    modafinil  200 mg Oral Daily    pantoprazole  40 mg Intravenous Daily    predniSONE  60 mg Oral Daily    propofol        scopolamine  1 patch Transdermal Q3 Days     Review of Systems:  Review of systems not obtained due to patient factors intubated and sedated.    OBJECTIVE:     Vital Signs Range (Last 24H):  Temp:  [98 °F (36.7 °C)-98.9 °F (37.2 °C)]   Pulse:  []   Resp:  [13-52]   BP: ()/()   SpO2:  [83 %-100 %]     I & O (Last 24H):    Intake/Output Summary (Last 24 hours) at 04/16/17 0718  Last data filed at 04/16/17 0600   Gross per 24 hour   Intake           442.08 ml   Output             2215 ml   Net         -1772.92 ml     Physical Exam:  General: no distress, moderately obese. No distress.  Neck: no carotid bruit  Breast: left breast mass - hard, non-mobile  Lungs:  normal respiratory effort, and fairly clear except mildly diminished at bases and 7.5 ett  Heart: regular rate and rhythm and no murmur  Abdomen: soft, non-tender non-distended; bowel sounds diminished and OGT and rectal tube  Extremities: no cyanosis or edema, or clubbing and SCDs  Neurologic: intubated and sedated    Laboratory:  CBC:     Recent Labs  Lab 04/12/17  0141   WBC 4.86   RBC 4.35   HGB 10.9*   HCT 39.2      MCV 90   MCH 25.1*   MCHC 27.8*     BMP:     Recent Labs  Lab  04/16/17  0400   *      K 3.3*      CO2 27   BUN 47*   CREATININE 1.0   CALCIUM 11.0*   MG 2.2     Vent Mode: PRVC  Oxygen Concentration (%):  [] 75  Resp Rate Total:  [6 br/min-42 br/min] 15 br/min  Vt Set:  [440 mL] 440 mL  PEEP/CPAP:  [5 cmH20] 5 cmH20  Pressure Support:  [10 cmH20-15 cmH20] 15 cmH20  Mean Airway Pressure:  [7.5 ioJ83-53.9 cmH20] 9.2 cmH20    ABGs:     Recent Labs  Lab 04/16/17  0445   PH 7.481*   PCO2 37.8   PO2 337*   HCO3 28.2*   POCSATURATED 100   BE 5     Microbiology Results (last 7 days)     Procedure Component Value Units Date/Time    Clostridium difficile EIA [[id number]] Collected:  04/14/17 0015    Order Status:  Completed Specimen:  Stool from Stool Updated:  04/14/17 1416     C. diff Antigen Negative     C difficile Toxins A+B, EIA Negative      Testing not recommended for children <24 months old.       Culture, Respiratory with Gram Stain [[id number]] Collected:  04/11/17 1200    Order Status:  Completed Specimen:  Respiratory from Bronchial Wash Updated:  04/13/17 0911     Respiratory Culture No growth     Gram Stain (Respiratory) Moderate WBC's     Gram Stain (Respiratory) No organisms seen    Narrative:       Clinical Hx: HTN  Breast Mass  Pre-op Diagnosis: Pneumonia  Post-op Diagnosis: Bronchial Washings  Jar #1: Bronchial Washings    Gram stain [[id number]] Collected:  04/11/17 1157    Order Status:  Canceled Specimen:  Bronchial Wash from Lung, RUL Updated:  04/11/17 1230    Narrative:       Clinical Hx:  HTN  Breast Mass  Pre-op Diagnosis: Pneumonia  Post-op Diagnosis  Jar #1: Bronchial Washings  Gram Stain was cancelled on 04/11/2017 at 13:47 by JGO; Duplicate   order, test included in another profile. 04/11/2017  13:47        Chest X-Ray: I personally reviewed the films and findings are: ett in place. Small volumes. LLL atelectasis today    Diagnostic Results:  Echo: Reviewed 4/8/17    1 - TDS, large mass/tumor of left breast.     2 - Normal left  ventricular systolic function (EF 60-65%).  Normal wall motion.     3 - Left ventricular diastolic dysfunction.     4 - Right ventricular enlargement with low normal to mildly depressed systolic function.     5 - Trivial tricuspid regurgitation.     6 - The estimated PA systolic pressure is 29 mmHg.     CT head 4/14/17  1. No acute intracranial pathology  2.  Remaining findings as discussed above.    CTA 4/6/17  1. There is a 12 cm left breast hematoma.  2. There are low lung volumes with bibasilar dependent regions of consolidation and patchy opacification of the right upper lung zone. The findings may represent developing pneumonia.  3. There is no evidence pulmonary embolus.    ASSESSMENT/PLAN:     1) Acute respiratory failure - in 46 overweight female who presented with weakness and pneumonia. Progressed to intubation and difficult weaning. Attempted extubation failed with marked hypercapnia. Suspect weakness was strong factor, ?neurologic. Sedated at present. CXR as above. ABG with good gas exchange. Negative balance. Afebrile. On ceftriaxone and cultures as above. Will wean oxygen and wean rate a bit. Bronchodilators. On steroids for? Possible MG?. ENT consulted for trach.  2) Weakness - UE. Neurology following. ??MG. On steroids. Would wean when ok with neurology.   3) History of HTN  4) Hypokalemia - still low and continue to replace.  5) Anemia - repeat in tanner.    Renae Gunderson    Febrile now. Will order repeat cultures and add linezolid and change ceftriaxone to zosyn.

## 2017-04-17 LAB
ALLENS TEST: ABNORMAL
ALLENS TEST: ABNORMAL
ANION GAP SERPL CALC-SCNC: 8 MMOL/L
ANISOCYTOSIS BLD QL SMEAR: SLIGHT
BASOPHILS # BLD AUTO: 0.01 K/UL
BASOPHILS NFR BLD: 0.1 %
BUN SERPL-MCNC: 46 MG/DL
CALCIUM SERPL-MCNC: 10.5 MG/DL
CHLORIDE SERPL-SCNC: 102 MMOL/L
CO2 SERPL-SCNC: 28 MMOL/L
CREAT SERPL-MCNC: 1.1 MG/DL
DELSYS: ABNORMAL
DELSYS: ABNORMAL
DIFFERENTIAL METHOD: ABNORMAL
EOSINOPHIL # BLD AUTO: 0 K/UL
EOSINOPHIL NFR BLD: 0 %
ERYTHROCYTE [DISTWIDTH] IN BLOOD BY AUTOMATED COUNT: 17.3 %
ERYTHROCYTE [SEDIMENTATION RATE] IN BLOOD BY WESTERGREN METHOD: 10 MM/H
ERYTHROCYTE [SEDIMENTATION RATE] IN BLOOD BY WESTERGREN METHOD: 16 MM/H
EST. GFR  (AFRICAN AMERICAN): >60 ML/MIN/1.73 M^2
EST. GFR  (NON AFRICAN AMERICAN): >60 ML/MIN/1.73 M^2
FIO2: 30
FIO2: 40
GIANT PLATELETS BLD QL SMEAR: PRESENT
GLUCOSE SERPL-MCNC: 244 MG/DL
HCO3 UR-SCNC: 24.4 MMOL/L (ref 24–28)
HCO3 UR-SCNC: 27.4 MMOL/L (ref 24–28)
HCT VFR BLD AUTO: 41.9 %
HGB BLD-MCNC: 12.1 G/DL
HYPOCHROMIA BLD QL SMEAR: ABNORMAL
LYMPHOCYTES # BLD AUTO: 1.8 K/UL
LYMPHOCYTES NFR BLD: 20.7 %
MAGNESIUM SERPL-MCNC: 2.4 MG/DL
MCH RBC QN AUTO: 24.4 PG
MCHC RBC AUTO-ENTMCNC: 28.9 %
MCV RBC AUTO: 85 FL
MIN VOL: 4.9
MODE: ABNORMAL
MODE: ABNORMAL
MONOCYTES # BLD AUTO: 1.1 K/UL
MONOCYTES NFR BLD: 13.4 %
NEUTROPHILS # BLD AUTO: 5.5 K/UL
NEUTROPHILS NFR BLD: 65.8 %
PCO2 BLDA: 32 MMHG (ref 35–45)
PCO2 BLDA: 57.5 MMHG (ref 35–45)
PEEP: 5
PEEP: 5
PH SMN: 7.29 [PH] (ref 7.35–7.45)
PH SMN: 7.49 [PH] (ref 7.35–7.45)
PHOSPHATE SERPL-MCNC: 3.6 MG/DL
PIP: 28
PLATELET # BLD AUTO: 283 K/UL
PLATELET BLD QL SMEAR: ABNORMAL
PMV BLD AUTO: 12 FL
PO2 BLDA: 86 MMHG (ref 80–100)
PO2 BLDA: 99 MMHG (ref 80–100)
POC BE: 0 MMOL/L
POC BE: 2 MMOL/L
POC SATURATED O2: 97 % (ref 95–100)
POC SATURATED O2: 97 % (ref 95–100)
POC TCO2: 25 MMOL/L (ref 23–27)
POC TCO2: 29 MMOL/L (ref 23–27)
POCT GLUCOSE: 228 MG/DL (ref 70–110)
POCT GLUCOSE: 243 MG/DL (ref 70–110)
POCT GLUCOSE: 280 MG/DL (ref 70–110)
POCT GLUCOSE: 319 MG/DL (ref 70–110)
POIKILOCYTOSIS BLD QL SMEAR: SLIGHT
POLYCHROMASIA BLD QL SMEAR: ABNORMAL
POTASSIUM SERPL-SCNC: 3.8 MMOL/L
RBC # BLD AUTO: 4.95 M/UL
SAMPLE: ABNORMAL
SAMPLE: ABNORMAL
SITE: ABNORMAL
SITE: ABNORMAL
SODIUM SERPL-SCNC: 138 MMOL/L
SP02: 100
VT: 440
VT: 440
WBC # BLD AUTO: 8.44 K/UL

## 2017-04-17 PROCEDURE — 63600175 PHARM REV CODE 636 W HCPCS: Performed by: INTERNAL MEDICINE

## 2017-04-17 PROCEDURE — 20000000 HC ICU ROOM

## 2017-04-17 PROCEDURE — 85025 COMPLETE CBC W/AUTO DIFF WBC: CPT

## 2017-04-17 PROCEDURE — 84100 ASSAY OF PHOSPHORUS: CPT

## 2017-04-17 PROCEDURE — 80048 BASIC METABOLIC PNL TOTAL CA: CPT

## 2017-04-17 PROCEDURE — 25000003 PHARM REV CODE 250: Performed by: INTERNAL MEDICINE

## 2017-04-17 PROCEDURE — 94640 AIRWAY INHALATION TREATMENT: CPT

## 2017-04-17 PROCEDURE — 63600175 PHARM REV CODE 636 W HCPCS: Performed by: PSYCHIATRY & NEUROLOGY

## 2017-04-17 PROCEDURE — 25000242 PHARM REV CODE 250 ALT 637 W/ HCPCS: Performed by: INTERNAL MEDICINE

## 2017-04-17 PROCEDURE — 99900035 HC TECH TIME PER 15 MIN (STAT)

## 2017-04-17 PROCEDURE — 83735 ASSAY OF MAGNESIUM: CPT

## 2017-04-17 PROCEDURE — 27000221 HC OXYGEN, UP TO 24 HOURS

## 2017-04-17 PROCEDURE — 63600175 PHARM REV CODE 636 W HCPCS: Performed by: HOSPITALIST

## 2017-04-17 PROCEDURE — 36415 COLL VENOUS BLD VENIPUNCTURE: CPT

## 2017-04-17 PROCEDURE — 99900026 HC AIRWAY MAINTENANCE (STAT)

## 2017-04-17 PROCEDURE — 36600 WITHDRAWAL OF ARTERIAL BLOOD: CPT

## 2017-04-17 PROCEDURE — 82803 BLOOD GASES ANY COMBINATION: CPT

## 2017-04-17 PROCEDURE — 94761 N-INVAS EAR/PLS OXIMETRY MLT: CPT

## 2017-04-17 PROCEDURE — C9113 INJ PANTOPRAZOLE SODIUM, VIA: HCPCS | Performed by: HOSPITALIST

## 2017-04-17 PROCEDURE — 99232 SBSQ HOSP IP/OBS MODERATE 35: CPT | Mod: ,,, | Performed by: PSYCHIATRY & NEUROLOGY

## 2017-04-17 PROCEDURE — 27200966 HC CLOSED SUCTION SYSTEM

## 2017-04-17 PROCEDURE — 25000003 PHARM REV CODE 250: Performed by: HOSPITALIST

## 2017-04-17 RX ADMIN — LINEZOLID 600 MG: 600 INJECTION, SOLUTION INTRAVENOUS at 10:04

## 2017-04-17 RX ADMIN — PIPERACILLIN AND TAZOBACTAM 4.5 G: 4; .5 INJECTION, POWDER, LYOPHILIZED, FOR SOLUTION INTRAVENOUS; PARENTERAL at 01:04

## 2017-04-17 RX ADMIN — LINEZOLID 600 MG: 600 INJECTION, SOLUTION INTRAVENOUS at 08:04

## 2017-04-17 RX ADMIN — INSULIN ASPART 4 UNITS: 100 INJECTION, SOLUTION INTRAVENOUS; SUBCUTANEOUS at 11:04

## 2017-04-17 RX ADMIN — MODAFINIL 200 MG: 100 TABLET ORAL at 09:04

## 2017-04-17 RX ADMIN — IPRATROPIUM BROMIDE AND ALBUTEROL SULFATE 3 ML: .5; 3 SOLUTION RESPIRATORY (INHALATION) at 02:04

## 2017-04-17 RX ADMIN — IPRATROPIUM BROMIDE AND ALBUTEROL SULFATE 3 ML: .5; 3 SOLUTION RESPIRATORY (INHALATION) at 06:04

## 2017-04-17 RX ADMIN — PROPOFOL 24.92 MCG/KG/MIN: 10 INJECTION, EMULSION INTRAVENOUS at 05:04

## 2017-04-17 RX ADMIN — PANTOPRAZOLE SODIUM 40 MG: 40 INJECTION, POWDER, FOR SOLUTION INTRAVENOUS at 09:04

## 2017-04-17 RX ADMIN — PREDNISONE 60 MG: 20 TABLET ORAL at 09:04

## 2017-04-17 RX ADMIN — IPRATROPIUM BROMIDE AND ALBUTEROL SULFATE 3 ML: .5; 3 SOLUTION RESPIRATORY (INHALATION) at 12:04

## 2017-04-17 RX ADMIN — PIPERACILLIN AND TAZOBACTAM 4.5 G: 4; .5 INJECTION, POWDER, LYOPHILIZED, FOR SOLUTION INTRAVENOUS; PARENTERAL at 04:04

## 2017-04-17 RX ADMIN — CHLORHEXIDINE GLUCONATE 15 ML: 1.2 RINSE ORAL at 08:04

## 2017-04-17 RX ADMIN — PROPOFOL 25 MCG/KG/MIN: 10 INJECTION, EMULSION INTRAVENOUS at 11:04

## 2017-04-17 RX ADMIN — IPRATROPIUM BROMIDE AND ALBUTEROL SULFATE 3 ML: .5; 3 SOLUTION RESPIRATORY (INHALATION) at 08:04

## 2017-04-17 RX ADMIN — CHLORHEXIDINE GLUCONATE 15 ML: 1.2 RINSE ORAL at 10:04

## 2017-04-17 RX ADMIN — PROPOFOL 20 MCG/KG/MIN: 10 INJECTION, EMULSION INTRAVENOUS at 06:04

## 2017-04-17 RX ADMIN — CARVEDILOL 3.12 MG: 3.12 TABLET, FILM COATED ORAL at 08:04

## 2017-04-17 RX ADMIN — INSULIN ASPART 6 UNITS: 100 INJECTION, SOLUTION INTRAVENOUS; SUBCUTANEOUS at 06:04

## 2017-04-17 RX ADMIN — INSULIN ASPART 4 UNITS: 100 INJECTION, SOLUTION INTRAVENOUS; SUBCUTANEOUS at 02:04

## 2017-04-17 RX ADMIN — ENOXAPARIN SODIUM 40 MG: 100 INJECTION SUBCUTANEOUS at 04:04

## 2017-04-17 RX ADMIN — INSULIN ASPART 4 UNITS: 100 INJECTION, SOLUTION INTRAVENOUS; SUBCUTANEOUS at 06:04

## 2017-04-17 RX ADMIN — PIPERACILLIN AND TAZOBACTAM 4.5 G: 4; .5 INJECTION, POWDER, LYOPHILIZED, FOR SOLUTION INTRAVENOUS; PARENTERAL at 10:04

## 2017-04-17 RX ADMIN — CARVEDILOL 3.12 MG: 3.12 TABLET, FILM COATED ORAL at 04:04

## 2017-04-17 NOTE — PLAN OF CARE
04/17/17 1341   Discharge Reassessment   Assessment Type Discharge Planning Reassessment   Can the patient answer the patient profile reliably? No, cognitively impaired   How does the patient rate their overall health at the present time? Good  (record)   Describe the patient's ability to walk at the present time. Does not walk or unable to take any steps at all   How often would a person be available to care for the patient? Occasionally   Number of comorbid conditions (as recorded on the chart) Five or more   During the past month, has the patient often been bothered by feeling down, depressed or hopeless? Yes  (record)   During the past month, has the patient often been bothered by little interest or pleasure in doing things? No  (record)   Discharge plan remains the same: No   Provided patient/caregiver education on the expected discharge date and the discharge plan No   Discharge Plan A Long-term acute care facility (LTAC)   Discharge Plan B Rehab   Change in patient condition or support system Yes   Patient choice form signed by patient/caregiver No   Explained to the the patient/caregiver why the discharge planned changed: No   Involved the patient/caregiver in establishing a new discharge plan: No   patient remains in ICU on vent support. SW reviewed chart, discussed with team in bed huddle. Patient extubated, then re intubated over weekend. Trach anticipated if unable to extubate. Anticipate patient will benefit from LTAC if proceed with trach. If does progress without trach, anticipate some type of rehab is most likely resource post acute. Past discussion regarding discharge were with patient grandmother with whom patient lives. SW understands that grandmother currently in hospital. No family in room when SW rounded. SW will follow in ICU, update with patient/extended family and assist as needed.

## 2017-04-17 NOTE — PROGRESS NOTES
ENT:  Will schedule tracheotomy for Wed or Thurs.  Discussed in detail with family.  Multiple signatures obtained.  They are all in agreement with surgery.  The grandmother is in [facility].  Patient has no children.

## 2017-04-17 NOTE — PROGRESS NOTES
Progress Note  Pulmonology    Admit Date: 4/6/2017   LOS: 11 days     SUBJECTIVE:     Follow-up For:  Vent management. Intubated, sedated. Failed extubation yesterday. On 75% currently.    Continuous Infusions:   dexmedetomidine (PRECEDEX) infusion Stopped (04/15/17 2149)    propofol 20 mcg/kg/min (04/17/17 0600)     Scheduled Meds:   acetaZOLAMIDE (DIAMOX) IVPB  250 mg Intravenous Q12H    albuterol-ipratropium 2.5mg-0.5mg/3mL  3 mL Nebulization Q6H    carvedilol  3.125 mg Oral BID WM    chlorhexidine  15 mL Mouth/Throat BID    enoxaparin  40 mg Subcutaneous Daily    linezolid 600mg/300ml  600 mg Intravenous Q12H    modafinil  200 mg Oral Daily    pantoprazole  40 mg Intravenous Daily    piperacillin-tazobactam 4.5 g in dextrose 5 % 100 mL IVPB (ready to mix system)  4.5 g Intravenous Q8H    predniSONE  60 mg Oral Daily    scopolamine  1 patch Transdermal Q3 Days     Review of Systems:  Review of systems not obtained due to patient factors intubated and sedated.    OBJECTIVE:     Vital Signs Range (Last 24H):  Temp:  [98.7 °F (37.1 °C)-100.4 °F (38 °C)]   Pulse:  []   Resp:  [10-29]   BP: ()/(48-88)   SpO2:  [98 %-100 %]     I & O (Last 24H):    Intake/Output Summary (Last 24 hours) at 04/17/17 0832  Last data filed at 04/17/17 0730   Gross per 24 hour   Intake          2169.78 ml   Output             1055 ml   Net          1114.78 ml     Physical Exam:  General: no distress, moderately obese. No distress.  Neck: no carotid bruit  Breast: left breast mass - hard, non-mobile  Lungs:  normal respiratory effort, and fairly clear except mildly diminished at bases and 7.5 ett  Heart: regular rate and rhythm and no murmur  Abdomen: soft, non-tender non-distended; bowel sounds diminished and OGT and rectal tube  Extremities: no cyanosis or edema, or clubbing and SCDs  Neurologic: intubated and sedated    Laboratory:  CBC:     Recent Labs  Lab 04/17/17  0400   WBC 8.44   RBC 4.95   HGB 12.1   HCT  41.9      MCV 85   MCH 24.4*   MCHC 28.9*     BMP:     Recent Labs  Lab 04/17/17  0400   *      K 3.8      CO2 28   BUN 46*   CREATININE 1.1   CALCIUM 10.5   MG 2.4     Vent Mode: PRVC  Oxygen Concentration (%):  [30-60] 30  Resp Rate Total:  [10 br/min-35 br/min] 19 br/min  Vt Set:  [440 mL] 440 mL  PEEP/CPAP:  [5 cmH20] 5 cmH20  Mean Airway Pressure:  [5.8 cmH20-10.6 cmH20] 10.6 cmH20    ABGs:     Recent Labs  Lab 04/17/17  0439   PH 7.286*   PCO2 57.5*   PO2 99   HCO3 27.4   POCSATURATED 97   BE 0     Microbiology Results (last 7 days)     Procedure Component Value Units Date/Time    Blood culture [[id number]] Collected:  04/16/17 0827    Order Status:  Completed Specimen:  Blood Updated:  04/16/17 1512     Blood Culture, Routine No Growth to date    Blood culture [[id number]] Collected:  04/16/17 0823    Order Status:  Completed Specimen:  Blood Updated:  04/16/17 1512     Blood Culture, Routine No Growth to date    Culture, Respiratory with Gram Stain [[id number]] Collected:  04/16/17 0817    Order Status:  Completed Specimen:  Respiratory from Endotracheal Aspirate Updated:  04/16/17 1039     Gram Stain (Respiratory) <10 epithelial cells per low power field.     Gram Stain (Respiratory) Many WBC's     Gram Stain (Respiratory) Few Gram positive cocci in clusters     Gram Stain (Respiratory) Few Gram positive cocci in pairs    Urine culture [[id number]] Collected:  04/16/17 0817    Order Status:  Sent Specimen:  Urine from Urine, Catheterized Updated:  04/16/17 0823    Clostridium difficile EIA [[id number]] Collected:  04/14/17 0015    Order Status:  Completed Specimen:  Stool from Stool Updated:  04/14/17 1416     C. diff Antigen Negative     C difficile Toxins A+B, EIA Negative      Testing not recommended for children <24 months old.       Culture, Respiratory with Gram Stain [[id number]] Collected:  04/11/17 1200    Order Status:  Completed Specimen:  Respiratory from Bronchial Wash  Updated:  04/13/17 0911     Respiratory Culture No growth     Gram Stain (Respiratory) Moderate WBC's     Gram Stain (Respiratory) No organisms seen    Narrative:       Clinical Hx: HTN  Breast Mass  Pre-op Diagnosis: Pneumonia  Post-op Diagnosis: Bronchial Washings  Jar #1: Bronchial Washings    Gram stain [[id number]] Collected:  04/11/17 1157    Order Status:  Canceled Specimen:  Bronchial Wash from Lung, RUL Updated:  04/11/17 1230    Narrative:       Clinical Hx:  HTN  Breast Mass  Pre-op Diagnosis: Pneumonia  Post-op Diagnosis  Jar #1: Bronchial Washings  Gram Stain was cancelled on 04/11/2017 at 13:47 by JGO; Duplicate   order, test included in another profile. 04/11/2017  13:47        Chest X-Ray: I personally reviewed the films and findings are: ett in place. Small volumes. LLL atelectasis today    Diagnostic Results:  Echo: Reviewed 4/8/17    1 - TDS, large mass/tumor of left breast.     2 - Normal left ventricular systolic function (EF 60-65%).  Normal wall motion.     3 - Left ventricular diastolic dysfunction.     4 - Right ventricular enlargement with low normal to mildly depressed systolic function.     5 - Trivial tricuspid regurgitation.     6 - The estimated PA systolic pressure is 29 mmHg.     CT head 4/14/17  1. No acute intracranial pathology  2.  Remaining findings as discussed above.    CTA 4/6/17  1. There is a 12 cm left breast hematoma.  2. There are low lung volumes with bibasilar dependent regions of consolidation and patchy opacification of the right upper lung zone. The findings may represent developing pneumonia.  3. There is no evidence pulmonary embolus.    ASSESSMENT/PLAN:     1) Acute respiratory failure - in 46 overweight female who presented with weakness and pneumonia. Progressed to intubation and difficult weaning. Attempted extubation failed with marked hypercapnia. Suspect weakness was strong factor, ?neurologic. Sedated at present. CXR as above. ABG with good gas exchange.  Negative balance. Afebrile. On ceftriaxone and cultures as above. Will wean oxygen and wean rate a bit. Bronchodilators. On steroids for? Possible MG?. ENT consulted for trach.  2) Weakness - UE. Neurology following. ??MG. On steroids. Would wean when ok with neurology.   3) History of HTN  4) Hypokalemia - still low and continue to replace.  5) Anemia - repeat in am.    CO2 increased this am and vent rate increased to correct ; For trach hopefully on Tuesday.    Bird Vega

## 2017-04-17 NOTE — PLAN OF CARE
Problem: Patient Care Overview  Goal: Plan of Care Review  Outcome: Ongoing (interventions implemented as appropriate)  Pt remains intubated, on propofol for comfort/sedation. VSS/afebrile. CO2 elevated this AM, rate increased. For trach wed or Thursday. Dr. Escamilla talked w/ family extensively today. Pt alert to voice, does not follow commands off sedation. Carter in place, output adequate, TF @ goal and tolerating. No new injury, safety and skin integrity maintained. POC reviewed thoroughly with family at bedside today, questions answered.

## 2017-04-17 NOTE — PROGRESS NOTES
Results reported to [facility].   Results for PINKY OSUNA (MRN [id number]) as of 4/17/2017 05:35   Ref. Range 4/17/2017 04:39   POC PH Latest Ref Range: 7.35 - 7.45  7.286 (LL)   POC PCO2 Latest Ref Range: 35 - 45 mmHg 57.5 (HH)   POC PO2 Latest Ref Range: 80 - 100 mmHg 99   POC BE Latest Ref Range: -2 to 2 mmol/L 0   POC HCO3 Latest Ref Range: 24 - 28 mmol/L 27.4   POC SATURATED O2 Latest Ref Range: 95 - 100 % 97   POC TCO2 Latest Ref Range: 23 - 27 mmol/L 29 (H)   FiO2 Unknown 40   Vt Unknown 440   PiP Unknown 28   PEEP Unknown 5   Sample Unknown ARTERIAL   DelSys Unknown Adult Vent   Allens Test Unknown Pass   Site Unknown RR   Mode Unknown AC/PRVC

## 2017-04-17 NOTE — ASSESSMENT & PLAN NOTE
Mechanical ventilation  Weakness  Metabolic encephalopathy  Fever  Assessment and Plan:  Pt with respiratory failure that required intubation, possibly secondary to pneumonia vs edema, treated empirically with Rocephin/Azithromycin, ECHO with diastolic dysfunction but normal EF, and Pulm following for vent management. Family reported UE weakness for a few months prior to this admission and Neurology consulted, possible MG and patient undergoing trial of prednisone and pyridostigmine with confirmatory lab still pending. This weakness likely limited extubation and necessitated reintubation; patient was extubated on 4/16 but had to be re-intubated that same day. Unclear source of encephalopathy and case discussed with Neurology, CT scan of the head was unrevealing. Also, patient completed treatment with Azithromycin and was on Rocephin alone, but she spiked a fever overnight and antibiotics changed to Zosyn/Zyvox today, and repeat cultures done, f/u cultures. Prognosis diminishing with continued lack of progress. Appreciate Neurology and Pulmonary input.  On MIldenafil at this time.

## 2017-04-17 NOTE — PROGRESS NOTES
[facility] Hospital Medicine  Progress Note    Patient Name: Radha Pritchett  MRN: [id number]  Patient Class: IP- Inpatient   Admission Date: 4/6/2017  Length of Stay: 11 days  Attending Physician: Antonieta Carpenter MD  Primary Care Provider: Ferny Iglesias MD        Subjective:     Principal Problem:Acute respiratory failure with hypercapnia    HPI:  Ms. Radha Pritchett is a 46 y.o. female with essential hypertension, chronic diastolic heart failure (LVEF 55-60% Dec 2014), moderate protein malnutrition who presents to [facility] ED with complaints of dyspnea today.  She was scheduled to undergo removal of a breast mass today but was found to be hypoxic.  She does report that she has been short of breath but cannot specify how long.  She has also had some non-productive coughing and some chills without fevers.  Further history is otherwise limited at this time.    Hospital Course:   was admitted with pneumonia and acutely decompensated and was intubated and placed on vent. Pt with UE weakness prior to presentation on this admission which may be contributing to inability to extubate. Neurology following and considering possible MG and patient on therapeutic trial of prednisone and Modafinil,. Pt s/p bronch that was unrevealing on 4/11. Pt was extubated on 4/15 but was re-intubated later the same day.  Was febrile,has been stared on Zyvox and zosyn.chest x ray show infiltrate,step in urine culture noted.  Possible tracheostomy duo to multiple flailed extubation.      Interval History: Pt was failed extubation yesterday and had to be re-intubated. Pt also spiked fever overnight and repeat cultures drawn.    Review of Systems   Unable to perform ROS: Intubated     Objective:     Vital Signs (Most Recent):  Temp: 100 °F (37.8 °C) (04/17/17 0700)  Pulse: 84 (04/17/17 1000)  Resp: (!) 24 (04/17/17 1000)  BP: 110/73 (04/17/17 1000)  SpO2: 99 % (04/17/17 1000) Vital Signs (24h Range):  Temp:   [98.7 °F (37.1 °C)-100 °F (37.8 °C)] 100 °F (37.8 °C)  Pulse:  [] 84  Resp:  [10-29] 24  SpO2:  [98 %-100 %] 99 %  BP: ()/(48-88) 110/73     Weight: 75.9 kg (167 lb 5.3 oz)  Body mass index is 47.06 kg/(m^2).    Intake/Output Summary (Last 24 hours) at 04/17/17 1152  Last data filed at 04/17/17 0730   Gross per 24 hour   Intake          2009.78 ml   Output             1055 ml   Net           954.78 ml      Physical Exam   Constitutional: She appears well-developed and well-nourished.   obese   HENT:   Head: Normocephalic and atraumatic.   Eyes: Conjunctivae are normal.   Neck: Neck supple.   Cardiovascular: Normal rate and regular rhythm.    Anterior chest with hard, large left breast mass that is palpable.   Pulmonary/Chest:   Poor respiratory effort with decreased breath sound bilaterally and inspiratory crackles    Abdominal: Soft. Bowel sounds are normal. She exhibits no distension. There is no tenderness.   Musculoskeletal: Normal range of motion. She exhibits no edema.   Neurological:   Sedated on vent.   Skin: Skin is warm and dry. No erythema.   Nursing note and vitals reviewed.      Significant Labs: All pertinent labs within the past 24 hours have been reviewed.    Significant Imaging: I have reviewed and interpreted all pertinent imaging results/findings within the past 24 hours.    Assessment/Plan:      * Acute respiratory failure with hypercapnia  Mechanical ventilation  Weakness  Metabolic encephalopathy  Fever  Assessment and Plan:  Pt with respiratory failure that required intubation, possibly secondary to pneumonia vs edema, treated empirically with Rocephin/Azithromycin, ECHO with diastolic dysfunction but normal EF, and Pulm following for vent management. Family reported UE weakness for a few months prior to this admission and Neurology consulted, possible MG and patient undergoing trial of prednisone and pyridostigmine with confirmatory lab still pending. This weakness likely limited  extubation and necessitated reintubation; patient was extubated on 4/16 but had to be re-intubated that same day. Unclear source of encephalopathy and case discussed with Neurology, CT scan of the head was unrevealing. Also, patient completed treatment with Azithromycin and was on Rocephin alone, but she spiked a fever overnight and antibiotics changed to Zosyn/Zyvox today, and repeat cultures done, f/u cultures. Prognosis diminishing with continued lack of progress. Appreciate Neurology and Pulmonary input.  On MIldenafil at this time.      Pneumonia of both lungs due to infectious organism  The patient has a CURB-65 score of 0, and does not meet criteria for healthcare-associated pneumonia initially. CTA chest with bilateral airspace opacification concerning for pneumonia.  Oxygen saturations are improved but requiring a non-rebreather mask; then BIPAP and s/p intubation.  Blood culture NGTD.  Pt completed full course of Azithromycin, and was continued Rocephin until had spike of fever,Abx has been expanded  to Zosyn/Zyvox, and f/u cultures.cheset X ray today show infiltrate,    Sepsis  This patient met criteria for sepsis given tachycardia, tachypnea, and pneumonia; there is no evidence of endo-organ dysfunction.  Blood cultures are NGTD and repeat studies done;  antibiotics as discussed above.  strep in urine likely normal pennie.    Essential hypertension  Pt's BP on low end of normal, and hydrochlorothiazide and lisinopril stopped. Hold parameter placed on  Carvedilol.    Chronic diastolic heart failure  Patient doesn't appear to be in acute heart failure; checked ECHO with EF=65% but with diastolic dysfunction.    Moderate protein malnutrition  Tolerating tube feeds.    Breast mass, left  Planned for surgery, seen by Dr. Sandoval (his patient) and plan to reschedule as outpatient. It is reportedly a hematoma by biopsy, but suspicion for CA not completely ruled out given paraneoplastic syndrome a  consideration.      Hypokalemia  Will replete, monitor.      Counseling regarding advanced directives and goals of care  Extensive conversations done with family. Pt to remain full code. Pt is still  but estranged from the  and reportedly the grandmother is the major decision maker. Will consult palliative care to help clarify the legal decision maker and to continue conversations in regards to goals of care given if the patient makes no clinical progress soon, decisions regarding trach, etc are soon looming in the horizon. Prognosis diminishing daily with continued lack of progress.      VTE Risk Mitigation         Ordered     enoxaparin injection 40 mg  Daily     Route:  Subcutaneous        04/06/17 2202     Medium Risk of VTE  Once      04/06/17 2202          Antonieta Carpenter MD  Department of Hospital Medicine   [facility]

## 2017-04-17 NOTE — PLAN OF CARE
Problem: Patient Care Overview  Goal: Plan of Care Review  Pt remains intubated, excessive secretions, new scopalamine patch placed and frequent suctioning provided, CO2=57.5 on ABGs, rate increased to 16, no other vent changes made. Propofol for sedation, pt reaches for ETT during suctioning and oral care, bilateral wrist restraints in place. Pt afebrile, VSS. Pt free of falls or injury, no new skin breakdown noted, Aquacel foam to sacral, flexiseal in place, frequent repositioning done. Tolerating tube feeds and water flushes, to goal 40cc/hr.

## 2017-04-17 NOTE — PROGRESS NOTES
Received patient on vent settings PRVC 440/16/+5/FIO2 30% ETT size 7.5 secured at 23cm at the lip Ambu bag and mask at bed side no signs of any distress at this current time

## 2017-04-17 NOTE — ASSESSMENT & PLAN NOTE
The patient has a CURB-65 score of 0, and does not meet criteria for healthcare-associated pneumonia initially. CTA chest with bilateral airspace opacification concerning for pneumonia.  Oxygen saturations are improved but requiring a non-rebreather mask; then BIPAP and s/p intubation.  Blood culture NGTD.  Pt completed full course of Azithromycin, and was continued Rocephin until had spike of fever,Abx has been expanded  to Zosyn/Zyvox, and f/u cultures.cheset X ray today show infiltrate,

## 2017-04-17 NOTE — ASSESSMENT & PLAN NOTE
This patient met criteria for sepsis given tachycardia, tachypnea, and pneumonia; there is no evidence of endo-organ dysfunction.  Blood cultures are NGTD and repeat studies done;  antibiotics as discussed above.  strep in urine likely normal pennie.

## 2017-04-18 LAB
ALLENS TEST: ABNORMAL
ANION GAP SERPL CALC-SCNC: 11 MMOL/L
APTT BLDCRRT: 25.8 SEC
BACTERIA SPEC AEROBE CULT: NORMAL
BACTERIA UR CULT: NORMAL
BUN SERPL-MCNC: 42 MG/DL
CALCIUM SERPL-MCNC: 10 MG/DL
CHLORIDE SERPL-SCNC: 101 MMOL/L
CO2 SERPL-SCNC: 24 MMOL/L
CREAT SERPL-MCNC: 1 MG/DL
DELSYS: ABNORMAL
ERYTHROCYTE [SEDIMENTATION RATE] IN BLOOD BY WESTERGREN METHOD: 16 MM/H
EST. GFR  (AFRICAN AMERICAN): >60 ML/MIN/1.73 M^2
EST. GFR  (NON AFRICAN AMERICAN): >60 ML/MIN/1.73 M^2
FIO2: 30
GLUCOSE SERPL-MCNC: 224 MG/DL
GRAM STN SPEC: NORMAL
HCO3 UR-SCNC: 20.8 MMOL/L (ref 24–28)
INR PPP: 1
MAGNESIUM SERPL-MCNC: 1.9 MG/DL
MODE: ABNORMAL
MUSK ANTIBODY TEST: 0 NMOL/L (ref 0–0.02)
PCO2 BLDA: 28.6 MMHG (ref 35–45)
PEEP: 5
PH SMN: 7.47 [PH] (ref 7.35–7.45)
PHOSPHATE SERPL-MCNC: 2.4 MG/DL
PO2 BLDA: 101 MMHG (ref 80–100)
POC BE: -2 MMOL/L
POC SATURATED O2: 98 % (ref 95–100)
POC TCO2: 22 MMOL/L (ref 23–27)
POCT GLUCOSE: 239 MG/DL (ref 70–110)
POCT GLUCOSE: 266 MG/DL (ref 70–110)
POCT GLUCOSE: 313 MG/DL (ref 70–110)
POCT GLUCOSE: 362 MG/DL (ref 70–110)
POTASSIUM SERPL-SCNC: 2.8 MMOL/L
POTASSIUM SERPL-SCNC: 4.8 MMOL/L
PROTHROMBIN TIME: 10.4 SEC
SAMPLE: ABNORMAL
SITE: ABNORMAL
SODIUM SERPL-SCNC: 136 MMOL/L
VT: 450

## 2017-04-18 PROCEDURE — 25000003 PHARM REV CODE 250: Performed by: HOSPITALIST

## 2017-04-18 PROCEDURE — 99232 SBSQ HOSP IP/OBS MODERATE 35: CPT | Mod: ,,, | Performed by: PSYCHIATRY & NEUROLOGY

## 2017-04-18 PROCEDURE — 36600 WITHDRAWAL OF ARTERIAL BLOOD: CPT

## 2017-04-18 PROCEDURE — 36415 COLL VENOUS BLD VENIPUNCTURE: CPT

## 2017-04-18 PROCEDURE — 80048 BASIC METABOLIC PNL TOTAL CA: CPT

## 2017-04-18 PROCEDURE — C9113 INJ PANTOPRAZOLE SODIUM, VIA: HCPCS | Performed by: HOSPITALIST

## 2017-04-18 PROCEDURE — 63600175 PHARM REV CODE 636 W HCPCS: Performed by: HOSPITALIST

## 2017-04-18 PROCEDURE — 25000003 PHARM REV CODE 250: Performed by: INTERNAL MEDICINE

## 2017-04-18 PROCEDURE — 94003 VENT MGMT INPAT SUBQ DAY: CPT

## 2017-04-18 PROCEDURE — 83735 ASSAY OF MAGNESIUM: CPT

## 2017-04-18 PROCEDURE — 84132 ASSAY OF SERUM POTASSIUM: CPT

## 2017-04-18 PROCEDURE — 63600175 PHARM REV CODE 636 W HCPCS: Performed by: PSYCHIATRY & NEUROLOGY

## 2017-04-18 PROCEDURE — 63600175 PHARM REV CODE 636 W HCPCS: Performed by: INTERNAL MEDICINE

## 2017-04-18 PROCEDURE — 94640 AIRWAY INHALATION TREATMENT: CPT

## 2017-04-18 PROCEDURE — 25000242 PHARM REV CODE 250 ALT 637 W/ HCPCS: Performed by: INTERNAL MEDICINE

## 2017-04-18 PROCEDURE — 82803 BLOOD GASES ANY COMBINATION: CPT

## 2017-04-18 PROCEDURE — 20000000 HC ICU ROOM

## 2017-04-18 PROCEDURE — 99900035 HC TECH TIME PER 15 MIN (STAT)

## 2017-04-18 PROCEDURE — 85730 THROMBOPLASTIN TIME PARTIAL: CPT

## 2017-04-18 PROCEDURE — 84100 ASSAY OF PHOSPHORUS: CPT

## 2017-04-18 PROCEDURE — 99900026 HC AIRWAY MAINTENANCE (STAT)

## 2017-04-18 PROCEDURE — 85610 PROTHROMBIN TIME: CPT

## 2017-04-18 PROCEDURE — 94761 N-INVAS EAR/PLS OXIMETRY MLT: CPT

## 2017-04-18 RX ORDER — MAGNESIUM SULFATE 1 G/100ML
1 INJECTION INTRAVENOUS ONCE
Status: COMPLETED | OUTPATIENT
Start: 2017-04-18 | End: 2017-04-18

## 2017-04-18 RX ORDER — POTASSIUM CHLORIDE 20 MEQ/15ML
60 SOLUTION ORAL ONCE
Status: COMPLETED | OUTPATIENT
Start: 2017-04-18 | End: 2017-04-18

## 2017-04-18 RX ADMIN — PROPOFOL 40 MCG/KG/MIN: 10 INJECTION, EMULSION INTRAVENOUS at 09:04

## 2017-04-18 RX ADMIN — CHLORHEXIDINE GLUCONATE 15 ML: 1.2 RINSE ORAL at 08:04

## 2017-04-18 RX ADMIN — INSULIN ASPART 10 UNITS: 100 INJECTION, SOLUTION INTRAVENOUS; SUBCUTANEOUS at 05:04

## 2017-04-18 RX ADMIN — PIPERACILLIN AND TAZOBACTAM 4.5 G: 4; .5 INJECTION, POWDER, LYOPHILIZED, FOR SOLUTION INTRAVENOUS; PARENTERAL at 01:04

## 2017-04-18 RX ADMIN — PROPOFOL 45 MCG/KG/MIN: 10 INJECTION, EMULSION INTRAVENOUS at 05:04

## 2017-04-18 RX ADMIN — POTASSIUM CHLORIDE 60 MEQ: 20 SOLUTION ORAL at 05:04

## 2017-04-18 RX ADMIN — MODAFINIL 200 MG: 100 TABLET ORAL at 08:04

## 2017-04-18 RX ADMIN — IPRATROPIUM BROMIDE AND ALBUTEROL SULFATE 3 ML: .5; 3 SOLUTION RESPIRATORY (INHALATION) at 02:04

## 2017-04-18 RX ADMIN — LINEZOLID 600 MG: 600 INJECTION, SOLUTION INTRAVENOUS at 08:04

## 2017-04-18 RX ADMIN — IPRATROPIUM BROMIDE AND ALBUTEROL SULFATE 3 ML: .5; 3 SOLUTION RESPIRATORY (INHALATION) at 12:04

## 2017-04-18 RX ADMIN — CARVEDILOL 3.12 MG: 3.12 TABLET, FILM COATED ORAL at 05:04

## 2017-04-18 RX ADMIN — IPRATROPIUM BROMIDE AND ALBUTEROL SULFATE 3 ML: .5; 3 SOLUTION RESPIRATORY (INHALATION) at 07:04

## 2017-04-18 RX ADMIN — INSULIN ASPART 8 UNITS: 100 INJECTION, SOLUTION INTRAVENOUS; SUBCUTANEOUS at 11:04

## 2017-04-18 RX ADMIN — PIPERACILLIN AND TAZOBACTAM 4.5 G: 4; .5 INJECTION, POWDER, LYOPHILIZED, FOR SOLUTION INTRAVENOUS; PARENTERAL at 05:04

## 2017-04-18 RX ADMIN — PANTOPRAZOLE SODIUM 40 MG: 40 INJECTION, POWDER, FOR SOLUTION INTRAVENOUS at 08:04

## 2017-04-18 RX ADMIN — PROPOFOL 45 MCG/KG/MIN: 10 INJECTION, EMULSION INTRAVENOUS at 03:04

## 2017-04-18 RX ADMIN — PIPERACILLIN AND TAZOBACTAM 4.5 G: 4; .5 INJECTION, POWDER, LYOPHILIZED, FOR SOLUTION INTRAVENOUS; PARENTERAL at 08:04

## 2017-04-18 RX ADMIN — IPRATROPIUM BROMIDE AND ALBUTEROL SULFATE 3 ML: .5; 3 SOLUTION RESPIRATORY (INHALATION) at 09:04

## 2017-04-18 RX ADMIN — PROPOFOL 35 MCG/KG/MIN: 10 INJECTION, EMULSION INTRAVENOUS at 04:04

## 2017-04-18 RX ADMIN — PREDNISONE 60 MG: 20 TABLET ORAL at 08:04

## 2017-04-18 RX ADMIN — INSULIN ASPART 4 UNITS: 100 INJECTION, SOLUTION INTRAVENOUS; SUBCUTANEOUS at 05:04

## 2017-04-18 RX ADMIN — MAGNESIUM SULFATE IN DEXTROSE 1 G: 10 INJECTION, SOLUTION INTRAVENOUS at 05:04

## 2017-04-18 NOTE — ASSESSMENT & PLAN NOTE
Mechanical ventilation  Weakness  Metabolic encephalopathy  Fever  Assessment and Plan:  Pt with respiratory failure that required intubation, possibly secondary to pneumonia vs edema, treated empirically with Rocephin/Azithromycin, ECHO with diastolic dysfunction but normal EF, and Pulm following for vent management. Family reported UE weakness for a few months prior to this admission and Neurology consulted, possible MG and patient undergoing trial of prednisone and pyridostigmine. This weakness likely limited extubation and necessitated reintubation; patient was extubated on 4/16 but had to be re-intubated that same day. Unclear source of encephalopathy and case discussed with Neurology, CT scan of the head was unrevealing. Also, patient completed treatment with Azithromycin and was on Rocephin alone, but she spiked a fever overnight and antibiotics changed to Zosyn/Zyvox.   ENT consulted and plan for trach this week.

## 2017-04-18 NOTE — PLAN OF CARE
Problem: Patient Care Overview  Goal: Plan of Care Review  Outcome: Ongoing (interventions implemented as appropriate)  VSS. Remains intubated in ICU with plans for trach on weds or Thursday. Tolerating tube feeds. flexiseal in place and draining liquid stool, C-diff negative. Propofol for sedation. IV ABX continue. Remains free from fall, injury, or breakdown throughout shift.

## 2017-04-18 NOTE — SUBJECTIVE & OBJECTIVE
Interval History: No issues overnight.  Remains on vent.    Review of Systems   Unable to perform ROS: Intubated     Objective:     Vital Signs (Most Recent):  Temp: 99 °F (37.2 °C) (04/18/17 0715)  Pulse: 84 (04/18/17 1016)  Resp: (!) 21 (04/18/17 1016)  BP: 106/71 (04/18/17 1002)  SpO2: 100 % (04/18/17 1016) Vital Signs (24h Range):  Temp:  [98.5 °F (36.9 °C)-99 °F (37.2 °C)] 99 °F (37.2 °C)  Pulse:  [76-92] 84  Resp:  [15-30] 21  SpO2:  [96 %-100 %] 100 %  BP: ()/(55-98) 106/71     Weight: 75.9 kg (167 lb 5.3 oz)  Body mass index is 47.06 kg/(m^2).    Intake/Output Summary (Last 24 hours) at 04/18/17 1049  Last data filed at 04/18/17 1000   Gross per 24 hour   Intake          3245.16 ml   Output             1520 ml   Net          1725.16 ml      Physical Exam   Constitutional: She appears well-developed. No distress.   HENT:   ET tube in place   Eyes: Conjunctivae are normal. Right eye exhibits no discharge. Left eye exhibits no discharge.   Neck: Neck supple.   Cardiovascular: Normal rate, regular rhythm and normal heart sounds.    Pulmonary/Chest: No stridor.   Mechanically ventilated.  Coarse BS bilaterally   Abdominal: Soft. Bowel sounds are normal.   Musculoskeletal: She exhibits no deformity.   Neurological:   Sedated   Skin: Skin is warm and dry.       Significant Labs:   BMP:   Recent Labs  Lab 04/18/17  0400   *      K 2.8*      CO2 24   BUN 42*   CREATININE 1.0   CALCIUM 10.0   MG 1.9     CBC:   Recent Labs  Lab 04/17/17  0400   WBC 8.44   HGB 12.1   HCT 41.9

## 2017-04-18 NOTE — PROGRESS NOTES
Following am ABG's, the following change was made to the Ventilator settings:  The rate was decreased to 14.

## 2017-04-18 NOTE — PROGRESS NOTES
[facility]  Neurology  Progress Note    Patient Name: Radha Pritchett  MRN: [id number]  Admission Date: 4/6/2017  Hospital Length of Stay: 12 days  Code Status: Full Code   Attending Provider: Yovani Arellano MD  Primary Care Physician: Ferny Iglesias MD   Principal Problem:Acute respiratory failure with hypercapnia    Subjective:     Interval History: 47 y/o female with medical Hx as listed below who was admitted for shortness of breath. Pt was found to have a pneumonia. She decompensasated and is now intubated. Pt has been difficult to to wean off ventilator. It has been reported that pt has been experiencing upper extremity weakness for a while but is perhaps a generalized weakness chronically. No other details at his moment. Pt also has Hx of a left breast mass that according to pathology is benign, possibly a hematoma. Pt has at baseline a learning difficulty.      -4/11/17: Pt remains mechanically ventilated. NIF reported -10.      -4/12/17: Remains mechanically ventilated. Upon sedation vacation follow commands.      -4/13/17: Still unable to wean off vent.       -4/14/17: No improvement reported. Pt remains mechanically ventilated. In previous days pt reported to reach for the ETT when off of sedation.      -4/15/17: Pt tolerating CPAP trial. Moving her extremities.      -4/16/17: Pt extubated yesterday but began to present shallow respirations and somnolence. Markedly hypercapnic on ABG's. Reintubated.      -4/17/17: Unable to wean off vent. Trach planned for Wed/Thurs.    -4/18/17: Pt opening eyes and following commands; still on vent.    Current Neurological Medications:     Current Facility-Administered Medications   Medication Dose Route Frequency Provider Last Rate Last Dose    acetaminophen tablet 500 mg  500 mg Oral Q6H PRN Easton Aaron MD   500 mg at 04/16/17 0823    albuterol-ipratropium 2.5mg-0.5mg/3mL nebulizer solution 3 mL  3 mL Nebulization Q4H PRN Easton Aaron MD   3  mL at 04/15/17 0323    albuterol-ipratropium 2.5mg-0.5mg/3mL nebulizer solution 3 mL  3 mL Nebulization Q6H Renae Gunderson MD   3 mL at 04/18/17 1403    carvedilol tablet 3.125 mg  3.125 mg Oral BID WM Lakesha Greer MD   3.125 mg at 04/18/17 1736    chlorhexidine 0.12 % solution 15 mL  15 mL Mouth/Throat BID Easton Aaron MD   15 mL at 04/18/17 0808    cloNIDine tablet 0.1 mg  0.1 mg Oral TID PRN Easton Aaron MD        dexmedetomidine (PRECEDEX) 400mcg/100mL 0.9% NaCL infusion  0.4 mcg/kg/hr Intravenous Continuous PRN Renae Gunderson MD   Stopped at 04/15/17 2149    dextrose 50% injection 12.5 g  12.5 g Intravenous PRN Easton Aaron MD        enoxaparin injection 40 mg  40 mg Subcutaneous Daily Easton Aaron MD   Stopped at 04/18/17 1700    glucagon (human recombinant) injection 1 mg  1 mg Intramuscular PRN Easton Aaron MD        insulin aspart pen 1-10 Units  1-10 Units Subcutaneous Q6H PRN Easton Aaron MD   10 Units at 04/18/17 1739    linezolid 600mg/300ml IVPB 600 mg  600 mg Intravenous Q12H Renae Gunderson  mL/hr at 04/18/17 0818 600 mg at 04/18/17 0818    modafinil tablet 200 mg  200 mg Oral Daily Bird Vega MD   200 mg at 04/18/17 0808    morphine injection 2 mg  2 mg Intravenous Q4H PRN Lakesha Greer MD   2 mg at 04/12/17 0830    ondansetron injection 8 mg  8 mg Intravenous Q8H PRN Easton Aaron MD        pantoprazole injection 40 mg  40 mg Intravenous Daily Lakesha Greer MD   40 mg at 04/18/17 0809    piperacillin-tazobactam 4.5 g in dextrose 5 % 100 mL IVPB (ready to mix system)  4.5 g Intravenous Q8H Renae Gunderson MD 25 mL/hr at 04/18/17 1736 4.5 g at 04/18/17 1736    promethazine (PHENERGAN) 12.5 mg in dextrose 5 % 50 mL IVPB  12.5 mg Intravenous Q6H PRN Easton Aaron MD        propofol (DIPRIVAN) 10 mg/mL infusion  5 mcg/kg/min Intravenous Continuous Easton Aaron MD 21.5 mL/hr at 04/18/17 1745 45 mcg/kg/min at 04/18/17 1745    ramelteon tablet 8 mg  8 mg Oral Nightly PRN  Easton Aaron MD   8 mg at 04/06/17 2239    scopolamine 1.5 mg (1 mg over 3 days) 1.5 mg  1 patch Transdermal Q3 Days Lakesha Greer MD   1.5 mg at 04/16/17 2021       Review of Systems   Pt is intubated    Objective:     Vital Signs (Most Recent):  Temp: 99 °F (37.2 °C) (04/18/17 1515)  Pulse: 84 (04/18/17 1748)  Resp: 12 (04/18/17 1748)  BP: 133/86 (04/18/17 1737)  SpO2: 100 % (04/18/17 1748) Vital Signs (24h Range):  Temp:  [98.7 °F (37.1 °C)-99 °F (37.2 °C)] 99 °F (37.2 °C)  Pulse:  [76-99] 84  Resp:  [12-32] 12  SpO2:  [97 %-100 %] 100 %  BP: ()/(55-86) 133/86     Weight: 75.9 kg (167 lb 5.3 oz)  Body mass index is 47.06 kg/(m^2).    Physical Exam  Constitutional: intubated  ENT: no discharge  Head: Normocephalic.   Eyes: No icteric sclereae  Neck: Neck supple.   Cardiovascular: Normal rate.   Pulmonary/Chest: symmetrical expansion of chest.   Extremities: no edema  Neurological: tracks  Pupils are round at 2 mm and reactive to light; corneal responses are present bilaterally; gag reflex present and cough reflex present  Moving UE's on command       Significant Labs:   CBC:   Recent Labs  Lab 04/17/17  0400   WBC 8.44   HGB 12.1   HCT 41.9        CMP:   Recent Labs  Lab 04/17/17  0400 04/18/17  0400 04/18/17  1217   * 224*  --     136  --    K 3.8 2.8* 4.8    101  --    CO2 28 24  --    BUN 46* 42*  --    CREATININE 1.1 1.0  --    CALCIUM 10.5 10.0  --    MG 2.4 1.9  --    ANIONGAP 8 11  --    EGFRNONAA >60 >60  --          Assessment and Plan:     45 y/o female consulted for UE weakness:      1. UE weakness: at this point etiology is uncertain but if the inability to extubate is linked to this weakness pt could have an underlying neuromuscular disorder (myopathy, MG, among others). Failed extubation. Head CT showed no gross abnormalities.  -MuSK antibody negative; rest of MG panel is pending.  -Will D/C prednisone as no response has been obtained.  -Glycopyrrolate 1 mg three  times daily for secretions.  -Trach possibly tomorrow.    Active Diagnoses:    Diagnosis Date Noted POA    PRINCIPAL PROBLEM:  Acute respiratory failure with hypercapnia [J96.02] 04/07/2017 Yes    Counseling regarding advanced directives and goals of care [Z71.89] 04/14/2017 Not Applicable    Hypokalemia [E87.6] 04/09/2017 Yes    Breast mass, left [N63] 04/08/2017 Yes    Pneumonia of both lungs due to infectious organism [J18.9] 04/06/2017 Yes    Sepsis [A41.9] 04/06/2017 Yes    Essential hypertension [I10] 04/06/2017 Yes     Chronic    Chronic diastolic heart failure [I50.32] 04/06/2017 Yes     Chronic    Moderate protein malnutrition [E44.0] 04/06/2017 Yes     Chronic      Problems Resolved During this Admission:    Diagnosis Date Noted Date Resolved POA       VTE Risk Mitigation         Ordered     enoxaparin injection 40 mg  Daily     Route:  Subcutaneous        04/06/17 2202     Medium Risk of VTE  Once      04/06/17 2202          Gus Phillips MD  Neurology  [facility]

## 2017-04-18 NOTE — PLAN OF CARE
Problem: Patient Care Overview  Goal: Plan of Care Review  04/18/2017     Recommendations     Recommendation/Intervention: 1) TF recs:  Peptamen VHP (Bariatric) @ 45 cc/hr, provides 1080 calories (1647 with propofol), 99 grams of protein, 907 cc free water 2) Flushes per MD 3) Check for residuals Q4 hours. Hold if > 250 cc. Take aspiration precautions. Use Reglan if needed  Goals: 1) Patient to meet >=85 - 115% of EEN & EPN  Nutrition Goal Status: new  Communication of RD Recs: reviewed with physician     Vida Amezcua, MPH, RD, LDN

## 2017-04-18 NOTE — PLAN OF CARE
Problem: Patient Care Overview  Goal: Plan of Care Review  Outcome: Ongoing (interventions implemented as appropriate)  Pt remains in the ICU. Unable to wean from vent. Scheduled for trach tomorrow. Propofol for sedation and comfort. TF stopped and now NPO. CBG in the 300s, coverage per sliding scale. flexi seal remains in place with thin brown liquid. Carter in place with 750 of UOP. Pt able to squeeze hands off sedation, restless when turned/stimulation, attempts to reach for ETT. Bilateral wrist restraints remain in place. No falls, injuries, or new skin breakdown, precautions maintained for all.

## 2017-04-18 NOTE — PROGRESS NOTES
Progress Note  Pulmonology    Admit Date: 4/6/2017   LOS: 12 days     SUBJECTIVE: resp failure     Follow-up For:  Vent management. Intubated, sedated.      Continuous Infusions:   dexmedetomidine (PRECEDEX) infusion Stopped (04/15/17 2149)    propofol 34.966 mcg/kg/min (04/18/17 0600)     Scheduled Meds:   albuterol-ipratropium 2.5mg-0.5mg/3mL  3 mL Nebulization Q6H    carvedilol  3.125 mg Oral BID WM    chlorhexidine  15 mL Mouth/Throat BID    enoxaparin  40 mg Subcutaneous Daily    linezolid 600mg/300ml  600 mg Intravenous Q12H    modafinil  200 mg Oral Daily    pantoprazole  40 mg Intravenous Daily    piperacillin-tazobactam 4.5 g in dextrose 5 % 100 mL IVPB (ready to mix system)  4.5 g Intravenous Q8H    predniSONE  60 mg Oral Daily    scopolamine  1 patch Transdermal Q3 Days     Review of Systems:  Review of systems not obtained due to patient factors intubated and sedated.    OBJECTIVE:     Vital Signs Range (Last 24H):  Temp:  [98.5 °F (36.9 °C)-98.9 °F (37.2 °C)]   Pulse:  [76-92]   Resp:  [15-34]   BP: ()/(55-98)   SpO2:  [96 %-100 %]     I & O (Last 24H):    Intake/Output Summary (Last 24 hours) at 04/18/17 0919  Last data filed at 04/18/17 0600   Gross per 24 hour   Intake          2915.55 ml   Output             1370 ml   Net          1545.55 ml     Physical Exam:  General: no distress, moderately obese. No distress.  Neck: no carotid bruit  Breast: left breast mass - hard, non-mobile  Lungs:  normal respiratory effort, and fairly clear except mildly diminished at bases and 7.5 ett  Heart: regular rate and rhythm and no murmur  Abdomen: soft, non-tender non-distended; bowel sounds diminished and OGT and rectal tube  Extremities: no cyanosis or edema, or clubbing and SCDs  Neurologic: intubated and sedated    Laboratory:  CBC:     Recent Labs  Lab 04/17/17  0400   WBC 8.44   RBC 4.95   HGB 12.1   HCT 41.9      MCV 85   MCH 24.4*   MCHC 28.9*     BMP:     Recent Labs  Lab  04/18/17  0400   *      K 2.8*      CO2 24   BUN 42*   CREATININE 1.0   CALCIUM 10.0   MG 1.9     Vent Mode: PRVC  Oxygen Concentration (%):  [30] 30  Resp Rate Total:  [15 br/min-27 br/min] 15 br/min  Vt Set:  [440 mL] 440 mL  PEEP/CPAP:  [5 cmH20] 5 cmH20  Mean Airway Pressure:  [9.5 npW60-04.9 cmH20] 9.6 cmH20    ABGs:     Recent Labs  Lab 04/18/17  0415   PH 7.471*   PCO2 28.6*   PO2 101*   HCO3 20.8*   POCSATURATED 98   BE -2     Microbiology Results (last 7 days)     Procedure Component Value Units Date/Time    Culture, Respiratory with Gram Stain [[id number]] Collected:  04/16/17 0817    Order Status:  Completed Specimen:  Respiratory from Endotracheal Aspirate Updated:  04/18/17 0909     Respiratory Culture No significant isolate     Gram Stain (Respiratory) <10 epithelial cells per low power field.     Gram Stain (Respiratory) Many WBC's     Gram Stain (Respiratory) Few Gram positive cocci in clusters     Gram Stain (Respiratory) Few Gram positive cocci in pairs    Blood culture [[id number]] Collected:  04/16/17 0827    Order Status:  Completed Specimen:  Blood Updated:  04/18/17 0903     Blood Culture, Routine No Growth to date     Blood Culture, Routine No Growth to date     Blood Culture, Routine No Growth to date    Blood culture [[id number]] Collected:  04/16/17 0823    Order Status:  Completed Specimen:  Blood Updated:  04/18/17 0903     Blood Culture, Routine No Growth to date     Blood Culture, Routine No Growth to date     Blood Culture, Routine No Growth to date    Urine culture [[id number]] Collected:  04/16/17 0817    Order Status:  Completed Specimen:  Urine from Urine, Catheterized Updated:  04/17/17 1100     Urine Culture, Routine --     STREPTOCOCCUS SPECIES  > 100,000 cfu/ml  Identification and susceptibility pending      Clostridium difficile EIA [[id number]] Collected:  04/14/17 0015    Order Status:  Completed Specimen:  Stool from Stool Updated:  04/14/17 1416     C. diff  Antigen Negative     C difficile Toxins A+B, EIA Negative      Testing not recommended for children <24 months old.       Culture, Respiratory with Gram Stain [[id number]] Collected:  04/11/17 1200    Order Status:  Completed Specimen:  Respiratory from Bronchial Wash Updated:  04/13/17 0911     Respiratory Culture No growth     Gram Stain (Respiratory) Moderate WBC's     Gram Stain (Respiratory) No organisms seen    Narrative:       Clinical Hx: HTN  Breast Mass  Pre-op Diagnosis: Pneumonia  Post-op Diagnosis: Bronchial Washings  Jar #1: Bronchial Washings    Gram stain [[id number]] Collected:  04/11/17 1157    Order Status:  Canceled Specimen:  Bronchial Wash from Lung, RUL Updated:  04/11/17 1230    Narrative:       Clinical Hx:  HTN  Breast Mass  Pre-op Diagnosis: Pneumonia  Post-op Diagnosis  Jar #1: Bronchial Washings  Gram Stain was cancelled on 04/11/2017 at 13:47 by JGO; Duplicate   order, test included in another profile. 04/11/2017  13:47        Chest X-Ray: I personally reviewed the films and findings are: ett in place. Small volumes. LLL atelectasis today    Findings:   Lines and tubes project in unchanged radiographic position.  There is increased parenchymal attenuation (left greater than right) as well as slight increased density in the left lower lung zone could represent edema with small left pleural effusion.  No evidence of pneumothorax.  No significant interval interval change.  Osseous structures demonstrate no acute abnormality.    ASSESSMENT/PLAN:     1) Acute respiratory failure - in 46 overweight female who presented with weakness and pneumonia. Progressed to intubation and difficult weaning. Attempted extubation failed with marked hypercapnia. Suspect weakness was strong factor, ?neurologic. Sedated at present. CXR as above. ABG with good gas exchange. Negative balance. Afebrile. On ceftriaxone and cultures as above. Will wean oxygen and wean rate a bit. Bronchodilators. On steroids for?  Possible MG?. ENT consulted for trach.  2) Weakness - UE. Neurology following. ??MG. On steroids. Would wean when ok with neurology.   3) History of HTN  4) Hypokalemia - still low and continue to replace.  5) Anemia - repeat in am.    Awakes easily when sedation decreased. Still unable to wean ; for trach per ENT.    Bird Vega

## 2017-04-18 NOTE — PROGRESS NOTES
[facility]  Neurology  Progress Note    Patient Name: Radha Pritchett  MRN: [id number]  Admission Date: 4/6/2017  Hospital Length of Stay: 11 days  Code Status: Full Code   Attending Provider: Yovani Arellano MD  Primary Care Physician: Ferny Iglesias MD   Principal Problem:Acute respiratory failure with hypercapnia    Subjective:     Interval History: 45 y/o female with medical Hx as listed below who was admitted for shortness of breath. Pt was found to have a pneumonia. She decompensasated and is now intubated. Pt has been difficult to to wean off ventilator. It has been reported that pt has been experiencing upper extremity weakness for a while but is perhaps a generalized weakness chronically. No other details at his moment. Pt also has Hx of a left breast mass that according to pathology is benign, possibly a hematoma. Pt has at baseline a learning difficulty.      -4/11/17: Pt remains mechanically ventilated. NIF reported -10.      -4/12/17: Remains mechanically ventilated. Upon sedation vacation follow commands.      -4/13/17: Still unable to wean off vent.       -4/14/17: No improvement reported. Pt remains mechanically ventilated. In previous days pt reported to reach for the ETT when off of sedation.      -4/15/17: Pt tolerating CPAP trial. Moving her extremities.     -4/16/17: Pt extubated yesterday but began to present shallow respirations and somnolence. Markedly hypercapnic on ABG's. Reintubated.     -4/17/17: Unable to wean off vent. Trach planned for Wed/Thurs     Current Neurological Medications:     Current Facility-Administered Medications   Medication Dose Route Frequency Provider Last Rate Last Dose    acetaminophen tablet 500 mg  500 mg Oral Q6H PRN Easton Aaron MD   500 mg at 04/16/17 0823    albuterol-ipratropium 2.5mg-0.5mg/3mL nebulizer solution 3 mL  3 mL Nebulization Q4H PRN Easton Aaron MD   3 mL at 04/15/17 0323    albuterol-ipratropium 2.5mg-0.5mg/3mL  nebulizer solution 3 mL  3 mL Nebulization Q6H Renae Gunderson MD   3 mL at 04/17/17 1850    carvedilol tablet 3.125 mg  3.125 mg Oral BID  Lakesha Greer MD   3.125 mg at 04/17/17 1632    chlorhexidine 0.12 % solution 15 mL  15 mL Mouth/Throat BID Easton Aaron MD   15 mL at 04/17/17 2016    cloNIDine tablet 0.1 mg  0.1 mg Oral TID PRN Easton Aaron MD        dexmedetomidine (PRECEDEX) 400mcg/100mL 0.9% NaCL infusion  0.4 mcg/kg/hr Intravenous Continuous PRN Renae Gunderson MD   Stopped at 04/15/17 2149    dextrose 50% injection 12.5 g  12.5 g Intravenous PRN Easton Aaron MD        enoxaparin injection 40 mg  40 mg Subcutaneous Daily Easton Aaron MD   40 mg at 04/17/17 1632    glucagon (human recombinant) injection 1 mg  1 mg Intramuscular PRN Easton Aaron MD        insulin aspart pen 1-10 Units  1-10 Units Subcutaneous Q6H PRN Easton Aaron MD   6 Units at 04/17/17 1847    linezolid 600mg/300ml IVPB 600 mg  600 mg Intravenous Q12H Renae Gunderson  mL/hr at 04/17/17 2016 600 mg at 04/17/17 2016    modafinil tablet 200 mg  200 mg Oral Daily Bird Vega MD   200 mg at 04/17/17 0958    morphine injection 2 mg  2 mg Intravenous Q4H PRN Lakesha Greer MD   2 mg at 04/12/17 0830    ondansetron injection 8 mg  8 mg Intravenous Q8H PRN Easton Aaron MD        pantoprazole injection 40 mg  40 mg Intravenous Daily Lakesha Greer MD   40 mg at 04/17/17 0957    piperacillin-tazobactam 4.5 g in dextrose 5 % 100 mL IVPB (ready to mix system)  4.5 g Intravenous Q8H Renae Gunderson MD 25 mL/hr at 04/17/17 1632 4.5 g at 04/17/17 1632    predniSONE tablet 60 mg  60 mg Oral Daily Gus Phillips MD   60 mg at 04/17/17 0958    promethazine (PHENERGAN) 12.5 mg in dextrose 5 % 50 mL IVPB  12.5 mg Intravenous Q6H PRN Easton Aaron MD        propofol (DIPRIVAN) 10 mg/mL infusion  5 mcg/kg/min Intravenous Continuous Easton Aaron MD 11.9 mL/hr at 04/17/17 2000 24.916 mcg/kg/min at 04/17/17 2000    ramelteon tablet 8  mg  8 mg Oral Nightly PRN Easton Aaron MD   8 mg at 04/06/17 2239    scopolamine 1.5 mg (1 mg over 3 days) 1.5 mg  1 patch Transdermal Q3 Days Lakesha Greer MD   1.5 mg at 04/16/17 2021       Review of Systems   Unable to obtain as pt is intubated.    Objective:     Vital Signs (Most Recent):  Temp: 98.8 °F (37.1 °C) (04/17/17 1915)  Pulse: 83 (04/17/17 2215)  Resp: 16 (04/17/17 2215)  BP: (!) 86/58 (04/17/17 2202)  SpO2: 99 % (04/17/17 2215) Vital Signs (24h Range):  Temp:  [98.5 °F (36.9 °C)-100 °F (37.8 °C)] 98.8 °F (37.1 °C)  Pulse:  [76-96] 83  Resp:  [10-34] 16  SpO2:  [96 %-100 %] 99 %  BP: ()/(55-98) 86/58     Weight: 75.9 kg (167 lb 5.3 oz)  Body mass index is 47.06 kg/(m^2).    Physical Exam  Constitutional: intubated  ENT: no discharge  Head: Normocephalic.   Eyes: No icteric sclereae  Neck: Neck supple.   Cardiovascular: Normal rate.   Pulmonary/Chest: symmetrical expansion of chest.   Extremities: no edema  Neurological: partially open eyes   Pupils are round at 2 mm and reactive to light; corneal responses are present bilaterally; gag reflex present and cough reflex present  Withdraws four extremities to noxious stimulation and moves extremities spontaneously on sedation vacation       Significant Labs:   CBC:   Recent Labs  Lab 04/17/17  0400   WBC 8.44   HGB 12.1   HCT 41.9        CMP:   Recent Labs  Lab 04/16/17  0400 04/17/17  0400   * 244*    138   K 3.3* 3.8    102   CO2 27 28   BUN 47* 46*   CREATININE 1.0 1.1   CALCIUM 11.0* 10.5   MG 2.2 2.4   ANIONGAP 14 8   EGFRNONAA >60 >60           Assessment and Plan:        45 y/o female consulted for UE weakness:      1. UE weakness: at this point etiology is uncertain but if the inability to extubate is linked to this weakness pt could have an underlying neuromuscular disorder (myopathy, MG, among others). Failed extubation. Head CT showed no gross abnormalities.  -MG panel pending.  -Glycopyrrolate 1 mg three times  daily for secretions.  -Trach planned.    Active Diagnoses:    Diagnosis Date Noted POA    PRINCIPAL PROBLEM:  Acute respiratory failure with hypercapnia [J96.02] 04/07/2017 Yes    Counseling regarding advanced directives and goals of care [Z71.89] 04/14/2017 Not Applicable    Hypokalemia [E87.6] 04/09/2017 Yes    Breast mass, left [N63] 04/08/2017 Yes    Pneumonia of both lungs due to infectious organism [J18.9] 04/06/2017 Yes    Sepsis [A41.9] 04/06/2017 Yes    Essential hypertension [I10] 04/06/2017 Yes     Chronic    Chronic diastolic heart failure [I50.32] 04/06/2017 Yes     Chronic    Moderate protein malnutrition [E44.0] 04/06/2017 Yes     Chronic      Problems Resolved During this Admission:    Diagnosis Date Noted Date Resolved POA       VTE Risk Mitigation         Ordered     enoxaparin injection 40 mg  Daily     Route:  Subcutaneous        04/06/17 2202     Medium Risk of VTE  Once      04/06/17 2202          Gus Phillips MD  Neurology  [facility]

## 2017-04-18 NOTE — ASSESSMENT & PLAN NOTE
Extensive conversations done with family. Pt to remain full code. Pt is still  but estranged from the  and reportedly the grandmother is the major decision maker. Will consult palliative care to help clarify the legal decision maker and to continue conversations in regards to goals of care given if the patient makes no clinical progress soon, decisions regarding trach, etc are soon looming in the horizon.

## 2017-04-18 NOTE — PROGRESS NOTES
[facility] Hospital Medicine  Progress Note    Patient Name: Radha Pritchett  MRN: [id number]  Patient Class: IP- Inpatient   Admission Date: 4/6/2017  Length of Stay: 12 days  Attending Physician: Yovani Arellano MD  Primary Care Provider: Ferny Iglesias MD        Subjective:     Principal Problem:Acute respiratory failure with hypercapnia    HPI:  Ms. Radha Pritchett is a 46 y.o. female with essential hypertension, chronic diastolic heart failure (LVEF 55-60% Dec 2014), moderate protein malnutrition who presents to [facility] ED with complaints of dyspnea today.  She was scheduled to undergo removal of a breast mass today but was found to be hypoxic.  She does report that she has been short of breath but cannot specify how long.  She has also had some non-productive coughing and some chills without fevers.  Further history is otherwise limited at this time.    Hospital Course:   was admitted with pneumonia and acutely decompensated and was intubated and placed on vent. Pt with UE weakness prior to presentation on this admission which may be contributing to inability to extubate. Neurology following and considering possible MG and patient on therapeutic trial of prednisone and Modafinil. Pt s/p bronch that was unrevealing on 4/11. Pt was extubated on 4/15 but was re-intubated later the same day.  Was febrile and stared on Zyvox and zosyn.  CXR shows possible infiltrate.  Strep growing on UCx.  Probable tracheostomy due to failed extubation.      Interval History: No issues overnight.  Remains on vent.    Review of Systems   Unable to perform ROS: Intubated     Objective:     Vital Signs (Most Recent):  Temp: 99 °F (37.2 °C) (04/18/17 0715)  Pulse: 84 (04/18/17 1016)  Resp: (!) 21 (04/18/17 1016)  BP: 106/71 (04/18/17 1002)  SpO2: 100 % (04/18/17 1016) Vital Signs (24h Range):  Temp:  [98.5 °F (36.9 °C)-99 °F (37.2 °C)] 99 °F (37.2 °C)  Pulse:  [76-92] 84  Resp:  [15-30] 21  SpO2:  [96  %-100 %] 100 %  BP: ()/(55-98) 106/71     Weight: 75.9 kg (167 lb 5.3 oz)  Body mass index is 47.06 kg/(m^2).    Intake/Output Summary (Last 24 hours) at 04/18/17 1049  Last data filed at 04/18/17 1000   Gross per 24 hour   Intake          3245.16 ml   Output             1520 ml   Net          1725.16 ml      Physical Exam   Constitutional: She appears well-developed. No distress.   HENT:   ET tube in place   Eyes: Conjunctivae are normal. Right eye exhibits no discharge. Left eye exhibits no discharge.   Neck: Neck supple.   Cardiovascular: Normal rate, regular rhythm and normal heart sounds.    Pulmonary/Chest: No stridor.   Mechanically ventilated.  Coarse BS bilaterally   Abdominal: Soft. Bowel sounds are normal.   Musculoskeletal: She exhibits no deformity.   Neurological:   Sedated   Skin: Skin is warm and dry.       Significant Labs:   BMP:   Recent Labs  Lab 04/18/17  0400   *      K 2.8*      CO2 24   BUN 42*   CREATININE 1.0   CALCIUM 10.0   MG 1.9     CBC:   Recent Labs  Lab 04/17/17  0400   WBC 8.44   HGB 12.1   HCT 41.9            Assessment/Plan:      * Acute respiratory failure with hypercapnia  Mechanical ventilation  Weakness  Metabolic encephalopathy  Fever  Assessment and Plan:  Pt with respiratory failure that required intubation, possibly secondary to pneumonia vs edema, treated empirically with Rocephin/Azithromycin, ECHO with diastolic dysfunction but normal EF, and Pulm following for vent management. Family reported UE weakness for a few months prior to this admission and Neurology consulted, possible MG and patient undergoing trial of prednisone and pyridostigmine. This weakness likely limited extubation and necessitated reintubation; patient was extubated on 4/16 but had to be re-intubated that same day. Unclear source of encephalopathy and case discussed with Neurology, CT scan of the head was unrevealing. Also, patient completed treatment with Azithromycin  and was on Rocephin alone, but she spiked a fever overnight and antibiotics changed to Zosyn/Zyvox.   ENT consulted and plan for trach this week.      Pneumonia of both lungs due to infectious organism  The patient has a CURB-65 score of 0, and does not meet criteria for healthcare-associated pneumonia initially. CTA chest with bilateral airspace opacification concerning for pneumonia.  Oxygen saturations are improved but requiring a non-rebreather mask; then BIPAP and s/p intubation.  Blood culture NGTD.  Pt completed full course of Azithromycin, and was continued Rocephin until had spike of fever, Abx has been expanded  to Zosyn/Zyvox.    Sepsis  This patient met criteria for sepsis given tachycardia, tachypnea, and pneumonia; there is no evidence of endo-organ dysfunction.  Blood cultures are NGTD and repeat studies done;  antibiotics as discussed above.  strep in urine likely normal pennie.    Essential hypertension  Pt's BP on low end of normal, and hydrochlorothiazide and lisinopril stopped. Hold parameter placed on  Carvedilol.    Chronic diastolic heart failure  Patient doesn't appear to be in acute heart failure; checked ECHO with EF=65% but with diastolic dysfunction.    Moderate protein malnutrition  Tolerating tube feeds.    Breast mass, left  Planned for surgery, seen by Dr. Sandoval (his patient) and plan to reschedule as outpatient. It is reportedly a hematoma by biopsy, but suspicion for CA not completely ruled out given paraneoplastic syndrome a consideration.      Hypokalemia  Replace as necessary.      Counseling regarding advanced directives and goals of care  Extensive conversations done with family. Pt to remain full code. Pt is still  but estranged from the  and reportedly the grandmother is the major decision maker. Will consult palliative care to help clarify the legal decision maker and to continue conversations in regards to goals of care given if the patient makes no clinical  progress soon, decisions regarding trach, etc are soon looming in the horizon.       VTE Risk Mitigation         Ordered     enoxaparin injection 40 mg  Daily     Route:  Subcutaneous        04/06/17 2202     Medium Risk of VTE  Once      04/06/17 2202          Yovani Arellano MD  Department of Hospital Medicine   [facility]

## 2017-04-18 NOTE — PROGRESS NOTES
[facility] Nutrition  Progress Note    SUMMARY     Recommendations    Recommendation/Intervention: 1) TF recs:  Peptamen VHP (Bariatric) @ 45 cc/hr, provides 1080 calories (1647 with propofol), 99 grams of protein, 907 cc free water 2) Flushes per MD 3) Check for residuals Q4 hours. Hold if > 250 cc. Take aspiration precautions. Use Reglan if needed  Goals: 1) Patient to meet >=85 - 115% of EEN & EPN  Nutrition Goal Status: new  Communication of RD Recs: reviewed with physician    Continuum of Care Plan    Referral to Outpatient Services:  (D/C planning: Too soon to determine)    Reason for Assessment    Reason for Assessment: RD follow-up  Diagnosis:  (SOB; pneumonia)  Relevent Medical History: CHF   Interdisciplinary Rounds: attended  General Information Comments: Intubated. +propofol (rate increased). TF running at goal rate. Tolerating well. Minimal residuals. Patient exceeding [place] estimated energy needs.    Nutrition Prescription Ordered    Current Diet Order: NPO  Current Nutrition Support Formula Ordered: Impact Peptide 1.5  Current Nutrition Support Rate Ordered: 40 (ml)  Current Nutrition Support Frequency Ordered: continuous  Oral Nutrition Supplement: 2 packets of Beneprotein      Evaluation of Received Nutrients/Fluid Intake    Enteral Calories (kcal): 1440  Enteral Protein (gm): 90  Enteral (Free Water) Fluid (mL): 739  Free Water Flush Fluid (mL): 900    Other Calories (kcal): 617 (propofol + beneprotein)  Total Calories (kcal): 2057     Energy Calories Required: exceed needs  % Kcal Needs: 139%     Other Protein (gm): 12  Total Protein (gm/kg): 102  Protein Required: meeting needs  % Protein Needs: 100%     IV Fluid (mL): 600  Fluid Required: exceed needs (Net I/O +1665.5)    Comments: 15 cc residuals  Tolerance: tolerating     Nutrition Risk Screen     Nutrition Risk Screen: no indicators present    Nutrition/Diet History    Food Preferences: DANIEL    Factors Affecting  Nutritional Intake: NPO, on mechanical ventilation    Labs/Tests/Procedures/Meds    Pertinent Labs Reviewed: reviewed  Pertinent Medications Reviewed: reviewed  Pertinent Medications Comments: propofol    Physical Findings    Overall Physical Appearance: obese, on ventilator support  Tubes: orogastric tube  Oral/Mouth Cavity: WDL  Skin:  (blister on foot)    Anthropometrics    Height (inches): 62.13 in  Weight Method: Bed Scale  Weight (kg): 79.6 kg  Ideal Body Weight (IBW), Female: 110.65 lb  % Ideal Body Weight, Female (lb): 158.6 lb  BMI (kg/m2): 31.97  BMI Grade: 35 - 39.9 - obesity - grade II      Estimated/Assessed Needs    Weight Used For Calorie Calculations: 79.6 kg (175 lb 7.8 oz)   Height (cm): 157.8 cm (noted possible EPIC error of ht. (5'2 vs. 4'2))  Energy Need Method: [facility] (1478)  RMR (Payne-St. Jeor Equation): 1394.13  Weight Used For Protein Calculations: 50 kg (110 lb 3.7 oz) (IBW)  Protein Requirements: 100-125 g  Fluid Need Method: RDA Method (per MD)     Nutrition Diagnosis     Problem: Inadequate Energy Intake  Etiology: Mechanical Ventilation   As Evidenced by: NPO/TF initiated  Nutrition Diagnosis Status: Continues    Monitor and Evaluation    Food and Nutrient Intake: energy intake, food and beverage intake, enteral nutrition intake  Food and Nutrient Adminstration: diet order, enteral and parenteral nutrition administration  Anthropometric Measurements: weight, weight change  Biochemical Data, Medical Tests and Procedures: electrolyte and renal panel, glucose/endocrine profile, gastrointestinal profile  Nutrition-Focused Physical Findings: overall appearance    Nutrition Risk    Level of Risk: other (see comments) (f/u 2x weekly)    Nutrition Follow-Up    RD Follow-up?: Yes

## 2017-04-18 NOTE — ASSESSMENT & PLAN NOTE
The patient has a CURB-65 score of 0, and does not meet criteria for healthcare-associated pneumonia initially. CTA chest with bilateral airspace opacification concerning for pneumonia.  Oxygen saturations are improved but requiring a non-rebreather mask; then BIPAP and s/p intubation.  Blood culture NGTD.  Pt completed full course of Azithromycin, and was continued Rocephin until had spike of fever, Abx has been expanded  to Zosyn/Zyvox.

## 2017-04-18 NOTE — PROGRESS NOTES
Received patient orally intubated and on a Servo I Ventilator with settings as follows:  PRVC 16/ 450/ +5 PEEP/ 30%.  Patient has a size 7.5 ET tube in place which is secured at the 21 cm chad at the lips in the center of the patient's mouth.  The ET tube was rotated to the right side of the patient's mouth.  Ventilator alarms are set and functional and AMBU bag is at the HOB.

## 2017-04-19 LAB
ACHR BIND AB SER-SCNC: 0 NMOL/L
ACHR MOD AB/ACHR TOTAL SFR SER: 0 %
ALLENS TEST: ABNORMAL
ANION GAP SERPL CALC-SCNC: 10 MMOL/L
BUN SERPL-MCNC: 34 MG/DL
CALCIUM SERPL-MCNC: 10.3 MG/DL
CHLORIDE SERPL-SCNC: 102 MMOL/L
CO2 SERPL-SCNC: 22 MMOL/L
CREAT SERPL-MCNC: 0.9 MG/DL
DELSYS: ABNORMAL
ERYTHROCYTE [SEDIMENTATION RATE] IN BLOOD BY WESTERGREN METHOD: 12 MM/H
ERYTHROCYTE [SEDIMENTATION RATE] IN BLOOD BY WESTERGREN METHOD: 12 MM/H
ERYTHROCYTE [SEDIMENTATION RATE] IN BLOOD BY WESTERGREN METHOD: 8 MM/H
EST. GFR  (AFRICAN AMERICAN): >60 ML/MIN/1.73 M^2
EST. GFR  (NON AFRICAN AMERICAN): >60 ML/MIN/1.73 M^2
FIO2: 30
GLUCOSE SERPL-MCNC: 195 MG/DL
HCO3 UR-SCNC: 19.2 MMOL/L (ref 24–28)
HCO3 UR-SCNC: 19.3 MMOL/L (ref 24–28)
HCO3 UR-SCNC: 19.9 MMOL/L (ref 24–28)
MAGNESIUM SERPL-MCNC: 2.2 MG/DL
MG INTERPRETIVE COMMENTS: NORMAL
MODE: ABNORMAL
PCO2 BLDA: 18.6 MMHG (ref 35–45)
PCO2 BLDA: 18.9 MMHG (ref 35–45)
PCO2 BLDA: 22.8 MMHG (ref 35–45)
PEEP: 5
PH SMN: 7.54 [PH] (ref 7.35–7.45)
PH SMN: 7.62 [PH] (ref 7.35–7.45)
PH SMN: 7.63 [PH] (ref 7.35–7.45)
PHOSPHATE SERPL-MCNC: 2.7 MG/DL
PIP: 20
PO2 BLDA: 107 MMHG (ref 80–100)
PO2 BLDA: 78 MMHG (ref 80–100)
PO2 BLDA: 82 MMHG (ref 80–100)
POC BE: -2 MMOL/L
POC BE: 0 MMOL/L
POC BE: 1 MMOL/L
POC SATURATED O2: 97 % (ref 95–100)
POC SATURATED O2: 98 % (ref 95–100)
POC SATURATED O2: 99 % (ref 95–100)
POC TCO2: 20 MMOL/L (ref 23–27)
POCT GLUCOSE: 167 MG/DL (ref 70–110)
POCT GLUCOSE: 187 MG/DL (ref 70–110)
POCT GLUCOSE: 190 MG/DL (ref 70–110)
POCT GLUCOSE: 218 MG/DL (ref 70–110)
POCT GLUCOSE: 320 MG/DL (ref 70–110)
POTASSIUM SERPL-SCNC: 3.5 MMOL/L
SAMPLE: ABNORMAL
SITE: ABNORMAL
SODIUM SERPL-SCNC: 134 MMOL/L
SP02: 99
STRIA MUS AB TITR SER: NEGATIVE TITER
VT: 440
VT: 440

## 2017-04-19 PROCEDURE — 25000003 PHARM REV CODE 250: Performed by: HOSPITALIST

## 2017-04-19 PROCEDURE — 25000003 PHARM REV CODE 250: Performed by: INTERNAL MEDICINE

## 2017-04-19 PROCEDURE — 37000008 HC ANESTHESIA 1ST 15 MINUTES: Performed by: OTOLARYNGOLOGY

## 2017-04-19 PROCEDURE — 37000009 HC ANESTHESIA EA ADD 15 MINS: Performed by: OTOLARYNGOLOGY

## 2017-04-19 PROCEDURE — 36415 COLL VENOUS BLD VENIPUNCTURE: CPT

## 2017-04-19 PROCEDURE — 36000707: Performed by: OTOLARYNGOLOGY

## 2017-04-19 PROCEDURE — 83735 ASSAY OF MAGNESIUM: CPT

## 2017-04-19 PROCEDURE — 89220 SPUTUM SPECIMEN COLLECTION: CPT

## 2017-04-19 PROCEDURE — 99900026 HC AIRWAY MAINTENANCE (STAT)

## 2017-04-19 PROCEDURE — 63600175 PHARM REV CODE 636 W HCPCS: Performed by: NURSE ANESTHETIST, CERTIFIED REGISTERED

## 2017-04-19 PROCEDURE — D9220A PRA ANESTHESIA: Mod: ANES,,, | Performed by: ANESTHESIOLOGY

## 2017-04-19 PROCEDURE — D9220A PRA ANESTHESIA: Mod: CRNA,,, | Performed by: NURSE ANESTHETIST, CERTIFIED REGISTERED

## 2017-04-19 PROCEDURE — 99900035 HC TECH TIME PER 15 MIN (STAT)

## 2017-04-19 PROCEDURE — 27201423 OPTIME MED/SURG SUP & DEVICES STERILE SUPPLY: Performed by: OTOLARYNGOLOGY

## 2017-04-19 PROCEDURE — 63600175 PHARM REV CODE 636 W HCPCS: Performed by: HOSPITALIST

## 2017-04-19 PROCEDURE — 82803 BLOOD GASES ANY COMBINATION: CPT

## 2017-04-19 PROCEDURE — 25000003 PHARM REV CODE 250: Performed by: OTOLARYNGOLOGY

## 2017-04-19 PROCEDURE — 84100 ASSAY OF PHOSPHORUS: CPT

## 2017-04-19 PROCEDURE — 25000242 PHARM REV CODE 250 ALT 637 W/ HCPCS: Performed by: INTERNAL MEDICINE

## 2017-04-19 PROCEDURE — C9113 INJ PANTOPRAZOLE SODIUM, VIA: HCPCS | Performed by: HOSPITALIST

## 2017-04-19 PROCEDURE — 36000706: Performed by: OTOLARYNGOLOGY

## 2017-04-19 PROCEDURE — 0B110F4 BYPASS TRACHEA TO CUTANEOUS WITH TRACHEOSTOMY DEVICE, OPEN APPROACH: ICD-10-PCS | Performed by: OTOLARYNGOLOGY

## 2017-04-19 PROCEDURE — 25000003 PHARM REV CODE 250: Performed by: NURSE ANESTHETIST, CERTIFIED REGISTERED

## 2017-04-19 PROCEDURE — 63600175 PHARM REV CODE 636 W HCPCS: Performed by: INTERNAL MEDICINE

## 2017-04-19 PROCEDURE — 36600 WITHDRAWAL OF ARTERIAL BLOOD: CPT

## 2017-04-19 PROCEDURE — 27200966 HC CLOSED SUCTION SYSTEM

## 2017-04-19 PROCEDURE — 80048 BASIC METABOLIC PNL TOTAL CA: CPT

## 2017-04-19 PROCEDURE — 94640 AIRWAY INHALATION TREATMENT: CPT

## 2017-04-19 PROCEDURE — 99232 SBSQ HOSP IP/OBS MODERATE 35: CPT | Mod: ,,, | Performed by: PSYCHIATRY & NEUROLOGY

## 2017-04-19 PROCEDURE — 20000000 HC ICU ROOM

## 2017-04-19 RX ORDER — GLYCOPYRROLATE 0.2 MG/ML
INJECTION INTRAMUSCULAR; INTRAVENOUS
Status: DISCONTINUED | OUTPATIENT
Start: 2017-04-19 | End: 2017-04-19 | Stop reason: HOSPADM

## 2017-04-19 RX ORDER — SODIUM CHLORIDE, SODIUM LACTATE, POTASSIUM CHLORIDE, CALCIUM CHLORIDE 600; 310; 30; 20 MG/100ML; MG/100ML; MG/100ML; MG/100ML
INJECTION, SOLUTION INTRAVENOUS CONTINUOUS PRN
Status: DISCONTINUED | OUTPATIENT
Start: 2017-04-19 | End: 2017-04-19 | Stop reason: HOSPADM

## 2017-04-19 RX ORDER — ROCURONIUM BROMIDE 10 MG/ML
INJECTION, SOLUTION INTRAVENOUS
Status: DISCONTINUED | OUTPATIENT
Start: 2017-04-19 | End: 2017-04-19 | Stop reason: HOSPADM

## 2017-04-19 RX ORDER — PHENYLEPHRINE HYDROCHLORIDE 10 MG/ML
INJECTION INTRAVENOUS
Status: DISCONTINUED | OUTPATIENT
Start: 2017-04-19 | End: 2017-04-19 | Stop reason: HOSPADM

## 2017-04-19 RX ORDER — MIDAZOLAM HYDROCHLORIDE 1 MG/ML
INJECTION, SOLUTION INTRAMUSCULAR; INTRAVENOUS
Status: DISCONTINUED | OUTPATIENT
Start: 2017-04-19 | End: 2017-04-19 | Stop reason: HOSPADM

## 2017-04-19 RX ORDER — LIDOCAINE HYDROCHLORIDE AND EPINEPHRINE 5; 5 MG/ML; UG/ML
INJECTION, SOLUTION INFILTRATION; PERINEURAL
Status: DISCONTINUED | OUTPATIENT
Start: 2017-04-19 | End: 2017-04-19 | Stop reason: HOSPADM

## 2017-04-19 RX ORDER — FENTANYL CITRATE 50 UG/ML
INJECTION, SOLUTION INTRAMUSCULAR; INTRAVENOUS
Status: DISCONTINUED | OUTPATIENT
Start: 2017-04-19 | End: 2017-04-19 | Stop reason: HOSPADM

## 2017-04-19 RX ADMIN — PIPERACILLIN AND TAZOBACTAM 4.5 G: 4; .5 INJECTION, POWDER, LYOPHILIZED, FOR SOLUTION INTRAVENOUS; PARENTERAL at 05:04

## 2017-04-19 RX ADMIN — ROCURONIUM BROMIDE 30 MG: 10 INJECTION, SOLUTION INTRAVENOUS at 12:04

## 2017-04-19 RX ADMIN — GLYCOPYRROLATE 0.2 MG: 0.2 INJECTION, SOLUTION INTRAMUSCULAR; INTRAVENOUS at 12:04

## 2017-04-19 RX ADMIN — PHENYLEPHRINE HYDROCHLORIDE 100 MCG: 10 INJECTION INTRAVENOUS at 12:04

## 2017-04-19 RX ADMIN — PROPOFOL 35 MCG/KG/MIN: 10 INJECTION, EMULSION INTRAVENOUS at 05:04

## 2017-04-19 RX ADMIN — PIPERACILLIN AND TAZOBACTAM 4.5 G: 4; .5 INJECTION, POWDER, LYOPHILIZED, FOR SOLUTION INTRAVENOUS; PARENTERAL at 01:04

## 2017-04-19 RX ADMIN — INSULIN ASPART 2 UNITS: 100 INJECTION, SOLUTION INTRAVENOUS; SUBCUTANEOUS at 01:04

## 2017-04-19 RX ADMIN — MODAFINIL 200 MG: 100 TABLET ORAL at 09:04

## 2017-04-19 RX ADMIN — CHLORHEXIDINE GLUCONATE 15 ML: 1.2 RINSE ORAL at 08:04

## 2017-04-19 RX ADMIN — CHLORHEXIDINE GLUCONATE 15 ML: 1.2 RINSE ORAL at 09:04

## 2017-04-19 RX ADMIN — INSULIN ASPART 2 UNITS: 100 INJECTION, SOLUTION INTRAVENOUS; SUBCUTANEOUS at 05:04

## 2017-04-19 RX ADMIN — SCOPOLAMINE 1.5 MG: 1 PATCH, EXTENDED RELEASE TRANSDERMAL at 08:04

## 2017-04-19 RX ADMIN — ENOXAPARIN SODIUM 40 MG: 100 INJECTION SUBCUTANEOUS at 04:04

## 2017-04-19 RX ADMIN — IPRATROPIUM BROMIDE AND ALBUTEROL SULFATE 3 ML: .5; 3 SOLUTION RESPIRATORY (INHALATION) at 12:04

## 2017-04-19 RX ADMIN — IPRATROPIUM BROMIDE AND ALBUTEROL SULFATE 3 ML: .5; 3 SOLUTION RESPIRATORY (INHALATION) at 01:04

## 2017-04-19 RX ADMIN — IPRATROPIUM BROMIDE AND ALBUTEROL SULFATE 3 ML: .5; 3 SOLUTION RESPIRATORY (INHALATION) at 07:04

## 2017-04-19 RX ADMIN — LINEZOLID 600 MG: 600 INJECTION, SOLUTION INTRAVENOUS at 09:04

## 2017-04-19 RX ADMIN — CARVEDILOL 3.12 MG: 3.12 TABLET, FILM COATED ORAL at 08:04

## 2017-04-19 RX ADMIN — PROPOFOL 40 MCG/KG/MIN: 10 INJECTION, EMULSION INTRAVENOUS at 12:04

## 2017-04-19 RX ADMIN — ROCURONIUM BROMIDE 20 MG: 10 INJECTION, SOLUTION INTRAVENOUS at 12:04

## 2017-04-19 RX ADMIN — INSULIN ASPART 2 UNITS: 100 INJECTION, SOLUTION INTRAVENOUS; SUBCUTANEOUS at 11:04

## 2017-04-19 RX ADMIN — MIDAZOLAM HYDROCHLORIDE 2 MG: 1 INJECTION, SOLUTION INTRAMUSCULAR; INTRAVENOUS at 11:04

## 2017-04-19 RX ADMIN — PIPERACILLIN AND TAZOBACTAM 4.5 G: 4; .5 INJECTION, POWDER, LYOPHILIZED, FOR SOLUTION INTRAVENOUS; PARENTERAL at 10:04

## 2017-04-19 RX ADMIN — ROCURONIUM BROMIDE 50 MG: 10 INJECTION, SOLUTION INTRAVENOUS at 11:04

## 2017-04-19 RX ADMIN — MIDAZOLAM HYDROCHLORIDE 2 MG: 1 INJECTION, SOLUTION INTRAMUSCULAR; INTRAVENOUS at 12:04

## 2017-04-19 RX ADMIN — ALTEPLASE 2 MG: 2.2 INJECTION, POWDER, LYOPHILIZED, FOR SOLUTION INTRAVENOUS at 01:04

## 2017-04-19 RX ADMIN — INSULIN ASPART 8 UNITS: 100 INJECTION, SOLUTION INTRAVENOUS; SUBCUTANEOUS at 12:04

## 2017-04-19 RX ADMIN — PROPOFOL 39.99 MCG/KG/MIN: 10 INJECTION, EMULSION INTRAVENOUS at 10:04

## 2017-04-19 RX ADMIN — PROPOFOL 40 MCG/KG/MIN: 10 INJECTION, EMULSION INTRAVENOUS at 05:04

## 2017-04-19 RX ADMIN — FENTANYL CITRATE 100 MCG: 50 INJECTION INTRAMUSCULAR; INTRAVENOUS at 11:04

## 2017-04-19 RX ADMIN — LINEZOLID 600 MG: 600 INJECTION, SOLUTION INTRAVENOUS at 08:04

## 2017-04-19 RX ADMIN — PANTOPRAZOLE SODIUM 40 MG: 40 INJECTION, POWDER, FOR SOLUTION INTRAVENOUS at 09:04

## 2017-04-19 RX ADMIN — SODIUM CHLORIDE, SODIUM LACTATE, POTASSIUM CHLORIDE, AND CALCIUM CHLORIDE: .6; .31; .03; .02 INJECTION, SOLUTION INTRAVENOUS at 11:04

## 2017-04-19 NOTE — PROGRESS NOTES
Spoke with Dr. Vega on critical ABG results. No new orders at this time. Patient having trach today.

## 2017-04-19 NOTE — PROGRESS NOTES
[facility] Hospital Medicine  Progress Note    Patient Name: Radha Pritchett  MRN: [id number]  Patient Class: IP- Inpatient   Admission Date: 4/6/2017  Length of Stay: 13 days  Attending Physician: Yovani Arellano MD  Primary Care Provider: Ferny Iglesias MD        Subjective:     Principal Problem:Acute respiratory failure with hypercapnia    HPI:  Ms. Radha Pritchett is a 46 y.o. female with essential hypertension, chronic diastolic heart failure (LVEF 55-60% Dec 2014), moderate protein malnutrition who presents to [facility] ED with complaints of dyspnea today.  She was scheduled to undergo removal of a breast mass today but was found to be hypoxic.  She does report that she has been short of breath but cannot specify how long.  She has also had some non-productive coughing and some chills without fevers.  Further history is otherwise limited at this time.    Hospital Course:   was admitted with pneumonia and acutely decompensated and was intubated and placed on vent. Pt with UE weakness prior to presentation on this admission which may be contributing to inability to extubate. Neurology following and considering possible MG and patient on therapeutic trial of prednisone and Modafinil. Pt s/p bronch that was unrevealing on 4/11. Pt was extubated on 4/15 but was re-intubated later the same day.  Was febrile and stared on Zyvox and zosyn.  CXR shows possible infiltrate.  Strep growing on UCx.  Trach today.      Interval History: No issues overnight.  Remains on vent.    Review of Systems   Unable to perform ROS: Intubated     Objective:     Vital Signs (Most Recent):  Temp: 98.9 °F (37.2 °C) (04/19/17 0730)  Pulse: 72 (04/19/17 1009)  Resp: (!) 32 (04/19/17 1009)  BP: 129/80 (04/19/17 1001)  SpO2: 100 % (04/19/17 1009) Vital Signs (24h Range):  Temp:  [97.8 °F (36.6 °C)-99.8 °F (37.7 °C)] 98.9 °F (37.2 °C)  Pulse:  [67-99] 72  Resp:  [12-39] 32  SpO2:  [97 %-100 %] 100 %  BP:  ()/(52-86) 129/80     Weight: 75.9 kg (167 lb 5.3 oz)  Body mass index is 47.06 kg/(m^2).    Intake/Output Summary (Last 24 hours) at 04/19/17 1222  Last data filed at 04/19/17 1000   Gross per 24 hour   Intake          1352.44 ml   Output             1180 ml   Net           172.44 ml      Physical Exam   Constitutional: She appears well-developed. No distress.   HENT:   ET tube in place   Eyes: Conjunctivae are normal. Right eye exhibits no discharge. Left eye exhibits no discharge.   Neck: Neck supple.   Cardiovascular: Normal rate, regular rhythm and normal heart sounds.    Pulmonary/Chest: No stridor.   Mechanically ventilated.  Coarse BS bilaterally   Abdominal: Soft. Bowel sounds are normal.   Musculoskeletal: She exhibits no deformity.   Neurological:   Sedated   Skin: Skin is warm and dry.       Significant Labs:   BMP:     Recent Labs  Lab 04/19/17  0350   *   *   K 3.5      CO2 22*   BUN 34*   CREATININE 0.9   CALCIUM 10.3   MG 2.2     CBC: No results for input(s): WBC, HGB, HCT, PLT in the last 48 hours.      Assessment/Plan:      * Acute respiratory failure with hypercapnia  Mechanical ventilation  Weakness  Metabolic encephalopathy  Fever  Assessment and Plan:  Pt with respiratory failure that required intubation, possibly secondary to pneumonia vs edema, treated empirically with Rocephin/Azithromycin, ECHO with diastolic dysfunction but normal EF, and Pulm following for vent management. Family reported UE weakness for a few months prior to this admission and Neurology consulted, possible MG and patient undergoing trial of prednisone and pyridostigmine. This weakness likely limited extubation and necessitated reintubation; patient was extubated on 4/16 but had to be re-intubated that same day. Unclear source of encephalopathy and case discussed with Neurology, CT scan of the head was unrevealing. Also, patient completed treatment with Azithromycin and was on Rocephin alone, but  she spiked a fever overnight and antibiotics changed to Zosyn/Zyvox.   Trach today.      Pneumonia of both lungs due to infectious organism  The patient has a CURB-65 score of 0, and does not meet criteria for healthcare-associated pneumonia initially. CTA chest with bilateral airspace opacification concerning for pneumonia.  Oxygen saturations are improved but requiring a non-rebreather mask; then BIPAP and s/p intubation.  Blood culture NGTD.  Pt completed full course of Azithromycin, and was continued Rocephin until had spike of fever, Abx has been expanded  to Zosyn/Zyvox.    Sepsis  This patient met criteria for sepsis given tachycardia, tachypnea, and pneumonia; there is no evidence of endo-organ dysfunction.  Blood cultures are NGTD and repeat studies done;  antibiotics as discussed above.  strep in urine likely normal pennie.    Essential hypertension  BP well controlled.  Continue current management.    Chronic diastolic heart failure  Patient doesn't appear to be in acute heart failure; checked ECHO with EF=65% but with diastolic dysfunction.    Moderate protein malnutrition  Tolerating tube feeds.  Hyperglycemia--probably related to steroids.  Prednisone stopped.    Breast mass, left  Planned for surgery, seen by Dr. Sandoval (his patient) and plan to reschedule as outpatient. It is reportedly a hematoma by biopsy, but suspicion for CA not completely ruled out given paraneoplastic syndrome a consideration.      Hypokalemia  Replace as necessary.      Counseling regarding advanced directives and goals of care  Extensive conversations done with family. Pt to remain full code. Pt is still  but estranged from the  and reportedly the grandmother is the major decision maker.       VTE Risk Mitigation         Ordered     enoxaparin injection 40 mg  Daily     Route:  Subcutaneous        04/06/17 2202     Medium Risk of VTE  Once      04/06/17 2202          Yovani Arellano MD  Department of Hospital  Medicine   [facility]

## 2017-04-19 NOTE — EICU
ABG: resp alkalosis and met acidosis.  Decrease Vt to 400 and rate down to 8.  repeate ABG in an hour.

## 2017-04-19 NOTE — ADDENDUM NOTE
Addendum  created 04/19/17 0829 by Osvaldo Del Angel MD    Anesthesia Review and Sign - Ready for Procedure, Anesthesia Review and Sign - Signed, Sign clinical note

## 2017-04-19 NOTE — PLAN OF CARE
Problem: Patient Care Overview  Goal: Plan of Care Review  Outcome: Ongoing (interventions implemented as appropriate)  Remains on mechanical ventilation.  Received tracheostomy today, secure, connected to vent c settings:  FIO2 30%, , PRVC 8, +5 peep.  Remains in restraints for safety with good circulation checks.  Propofol to help keep calm.  Reinserted NGT & tube feedings restarted.  VSS, afebrile.  Adequate urine output.  Pressure ulcer & fall prevention interventions on-going. No injuries.

## 2017-04-19 NOTE — UM SECONDARY REVIEW
[facility]    IP Extended Stay > 10  Hospital day # 14  Admitted with Pneumonia  Remains in ICU on Vent    LOS: approved w/o recommendations due to severity of clinical picture

## 2017-04-19 NOTE — ADDENDUM NOTE
Addendum  created 04/19/17 1250 by Kayla Hernandez, MANAV    Anesthesia Event edited, Anesthesia Intra Flowsheets edited, Anesthesia Intra Meds edited, Anesthesia Staff edited, Orders acknowledged in Narrator, Patient device added, Patient device removed, Procedure Event Log accessed

## 2017-04-19 NOTE — TRANSFER OF CARE
"Anesthesia Transfer of Care Note    Patient: Radha Pritchett    Procedure(s) Performed: Procedure(s) (LRB):  TRACHEOSTOMY (N/A)    Patient location: PACU    Anesthesia Type: general    Transport from OR: Upon arrival to PACU/ICU, patient attached to ventilator and auscultated to confirm bilateral breath sounds and adequate TV. Transported from OR intubated on 100% O2 by AMBU with adequate controlled ventilation. Continuous ECG monitoring in transport. Continuous SpO2 monitoring in transport. Continuos invasive BP monitoring in transport. Continuous CVP monitoring in transport    Post pain: adequate analgesia    Post assessment: no apparent anesthetic complications and tolerated procedure well    Post vital signs: stable    Level of consciousness: sedated    Nausea/Vomiting: no nausea/vomiting    Complications: none          Last vitals:   Visit Vitals    /79 (BP Location: Right arm, Patient Position: Lying, BP Method: Automatic)    Pulse 83    Temp 36.7 °C (98.1 °F) (Oral)    Resp (!) 8    Ht 4' 2" (1.27 m)    Wt 75.9 kg (167 lb 5.3 oz)    LMP 03/29/2017 (Approximate)    SpO2 98%    Breastfeeding No    BMI 47.06 kg/m2     "

## 2017-04-19 NOTE — ASSESSMENT & PLAN NOTE
Extensive conversations done with family. Pt to remain full code. Pt is still  but estranged from the  and reportedly the grandmother is the major decision maker.

## 2017-04-19 NOTE — PROGRESS NOTES
Patient came back from having a tracheostomy put in. Patient has a 8.0 Shiley sutured in place and with trach ties. AMBU bag at [facility], all alarms are set and functioning . Will continue to monitor. Spare 8.0 shiley trach at bedside.

## 2017-04-19 NOTE — PROGRESS NOTES
[facility]  Neurology  Progress Note    Patient Name: Radha Pritchett  MRN: [id number]  Admission Date: 4/6/2017  Hospital Length of Stay: 13 days  Code Status: Full Code   Attending Provider: Yovani Arellano MD  Primary Care Physician: Ferny Iglesias MD   Principal Problem:Acute respiratory failure with hypercapnia    Subjective:     Interval History:47 y/o female with medical Hx as listed below who was admitted for shortness of breath. Pt was found to have a pneumonia. She decompensasated and is now intubated. Pt has been difficult to to wean off ventilator. It has been reported that pt has been experiencing upper extremity weakness for a while but is perhaps a generalized weakness chronically. No other details at his moment. Pt also has Hx of a left breast mass that according to pathology is benign, possibly a hematoma. Pt has at baseline a learning difficulty.      -4/11/17: Pt remains mechanically ventilated. NIF reported -10.      -4/12/17: Remains mechanically ventilated. Upon sedation vacation follow commands.      -4/13/17: Still unable to wean off vent.       -4/14/17: No improvement reported. Pt remains mechanically ventilated. In previous days pt reported to reach for the ETT when off of sedation.      -4/15/17: Pt tolerating CPAP trial. Moving her extremities.      -4/16/17: Pt extubated yesterday but began to present shallow respirations and somnolence. Markedly hypercapnic on ABG's. Reintubated.       -4/17/17: Unable to wean off vent. Trach planned for Wed/Thurs.     -4/18/17: Pt opening eyes and following commands; still on vent.    -4/19/17: For trach today.     Current Neurological Medications:     Current Facility-Administered Medications   Medication Dose Route Frequency Provider Last Rate Last Dose    acetaminophen tablet 500 mg  500 mg Oral Q6H PRN Easton Aaron MD   500 mg at 04/16/17 0823    albuterol-ipratropium 2.5mg-0.5mg/3mL nebulizer solution 3 mL  3 mL  Nebulization Q4H PRN Easton Aaron MD   3 mL at 04/15/17 0323    albuterol-ipratropium 2.5mg-0.5mg/3mL nebulizer solution 3 mL  3 mL Nebulization Q6H Renae Gunderson MD   3 mL at 04/19/17 1312    carvedilol tablet 3.125 mg  3.125 mg Oral BID WM Lakesha Greer MD   3.125 mg at 04/19/17 0845    chlorhexidine 0.12 % solution 15 mL  15 mL Mouth/Throat BID Easton Aaron MD   15 mL at 04/19/17 0910    cloNIDine tablet 0.1 mg  0.1 mg Oral TID PRN Easotn Aaron MD        dexmedetomidine (PRECEDEX) 400mcg/100mL 0.9% NaCL infusion  0.4 mcg/kg/hr Intravenous Continuous PRN Renae Gunderson MD   Stopped at 04/15/17 2149    dextrose 50% injection 12.5 g  12.5 g Intravenous PRN Easton Aaron MD        enoxaparin injection 40 mg  40 mg Subcutaneous Daily Easton Aaron MD   Stopped at 04/18/17 1700    glucagon (human recombinant) injection 1 mg  1 mg Intramuscular PRN Easton Aaron MD        insulin aspart pen 1-10 Units  1-10 Units Subcutaneous Q6H PRN Easton Aaron MD   2 Units at 04/19/17 1324    linezolid 600mg/300ml IVPB 600 mg  600 mg Intravenous Q12H Renae Gunderson  mL/hr at 04/19/17 0900 600 mg at 04/19/17 0900    modafinil tablet 200 mg  200 mg Oral Daily Bird Vega MD   200 mg at 04/19/17 0909    morphine injection 2 mg  2 mg Intravenous Q4H PRN Lakesha Greer MD   2 mg at 04/12/17 0830    ondansetron injection 8 mg  8 mg Intravenous Q8H PRN Easton Aaron MD        pantoprazole injection 40 mg  40 mg Intravenous Daily Lakesha Greer MD   40 mg at 04/19/17 0907    piperacillin-tazobactam 4.5 g in dextrose 5 % 100 mL IVPB (ready to mix system)  4.5 g Intravenous Q8H Renae Gunderson MD 25 mL/hr at 04/19/17 1000 4.5 g at 04/19/17 1000    promethazine (PHENERGAN) 12.5 mg in dextrose 5 % 50 mL IVPB  12.5 mg Intravenous Q6H PRN Easton Aaron MD        propofol (DIPRIVAN) 10 mg/mL infusion  5 mcg/kg/min Intravenous Continuous Easton Aaron MD 16.7 mL/hr at 04/19/17 1500 34.966 mcg/kg/min at 04/19/17 1500     ramelteon tablet 8 mg  8 mg Oral Nightly PRN Easton Aaron MD   8 mg at 04/06/17 2239    scopolamine 1.5 mg (1 mg over 3 days) 1.5 mg  1 patch Transdermal Q3 Days Lakesha Greer MD   1.5 mg at 04/16/17 2021       Review of Systems   Pt is intubated; unable to obtain ROS    Objective:     Vital Signs (Most Recent):  Temp: 98.1 °F (36.7 °C) (04/19/17 1303)  Pulse: 74 (04/19/17 1542)  Resp: 11 (04/19/17 1542)  BP: 120/66 (04/19/17 1432)  SpO2: 100 % (04/19/17 1542) Vital Signs (24h Range):  Temp:  [97.8 °F (36.6 °C)-99.8 °F (37.7 °C)] 98.1 °F (36.7 °C)  Pulse:  [67-92] 74  Resp:  [8-39] 11  SpO2:  [96 %-100 %] 100 %  BP: ()/(52-88) 120/66     Weight: 75.9 kg (167 lb 5.3 oz)  Body mass index is 47.06 kg/(m^2).    Physical Exam  Constitutional: intubated  ENT: no discharge  Head: Normocephalic.   Eyes: No icteric sclereae  Neck: Neck supple.   Cardiovascular: Normal rate.   Pulmonary/Chest: symmetrical expansion of chest.   Extremities: no edema  Neurological: opens eyes upon verbal stimuli tracks;   Pupils are round at 2 mm and reactive to light; corneal responses are present bilaterally; gag reflex present and cough reflex present  Moving UE's on command       Significant Labs:   CBC: No results for input(s): WBC, HGB, HCT, PLT in the last 48 hours.  CMP:   Recent Labs  Lab 04/18/17  0400 04/18/17  1217 04/19/17  0350   *  --  195*     --  134*   K 2.8* 4.8 3.5     --  102   CO2 24  --  22*   BUN 42*  --  34*   CREATININE 1.0  --  0.9   CALCIUM 10.0  --  10.3   MG 1.9  --  2.2   ANIONGAP 11  --  10   EGFRNONAA >60  --  >60       Significant Imaging:       Assessment and Plan:     47 y/o female consulted for UE weakness:      1. UE weakness: at this point etiology is uncertain but if the inability to extubate is linked to this weakness pt could have an underlying neuromuscular disorder (myopathy, MG, among others). Failed extubation. Head CT showed no gross abnormalities.  -Mg panel negative; no  presence of antibodies in serum.   CK normal. No response to steroids.  -Trach today    Active Diagnoses:    Diagnosis Date Noted POA    PRINCIPAL PROBLEM:  Acute respiratory failure with hypercapnia [J96.02] 04/07/2017 Yes    Counseling regarding advanced directives and goals of care [Z71.89] 04/14/2017 Not Applicable    Hypokalemia [E87.6] 04/09/2017 Yes    Breast mass, left [N63] 04/08/2017 Yes    Pneumonia of both lungs due to infectious organism [J18.9] 04/06/2017 Yes    Sepsis [A41.9] 04/06/2017 Yes    Essential hypertension [I10] 04/06/2017 Yes     Chronic    Chronic diastolic heart failure [I50.32] 04/06/2017 Yes     Chronic    Moderate protein malnutrition [E44.0] 04/06/2017 Yes     Chronic      Problems Resolved During this Admission:    Diagnosis Date Noted Date Resolved POA       VTE Risk Mitigation         Ordered     enoxaparin injection 40 mg  Daily     Route:  Subcutaneous        04/06/17 2202     Medium Risk of VTE  Once      04/06/17 2202          Gus Phillips MD  Neurology  [facility]

## 2017-04-19 NOTE — OP NOTE
ENT OP NOTE:     #8 Shiley tube placed for respiratory failure.   Flow Seal instilled into depth of the wound .   EBL - 1 - 2 cc's.    Tolerated well to ICU in stable condition.  Full Note Dictated.

## 2017-04-19 NOTE — PLAN OF CARE
Problem: Patient Care Overview  Goal: Plan of Care Review  Outcome: Ongoing (interventions implemented as appropriate)  No acute events this shift. Remains sedated on vent. Plans for trach today. NPO after midnight. All anticoagulants held. VSS. All lines intact. Remains free from falls and new skin breakdown.

## 2017-04-19 NOTE — ADDENDUM NOTE
Addendum  created 04/19/17 1309 by Kayla Hernandez, CRNA    Anesthesia Event edited, Anesthesia Intra Flowsheets edited, Anesthesia Intra Meds edited, Procedure Event Log accessed, Sign clinical note, Visit Navigator Flowsheet section accepted

## 2017-04-19 NOTE — OP NOTE
DATE OF PROCEDURE:  04/19/2017.    PREOPERATIVE DIAGNOSES:  1.  Respiratory failure.  2.  Prolonged endotracheal intubation.  3.  Failure to wean from the ventilator.    POSTOPERATIVE DIAGNOSES:  1.  Respiratory failure.  2.  Prolonged endotracheal intubation.  3.  Failure to wean from the ventilator.    PROCEDURE PERFORMED:  Tracheotomy.    SURGEON:  Elia Salcedo M.D.    PROCEDURE IN DETAIL:  After being given adequate medication, the patient was   brought to the Operating Room and placed supine on the operating table.  After   induction of intravenous fluid and general endotracheal anesthesia, through the   previously placed endotracheal tube, the neck was prepped and draped in usual   sterile fashion.  The lady's neck was extended as well.  Appropriate landmarks   were palpated on the anterior neck and marking pencil was used to chad the   suprasternal notch, thyroid cartilage as well as cricoid cartilage.  The skin   was marked over the neck.  The area was then injected with 1% Xylocaine with   1:100,000 adrenaline solution.  Pressure was held for a few moments in order to   initiate hemostasis.  Subsequently, horizontal incision was made.  This was   carried down through skin and subcutaneous tissue.  Skin muscle flaps containing   the platysma muscle were created superiorly and inferiorly.  The area was   cauterized with the Bovie cautery in order to control bleeding.  Small   subcutaneous plug of fat was removed identifying the strap muscles.  Strap   muscles were then split in the midline and the pretracheal fascia was carefully   cleaned.  Incision was then made between the third and fourth tracheal rings.    Under direct visualization, previously placed endotracheal tube was carefully   removed.  A #8 Shiley tube was carefully placed into the fenestration into the   upper trachea.  The lady had a very wide trachea, so an 8-Shiley was used   instead of a typical 6.  The chest was auscultated, saturation  was obtained and   a CO2 wave was obtained.  At this point, FloSeal was instilled into the depth of   the wound.  This trach was then sutured about the skin with four 2-0 silk ties   and tied on the side with trach ties.  At this point, the procedure was   terminated.  The lady was awakened from anesthesia in the Operating Room,   extubated, taken over to Recovery in satisfactory condition.  Total blood loss   for the case was 1 to 2 mL and none was rendered.      CARMEN  dd: 04/19/2017 16:45:46 (CDT)  td: 04/19/2017 18:19:58 (CDT)  Doc ID   #[id number]  Job ID #[id number]    CC:

## 2017-04-19 NOTE — SUBJECTIVE & OBJECTIVE
Interval History: No issues overnight.  Remains on vent.    Review of Systems   Unable to perform ROS: Intubated     Objective:     Vital Signs (Most Recent):  Temp: 98.9 °F (37.2 °C) (04/19/17 0730)  Pulse: 72 (04/19/17 1009)  Resp: (!) 32 (04/19/17 1009)  BP: 129/80 (04/19/17 1001)  SpO2: 100 % (04/19/17 1009) Vital Signs (24h Range):  Temp:  [97.8 °F (36.6 °C)-99.8 °F (37.7 °C)] 98.9 °F (37.2 °C)  Pulse:  [67-99] 72  Resp:  [12-39] 32  SpO2:  [97 %-100 %] 100 %  BP: ()/(52-86) 129/80     Weight: 75.9 kg (167 lb 5.3 oz)  Body mass index is 47.06 kg/(m^2).    Intake/Output Summary (Last 24 hours) at 04/19/17 1222  Last data filed at 04/19/17 1000   Gross per 24 hour   Intake          1352.44 ml   Output             1180 ml   Net           172.44 ml      Physical Exam   Constitutional: She appears well-developed. No distress.   HENT:   ET tube in place   Eyes: Conjunctivae are normal. Right eye exhibits no discharge. Left eye exhibits no discharge.   Neck: Neck supple.   Cardiovascular: Normal rate, regular rhythm and normal heart sounds.    Pulmonary/Chest: No stridor.   Mechanically ventilated.  Coarse BS bilaterally   Abdominal: Soft. Bowel sounds are normal.   Musculoskeletal: She exhibits no deformity.   Neurological:   Sedated   Skin: Skin is warm and dry.       Significant Labs:   BMP:     Recent Labs  Lab 04/19/17  0350   *   *   K 3.5      CO2 22*   BUN 34*   CREATININE 0.9   CALCIUM 10.3   MG 2.2     CBC: No results for input(s): WBC, HGB, HCT, PLT in the last 48 hours.

## 2017-04-19 NOTE — PROGRESS NOTES
Progress Note  Pulmonology    Admit Date: 4/6/2017   LOS: 13 days     SUBJECTIVE: resp failure     Follow-up For:  Vent management. Intubated, sedated.      Continuous Infusions:   dexmedetomidine (PRECEDEX) infusion Stopped (04/15/17 2149)    propofol 39.992 mcg/kg/min (04/19/17 1000)     Scheduled Meds:   albuterol-ipratropium 2.5mg-0.5mg/3mL  3 mL Nebulization Q6H    carvedilol  3.125 mg Oral BID WM    chlorhexidine  15 mL Mouth/Throat BID    enoxaparin  40 mg Subcutaneous Daily    linezolid 600mg/300ml  600 mg Intravenous Q12H    modafinil  200 mg Oral Daily    pantoprazole  40 mg Intravenous Daily    piperacillin-tazobactam 4.5 g in dextrose 5 % 100 mL IVPB (ready to mix system)  4.5 g Intravenous Q8H    scopolamine  1 patch Transdermal Q3 Days     Review of Systems:  Review of systems not obtained due to patient factors intubated and sedated.    OBJECTIVE:     Vital Signs Range (Last 24H):  Temp:  [97.8 °F (36.6 °C)-99.8 °F (37.7 °C)]   Pulse:  [67-99]   Resp:  [12-39]   BP: ()/(52-86)   SpO2:  [97 %-100 %]     I & O (Last 24H):    Intake/Output Summary (Last 24 hours) at 04/19/17 0955  Last data filed at 04/19/17 0910   Gross per 24 hour   Intake          1579.14 ml   Output             1335 ml   Net           244.14 ml     Physical Exam:  General: no distress, moderately obese. No distress.; intubated on vent  Neck: no carotid bruit  Breast: left breast mass - hard, non-mobile  Lungs:  normal respiratory effort, and fairly clear except mildly diminished at bases and 7.5 ett  Heart: regular rate and rhythm and no murmur  Abdomen: soft, non-tender non-distended; bowel sounds diminished and OGT and rectal tube  Extremities: no cyanosis or edema, or clubbing and SCDs  Neurologic: intubated and sedated    Laboratory:  CBC:     Recent Labs  Lab 04/17/17  0400   WBC 8.44   RBC 4.95   HGB 12.1   HCT 41.9      MCV 85   MCH 24.4*   MCHC 28.9*     BMP:     Recent Labs  Lab 04/19/17  0350   GLU  195*   *   K 3.5      CO2 22*   BUN 34*   CREATININE 0.9   CALCIUM 10.3   MG 2.2     Vent Mode: PRVC  Oxygen Concentration (%):  [30] 30  Resp Rate Total:  [12 br/min-35 br/min] 28 br/min  Vt Set:  [400 mL-440 mL] 400 mL  PEEP/CPAP:  [5 cmH20] 5 cmH20  Mean Airway Pressure:  [8.3 mrG88-29.1 cmH20] 13.1 cmH20    ABGs:     Recent Labs  Lab 04/19/17  0504   PH 7.621*   PCO2 18.6*   PO2 82   HCO3 19.2*   POCSATURATED 98   BE 0     Microbiology Results (last 7 days)     Procedure Component Value Units Date/Time    Blood culture [[id number]] Collected:  04/16/17 0827    Order Status:  Completed Specimen:  Blood Updated:  04/19/17 0903     Blood Culture, Routine No Growth to date     Blood Culture, Routine No Growth to date     Blood Culture, Routine No Growth to date     Blood Culture, Routine No Growth to date    Blood culture [[id number]] Collected:  04/16/17 0823    Order Status:  Completed Specimen:  Blood Updated:  04/19/17 0903     Blood Culture, Routine No Growth to date     Blood Culture, Routine No Growth to date     Blood Culture, Routine No Growth to date     Blood Culture, Routine No Growth to date    Urine culture [[id number]]  (Susceptibility) Collected:  04/16/17 0817    Order Status:  Completed Specimen:  Urine from Urine, Catheterized Updated:  04/18/17 1301     Urine Culture, Routine --     ENTEROCOCCUS FAECIUM  > 100,000 cfu/ml      Culture, Respiratory with Gram Stain [[id number]] Collected:  04/16/17 0817    Order Status:  Completed Specimen:  Respiratory from Endotracheal Aspirate Updated:  04/18/17 0909     Respiratory Culture No significant isolate     Gram Stain (Respiratory) <10 epithelial cells per low power field.     Gram Stain (Respiratory) Many WBC's     Gram Stain (Respiratory) Few Gram positive cocci in clusters     Gram Stain (Respiratory) Few Gram positive cocci in pairs    Clostridium difficile EIA [[id number]] Collected:  04/14/17 0015    Order Status:  Completed Specimen:   Stool from Stool Updated:  04/14/17 1416     C. diff Antigen Negative     C difficile Toxins A+B, EIA Negative      Testing not recommended for children <24 months old.       Culture, Respiratory with Gram Stain [[id number]] Collected:  04/11/17 1200    Order Status:  Completed Specimen:  Respiratory from Bronchial Wash Updated:  04/13/17 0911     Respiratory Culture No growth     Gram Stain (Respiratory) Moderate WBC's     Gram Stain (Respiratory) No organisms seen    Narrative:       Clinical Hx: HTN  Breast Mass  Pre-op Diagnosis: Pneumonia  Post-op Diagnosis: Bronchial Washings  Jar #1: Bronchial Washings        Chest X-Ray:Findings:   Endotracheal tube tip terminates at the level of the clavicles. Esophagogastric tube courses below the diaphragm. Right-sided PICC line projects in stable radiographic position. The cardiomediastinal silhouette is unchanged from prior examination. There is stable appearance of the lungs without evidence of new focal air space consolidation or pneumothorax.    ASSESSMENT/PLAN:     1) Acute respiratory failure - in 46 overweight female who presented with weakness and pneumonia. Progressed to intubation and difficult weaning. Attempted extubation failed with marked hypercapnia. Suspect weakness was strong factor, ?neurologic. Sedated at present. CXR as above. ABG with good gas exchange. Negative balance. Afebrile. On ceftriaxone and cultures as above. Will wean oxygen and wean rate a bit. Bronchodilators. On steroids for? Possible MG?. ENT consulted for trach.      For trach today ; will hope for rapid weaning subsequently.    Bird Vega

## 2017-04-19 NOTE — PROGRESS NOTES
Patient remains intubated on the servo-i vent with the following settings: PRVC 8, 400, PEEP 5, 30%. AMBU bag at [facility], all alarms are set and functioning . Will continue to monitor. Patient scheduled for tracheostomy at 1100

## 2017-04-19 NOTE — ASSESSMENT & PLAN NOTE
Mechanical ventilation  Weakness  Metabolic encephalopathy  Fever  Assessment and Plan:  Pt with respiratory failure that required intubation, possibly secondary to pneumonia vs edema, treated empirically with Rocephin/Azithromycin, ECHO with diastolic dysfunction but normal EF, and Pulm following for vent management. Family reported UE weakness for a few months prior to this admission and Neurology consulted, possible MG and patient undergoing trial of prednisone and pyridostigmine. This weakness likely limited extubation and necessitated reintubation; patient was extubated on 4/16 but had to be re-intubated that same day. Unclear source of encephalopathy and case discussed with Neurology, CT scan of the head was unrevealing. Also, patient completed treatment with Azithromycin and was on Rocephin alone, but she spiked a fever overnight and antibiotics changed to Zosyn/Zyvox.   Trach today.

## 2017-04-19 NOTE — ANESTHESIA PREPROCEDURE EVALUATION
04/19/2017  Radha Pritchett is a 46 y.o., female.    Anesthesia Evaluation    I have reviewed the Patient Summary Reports.     I have reviewed the Medications.     Review of Systems  Anesthesia Hx:  History of prior surgery of interest to airway management or planning:  Denies Personal Hx of Anesthesia complications.   Social:  Non-Smoker    Hematology/Oncology:         -- Anemia:   Cardiovascular:   Hypertension CHF    Pulmonary:   Pneumonia COPD    Renal/:  Renal/ Normal     Hepatic/GI:  Hepatic/GI Normal    Endocrine:  Endocrine Normal        Physical Exam  General:  Morbid Obesity    Airway/Jaw/Neck:  Airway Findings: Pre-Existing Airway Tube(s): Oral Endotracheal tube                Anesthesia Plan  Type of Anesthesia, risks & benefits discussed:  Anesthesia Type:  general  Patient's Preference:   Intra-op Monitoring Plan: standard ASA monitors  Intra-op Monitoring Plan Comments:   Post Op Pain Control Plan:   Post Op Pain Control Plan Comments:   Induction:   IV  Beta Blocker:  Patient is on a Beta-Blocker and has received one dose within the past 24 hours (No further documentation required).       Informed Consent: Patient representative understands risks and agrees with Anesthesia plan.  Questions answered. Anesthesia consent signed with patient representative.  ASA Score: 3     Day of Surgery Review of History & Physical:    H&P update referred to the provider.         Ready For Surgery From Anesthesia Perspective.

## 2017-04-20 LAB
ALLENS TEST: ABNORMAL
ANION GAP SERPL CALC-SCNC: 11 MMOL/L
BASOPHILS # BLD AUTO: 0.05 K/UL
BASOPHILS NFR BLD: 0.6 %
BUN SERPL-MCNC: 34 MG/DL
CALCIUM SERPL-MCNC: 10.1 MG/DL
CHLORIDE SERPL-SCNC: 103 MMOL/L
CO2 SERPL-SCNC: 23 MMOL/L
CREAT SERPL-MCNC: 1 MG/DL
DELSYS: ABNORMAL
DIFFERENTIAL METHOD: ABNORMAL
EOSINOPHIL # BLD AUTO: 0.4 K/UL
EOSINOPHIL NFR BLD: 4.3 %
ERYTHROCYTE [DISTWIDTH] IN BLOOD BY AUTOMATED COUNT: 17.5 %
ERYTHROCYTE [SEDIMENTATION RATE] IN BLOOD BY WESTERGREN METHOD: 8 MM/H
EST. GFR  (AFRICAN AMERICAN): >60 ML/MIN/1.73 M^2
EST. GFR  (NON AFRICAN AMERICAN): >60 ML/MIN/1.73 M^2
FIO2: 30
GLUCOSE SERPL-MCNC: 185 MG/DL
HCO3 UR-SCNC: 24 MMOL/L (ref 24–28)
HCT VFR BLD AUTO: 38 %
HGB BLD-MCNC: 11 G/DL
LYMPHOCYTES # BLD AUTO: 2.7 K/UL
LYMPHOCYTES NFR BLD: 30.5 %
MCH RBC QN AUTO: 24.3 PG
MCHC RBC AUTO-ENTMCNC: 28.9 %
MCV RBC AUTO: 84 FL
MIN VOL: 6.5
MODE: ABNORMAL
MONOCYTES # BLD AUTO: 0.7 K/UL
MONOCYTES NFR BLD: 7.9 %
NEUTROPHILS # BLD AUTO: 4.9 K/UL
NEUTROPHILS NFR BLD: 56.7 %
PCO2 BLDA: 42.8 MMHG (ref 35–45)
PEEP: 5
PH SMN: 7.36 [PH] (ref 7.35–7.45)
PIP: 31
PLATELET # BLD AUTO: 229 K/UL
PMV BLD AUTO: ABNORMAL FL
PO2 BLDA: 101 MMHG (ref 80–100)
POC BE: -2 MMOL/L
POC SATURATED O2: 98 % (ref 95–100)
POC TCO2: 25 MMOL/L (ref 23–27)
POCT GLUCOSE: 219 MG/DL (ref 70–110)
POCT GLUCOSE: 235 MG/DL (ref 70–110)
POCT GLUCOSE: 260 MG/DL (ref 70–110)
POTASSIUM SERPL-SCNC: 3.5 MMOL/L
RBC # BLD AUTO: 4.52 M/UL
SAMPLE: ABNORMAL
SITE: ABNORMAL
SODIUM SERPL-SCNC: 137 MMOL/L
SP02: 98
VT: 400
WBC # BLD AUTO: 8.68 K/UL

## 2017-04-20 PROCEDURE — 99900035 HC TECH TIME PER 15 MIN (STAT)

## 2017-04-20 PROCEDURE — 25000003 PHARM REV CODE 250: Performed by: INTERNAL MEDICINE

## 2017-04-20 PROCEDURE — 94640 AIRWAY INHALATION TREATMENT: CPT

## 2017-04-20 PROCEDURE — 36415 COLL VENOUS BLD VENIPUNCTURE: CPT

## 2017-04-20 PROCEDURE — 94761 N-INVAS EAR/PLS OXIMETRY MLT: CPT

## 2017-04-20 PROCEDURE — 99900022

## 2017-04-20 PROCEDURE — 27200966 HC CLOSED SUCTION SYSTEM

## 2017-04-20 PROCEDURE — 99232 SBSQ HOSP IP/OBS MODERATE 35: CPT | Mod: ,,, | Performed by: PSYCHIATRY & NEUROLOGY

## 2017-04-20 PROCEDURE — 94003 VENT MGMT INPAT SUBQ DAY: CPT

## 2017-04-20 PROCEDURE — 63600175 PHARM REV CODE 636 W HCPCS: Performed by: INTERNAL MEDICINE

## 2017-04-20 PROCEDURE — 20000000 HC ICU ROOM

## 2017-04-20 PROCEDURE — 36600 WITHDRAWAL OF ARTERIAL BLOOD: CPT

## 2017-04-20 PROCEDURE — 63600175 PHARM REV CODE 636 W HCPCS: Performed by: HOSPITALIST

## 2017-04-20 PROCEDURE — 80048 BASIC METABOLIC PNL TOTAL CA: CPT

## 2017-04-20 PROCEDURE — 25000242 PHARM REV CODE 250 ALT 637 W/ HCPCS: Performed by: INTERNAL MEDICINE

## 2017-04-20 PROCEDURE — 85025 COMPLETE CBC W/AUTO DIFF WBC: CPT

## 2017-04-20 PROCEDURE — 27000221 HC OXYGEN, UP TO 24 HOURS

## 2017-04-20 PROCEDURE — C9113 INJ PANTOPRAZOLE SODIUM, VIA: HCPCS | Performed by: HOSPITALIST

## 2017-04-20 PROCEDURE — 99900026 HC AIRWAY MAINTENANCE (STAT)

## 2017-04-20 RX ORDER — LORAZEPAM 2 MG/ML
1 INJECTION INTRAMUSCULAR
Status: DISCONTINUED | OUTPATIENT
Start: 2017-04-20 | End: 2017-04-22

## 2017-04-20 RX ADMIN — LINEZOLID 600 MG: 600 INJECTION, SOLUTION INTRAVENOUS at 08:04

## 2017-04-20 RX ADMIN — PROPOFOL 50 MCG/KG/MIN: 10 INJECTION, EMULSION INTRAVENOUS at 03:04

## 2017-04-20 RX ADMIN — IPRATROPIUM BROMIDE AND ALBUTEROL SULFATE 3 ML: .5; 3 SOLUTION RESPIRATORY (INHALATION) at 01:04

## 2017-04-20 RX ADMIN — PIPERACILLIN AND TAZOBACTAM 4.5 G: 4; .5 INJECTION, POWDER, LYOPHILIZED, FOR SOLUTION INTRAVENOUS; PARENTERAL at 05:04

## 2017-04-20 RX ADMIN — PROPOFOL 20 MCG/KG/MIN: 10 INJECTION, EMULSION INTRAVENOUS at 05:04

## 2017-04-20 RX ADMIN — IPRATROPIUM BROMIDE AND ALBUTEROL SULFATE 3 ML: .5; 3 SOLUTION RESPIRATORY (INHALATION) at 07:04

## 2017-04-20 RX ADMIN — INSULIN ASPART 4 UNITS: 100 INJECTION, SOLUTION INTRAVENOUS; SUBCUTANEOUS at 05:04

## 2017-04-20 RX ADMIN — MODAFINIL 200 MG: 100 TABLET ORAL at 08:04

## 2017-04-20 RX ADMIN — CHLORHEXIDINE GLUCONATE 15 ML: 1.2 RINSE ORAL at 08:04

## 2017-04-20 RX ADMIN — IPRATROPIUM BROMIDE AND ALBUTEROL SULFATE 3 ML: .5; 3 SOLUTION RESPIRATORY (INHALATION) at 08:04

## 2017-04-20 RX ADMIN — IPRATROPIUM BROMIDE AND ALBUTEROL SULFATE 3 ML: .5; 3 SOLUTION RESPIRATORY (INHALATION) at 12:04

## 2017-04-20 RX ADMIN — INSULIN ASPART 4 UNITS: 100 INJECTION, SOLUTION INTRAVENOUS; SUBCUTANEOUS at 06:04

## 2017-04-20 RX ADMIN — ENOXAPARIN SODIUM 40 MG: 100 INJECTION SUBCUTANEOUS at 05:04

## 2017-04-20 RX ADMIN — PIPERACILLIN AND TAZOBACTAM 4.5 G: 4; .5 INJECTION, POWDER, LYOPHILIZED, FOR SOLUTION INTRAVENOUS; PARENTERAL at 10:04

## 2017-04-20 RX ADMIN — PIPERACILLIN AND TAZOBACTAM 4.5 G: 4; .5 INJECTION, POWDER, LYOPHILIZED, FOR SOLUTION INTRAVENOUS; PARENTERAL at 12:04

## 2017-04-20 RX ADMIN — MORPHINE SULFATE 2 MG: 10 INJECTION INTRAVENOUS at 11:04

## 2017-04-20 RX ADMIN — PROPOFOL 30 MCG/KG/MIN: 10 INJECTION, EMULSION INTRAVENOUS at 11:04

## 2017-04-20 RX ADMIN — PANTOPRAZOLE SODIUM 40 MG: 40 INJECTION, POWDER, FOR SOLUTION INTRAVENOUS at 08:04

## 2017-04-20 RX ADMIN — MORPHINE SULFATE 2 MG: 10 INJECTION INTRAVENOUS at 08:04

## 2017-04-20 RX ADMIN — INSULIN ASPART 6 UNITS: 100 INJECTION, SOLUTION INTRAVENOUS; SUBCUTANEOUS at 11:04

## 2017-04-20 NOTE — PROGRESS NOTES
Progress Note  Pulmonology    Admit Date: 4/6/2017   LOS: 14 days     SUBJECTIVE: resp failure     Follow-up For:  Vent management. Now trach    Continuous Infusions:   dexmedetomidine (PRECEDEX) infusion Stopped (04/15/17 3770)    propofol 15 mcg/kg/min (04/20/17 0855)     Scheduled Meds:   albuterol-ipratropium 2.5mg-0.5mg/3mL  3 mL Nebulization Q6H    carvedilol  3.125 mg Oral BID WM    chlorhexidine  15 mL Mouth/Throat BID    enoxaparin  40 mg Subcutaneous Daily    linezolid 600mg/300ml  600 mg Intravenous Q12H    modafinil  200 mg Oral Daily    pantoprazole  40 mg Intravenous Daily    piperacillin-tazobactam 4.5 g in dextrose 5 % 100 mL IVPB (ready to mix system)  4.5 g Intravenous Q8H    scopolamine  1 patch Transdermal Q3 Days     Review of Systems:  Review of systems not obtained due to patient factors intubated and sedated.    OBJECTIVE:     Vital Signs Range (Last 24H):  Temp:  [98.1 °F (36.7 °C)-99 °F (37.2 °C)]   Pulse:  [71-93]   Resp:  [8-57]   BP: ()/(50-88)   SpO2:  [83 %-100 %]     I & O (Last 24H):    Intake/Output Summary (Last 24 hours) at 04/20/17 0924  Last data filed at 04/20/17 0900   Gross per 24 hour   Intake          2649.06 ml   Output             1845 ml   Net           804.06 ml     Physical Exam:  General: no distress, moderately obese. No distress.;tracheostomy in place  Neck: no carotid bruit  Breast: left breast mass - hard, non-mobile  Lungs:  normal respiratory effort, and fairly clear except mildly diminished at bases and 7.5 ett  Heart: regular rate and rhythm and no murmur  Abdomen: soft, non-tender non-distended; bowel sounds diminished and OGT and rectal tube  Extremities: no cyanosis or edema, or clubbing and SCDs  Neurologic: intubated and sedated    Laboratory:  CBC:     Recent Labs  Lab 04/20/17  0330   WBC 8.68   RBC 4.52   HGB 11.0*   HCT 38.0      MCV 84   MCH 24.3*   MCHC 28.9*     BMP:     Recent Labs  Lab 04/19/17  0350 04/20/17  0330   GLU  195* 185*   * 137   K 3.5 3.5    103   CO2 22* 23   BUN 34* 34*   CREATININE 0.9 1.0   CALCIUM 10.3 10.1   MG 2.2  --      Vent Mode: PRVC  Oxygen Concentration (%):  [] 30  Resp Rate Total:  [8 br/min-34 br/min] 11 br/min  Vt Set:  [400 mL] 400 mL  PEEP/CPAP:  [5 cmH20] 5 cmH20  Mean Airway Pressure:  [7.3 hnA17-20.6 cmH20] 8.9 cmH20    ABGs:     Recent Labs  Lab 04/20/17 0515   PH 7.356   PCO2 42.8   PO2 101*   HCO3 24.0   POCSATURATED 98   BE -2     Microbiology Results (last 7 days)     Procedure Component Value Units Date/Time    Blood culture [[id number]] Collected:  04/16/17 0827    Order Status:  Completed Specimen:  Blood Updated:  04/20/17 0903     Blood Culture, Routine No Growth to date     Blood Culture, Routine No Growth to date     Blood Culture, Routine No Growth to date     Blood Culture, Routine No Growth to date     Blood Culture, Routine No Growth to date    Blood culture [[id number]] Collected:  04/16/17 0823    Order Status:  Completed Specimen:  Blood Updated:  04/20/17 0903     Blood Culture, Routine No Growth to date     Blood Culture, Routine No Growth to date     Blood Culture, Routine No Growth to date     Blood Culture, Routine No Growth to date     Blood Culture, Routine No Growth to date    Urine culture [[id number]]  (Susceptibility) Collected:  04/16/17 0817    Order Status:  Completed Specimen:  Urine from Urine, Catheterized Updated:  04/18/17 1301     Urine Culture, Routine --     ENTEROCOCCUS FAECIUM  > 100,000 cfu/ml      Culture, Respiratory with Gram Stain [[id number]] Collected:  04/16/17 0817    Order Status:  Completed Specimen:  Respiratory from Endotracheal Aspirate Updated:  04/18/17 0909     Respiratory Culture No significant isolate     Gram Stain (Respiratory) <10 epithelial cells per low power field.     Gram Stain (Respiratory) Many WBC's     Gram Stain (Respiratory) Few Gram positive cocci in clusters     Gram Stain (Respiratory) Few Gram positive  cocci in pairs    Clostridium difficile EIA [[id number]] Collected:  04/14/17 0015    Order Status:  Completed Specimen:  Stool from Stool Updated:  04/14/17 1416     C. diff Antigen Negative     C difficile Toxins A+B, EIA Negative      Testing not recommended for children <24 months old.           Chest X-Ray:Findings:   Endotracheal tube tip terminates at the level of the clavicles. Esophagogastric tube courses below the diaphragm. Right-sided PICC line projects in stable radiographic position. The cardiomediastinal silhouette is unchanged from prior examination. There is stable appearance of the lungs without evidence of new focal air space consolidation or pneumothorax.    ASSESSMENT/PLAN:     1) Acute respiratory failure - in 46 overweight female who presented with weakness and pneumonia. Progressed to intubation and difficult weaning. Attempted extubation failed with marked hypercapnia. Suspect weakness was strong factor, ?neurologic. Sedated at present. CXR as above. ABG with good gas exchange. Negative balance. Afebrile. On ceftriaxone and cultures as above. Will wean oxygen and wean rate a bit. Bronchodilators. On steroids for? Possible MG?. ENT consulted for trach.      Underwent trach yesterday and now on cpap ; awakens and tries to respond ; very weak.. Neuro following.  Will attempt trach collar in next few days .     Bird Vega

## 2017-04-20 NOTE — PROGRESS NOTES
SBT and sedation vacation  initiated. Patient placed on CPAP 14 PS/ 5 PEEP/ 30%. Will continue to monitor.  HR is 85. sats 97% , RR 29

## 2017-04-20 NOTE — PROGRESS NOTES
Patient placed back on a low set RR on the vent. She is on SIMV /8/400/+5/30%. Pt started having periods of RR rate being in the 30s and her BP increased during the weaning trial. She has complaints of pain and just generally looks uncomfortable. Her sedation was put on. We will try to wean again a little later.

## 2017-04-20 NOTE — PLAN OF CARE
Problem: Patient Care Overview  Goal: Plan of Care Review  Outcome: Ongoing (interventions implemented as appropriate)  Pt remains in ICU today on vent per #8 ciera hinton. Pt requires some propofol for sedation, although weaned significantly throughout shift; at times pt tachypenic with labored breathing and restless. Pt arouses, follows commands but remains very weak. Pt tolerating tubefeeds. No signs of fall, injury, trauma today. Precautions in place for each of these.

## 2017-04-20 NOTE — PROGRESS NOTES
Received patient on vent settings PRVC 8/400/+5/FIO2 30% Trach size 8 Shiley intact, secured, and patent Ambu bag and mask at bed side no signs of any distress at this current time

## 2017-04-20 NOTE — PROGRESS NOTES
[facility] Hospital Medicine  Progress Note    Patient Name: Radha Pritchett  MRN: [id number]  Patient Class: IP- Inpatient   Admission Date: 4/6/2017  Length of Stay: 14 days  Attending Physician: Yovani Arellano MD  Primary Care Provider: Ferny Iglesias MD        Subjective:     Principal Problem:Acute respiratory failure with hypercapnia    HPI:  Ms. Radha Pritchett is a 46 y.o. female with essential hypertension, chronic diastolic heart failure (LVEF 55-60% Dec 2014), moderate protein malnutrition who presents to [facility] ED with complaints of dyspnea today.  She was scheduled to undergo removal of a breast mass today but was found to be hypoxic.  She does report that she has been short of breath but cannot specify how long.  She has also had some non-productive coughing and some chills without fevers.  Further history is otherwise limited at this time.    Hospital Course:   was admitted with pneumonia and acutely decompensated and was intubated and placed on vent. Pt with UE weakness prior to presentation on this admission which may be contributing to inability to extubate. Neurology following and considering possible MG and patient on therapeutic trial of prednisone and Modafinil. Pt s/p bronch that was unrevealing on 4/11. Pt was extubated on 4/15 but was re-intubated later the same day.  Was febrile and stared on Zyvox and zosyn.  CXR shows possible infiltrate.  Strep growing on UCx.  Trach on 4/19 without complications.      Interval History: Trach yesterday without complications.  No events overnight.    Review of Systems   Unable to perform ROS: Intubated     Objective:     Vital Signs (Most Recent):  Temp: 99.9 °F (37.7 °C) (04/20/17 1105)  Pulse: 86 (04/20/17 1130)  Resp: (!) 8 (04/20/17 1130)  BP: (!) 97/57 (04/20/17 1130)  SpO2: 100 % (04/20/17 1130) Vital Signs (24h Range):  Temp:  [98.1 °F (36.7 °C)-99.9 °F (37.7 °C)] 99.9 °F (37.7 °C)  Pulse:  [71-96] 86  Resp:  [0-57]  8  SpO2:  [80 %-100 %] 100 %  BP: ()/(47-88) 97/57     Weight: 76.6 kg (168 lb 14 oz)  Body mass index is 47.49 kg/(m^2).    Intake/Output Summary (Last 24 hours) at 04/20/17 1158  Last data filed at 04/20/17 1105   Gross per 24 hour   Intake          3501.72 ml   Output             1965 ml   Net          1536.72 ml      Physical Exam   Constitutional: She appears well-developed. No distress.   HENT:   ET tube in place   Eyes: Conjunctivae are normal. Right eye exhibits no discharge. Left eye exhibits no discharge.   Neck:   Trach in place   Cardiovascular: Normal rate, regular rhythm and normal heart sounds.    Pulmonary/Chest: No stridor.   Mechanically ventilated per trach.  Coarse BS bilaterally   Abdominal: Soft. Bowel sounds are normal.   Musculoskeletal: She exhibits no deformity.   Neurological:   Sedated   Skin: Skin is warm and dry.       Significant Labs:   BMP:     Recent Labs  Lab 04/19/17  0350 04/20/17  0330   * 185*   * 137   K 3.5 3.5    103   CO2 22* 23   BUN 34* 34*   CREATININE 0.9 1.0   CALCIUM 10.3 10.1   MG 2.2  --      CBC:     Recent Labs  Lab 04/20/17  0330   WBC 8.68   HGB 11.0*   HCT 38.0            Assessment/Plan:      * Acute respiratory failure with hypercapnia  Mechanical ventilation  Weakness  Metabolic encephalopathy  Fever  Assessment and Plan:  Pt with respiratory failure that required intubation, possibly secondary to pneumonia vs edema, treated empirically with Rocephin/Azithromycin, ECHO with diastolic dysfunction but normal EF, and Pulm following for vent management. Family reported UE weakness for a few months prior to this admission and Neurology consulted, possible MG and patient undergoing trial of prednisone and pyridostigmine. This weakness likely limited extubation and necessitated reintubation; patient was extubated on 4/16 but had to be re-intubated that same day. Unclear source of encephalopathy and case discussed with Neurology, CT  scan of the head was unrevealing. Also, patient completed treatment with Azithromycin and was on Rocephin alone, but she spiked a fever overnight and antibiotics changed to Zosyn/Zyvox.   Trach yesterday without complications.  Currently doing well on minimal FIO2.  Possibly trach collar soon.      Pneumonia of both lungs due to infectious organism  Probably bacterial, but unable to determine.  The patient has a CURB-65 score of 0, and does not meet criteria for healthcare-associated pneumonia initially. CTA chest with bilateral airspace opacification concerning for pneumonia.  Oxygen saturations are improved but requiring a non-rebreather mask; then BIPAP and s/p intubation.  Blood culture NGTD.  Pt completed full course of Azithromycin, and was continued Rocephin until had spike of fever, Abx has been expanded  to Zosyn/Zyvox.  Will give a couple more days of ABx, then stop.    Sepsis  This patient met criteria for sepsis given tachycardia, tachypnea, and pneumonia; there is no evidence of endo-organ dysfunction.  Blood cultures are NGTD and repeat studies done;  antibiotics as discussed above.  strep in urine likely normal pennie.    Essential hypertension  BP well controlled.  Continue current management.    Chronic diastolic heart failure  Patient doesn't appear to be in acute heart failure; checked ECHO with EF=65% but with diastolic dysfunction.    Moderate protein malnutrition  Tolerating tube feeds.  Hyperglycemia--probably related to steroids.  Prednisone stopped.    Breast mass, left  Planned for surgery, seen by Dr. Sandoval (his patient) and plan to reschedule as outpatient. It is reportedly a hematoma by biopsy, but suspicion for CA not completely ruled out given paraneoplastic syndrome a consideration.      Hypokalemia  Replace as necessary.      Counseling regarding advanced directives and goals of care  Extensive conversations done with family. Pt to remain full code. Pt is still  but estranged  from the  and reportedly the grandmother is the major decision maker.       VTE Risk Mitigation         Ordered     enoxaparin injection 40 mg  Daily     Route:  Subcutaneous        04/06/17 2202     Medium Risk of VTE  Once      04/06/17 2202        Critical Care time spent > 35 minutes       Yovani Arellano MD  Department of Hospital Medicine   [facility]

## 2017-04-20 NOTE — PLAN OF CARE
Problem: Patient Care Overview  Goal: Plan of Care Review  Outcome: Ongoing (interventions implemented as appropriate)  Patient remains in ICU requiring mechanical ventilation. Settings are as follows: PRVC 30%/400/8/5+. Patient with frequent periods of agitation, propofol increased to max dose and PRN ativan ordered. Tracheostomy remains intact, sutures secure, dressing dry and intact requiring PRN changes. Trach care completed by RT. Patient follows simple commands on sedation vacation, however very weak. Tf at goal of 40 with minimal residuals. Flexiseal in place with 50mL output. Carter intact draining fabiana urine, adequate UO. Restraints remain in place for patient safety. No falls or injuries during shift. No new skin breakdown noted.

## 2017-04-20 NOTE — ASSESSMENT & PLAN NOTE
Probably bacterial, but unable to determine.  The patient has a CURB-65 score of 0, and does not meet criteria for healthcare-associated pneumonia initially. CTA chest with bilateral airspace opacification concerning for pneumonia.  Oxygen saturations are improved but requiring a non-rebreather mask; then BIPAP and s/p intubation.  Blood culture NGTD.  Pt completed full course of Azithromycin, and was continued Rocephin until had spike of fever, Abx has been expanded  to Zosyn/Zyvox.  Will give a couple more days of ABx, then stop.

## 2017-04-20 NOTE — ASSESSMENT & PLAN NOTE
Mechanical ventilation  Weakness  Metabolic encephalopathy  Fever  Assessment and Plan:  Pt with respiratory failure that required intubation, possibly secondary to pneumonia vs edema, treated empirically with Rocephin/Azithromycin, ECHO with diastolic dysfunction but normal EF, and Pulm following for vent management. Family reported UE weakness for a few months prior to this admission and Neurology consulted, possible MG and patient undergoing trial of prednisone and pyridostigmine. This weakness likely limited extubation and necessitated reintubation; patient was extubated on 4/16 but had to be re-intubated that same day. Unclear source of encephalopathy and case discussed with Neurology, CT scan of the head was unrevealing. Also, patient completed treatment with Azithromycin and was on Rocephin alone, but she spiked a fever overnight and antibiotics changed to Zosyn/Zyvox.   Trach yesterday without complications.  Currently doing well on minimal FIO2.  Possibly trach collar soon.

## 2017-04-20 NOTE — SUBJECTIVE & OBJECTIVE
Interval History: Trach yesterday without complications.  No events overnight.    Review of Systems   Unable to perform ROS: Intubated     Objective:     Vital Signs (Most Recent):  Temp: 99.9 °F (37.7 °C) (04/20/17 1105)  Pulse: 86 (04/20/17 1130)  Resp: (!) 8 (04/20/17 1130)  BP: (!) 97/57 (04/20/17 1130)  SpO2: 100 % (04/20/17 1130) Vital Signs (24h Range):  Temp:  [98.1 °F (36.7 °C)-99.9 °F (37.7 °C)] 99.9 °F (37.7 °C)  Pulse:  [71-96] 86  Resp:  [0-57] 8  SpO2:  [80 %-100 %] 100 %  BP: ()/(47-88) 97/57     Weight: 76.6 kg (168 lb 14 oz)  Body mass index is 47.49 kg/(m^2).    Intake/Output Summary (Last 24 hours) at 04/20/17 1158  Last data filed at 04/20/17 1105   Gross per 24 hour   Intake          3501.72 ml   Output             1965 ml   Net          1536.72 ml      Physical Exam   Constitutional: She appears well-developed. No distress.   HENT:   ET tube in place   Eyes: Conjunctivae are normal. Right eye exhibits no discharge. Left eye exhibits no discharge.   Neck:   Trach in place   Cardiovascular: Normal rate, regular rhythm and normal heart sounds.    Pulmonary/Chest: No stridor.   Mechanically ventilated per trach.  Coarse BS bilaterally   Abdominal: Soft. Bowel sounds are normal.   Musculoskeletal: She exhibits no deformity.   Neurological:   Sedated   Skin: Skin is warm and dry.       Significant Labs:   BMP:     Recent Labs  Lab 04/19/17  0350 04/20/17  0330   * 185*   * 137   K 3.5 3.5    103   CO2 22* 23   BUN 34* 34*   CREATININE 0.9 1.0   CALCIUM 10.3 10.1   MG 2.2  --      CBC:     Recent Labs  Lab 04/20/17  0330   WBC 8.68   HGB 11.0*   HCT 38.0

## 2017-04-21 LAB
ALLENS TEST: ABNORMAL
ANION GAP SERPL CALC-SCNC: 9 MMOL/L
BACTERIA BLD CULT: NORMAL
BACTERIA BLD CULT: NORMAL
BUN SERPL-MCNC: 31 MG/DL
CALCIUM SERPL-MCNC: 10.3 MG/DL
CHLORIDE SERPL-SCNC: 104 MMOL/L
CO2 SERPL-SCNC: 27 MMOL/L
CREAT SERPL-MCNC: 0.9 MG/DL
DELSYS: ABNORMAL
ERYTHROCYTE [SEDIMENTATION RATE] IN BLOOD BY WESTERGREN METHOD: 8 MM/H
EST. GFR  (AFRICAN AMERICAN): >60 ML/MIN/1.73 M^2
EST. GFR  (NON AFRICAN AMERICAN): >60 ML/MIN/1.73 M^2
FIO2: 30
GLUCOSE SERPL-MCNC: 187 MG/DL
HCO3 UR-SCNC: 27.3 MMOL/L (ref 24–28)
MODE: ABNORMAL
PCO2 BLDA: 64.8 MMHG (ref 35–45)
PEEP: 5
PH SMN: 7.23 [PH] (ref 7.35–7.45)
PO2 BLDA: 81 MMHG (ref 80–100)
POC BE: -2 MMOL/L
POC SATURATED O2: 93 % (ref 95–100)
POC TCO2: 29 MMOL/L (ref 23–27)
POCT GLUCOSE: 158 MG/DL (ref 70–110)
POCT GLUCOSE: 228 MG/DL (ref 70–110)
POCT GLUCOSE: 251 MG/DL (ref 70–110)
POCT GLUCOSE: 259 MG/DL (ref 70–110)
POTASSIUM SERPL-SCNC: 3.9 MMOL/L
PS: 10
SAMPLE: ABNORMAL
SITE: ABNORMAL
SODIUM SERPL-SCNC: 140 MMOL/L
SP02: 98
VT: 400

## 2017-04-21 PROCEDURE — 25000003 PHARM REV CODE 250: Performed by: INTERNAL MEDICINE

## 2017-04-21 PROCEDURE — 80048 BASIC METABOLIC PNL TOTAL CA: CPT

## 2017-04-21 PROCEDURE — 63600175 PHARM REV CODE 636 W HCPCS: Performed by: HOSPITALIST

## 2017-04-21 PROCEDURE — 20000000 HC ICU ROOM

## 2017-04-21 PROCEDURE — 94761 N-INVAS EAR/PLS OXIMETRY MLT: CPT

## 2017-04-21 PROCEDURE — 94003 VENT MGMT INPAT SUBQ DAY: CPT

## 2017-04-21 PROCEDURE — 94640 AIRWAY INHALATION TREATMENT: CPT

## 2017-04-21 PROCEDURE — 99900026 HC AIRWAY MAINTENANCE (STAT)

## 2017-04-21 PROCEDURE — 63600175 PHARM REV CODE 636 W HCPCS: Performed by: INTERNAL MEDICINE

## 2017-04-21 PROCEDURE — 27100171 HC OXYGEN HIGH FLOW UP TO 24 HOURS

## 2017-04-21 PROCEDURE — 36415 COLL VENOUS BLD VENIPUNCTURE: CPT

## 2017-04-21 PROCEDURE — 25000242 PHARM REV CODE 250 ALT 637 W/ HCPCS: Performed by: INTERNAL MEDICINE

## 2017-04-21 PROCEDURE — 25000003 PHARM REV CODE 250: Performed by: HOSPITALIST

## 2017-04-21 PROCEDURE — 27000221 HC OXYGEN, UP TO 24 HOURS

## 2017-04-21 PROCEDURE — 27200966 HC CLOSED SUCTION SYSTEM

## 2017-04-21 PROCEDURE — C9113 INJ PANTOPRAZOLE SODIUM, VIA: HCPCS | Performed by: HOSPITALIST

## 2017-04-21 PROCEDURE — 36600 WITHDRAWAL OF ARTERIAL BLOOD: CPT

## 2017-04-21 PROCEDURE — 99900035 HC TECH TIME PER 15 MIN (STAT)

## 2017-04-21 RX ORDER — MORPHINE SULFATE 10 MG/ML
1 INJECTION INTRAMUSCULAR; INTRAVENOUS; SUBCUTANEOUS
Status: DISCONTINUED | OUTPATIENT
Start: 2017-04-21 | End: 2017-04-24

## 2017-04-21 RX ORDER — GLYCOPYRROLATE 0.2 MG/ML
0.1 INJECTION INTRAMUSCULAR; INTRAVENOUS 3 TIMES DAILY PRN
Status: DISCONTINUED | OUTPATIENT
Start: 2017-04-21 | End: 2017-05-08 | Stop reason: HOSPADM

## 2017-04-21 RX ORDER — FUROSEMIDE 10 MG/ML
60 INJECTION INTRAMUSCULAR; INTRAVENOUS ONCE
Status: COMPLETED | OUTPATIENT
Start: 2017-04-21 | End: 2017-04-21

## 2017-04-21 RX ADMIN — PIPERACILLIN AND TAZOBACTAM 4.5 G: 4; .5 INJECTION, POWDER, LYOPHILIZED, FOR SOLUTION INTRAVENOUS; PARENTERAL at 01:04

## 2017-04-21 RX ADMIN — LINEZOLID 600 MG: 600 INJECTION, SOLUTION INTRAVENOUS at 08:04

## 2017-04-21 RX ADMIN — GLYCOPYRROLATE 0.1 MG: 0.2 INJECTION, SOLUTION INTRAMUSCULAR; INTRAVENOUS at 01:04

## 2017-04-21 RX ADMIN — PANTOPRAZOLE SODIUM 40 MG: 40 INJECTION, POWDER, FOR SOLUTION INTRAVENOUS at 08:04

## 2017-04-21 RX ADMIN — PROPOFOL 10 MCG/KG/MIN: 10 INJECTION, EMULSION INTRAVENOUS at 02:04

## 2017-04-21 RX ADMIN — INSULIN ASPART 2 UNITS: 100 INJECTION, SOLUTION INTRAVENOUS; SUBCUTANEOUS at 12:04

## 2017-04-21 RX ADMIN — IPRATROPIUM BROMIDE AND ALBUTEROL SULFATE 3 ML: .5; 3 SOLUTION RESPIRATORY (INHALATION) at 07:04

## 2017-04-21 RX ADMIN — MORPHINE SULFATE 2 MG: 10 INJECTION INTRAVENOUS at 01:04

## 2017-04-21 RX ADMIN — INSULIN ASPART 6 UNITS: 100 INJECTION, SOLUTION INTRAVENOUS; SUBCUTANEOUS at 05:04

## 2017-04-21 RX ADMIN — ENOXAPARIN SODIUM 40 MG: 100 INJECTION SUBCUTANEOUS at 04:04

## 2017-04-21 RX ADMIN — LORAZEPAM 1 MG: 2 INJECTION, SOLUTION INTRAMUSCULAR; INTRAVENOUS at 01:04

## 2017-04-21 RX ADMIN — LORAZEPAM 1 MG: 2 INJECTION, SOLUTION INTRAMUSCULAR; INTRAVENOUS at 02:04

## 2017-04-21 RX ADMIN — IPRATROPIUM BROMIDE AND ALBUTEROL SULFATE 3 ML: .5; 3 SOLUTION RESPIRATORY (INHALATION) at 01:04

## 2017-04-21 RX ADMIN — CHLORHEXIDINE GLUCONATE 15 ML: 1.2 RINSE ORAL at 08:04

## 2017-04-21 RX ADMIN — PIPERACILLIN AND TAZOBACTAM 4.5 G: 4; .5 INJECTION, POWDER, LYOPHILIZED, FOR SOLUTION INTRAVENOUS; PARENTERAL at 08:04

## 2017-04-21 RX ADMIN — PIPERACILLIN AND TAZOBACTAM 4.5 G: 4; .5 INJECTION, POWDER, LYOPHILIZED, FOR SOLUTION INTRAVENOUS; PARENTERAL at 06:04

## 2017-04-21 RX ADMIN — FUROSEMIDE 60 MG: 10 INJECTION, SOLUTION INTRAMUSCULAR; INTRAVENOUS at 11:04

## 2017-04-21 RX ADMIN — LORAZEPAM 1 MG: 2 INJECTION, SOLUTION INTRAMUSCULAR; INTRAVENOUS at 10:04

## 2017-04-21 RX ADMIN — INSULIN ASPART 6 UNITS: 100 INJECTION, SOLUTION INTRAVENOUS; SUBCUTANEOUS at 01:04

## 2017-04-21 RX ADMIN — MORPHINE SULFATE 1 MG: 10 INJECTION INTRAVENOUS at 06:04

## 2017-04-21 RX ADMIN — LORAZEPAM 1 MG: 2 INJECTION, SOLUTION INTRAMUSCULAR; INTRAVENOUS at 04:04

## 2017-04-21 RX ADMIN — MODAFINIL 200 MG: 100 TABLET ORAL at 08:04

## 2017-04-21 RX ADMIN — LORAZEPAM 1 MG: 2 INJECTION, SOLUTION INTRAMUSCULAR; INTRAVENOUS at 06:04

## 2017-04-21 NOTE — PLAN OF CARE
04/21/17 1457   Discharge Reassessment   Assessment Type Discharge Planning Reassessment   Can the patient answer the patient profile reliably? No, cognitively impaired   How does the patient rate their overall health at the present time? Fair   Describe the patient's ability to walk at the present time. Does not walk or unable to take any steps at all   How often would a person be available to care for the patient? Occasionally   Number of comorbid conditions (as recorded on the chart) Five or more   During the past month, has the patient often been bothered by feeling down, depressed or hopeless? No   During the past month, has the patient often been bothered by little interest or pleasure in doing things? No   Discharge plan remains the same: Yes   Provided patient/caregiver education on the expected discharge date and the discharge plan Yes   Discharge Plan A Long-term acute care facility (LTAC)  ([facility])   Discharge Plan [facility]   Change in patient condition or support system Yes   Patient choice form signed by patient/caregiver No  (verbal--explained the ones in the insurance network--by phone)   Explained to the the patient/caregiver why the discharge planned changed: Yes   Involved the patient/caregiver in establishing a new discharge plan: Yes   patient remains in ICU on vent support. DENIS reviewed chart, reviewed in bed huddle. Contacted [facility]AC [facility] ([facility]) not in network, [facility] ([facility]) no return call, in past not in network with insurance,  [facility] (no return call) was in network in past,  [facility] (not in network). Parvin with [employer] reports submitting to insurance. DENIS spoke with aunt Mylene Matute [phone] and grandmother Kami Matute [phone] on phone, explained LTAC role. They prefer agency on [facility] but report understanding need to use agency within the insurance network. DENIS explained probably will be Monday before have  feedback from insurance. SW will continue to follow, update patient and family and assist as needed.   3:10 contact number for Parvin with [facility] [phone] ([phone]). Will also send demograhics thru [facility] however [facility] accesses thru [employer]

## 2017-04-21 NOTE — PLAN OF CARE
Problem: Patient Care Overview  Goal: Plan of Care Review  Outcome: Ongoing (interventions implemented as appropriate)  Patient remains in ICU requiring mechanical ventilation. Settings are as follows: PRVC 30%/400/8/5+/ PS 20. PRN morphine provided for comfort. Propofol weaned off.  Tracheostomy remains intact, sutures secure, dressing dry and intact.Trach care completed by RN. Patient follows simple commands on sedation vacation, however very weak. Tf at goal of 40 with minimal residuals. Flexiseal in place with 175mL output. Carter intact draining fabiana urine, adequate UO. Restraints remain in place for patient safety. No falls or injuries during shift. No new skin breakdown noted.

## 2017-04-21 NOTE — PROGRESS NOTES
[facility]  Neurology  Progress Note    Patient Name: Radha Pritchett  MRN: [id number]  Admission Date: 4/6/2017  Hospital Length of Stay: 15 days  Code Status: Full Code   Attending Provider: Yovani Arellano MD  Primary Care Physician: Ferny Iglesias MD   Principal Problem:Acute respiratory failure with hypercapnia    Subjective:     Interval History: 47 y/o female with medical Hx as listed below who was admitted for shortness of breath. Pt was found to have a pneumonia. She decompensasated and is now intubated. Pt has been difficult to to wean off ventilator. It has been reported that pt has been experiencing upper extremity weakness for a while but is perhaps a generalized weakness chronically. No other details at his moment. Pt also has Hx of a left breast mass that according to pathology is benign, possibly a hematoma. Pt has at baseline a learning difficulty.      -4/11/17: Pt remains mechanically ventilated. NIF reported -10.      -4/12/17: Remains mechanically ventilated. Upon sedation vacation follow commands.      -4/13/17: Still unable to wean off vent.       -4/14/17: No improvement reported. Pt remains mechanically ventilated. In previous days pt reported to reach for the ETT when off of sedation.      -4/15/17: Pt tolerating CPAP trial. Moving her extremities.      -4/16/17: Pt extubated yesterday but began to present shallow respirations and somnolence. Markedly hypercapnic on ABG's. Reintubated.       -4/17/17: Unable to wean off vent. Trach planned for Wed/Thurs.      -4/18/17: Pt opening eyes and following commands; still on vent.     -4/19/17: For trach today.      -4/20/17: Pt is S/P trach. Ventilator dependent.    Current Neurological Medications:     Current Facility-Administered Medications   Medication Dose Route Frequency Provider Last Rate Last Dose    acetaminophen tablet 500 mg  500 mg Oral Q6H PRN Easton Aaron MD   500 mg at 04/16/17 0823    albuterol-ipratropium  2.5mg-0.5mg/3mL nebulizer solution 3 mL  3 mL Nebulization Q4H PRN Easton Aaron MD   3 mL at 04/15/17 0323    albuterol-ipratropium 2.5mg-0.5mg/3mL nebulizer solution 3 mL  3 mL Nebulization Q6H Renae Gundreson MD   3 mL at 04/20/17 1923    carvedilol tablet 3.125 mg  3.125 mg Oral BID WM Lakesha Greer MD   3.125 mg at 04/19/17 0845    chlorhexidine 0.12 % solution 15 mL  15 mL Mouth/Throat BID Easton Aaron MD   15 mL at 04/20/17 2021    cloNIDine tablet 0.1 mg  0.1 mg Oral TID PRN Easton Aaron MD        dexmedetomidine (PRECEDEX) 400mcg/100mL 0.9% NaCL infusion  0.4 mcg/kg/hr Intravenous Continuous PRN Renae Gunderson MD   Stopped at 04/15/17 2149    dextrose 50% injection 12.5 g  12.5 g Intravenous PRN Easton Aaron MD        enoxaparin injection 40 mg  40 mg Subcutaneous Daily Easton Aaron MD   40 mg at 04/20/17 1708    glucagon (human recombinant) injection 1 mg  1 mg Intramuscular PRN Easton Aaron MD        insulin aspart pen 1-10 Units  1-10 Units Subcutaneous Q6H PRN Easton Aaron MD   2 Units at 04/21/17 0009    linezolid 600mg/300ml IVPB 600 mg  600 mg Intravenous Q12H Renae Gunderson  mL/hr at 04/20/17 2021 600 mg at 04/20/17 2021    lorazepam injection 1 mg  1 mg Intravenous Q2H PRN Jun Burleson MD        modafinil tablet 200 mg  200 mg Oral Daily Bird Vega MD   200 mg at 04/20/17 0818    morphine injection 2 mg  2 mg Intravenous Q4H PRN Lakesha Greer MD   2 mg at 04/20/17 2021    ondansetron injection 8 mg  8 mg Intravenous Q8H PRN Easton Aaron MD        pantoprazole injection 40 mg  40 mg Intravenous Daily Lakesha Greer MD   40 mg at 04/20/17 0818    piperacillin-tazobactam 4.5 g in dextrose 5 % 100 mL IVPB (ready to mix system)  4.5 g Intravenous Q8H Renae Gunderson MD 25 mL/hr at 04/20/17 1709 4.5 g at 04/20/17 1709    piperacillin-tazobactam 4.5 g in sodium chloride 0.9% 100 mL IVPB (ready to mix system)  4.5 g Intravenous Q8H Renae Gunderson MD        promethazine  (PHENERGAN) 12.5 mg in dextrose 5 % 50 mL IVPB  12.5 mg Intravenous Q6H PRN Easton Aaron MD        propofol (DIPRIVAN) 10 mg/mL infusion  5 mcg/kg/min Intravenous Continuous Easton Aaron MD 4.8 mL/hr at 04/20/17 2235 10 mcg/kg/min at 04/20/17 2235    ramelteon tablet 8 mg  8 mg Oral Nightly PRN Easton Aaron MD   8 mg at 04/06/17 2239    scopolamine 1.5 mg (1 mg over 3 days) 1.5 mg  1 patch Transdermal Q3 Days Lakesha Greer MD   1.5 mg at 04/19/17 2049       Review of Systems   Unable to obtain; pt has a trach/vent    Objective:     Vital Signs (Most Recent):  Temp: 98.8 °F (37.1 °C) (04/20/17 1900)  Pulse: 79 (04/20/17 2300)  Resp: 14 (04/20/17 2300)  BP: (!) 90/51 (04/20/17 2300)  SpO2: 98 % (04/20/17 2200) Vital Signs (24h Range):  Temp:  [98.8 °F (37.1 °C)-99.9 °F (37.7 °C)] 98.8 °F (37.1 °C)  Pulse:  [75-96] 79  Resp:  [0-57] 14  SpO2:  [80 %-100 %] 98 %  BP: ()/(47-79) 90/51     Weight: 76.6 kg (168 lb 14 oz)  Body mass index is 47.49 kg/(m^2).    Physical Exam  Constitutional: intubated  ENT: no discharge  Head: Normocephalic.   Eyes: No icteric sclereae  Neck: Neck supple.   Cardiovascular: Normal rate.   Pulmonary/Chest: symmetrical expansion of chest.   Extremities: no edema  Neurological: opens eyes upon verbal stimuli tracks;   Pupils are round at 2 mm and reactive to light; corneal responses are present bilaterally; gag reflex present and cough reflex present  Moving UE's on command       Significant Labs:   CBC:   Recent Labs  Lab 04/20/17  0330   WBC 8.68   HGB 11.0*   HCT 38.0        CMP:   Recent Labs  Lab 04/20/17  0330 04/21/17  0126   * 187*    140   K 3.5 3.9    104   CO2 23 27   BUN 34* 31*   CREATININE 1.0 0.9   CALCIUM 10.1 10.3   ANIONGAP 11 9   EGFRNONAA >60 >60         Assessment and Plan:     45 y/o female consulted for UE weakness:      1. UE weakness: reported weakness of uncertain etiology. Pt still ventilator dependent.  -Mg panel negative; no  presence of antibodies in serum.  CK normal. No response to steroids.  -Trach placed.    Active Diagnoses:    Diagnosis Date Noted POA    PRINCIPAL PROBLEM:  Acute respiratory failure with hypercapnia [J96.02] 04/07/2017 Yes    Counseling regarding advanced directives and goals of care [Z71.89] 04/14/2017 Not Applicable    Hypokalemia [E87.6] 04/09/2017 Yes    Breast mass, left [N63] 04/08/2017 Yes    Pneumonia of both lungs due to infectious organism [J18.9] 04/06/2017 Yes    Sepsis [A41.9] 04/06/2017 Yes    Essential hypertension [I10] 04/06/2017 Yes     Chronic    Chronic diastolic heart failure [I50.32] 04/06/2017 Yes     Chronic    Moderate protein malnutrition [E44.0] 04/06/2017 Yes     Chronic      Problems Resolved During this Admission:    Diagnosis Date Noted Date Resolved POA       VTE Risk Mitigation         Ordered     enoxaparin injection 40 mg  Daily     Route:  Subcutaneous        04/06/17 2202     Medium Risk of VTE  Once      04/06/17 2202          Gus Phillips MD  Neurology  [facility]

## 2017-04-21 NOTE — PROGRESS NOTES
Progress Note  Pulmonology    Admit Date: 4/6/2017   LOS: 15 days     SUBJECTIVE:     Follow-up For:  Vent management.n vent via trach and not very awake or alert. Failed extubation last weekend.     Continuous Infusions:   dexmedetomidine (PRECEDEX) infusion Stopped (04/15/17 7645)    propofol Stopped (04/21/17 9692)     Scheduled Meds:   albuterol-ipratropium 2.5mg-0.5mg/3mL  3 mL Nebulization Q6H    carvedilol  3.125 mg Oral BID WM    chlorhexidine  15 mL Mouth/Throat BID    enoxaparin  40 mg Subcutaneous Daily    linezolid 600mg/300ml  600 mg Intravenous Q12H    modafinil  200 mg Oral Daily    pantoprazole  40 mg Intravenous Daily    piperacillin-tazobactam 4.5 g in dextrose 5 % 100 mL IVPB (ready to mix system)  4.5 g Intravenous Q8H    piperacillin-tazobactam 4.5 g in sodium chloride 0.9% 100 mL IVPB (ready to mix system)  4.5 g Intravenous Q8H    scopolamine  1 patch Transdermal Q3 Days     Review of Systems:  Review of systems not obtained due to patient factors as not very responsive and on vent via trach.    OBJECTIVE:     Vital Signs Range (Last 24H):  Temp:  [98.7 °F (37.1 °C)-99.9 °F (37.7 °C)]   Pulse:  [75-98]   Resp:  [8-23]   BP: ()/(50-79)   SpO2:  [80 %-100 %]     I & O (Last 24H):    Intake/Output Summary (Last 24 hours) at 04/21/17 1013  Last data filed at 04/21/17 0600   Gross per 24 hour   Intake           2457.3 ml   Output             2265 ml   Net            192.3 ml     Physical Exam:  General: no distress, moderately obese. No distress.  Neck: no carotid bruit  Breast: left breast mass - hard, non-mobile  Lungs:  Shallow, decreased bs bilaterally with weak effort and marked decreased bs bilaterally  Heart: regular rate and rhythm and no murmur  Abdomen: soft, non-tender non-distended; bowel sounds diminished and right nasal feeding tube and rectal tube  Extremities: no cyanosis  or clubbing but has pedal edema and SCDs to LE  Neurologic: minimally  responsive    Laboratory:  CBC:     Recent Labs  Lab 04/20/17  0330   WBC 8.68   RBC 4.52   HGB 11.0*   HCT 38.0      MCV 84   MCH 24.3*   MCHC 28.9*     BMP:     Recent Labs  Lab 04/19/17  0350  04/21/17  0126   *  < > 187*   *  < > 140   K 3.5  < > 3.9     < > 104   CO2 22*  < > 27   BUN 34*  < > 31*   CREATININE 0.9  < > 0.9   CALCIUM 10.3  < > 10.3   MG 2.2  --   --    < > = values in this interval not displayed.  Vent Mode: SIMV (PRVC) + PS  Oxygen Concentration (%):  [30] 30  Resp Rate Total:  [8 br/min-35 br/min] 11 br/min  Vt Set:  [400 mL] 400 mL  PEEP/CPAP:  [5 cmH20] 5 cmH20  Pressure Support:  [10 uqB22-53 cmH20] 20 cmH20  Mean Airway Pressure:  [7 cmH20-9.8 cmH20] 7.5 cmH20    ABGs:     Recent Labs  Lab 04/21/17  0431   PH 7.232*   PCO2 64.8*   PO2 81   HCO3 27.3   POCSATURATED 93*   BE -2     Microbiology Results (last 7 days)     Procedure Component Value Units Date/Time    Blood culture [[id number]] Collected:  04/16/17 0827    Order Status:  Completed Specimen:  Blood Updated:  04/21/17 0903     Blood Culture, Routine No growth after 5 days.    Blood culture [[id number]] Collected:  04/16/17 0823    Order Status:  Completed Specimen:  Blood Updated:  04/21/17 0903     Blood Culture, Routine No growth after 5 days.    Urine culture [[id number]]  (Susceptibility) Collected:  04/16/17 0817    Order Status:  Completed Specimen:  Urine from Urine, Catheterized Updated:  04/18/17 1301     Urine Culture, Routine --     ENTEROCOCCUS FAECIUM  > 100,000 cfu/ml      Culture, Respiratory with Gram Stain [[id number]] Collected:  04/16/17 0817    Order Status:  Completed Specimen:  Respiratory from Endotracheal Aspirate Updated:  04/18/17 0909     Respiratory Culture No significant isolate     Gram Stain (Respiratory) <10 epithelial cells per low power field.     Gram Stain (Respiratory) Many WBC's     Gram Stain (Respiratory) Few Gram positive cocci in clusters     Gram Stain (Respiratory)  Few Gram positive cocci in pairs    Clostridium difficile EIA [[id number]] Collected:  04/14/17 0015    Order Status:  Completed Specimen:  Stool from Stool Updated:  04/14/17 141     C. diff Antigen Negative     C difficile Toxins A+B, EIA Negative      Testing not recommended for children <24 months old.           Chest X-Ray: I personally reviewed the films and findings are: ett in place. Small volumes. LLL atelectasis and diffuse congestion.    Diagnostic Results:  Echo: Reviewed 4/8/17    1 - TDS, large mass/tumor of left breast.     2 - Normal left ventricular systolic function (EF 60-65%).  Normal wall motion.     3 - Left ventricular diastolic dysfunction.     4 - Right ventricular enlargement with low normal to mildly depressed systolic function.     5 - Trivial tricuspid regurgitation.     6 - The estimated PA systolic pressure is 29 mmHg.     CT head 4/14/17  1. No acute intracranial pathology  2.  Remaining findings as discussed above.    CTA 4/6/17  1. There is a 12 cm left breast hematoma.  2. There are low lung volumes with bibasilar dependent regions of consolidation and patchy opacification of the right upper lung zone. The findings may represent developing pneumonia.  3. There is no evidence pulmonary embolus.    ASSESSMENT/PLAN:     1) Acute respiratory failure - in 46 overweight female who presented with weakness and pneumonia. Unable to wean off vent. Not very responsive at present. S/p trach. Marked increase in hypercapnia this am. Will need to increase rate and diurese. Bronchodilators. Cultures as above. Afebrile.  2) Weakness - UE. Neurology following. ??MG. Steroids stopped. ?eiotlogy to weakness.  3) History of HTN  4) Anemia - stable.    Renae Gunderson

## 2017-04-21 NOTE — PROGRESS NOTES
[facility]  Adult Nutrition  Consult Note    SUMMARY     Recommendations    Recommendation/Intervention:   1. Can increase TF to 45 ml/hr with d/c of propofol.   2. RD to monitor    Goals: 1) Patient to meet >=85 - 115% of EEN & EPN  Nutrition Goal Status: goal met  Communication of RD Recs: reviewed with physician    Continuum of Care Plan    Referral to Outpatient Services:  (D/C planning: Too soon to determine)    Reason for Assessment    Reason for Assessment: RD follow-up  Diagnosis:  (SOB; pneumonia)  Relevent Medical History: CHF   Interdisciplinary Rounds: attended     General Information Comments: Trached. Propofol d/c. TF tolerated @ 40/hr.    Nutrition Prescription Ordered    Current Diet Order: NPO     Current Nutrition Support Formula Ordered: Impact Peptide 1.5  Current Nutrition Support Rate Ordered: 40 (ml)  Current Nutrition Support Frequency Ordered: continuous  Oral Nutrition Supplement: 2 packets of Beneprotein      Evaluation of Received Nutrients/Fluid Intake    Enteral Calories (kcal): 1440  Enteral Protein (gm): 90  Enteral (Free Water) Fluid (mL): 739  Free Water Flush Fluid (mL): 900      Other Calories (kcal): 50 (beneprotein)  Total Calories (kcal): 1490     Energy Calories Required: meeting needs  % Kcal Needs: 90%     Other Protein (gm): 12     Total Protein (gm/kg): 102  Protein Required: meeting needs  % Protein Needs: 100%     Fluid Required: meeting needs (per MD)    Tolerance: tolerating     Nutrition Risk Screen     Nutrition Risk Screen: no indicators present    Nutrition/Diet History     Food Preferences: DANIEL  Factors Affecting Nutritional Intake: NPO, on mechanical ventilation     Labs/Tests/Procedures/Meds    Diagnostic Test/Procedure Review: reviewed, pertinent  Pertinent Labs Reviewed: reviewed  Pertinent Labs Comments: BUN 36  Pertinent Medications Reviewed: reviewed  Pertinent Medications Comments: abx    Physical Findings    Overall Physical  Appearance: obese, on ventilator support  Tubes: nasogastric tube  Oral/Mouth Cavity: WDL  Skin:  (blister on foot)    Anthropometrics     Height (inches): 62.13 in  Weight Method: Bed Scale  Weight (kg): 79.6 kg     Ideal Body Weight (IBW), Female: 110.65 lb     % Ideal Body Weight, Female (lb): 158.6 lb  BMI (kg/m2): 31.97  BMI Grade: 35 - 39.9 - obesity - grade II     Estimated/Assessed Needs    Weight Used For Calorie Calculations: 75.7 kg (166 lb 14.2 oz)   Height (cm): 157.8 cm (noted possible EPIC error of ht. (5'2 vs. 4'2))     Energy Need Method: [facility] (1661)     RMR (Taylor-St. Jeor Equation): 1394.13      Weight Used For Protein Calculations: 50 kg (110 lb 3.7 oz) (IBW)  Protein Requirements: 100-125 g    Fluid Need Method: RDA Method (per MD)     Assessment and Plan    Nutrition Diagnosis    Problem: Inadequate Energy Intake  Etiology: Mechanical Ventilation  As Evidenced by:NPO status/TF initiated  Nutrition Diagnosis: Continues      Monitor and Evaluation    Food and Nutrient Intake: energy intake, food and beverage intake, enteral nutrition intake  Food and Nutrient Adminstration: diet order, enteral and parenteral nutrition administration  Anthropometric Measurements: weight, weight change  Biochemical Data, Medical Tests and Procedures: electrolyte and renal panel, glucose/endocrine profile, gastrointestinal profile  Nutrition-Focused Physical Findings: overall appearance    Nutrition Risk    Level of Risk: other (see comments) (f/u 2x weekly)    Nutrition Follow-Up    RD Follow-up?: Yes

## 2017-04-21 NOTE — PLAN OF CARE
Problem: Patient Care Overview  Goal: Plan of Care Review  Outcome: Ongoing (interventions implemented as appropriate)  Pt remains in ICU, VSS/afebrile. Rate on vent increased today, lasix given; pt output ~ 2 L. Tracheostomy site CDI, care performed. Pt off propofol all day. Episode of agitation and copious secretions, PRN glycopyrrolate/ativan/morphine given and pt now much improved. Pt follows commands, able to move upper extremities to command. Flexi-seal continued, stool remains liquid. No new skin breakdown, precautions for skin integrity and safety in place. No family at bedside today.

## 2017-04-21 NOTE — SUBJECTIVE & OBJECTIVE
Interval History: Increased respiratory rate this morning.      Review of Systems   Unable to perform ROS: Intubated     Objective:     Vital Signs (Most Recent):  Temp: 98.2 °F (36.8 °C) (04/21/17 0720)  Pulse: 79 (04/21/17 1119)  Resp: (!) 9 (04/21/17 1119)  BP: (!) 92/54 (04/21/17 1000)  SpO2: 98 % (04/21/17 1119) Vital Signs (24h Range):  Temp:  [98.2 °F (36.8 °C)-99.7 °F (37.6 °C)] 98.2 °F (36.8 °C)  Pulse:  [75-98] 79  Resp:  [8-23] 9  SpO2:  [95 %-100 %] 98 %  BP: ()/(50-69) 92/54     Weight: 75.7 kg (166 lb 14.2 oz)  Body mass index is 46.93 kg/(m^2).    Intake/Output Summary (Last 24 hours) at 04/21/17 1300  Last data filed at 04/21/17 1100   Gross per 24 hour   Intake          2686.32 ml   Output             2185 ml   Net           501.32 ml      Physical Exam   Constitutional: She appears well-developed. No distress.   HENT:   ET tube in place   Eyes: Conjunctivae are normal. Right eye exhibits no discharge. Left eye exhibits no discharge.   Neck:   Trach in place   Cardiovascular: Normal rate, regular rhythm and normal heart sounds.    Pulmonary/Chest: No stridor.   Mechanically ventilated per trach.  Coarse BS bilaterally   Abdominal: Soft. Bowel sounds are normal.   Musculoskeletal: She exhibits no deformity.   Neurological:   Sedated   Skin: Skin is warm and dry.       Significant Labs:   BMP:     Recent Labs  Lab 04/21/17  0126   *      K 3.9      CO2 27   BUN 31*   CREATININE 0.9   CALCIUM 10.3     CBC:     Recent Labs  Lab 04/20/17  0330   WBC 8.68   HGB 11.0*   HCT 38.0

## 2017-04-21 NOTE — ASSESSMENT & PLAN NOTE
Mechanical ventilation  Weakness  Metabolic encephalopathy  Fever  Assessment and Plan:  Pt with respiratory failure that required intubation, possibly secondary to pneumonia vs edema, treated empirically with Rocephin/Azithromycin, ECHO with diastolic dysfunction but normal EF, and Pulm following for vent management. Family reported UE weakness for a few months prior to this admission and Neurology consulted, possible MG and patient undergoing trial of prednisone and pyridostigmine. This weakness likely limited extubation and necessitated reintubation; patient was extubated on 4/16 but had to be re-intubated that same day. Unclear source of encephalopathy and case discussed with Neurology, CT scan of the head was unrevealing. Also, patient completed treatment with Azithromycin and was on Rocephin alone, but she spiked a fever overnight and antibiotics changed to Zosyn/Zyvox.   Trach on 4/19 without complications.  Remains on vent per trach.  Having trouble clearing secretions.  Will add Glycopyrrolate.   SW consulted for LTAC.

## 2017-04-21 NOTE — ASSESSMENT & PLAN NOTE
Probably bacterial, but unable to determine.  The patient has a CURB-65 score of 0, and does not meet criteria for healthcare-associated pneumonia initially. CTA chest with bilateral airspace opacification concerning for pneumonia.  Oxygen saturations are improved but requiring a non-rebreather mask; then BIPAP and s/p intubation.  Blood culture NGTD.  Pt completed full course of Azithromycin, and was continued Rocephin until had spike of fever, Abx has been expanded  to Zosyn/Zyvox.  Will give one more day of ABx's.

## 2017-04-22 LAB
ALLENS TEST: ABNORMAL
ANION GAP SERPL CALC-SCNC: 12 MMOL/L
BUN SERPL-MCNC: 38 MG/DL
CALCIUM SERPL-MCNC: 10.9 MG/DL
CHLORIDE SERPL-SCNC: 100 MMOL/L
CO2 SERPL-SCNC: 28 MMOL/L
CREAT SERPL-MCNC: 1 MG/DL
DELSYS: ABNORMAL
ERYTHROCYTE [SEDIMENTATION RATE] IN BLOOD BY WESTERGREN METHOD: 16 MM/H
EST. GFR  (AFRICAN AMERICAN): >60 ML/MIN/1.73 M^2
EST. GFR  (NON AFRICAN AMERICAN): >60 ML/MIN/1.73 M^2
FIO2: 30
GLUCOSE SERPL-MCNC: 191 MG/DL
HCO3 UR-SCNC: 27 MMOL/L (ref 24–28)
MODE: ABNORMAL
PCO2 BLDA: 31.7 MMHG (ref 35–45)
PEEP: 5
PH SMN: 7.54 [PH] (ref 7.35–7.45)
PO2 BLDA: 130 MMHG (ref 80–100)
POC BE: 5 MMOL/L
POC SATURATED O2: 99 % (ref 95–100)
POC TCO2: 28 MMOL/L (ref 23–27)
POCT GLUCOSE: 233 MG/DL (ref 70–110)
POCT GLUCOSE: 234 MG/DL (ref 70–110)
POCT GLUCOSE: 244 MG/DL (ref 70–110)
POCT GLUCOSE: 252 MG/DL (ref 70–110)
POTASSIUM SERPL-SCNC: 3.2 MMOL/L
PS: 10
SAMPLE: ABNORMAL
SITE: ABNORMAL
SODIUM SERPL-SCNC: 140 MMOL/L
VT: 400

## 2017-04-22 PROCEDURE — 80048 BASIC METABOLIC PNL TOTAL CA: CPT

## 2017-04-22 PROCEDURE — 63600175 PHARM REV CODE 636 W HCPCS: Performed by: INTERNAL MEDICINE

## 2017-04-22 PROCEDURE — 63600175 PHARM REV CODE 636 W HCPCS: Performed by: HOSPITALIST

## 2017-04-22 PROCEDURE — 99900035 HC TECH TIME PER 15 MIN (STAT)

## 2017-04-22 PROCEDURE — C9113 INJ PANTOPRAZOLE SODIUM, VIA: HCPCS | Performed by: HOSPITALIST

## 2017-04-22 PROCEDURE — 99232 SBSQ HOSP IP/OBS MODERATE 35: CPT | Mod: ,,, | Performed by: NEUROLOGICAL SURGERY

## 2017-04-22 PROCEDURE — 94003 VENT MGMT INPAT SUBQ DAY: CPT

## 2017-04-22 PROCEDURE — 94640 AIRWAY INHALATION TREATMENT: CPT

## 2017-04-22 PROCEDURE — 36415 COLL VENOUS BLD VENIPUNCTURE: CPT

## 2017-04-22 PROCEDURE — 25000003 PHARM REV CODE 250: Performed by: HOSPITALIST

## 2017-04-22 PROCEDURE — 25000003 PHARM REV CODE 250: Performed by: INTERNAL MEDICINE

## 2017-04-22 PROCEDURE — 27100171 HC OXYGEN HIGH FLOW UP TO 24 HOURS

## 2017-04-22 PROCEDURE — 36600 WITHDRAWAL OF ARTERIAL BLOOD: CPT

## 2017-04-22 PROCEDURE — 94761 N-INVAS EAR/PLS OXIMETRY MLT: CPT

## 2017-04-22 PROCEDURE — 27200966 HC CLOSED SUCTION SYSTEM

## 2017-04-22 PROCEDURE — 25000242 PHARM REV CODE 250 ALT 637 W/ HCPCS: Performed by: INTERNAL MEDICINE

## 2017-04-22 PROCEDURE — 20000000 HC ICU ROOM

## 2017-04-22 PROCEDURE — 99900026 HC AIRWAY MAINTENANCE (STAT)

## 2017-04-22 RX ORDER — LINEZOLID 2 MG/ML
600 INJECTION, SOLUTION INTRAVENOUS
Status: COMPLETED | OUTPATIENT
Start: 2017-04-22 | End: 2017-04-22

## 2017-04-22 RX ORDER — LORAZEPAM 2 MG/ML
1 INJECTION INTRAMUSCULAR EVERY 4 HOURS PRN
Status: DISCONTINUED | OUTPATIENT
Start: 2017-04-22 | End: 2017-04-24

## 2017-04-22 RX ORDER — CARVEDILOL 3.12 MG/1
3.12 TABLET ORAL 2 TIMES DAILY
Status: DISCONTINUED | OUTPATIENT
Start: 2017-04-22 | End: 2017-05-07

## 2017-04-22 RX ORDER — POTASSIUM CHLORIDE 20 MEQ/15ML
40 SOLUTION ORAL ONCE
Status: COMPLETED | OUTPATIENT
Start: 2017-04-22 | End: 2017-04-22

## 2017-04-22 RX ADMIN — IPRATROPIUM BROMIDE AND ALBUTEROL SULFATE 3 ML: .5; 3 SOLUTION RESPIRATORY (INHALATION) at 01:04

## 2017-04-22 RX ADMIN — PIPERACILLIN AND TAZOBACTAM 4.5 G: 4; .5 INJECTION, POWDER, LYOPHILIZED, FOR SOLUTION INTRAVENOUS; PARENTERAL at 12:04

## 2017-04-22 RX ADMIN — POTASSIUM CHLORIDE 40 MEQ: 20 SOLUTION ORAL at 02:04

## 2017-04-22 RX ADMIN — PIPERACILLIN AND TAZOBACTAM 4.5 G: 4; .5 INJECTION, POWDER, LYOPHILIZED, FOR SOLUTION INTRAVENOUS; PARENTERAL at 08:04

## 2017-04-22 RX ADMIN — PANTOPRAZOLE SODIUM 40 MG: 40 INJECTION, POWDER, FOR SOLUTION INTRAVENOUS at 08:04

## 2017-04-22 RX ADMIN — ENOXAPARIN SODIUM 40 MG: 100 INJECTION SUBCUTANEOUS at 04:04

## 2017-04-22 RX ADMIN — RAMELTEON 8 MG: 8 TABLET, FILM COATED ORAL at 10:04

## 2017-04-22 RX ADMIN — MORPHINE SULFATE 1 MG: 10 INJECTION INTRAVENOUS at 10:04

## 2017-04-22 RX ADMIN — CHLORHEXIDINE GLUCONATE 15 ML: 1.2 RINSE ORAL at 08:04

## 2017-04-22 RX ADMIN — PIPERACILLIN AND TAZOBACTAM 4.5 G: 4; .5 INJECTION, POWDER, LYOPHILIZED, FOR SOLUTION INTRAVENOUS; PARENTERAL at 04:04

## 2017-04-22 RX ADMIN — INSULIN ASPART 2 UNITS: 100 INJECTION, SOLUTION INTRAVENOUS; SUBCUTANEOUS at 12:04

## 2017-04-22 RX ADMIN — GLYCOPYRROLATE 0.1 MG: 0.2 INJECTION, SOLUTION INTRAMUSCULAR; INTRAVENOUS at 10:04

## 2017-04-22 RX ADMIN — LORAZEPAM 1 MG: 2 INJECTION, SOLUTION INTRAMUSCULAR; INTRAVENOUS at 02:04

## 2017-04-22 RX ADMIN — LINEZOLID 600 MG: 600 INJECTION, SOLUTION INTRAVENOUS at 08:04

## 2017-04-22 RX ADMIN — LORAZEPAM 1 MG: 2 INJECTION, SOLUTION INTRAMUSCULAR; INTRAVENOUS at 11:04

## 2017-04-22 RX ADMIN — CARVEDILOL 3.12 MG: 3.12 TABLET, FILM COATED ORAL at 08:04

## 2017-04-22 RX ADMIN — MORPHINE SULFATE 1 MG: 10 INJECTION INTRAVENOUS at 02:04

## 2017-04-22 RX ADMIN — LORAZEPAM 1 MG: 2 INJECTION, SOLUTION INTRAMUSCULAR; INTRAVENOUS at 06:04

## 2017-04-22 RX ADMIN — INSULIN ASPART 2 UNITS: 100 INJECTION, SOLUTION INTRAVENOUS; SUBCUTANEOUS at 05:04

## 2017-04-22 RX ADMIN — IPRATROPIUM BROMIDE AND ALBUTEROL SULFATE 3 ML: .5; 3 SOLUTION RESPIRATORY (INHALATION) at 07:04

## 2017-04-22 RX ADMIN — INSULIN ASPART 6 UNITS: 100 INJECTION, SOLUTION INTRAVENOUS; SUBCUTANEOUS at 11:04

## 2017-04-22 RX ADMIN — INSULIN ASPART 4 UNITS: 100 INJECTION, SOLUTION INTRAVENOUS; SUBCUTANEOUS at 05:04

## 2017-04-22 RX ADMIN — MODAFINIL 200 MG: 100 TABLET ORAL at 08:04

## 2017-04-22 RX ADMIN — SCOPOLAMINE 1.5 MG: 1 PATCH, EXTENDED RELEASE TRANSDERMAL at 08:04

## 2017-04-22 RX ADMIN — LORAZEPAM 1 MG: 2 INJECTION, SOLUTION INTRAMUSCULAR; INTRAVENOUS at 07:04

## 2017-04-22 NOTE — PROGRESS NOTES
[facility] Hospital Medicine  Progress Note    Patient Name: Radha Pritchett  MRN: [id number]  Patient Class: IP- Inpatient   Admission Date: 4/6/2017  Length of Stay: 16 days  Attending Physician: Yovani Arellano MD  Primary Care Provider: Ferny Iglesias MD        Subjective:     Principal Problem:Acute respiratory failure with hypercapnia    HPI:  Ms. Radha Pritchett is a 46 y.o. female with essential hypertension, chronic diastolic heart failure (LVEF 55-60% Dec 2014), moderate protein malnutrition who presents to [facility] ED with complaints of dyspnea today.  She was scheduled to undergo removal of a breast mass today but was found to be hypoxic.  She does report that she has been short of breath but cannot specify how long.  She has also had some non-productive coughing and some chills without fevers.  Further history is otherwise limited at this time.    Hospital Course:   was admitted with pneumonia and acutely decompensated and was intubated and placed on vent. Pt with UE weakness prior to presentation on this admission which may be contributing to inability to extubate. Neurology following and considering possible MG and patient on therapeutic trial of prednisone and Modafinil. Pt s/p bronch that was unrevealing on 4/11. Pt was extubated on 4/15 but was re-intubated later the same day.  Was febrile and stared on Zyvox and zosyn.  CXR shows possible infiltrate.  Strep growing on UCx.  Trach on 4/19 without complications.  4/21: Increased in respiratory rate.  Having trouble clearing secretions.  4/22: More calm.  Remains on vent.  Not very responsive.      Interval History: No acute events overnight.    Review of Systems   Unable to perform ROS: Patient nonverbal     Objective:     Vital Signs (Most Recent):  Temp: 98 °F (36.7 °C) (04/22/17 1145)  Pulse: 85 (04/22/17 1200)  Resp: 12 (04/22/17 1200)  BP: (!) 93/56 (04/22/17 1200)  SpO2: (!) 87 % (04/22/17 1200) Vital Signs (24h  Range):  Temp:  [97.5 °F (36.4 °C)-98.9 °F (37.2 °C)] 98 °F (36.7 °C)  Pulse:  [] 85  Resp:  [8-46] 12  SpO2:  [87 %-100 %] 87 %  BP: ()/(47-84) 93/56     Weight: 75.7 kg (166 lb 14.2 oz)  Body mass index is 46.93 kg/(m^2).    Intake/Output Summary (Last 24 hours) at 04/22/17 1225  Last data filed at 04/22/17 1200   Gross per 24 hour   Intake             2265 ml   Output             2860 ml   Net             -595 ml      Physical Exam   Constitutional: She appears well-developed. No distress.   HENT:   ET tube in place   Eyes: Conjunctivae are normal. Right eye exhibits no discharge. Left eye exhibits no discharge.   Neck:   Trach in place   Cardiovascular: Normal rate, regular rhythm and normal heart sounds.    Pulmonary/Chest: No stridor.   Mechanically ventilated per trach.  Coarse BS bilaterally   Abdominal: Soft. Bowel sounds are normal.   Musculoskeletal: She exhibits no deformity.   Neurological:   Grimaces to stimuli.  Poorly responsive.   Skin: Skin is warm and dry.       Significant Labs:   BMP:     Recent Labs  Lab 04/22/17  0328   *      K 3.2*      CO2 28   BUN 38*   CREATININE 1.0   CALCIUM 10.9*     CBC:   No results for input(s): WBC, HGB, HCT, PLT in the last 48 hours.      Assessment/Plan:      * Acute respiratory failure with hypercapnia  Mechanical ventilation  Weakness  Metabolic encephalopathy  Fever  Assessment and Plan:  Pt with respiratory failure that required intubation, possibly secondary to pneumonia vs edema, treated empirically with Rocephin/Azithromycin, ECHO with diastolic dysfunction but normal EF, and Pulm following for vent management. Family reported UE weakness for a few months prior to this admission and Neurology consulted, possible MG and patient undergoing trial of prednisone and pyridostigmine. This weakness likely limited extubation and necessitated reintubation; patient was extubated on 4/16 but had to be re-intubated that same day. Unclear  source of encephalopathy and case discussed with Neurology, CT scan of the head was unrevealing. Also, patient completed treatment with Azithromycin and was on Rocephin alone, but she spiked a fever overnight and antibiotics changed to Zosyn/Zyvox.   Trach on 4/19 without complications.  Remains on vent per trach.  Having trouble clearing secretions.  Will add Glycopyrrolate.   SW consulted for LTAC.        Pneumonia of both lungs due to infectious organism  Probably bacterial, but unable to determine.  The patient has a CURB-65 score of 0, and does not meet criteria for healthcare-associated pneumonia initially. CTA chest with bilateral airspace opacification concerning for pneumonia.  Oxygen saturations are improved but requiring a non-rebreather mask; then BIPAP and s/p intubation.  Blood culture NGTD.  Pt completed full course of Azithromycin, and was continued Rocephin until had spike of fever, Abx has been expanded  to Zosyn/Zyvox.    Stop ABx's after today.    Sepsis  This patient met criteria for sepsis given tachycardia, tachypnea, and pneumonia; there is no evidence of endo-organ dysfunction.  Blood cultures are NGTD and repeat studies done;  antibiotics as discussed above.  Strep in urine likely normal pennie.    Essential hypertension  BP well controlled.  Continue current management.    Chronic diastolic heart failure  Patient doesn't appear to be in acute heart failure; checked ECHO with EF=65% but with diastolic dysfunction.    Moderate protein malnutrition  Tolerating tube feeds.      Breast mass, left  Planned for surgery, seen by Dr. Sandoval (his patient) and plan to reschedule as outpatient. It is reportedly a hematoma by biopsy, but suspicion for CA not completely ruled out given paraneoplastic syndrome a consideration.      Hypokalemia  Replace as necessary.      Counseling regarding advanced directives and goals of care  Extensive conversations done with family. Pt to remain full code. Pt is  still  but estranged from the  and reportedly the grandmother is the major decision maker.       VTE Risk Mitigation         Ordered     enoxaparin injection 40 mg  Daily     Route:  Subcutaneous        04/06/17 2202     Medium Risk of VTE  Once      04/06/17 2202        Critical Care time spent > 40 minutes.       Yovani Arellano MD  Department of Hospital Medicine   [facility]

## 2017-04-22 NOTE — PROGRESS NOTES
Received patient on vent settings SIMV(PRVC)+/16/+5/PS10/FIO2 30% Trach size 8.0 Shiley intact, secured, and patent Ambu bag and mask at bed side no signs of any distress at this current time

## 2017-04-22 NOTE — SUBJECTIVE & OBJECTIVE
Interval History: No acute events overnight.    Review of Systems   Unable to perform ROS: Patient nonverbal     Objective:     Vital Signs (Most Recent):  Temp: 98 °F (36.7 °C) (04/22/17 1145)  Pulse: 85 (04/22/17 1200)  Resp: 12 (04/22/17 1200)  BP: (!) 93/56 (04/22/17 1200)  SpO2: (!) 87 % (04/22/17 1200) Vital Signs (24h Range):  Temp:  [97.5 °F (36.4 °C)-98.9 °F (37.2 °C)] 98 °F (36.7 °C)  Pulse:  [] 85  Resp:  [8-46] 12  SpO2:  [87 %-100 %] 87 %  BP: ()/(47-84) 93/56     Weight: 75.7 kg (166 lb 14.2 oz)  Body mass index is 46.93 kg/(m^2).    Intake/Output Summary (Last 24 hours) at 04/22/17 1225  Last data filed at 04/22/17 1200   Gross per 24 hour   Intake             2265 ml   Output             2860 ml   Net             -595 ml      Physical Exam   Constitutional: She appears well-developed. No distress.   HENT:   ET tube in place   Eyes: Conjunctivae are normal. Right eye exhibits no discharge. Left eye exhibits no discharge.   Neck:   Trach in place   Cardiovascular: Normal rate, regular rhythm and normal heart sounds.    Pulmonary/Chest: No stridor.   Mechanically ventilated per trach.  Coarse BS bilaterally   Abdominal: Soft. Bowel sounds are normal.   Musculoskeletal: She exhibits no deformity.   Neurological:   Grimaces to stimuli.  Poorly responsive.   Skin: Skin is warm and dry.       Significant Labs:   BMP:     Recent Labs  Lab 04/22/17  0328   *      K 3.2*      CO2 28   BUN 38*   CREATININE 1.0   CALCIUM 10.9*     CBC:   No results for input(s): WBC, HGB, HCT, PLT in the last 48 hours.

## 2017-04-22 NOTE — PLAN OF CARE
Problem: Patient Care Overview  Goal: Plan of Care Review  Outcome: Ongoing (interventions implemented as appropriate)  No falls no skin breakdown during shift. Remains on vent. Trach Inner cannula changed. PICC line saline locked. Restraints in place to bilat wrist. Flexiseal draining brown stool, no problems identified. Carter draining clear yellow urine. Tube feeding infusing at goal rate of 145. Residuals remain in the 70's. -250's insulin administered per sliding scale. Vital signs stable. Will continue to assess.

## 2017-04-22 NOTE — PROGRESS NOTES
Progress Note  Pulmonology    Admit Date: 4/6/2017   LOS: 16 days     SUBJECTIVE:     Follow-up For:  Vent management.n vent via trach and not very awake or alert. Failed extubation last weekend.     Continuous Infusions:     Scheduled Meds:   albuterol-ipratropium 2.5mg-0.5mg/3mL  3 mL Nebulization Q6H    carvedilol  3.125 mg Oral BID WM    chlorhexidine  15 mL Mouth/Throat BID    enoxaparin  40 mg Subcutaneous Daily    linezolid 600mg/300ml  600 mg Intravenous Q12H    modafinil  200 mg Oral Daily    pantoprazole  40 mg Intravenous Daily    piperacillin-tazobactam 4.5 g in sodium chloride 0.9% 100 mL IVPB (ready to mix system)  4.5 g Intravenous Q8H    scopolamine  1 patch Transdermal Q3 Days     Review of Systems:  Review of systems not obtained due to patient factors as not very responsive and on vent via trach.    OBJECTIVE:     Vital Signs Range (Last 24H):  Temp:  [98.4 °F (36.9 °C)-99.7 °F (37.6 °C)]   Pulse:  []   Resp:  [8-46]   BP: ()/(51-84)   SpO2:  [95 %-100 %]     I & O (Last 24H):    Intake/Output Summary (Last 24 hours) at 04/22/17 0811  Last data filed at 04/22/17 0500   Gross per 24 hour   Intake             2200 ml   Output             3040 ml   Net             -840 ml     Physical Exam:  General: no distress, moderately obese. No distress.  Neck: no carotid bruit  Breast: left breast mass - hard, non-mobile  Lungs:  Shallow, decreased bs bilaterally with weak effort and marked decreased bs bilaterally  Heart: regular rate and rhythm and no murmur  Abdomen: soft, non-tender non-distended; bowel sounds diminished and right nasal feeding tube and rectal tube  Extremities: no cyanosis  or clubbing but has pedal edema and SCDs to LE  Neurologic: minimally responsive    Laboratory:  CBC:     Recent Labs  Lab 04/20/17  0330   WBC 8.68   RBC 4.52   HGB 11.0*   HCT 38.0      MCV 84   MCH 24.3*   MCHC 28.9*     BMP:     Recent Labs  Lab 04/19/17  0350  04/22/17  0328   *   < > 191*   *  < > 140   K 3.5  < > 3.2*     < > 100   CO2 22*  < > 28   BUN 34*  < > 38*   CREATININE 0.9  < > 1.0   CALCIUM 10.3  < > 10.9*   MG 2.2  --   --    < > = values in this interval not displayed.  Vent Mode: SIMV (PRVC) + PS  Oxygen Concentration (%):  [30] 30  Resp Rate Total:  [8 br/min-27 br/min] 16 br/min  Vt Set:  [400 mL] 400 mL  PEEP/CPAP:  [5 cmH20] 5 cmH20  Pressure Support:  [10 vuU51-54 cmH20] 10 cmH20  Mean Airway Pressure:  [7.1 veT59-22.4 cmH20] 8.9 cmH20    ABGs:     Recent Labs  Lab 04/22/17  0431   PH 7.538*   PCO2 31.7*   PO2 130*   HCO3 27.0   POCSATURATED 99   BE 5     Microbiology Results (last 7 days)     Procedure Component Value Units Date/Time    Blood culture [[id number]] Collected:  04/16/17 0827    Order Status:  Completed Specimen:  Blood Updated:  04/21/17 0903     Blood Culture, Routine No growth after 5 days.    Blood culture [[id number]] Collected:  04/16/17 0823    Order Status:  Completed Specimen:  Blood Updated:  04/21/17 0903     Blood Culture, Routine No growth after 5 days.    Urine culture [[id number]]  (Susceptibility) Collected:  04/16/17 0817    Order Status:  Completed Specimen:  Urine from Urine, Catheterized Updated:  04/18/17 1301     Urine Culture, Routine --     ENTEROCOCCUS FAECIUM  > 100,000 cfu/ml      Culture, Respiratory with Gram Stain [[id number]] Collected:  04/16/17 0817    Order Status:  Completed Specimen:  Respiratory from Endotracheal Aspirate Updated:  04/18/17 0909     Respiratory Culture No significant isolate     Gram Stain (Respiratory) <10 epithelial cells per low power field.     Gram Stain (Respiratory) Many WBC's     Gram Stain (Respiratory) Few Gram positive cocci in clusters     Gram Stain (Respiratory) Few Gram positive cocci in pairs        Chest X-Ray: I personally reviewed the films and findings are: ett in place. Small volumes. LLL atelectasis and diffuse congestion.    No change     Diagnostic Results:  Echo: Reviewed  4/8/17    1 - TDS, large mass/tumor of left breast.     2 - Normal left ventricular systolic function (EF 60-65%).  Normal wall motion.     3 - Left ventricular diastolic dysfunction.     4 - Right ventricular enlargement with low normal to mildly depressed systolic function.     5 - Trivial tricuspid regurgitation.     6 - The estimated PA systolic pressure is 29 mmHg.     CT head 4/14/17  1. No acute intracranial pathology  2.  Remaining findings as discussed above.    CTA 4/6/17  1. There is a 12 cm left breast hematoma.  2. There are low lung volumes with bibasilar dependent regions of consolidation and patchy opacification of the right upper lung zone. The findings may represent developing pneumonia.  3. There is no evidence pulmonary embolus.    ASSESSMENT/PLAN:     1) Acute respiratory failure -presented with weakness and pneumonia. Unable to wean off vent. Not very responsive at present. S/p trach. Vent as above - appears to be stable on vent   2) Weakness - UE. Neurology following. ??MG MG panel was negative. Steroids stopped. ?eiotlogy to weakness.  3) History of HTN  4) Anemia - stable.    Perico Solis

## 2017-04-22 NOTE — PLAN OF CARE
Problem: Patient Care Overview  Goal: Plan of Care Review  Outcome: Ongoing (interventions implemented as appropriate)  Continues on ventilator with same settings. Ativan 1mg ivp given x1 for patient comfort. Impact peptide at 45cc/hr, tolerating well. Afebrile. Aunt at bedside this pm, updated on plan of care. No falls, injuries or skin breakdown.

## 2017-04-22 NOTE — ASSESSMENT & PLAN NOTE
Probably bacterial, but unable to determine.  The patient has a CURB-65 score of 0, and does not meet criteria for healthcare-associated pneumonia initially. CTA chest with bilateral airspace opacification concerning for pneumonia.  Oxygen saturations are improved but requiring a non-rebreather mask; then BIPAP and s/p intubation.  Blood culture NGTD.  Pt completed full course of Azithromycin, and was continued Rocephin until had spike of fever, Abx has been expanded  to Zosyn/Zyvox.    Stop ABx's after today.

## 2017-04-23 LAB
ALLENS TEST: ABNORMAL
ANION GAP SERPL CALC-SCNC: 7 MMOL/L
BUN SERPL-MCNC: 34 MG/DL
CALCIUM SERPL-MCNC: 10.4 MG/DL
CHLORIDE SERPL-SCNC: 102 MMOL/L
CO2 SERPL-SCNC: 29 MMOL/L
CREAT SERPL-MCNC: 0.8 MG/DL
DELSYS: ABNORMAL
ERYTHROCYTE [SEDIMENTATION RATE] IN BLOOD BY WESTERGREN METHOD: 15 MM/H
EST. GFR  (AFRICAN AMERICAN): >60 ML/MIN/1.73 M^2
EST. GFR  (NON AFRICAN AMERICAN): >60 ML/MIN/1.73 M^2
FIO2: 30
GLUCOSE SERPL-MCNC: 240 MG/DL
HCO3 UR-SCNC: 26.8 MMOL/L (ref 24–28)
MIN VOL: 6.5
MODE: ABNORMAL
PCO2 BLDA: 37.6 MMHG (ref 35–45)
PEEP: 5
PH SMN: 7.46 [PH] (ref 7.35–7.45)
PIP: 28
PO2 BLDA: 126 MMHG (ref 80–100)
POC BE: 3 MMOL/L
POC SATURATED O2: 99 % (ref 95–100)
POC TCO2: 28 MMOL/L (ref 23–27)
POCT GLUCOSE: 219 MG/DL (ref 70–110)
POCT GLUCOSE: 231 MG/DL (ref 70–110)
POCT GLUCOSE: 257 MG/DL (ref 70–110)
POCT GLUCOSE: 274 MG/DL (ref 70–110)
POTASSIUM SERPL-SCNC: 4.4 MMOL/L
SAMPLE: ABNORMAL
SITE: ABNORMAL
SODIUM SERPL-SCNC: 138 MMOL/L
SP02: 99
VT: 400

## 2017-04-23 PROCEDURE — 36600 WITHDRAWAL OF ARTERIAL BLOOD: CPT

## 2017-04-23 PROCEDURE — 20000000 HC ICU ROOM

## 2017-04-23 PROCEDURE — 63600175 PHARM REV CODE 636 W HCPCS: Performed by: HOSPITALIST

## 2017-04-23 PROCEDURE — 94003 VENT MGMT INPAT SUBQ DAY: CPT

## 2017-04-23 PROCEDURE — 63600175 PHARM REV CODE 636 W HCPCS: Performed by: INTERNAL MEDICINE

## 2017-04-23 PROCEDURE — 25000003 PHARM REV CODE 250: Performed by: INTERNAL MEDICINE

## 2017-04-23 PROCEDURE — 99900035 HC TECH TIME PER 15 MIN (STAT)

## 2017-04-23 PROCEDURE — 25000242 PHARM REV CODE 250 ALT 637 W/ HCPCS: Performed by: INTERNAL MEDICINE

## 2017-04-23 PROCEDURE — 36415 COLL VENOUS BLD VENIPUNCTURE: CPT

## 2017-04-23 PROCEDURE — C9113 INJ PANTOPRAZOLE SODIUM, VIA: HCPCS | Performed by: HOSPITALIST

## 2017-04-23 PROCEDURE — 25000003 PHARM REV CODE 250: Performed by: HOSPITALIST

## 2017-04-23 PROCEDURE — 94640 AIRWAY INHALATION TREATMENT: CPT

## 2017-04-23 PROCEDURE — 80048 BASIC METABOLIC PNL TOTAL CA: CPT

## 2017-04-23 PROCEDURE — 99900026 HC AIRWAY MAINTENANCE (STAT)

## 2017-04-23 RX ADMIN — IPRATROPIUM BROMIDE AND ALBUTEROL SULFATE 3 ML: .5; 3 SOLUTION RESPIRATORY (INHALATION) at 12:04

## 2017-04-23 RX ADMIN — LORAZEPAM 1 MG: 2 INJECTION, SOLUTION INTRAMUSCULAR; INTRAVENOUS at 11:04

## 2017-04-23 RX ADMIN — INSULIN ASPART 3 UNITS: 100 INJECTION, SOLUTION INTRAVENOUS; SUBCUTANEOUS at 12:04

## 2017-04-23 RX ADMIN — CHLORHEXIDINE GLUCONATE 15 ML: 1.2 RINSE ORAL at 08:04

## 2017-04-23 RX ADMIN — INSULIN ASPART 6 UNITS: 100 INJECTION, SOLUTION INTRAVENOUS; SUBCUTANEOUS at 05:04

## 2017-04-23 RX ADMIN — MORPHINE SULFATE 1 MG: 10 INJECTION INTRAVENOUS at 09:04

## 2017-04-23 RX ADMIN — IPRATROPIUM BROMIDE AND ALBUTEROL SULFATE 3 ML: .5; 3 SOLUTION RESPIRATORY (INHALATION) at 07:04

## 2017-04-23 RX ADMIN — LORAZEPAM 1 MG: 2 INJECTION, SOLUTION INTRAMUSCULAR; INTRAVENOUS at 09:04

## 2017-04-23 RX ADMIN — MORPHINE SULFATE 1 MG: 10 INJECTION INTRAVENOUS at 11:04

## 2017-04-23 RX ADMIN — INSULIN ASPART 2 UNITS: 100 INJECTION, SOLUTION INTRAVENOUS; SUBCUTANEOUS at 05:04

## 2017-04-23 RX ADMIN — LORAZEPAM 1 MG: 2 INJECTION, SOLUTION INTRAMUSCULAR; INTRAVENOUS at 07:04

## 2017-04-23 RX ADMIN — CARVEDILOL 3.12 MG: 3.12 TABLET, FILM COATED ORAL at 08:04

## 2017-04-23 RX ADMIN — LORAZEPAM 1 MG: 2 INJECTION, SOLUTION INTRAMUSCULAR; INTRAVENOUS at 04:04

## 2017-04-23 RX ADMIN — PANTOPRAZOLE SODIUM 40 MG: 40 INJECTION, POWDER, FOR SOLUTION INTRAVENOUS at 08:04

## 2017-04-23 RX ADMIN — IPRATROPIUM BROMIDE AND ALBUTEROL SULFATE 3 ML: .5; 3 SOLUTION RESPIRATORY (INHALATION) at 01:04

## 2017-04-23 RX ADMIN — ENOXAPARIN SODIUM 40 MG: 100 INJECTION SUBCUTANEOUS at 04:04

## 2017-04-23 RX ADMIN — MODAFINIL 200 MG: 100 TABLET ORAL at 08:04

## 2017-04-23 RX ADMIN — LORAZEPAM 1 MG: 2 INJECTION, SOLUTION INTRAMUSCULAR; INTRAVENOUS at 03:04

## 2017-04-23 RX ADMIN — INSULIN ASPART 4 UNITS: 100 INJECTION, SOLUTION INTRAVENOUS; SUBCUTANEOUS at 11:04

## 2017-04-23 NOTE — ASSESSMENT & PLAN NOTE
Mechanical ventilation  Weakness  Metabolic encephalopathy  Fever  Assessment and Plan:  Pt with respiratory failure that required intubation, possibly secondary to pneumonia vs edema, treated empirically with Rocephin/Azithromycin, ECHO with diastolic dysfunction but normal EF, and Pulm following for vent management. Family reported UE weakness for a few months prior to this admission and Neurology consulted, possible MG and patient undergoing trial of prednisone and pyridostigmine. This weakness likely limited extubation and necessitated reintubation; patient was extubated on 4/16 but had to be re-intubated that same day. Unclear source of encephalopathy and case discussed with Neurology, CT scan of the head was unrevealing. Also, patient completed treatment with Azithromycin and was on Rocephin alone, but she spiked a fever overnight and antibiotics changed to Zosyn/Zyvox.   Trach on 4/19 without complications.  Stopped all ABx's.  Remains on vent per trach.  Having trouble clearing secretions.  Added Glycopyrrolate.   SW consulted for LTAC.

## 2017-04-23 NOTE — ASSESSMENT & PLAN NOTE
Probably bacterial, but unable to determine.  The patient has a CURB-65 score of 0, and does not meet criteria for healthcare-associated pneumonia initially. CTA chest with bilateral airspace opacification concerning for pneumonia.  Oxygen saturations are improved but requiring a non-rebreather mask; then BIPAP and s/p intubation.  Blood culture NGTD.  Pt completed full course of Azithromycin, and was continued Rocephin until had spike of fever, Abx has been expanded  to Zosyn/Zyvox.    Stopped ABx's.

## 2017-04-23 NOTE — PROGRESS NOTES
[facility]  Neurology  Progress Note    Patient Name: Radha Pritchett  MRN: [id number]  Admission Date: 4/6/2017  Hospital Length of Stay: 16 days  Code Status: Full Code   Attending Provider: Yovani Arellano MD  Primary Care Physician: Ferny Iglesias MD   Principal Problem:Acute respiratory failure with hypercapnia    Subjective:     Interval History: 47 y/o female with medical Hx as listed below who was admitted for shortness of breath. Pt was found to have a pneumonia. She decompensasated and is now intubated. Pt has been difficult to to wean off ventilator. It has been reported that pt has been experiencing upper extremity weakness for a while but is perhaps a generalized weakness chronically. No other details at his moment. Pt also has Hx of a left breast mass that according to pathology is benign, possibly a hematoma. Pt has at baseline a learning difficulty.      -4/11/17: Pt remains mechanically ventilated. NIF reported -10.      -4/12/17: Remains mechanically ventilated. Upon sedation vacation follow commands.      -4/13/17: Still unable to wean off vent.       -4/14/17: No improvement reported. Pt remains mechanically ventilated. In previous days pt reported to reach for the ETT when off of sedation.      -4/15/17: Pt tolerating CPAP trial. Moving her extremities.      -4/16/17: Pt extubated yesterday but began to present shallow respirations and somnolence. Markedly hypercapnic on ABG's. Reintubated.       -4/17/17: Unable to wean off vent. Trach planned for Wed/Thurs.      -4/18/17: Pt opening eyes and following commands; still on vent.     -4/19/17: For trach today.      -4/20/17: Pt is S/P trach. Ventilator dependent.    -4/22/2017: patient intubated and opening eyes intermittently. No new issues.    Current Neurological Medications:     Current Facility-Administered Medications   Medication Dose Route Frequency Provider Last Rate Last Dose    acetaminophen tablet 500 mg  500 mg  Oral Q6H PRN Easton Aaron MD   500 mg at 04/16/17 0823    albuterol-ipratropium 2.5mg-0.5mg/3mL nebulizer solution 3 mL  3 mL Nebulization Q4H PRN Easton Aaron MD   3 mL at 04/15/17 0323    albuterol-ipratropium 2.5mg-0.5mg/3mL nebulizer solution 3 mL  3 mL Nebulization Q6H Renae Gunderson MD   3 mL at 04/22/17 1936    carvedilol tablet 3.125 mg  3.125 mg Oral BID Yovani Arellano MD        chlorhexidine 0.12 % solution 15 mL  15 mL Mouth/Throat BID Easton Aaron MD   15 mL at 04/22/17 2007    cloNIDine tablet 0.1 mg  0.1 mg Oral TID PRN Easton Aaron MD        dextrose 50% injection 12.5 g  12.5 g Intravenous PRN Easton Aaron MD        enoxaparin injection 40 mg  40 mg Subcutaneous Daily Easton Aaron MD   40 mg at 04/22/17 1623    glucagon (human recombinant) injection 1 mg  1 mg Intramuscular PRN Easton Aaron MD        glycopyrrolate injection 0.1 mg  0.1 mg Intravenous TID PRN Yovani Arellano MD   0.1 mg at 04/21/17 1306    insulin aspart pen 1-10 Units  1-10 Units Subcutaneous Q6H PRN Easton Aaron MD   4 Units at 04/22/17 1740    linezolid 600mg/300ml IVPB 600 mg  600 mg Intravenous Q12H Yovani Arellano  mL/hr at 04/22/17 2017 600 mg at 04/22/17 2017    lorazepam injection 1 mg  1 mg Intravenous Q4H PRN Yovani Arellano MD   1 mg at 04/22/17 1926    modafinil tablet 200 mg  200 mg Oral Daily Bird Vega MD   200 mg at 04/22/17 0808    morphine injection 1 mg  1 mg Intravenous Q2H PRN Yovani Arellano MD   1 mg at 04/22/17 0230    ondansetron injection 8 mg  8 mg Intravenous Q8H PRN Easton Aaron MD        pantoprazole injection 40 mg  40 mg Intravenous Daily Lakesha Greer MD   40 mg at 04/22/17 0808    promethazine (PHENERGAN) 12.5 mg in dextrose 5 % 50 mL IVPB  12.5 mg Intravenous Q6H PRN Easton Aaron MD        ramelteon tablet 8 mg  8 mg Oral Nightly PRN Easton Aaron MD   8 mg at 04/06/17 1615    scopolamine 1.5 mg (1 mg over 3 days) 1.5 mg  1 patch Transdermal Q3  Days Lakesha Greer MD   1.5 mg at 04/22/17 2007       Review of Systems   Unable to obtain; pt has a trach/vent    Objective:     Vital Signs (Most Recent):  Temp: 98.1 °F (36.7 °C) (04/22/17 1900)  Pulse: 106 (04/22/17 2000)  Resp: 18 (04/22/17 2000)  BP: (!) 99/56 (04/22/17 2000)  SpO2: 100 % (04/22/17 2000) Vital Signs (24h Range):  Temp:  [97.5 °F (36.4 °C)-98.6 °F (37 °C)] 98.1 °F (36.7 °C)  Pulse:  [] 106  Resp:  [8-33] 18  SpO2:  [87 %-100 %] 100 %  BP: ()/(47-76) 99/56     Weight: 75.7 kg (166 lb 14.2 oz)  Body mass index is 46.93 kg/(m^2).    Physical Exam  Constitutional: intubated  ENT: no discharge  Head: Normocephalic.   Eyes: No icteric sclereae  Neck: Neck supple.   Cardiovascular: Normal rate.   Pulmonary/Chest: symmetrical expansion of chest.   Extremities: no edema  Neurological: opens eyes upon verbal stimuli tracks;   Pupils are round at 2 mm and reactive to light; corneal responses are present bilaterally; gag reflex present and cough reflex present  Moving UE's on command       Significant Labs:   CBC: No results for input(s): WBC, HGB, HCT, PLT in the last 48 hours.  CMP:     Recent Labs  Lab 04/21/17  0126 04/22/17  0328   * 191*    140   K 3.9 3.2*    100   CO2 27 28   BUN 31* 38*   CREATININE 0.9 1.0   CALCIUM 10.3 10.9*   ANIONGAP 9 12   EGFRNONAA >60 >60         Assessment and Plan:     45 y/o female consulted for UE weakness:      1. UE weakness: reported weakness of uncertain etiology. Pt still ventilator dependent.  -Wean vent as tolerated.    Active Diagnoses:    Diagnosis Date Noted POA    PRINCIPAL PROBLEM:  Acute respiratory failure with hypercapnia [J96.02] 04/07/2017 Yes    Counseling regarding advanced directives and goals of care [Z71.89] 04/14/2017 Not Applicable    Hypokalemia [E87.6] 04/09/2017 Yes    Breast mass, left [N63] 04/08/2017 Yes    Pneumonia of both lungs due to infectious organism [J18.9] 04/06/2017 Yes    Sepsis [A41.9]  04/06/2017 Yes    Essential hypertension [I10] 04/06/2017 Yes     Chronic    Chronic diastolic heart failure [I50.32] 04/06/2017 Yes     Chronic    Moderate protein malnutrition [E44.0] 04/06/2017 Yes     Chronic      Problems Resolved During this Admission:    Diagnosis Date Noted Date Resolved POA       VTE Risk Mitigation         Ordered     enoxaparin injection 40 mg  Daily     Route:  Subcutaneous        04/06/17 2202     Medium Risk of VTE  Once      04/06/17 2202          Howie Mar MD  Neurology  [facility]

## 2017-04-23 NOTE — PLAN OF CARE
Problem: Patient Care Overview  Goal: Plan of Care Review  Outcome: Ongoing (interventions implemented as appropriate)  No falls no skin breakdown during shift. Unable to wean vent. PICC line saline locked. Morphine used for pain management. Ativan given for restlessness and anxiety. Flexi seal in place draining brown stool. Carter catheter in place. Restraints to bilateral wrist, doesn't meet criteria for removal. Vital signs stable. Will continue to wean.

## 2017-04-23 NOTE — SIGNIFICANT EVENT
RT notified RT states patient respiration higher 30's assess patient place on rate 15 changed mode PRVC patient respiration decrease 17-21 patient tolerated well no signs of any distress at this current time

## 2017-04-23 NOTE — PROGRESS NOTES
[facility] Hospital Medicine  Progress Note    Patient Name: Radha Pritchett  MRN: [id number]  Patient Class: IP- Inpatient   Admission Date: 4/6/2017  Length of Stay: 17 days  Attending Physician: Yovani Arellano MD  Primary Care Provider: Ferny Iglesias MD        Subjective:     Principal Problem:Acute respiratory failure with hypercapnia    HPI:  Ms. Radha Pritchett is a 46 y.o. female with essential hypertension, chronic diastolic heart failure (LVEF 55-60% Dec 2014), moderate protein malnutrition who presents to [facility] ED with complaints of dyspnea today.  She was scheduled to undergo removal of a breast mass today but was found to be hypoxic.  She does report that she has been short of breath but cannot specify how long.  She has also had some non-productive coughing and some chills without fevers.  Further history is otherwise limited at this time.    Hospital Course:   was admitted with pneumonia and acutely decompensated and was intubated and placed on vent. Pt with UE weakness prior to presentation on this admission which may be contributing to inability to extubate. Neurology following and considering possible MG and patient on therapeutic trial of prednisone and Modafinil. Pt s/p bronch that was unrevealing on 4/11. Pt was extubated on 4/15 but was re-intubated later the same day.  Was febrile and stared on Zyvox and zosyn.  CXR shows possible infiltrate.  Strep growing on UCx.  Trach on 4/19 without complications.  4/21: Increased in respiratory rate.  Having trouble clearing secretions.  4/22: More calm.  Remains on vent.        Interval History: No new issues overnight.    Review of Systems   Unable to perform ROS: Patient nonverbal     Objective:     Vital Signs (Most Recent):  Temp: 98 °F (36.7 °C) (04/23/17 0700)  Pulse: 79 (04/23/17 0900)  Resp: 16 (04/23/17 0900)  BP: (!) 96/59 (04/23/17 0900)  SpO2: 100 % (04/23/17 0900) Vital Signs (24h Range):  Temp:  [98 °F  (36.7 °C)-98.6 °F (37 °C)] 98 °F (36.7 °C)  Pulse:  [] 79  Resp:  [8-47] 16  SpO2:  [87 %-100 %] 100 %  BP: ()/(50-95) 96/59     Weight: 75.7 kg (166 lb 14.2 oz)  Body mass index is 46.93 kg/(m^2).    Intake/Output Summary (Last 24 hours) at 04/23/17 1102  Last data filed at 04/23/17 0900   Gross per 24 hour   Intake             1540 ml   Output             1175 ml   Net              365 ml      Physical Exam   Constitutional: She appears well-developed. No distress.   Eyes: Conjunctivae are normal. Right eye exhibits no discharge. Left eye exhibits no discharge.   Neck:   Trach in place   Cardiovascular: Normal rate, regular rhythm and normal heart sounds.    Pulmonary/Chest: No stridor.   Mechanically ventilated per trach.  Coarse BS bilaterally   Abdominal: Soft. Bowel sounds are normal.   Musculoskeletal: She exhibits no deformity.   Neurological:   Grimaces to stimuli.  Poorly responsive.   Skin: Skin is warm and dry.       Significant Labs:   BMP:     Recent Labs  Lab 04/23/17  0137   *      K 4.4      CO2 29   BUN 34*   CREATININE 0.8   CALCIUM 10.4     CBC:   No results for input(s): WBC, HGB, HCT, PLT in the last 48 hours.      Assessment/Plan:      * Acute respiratory failure with hypercapnia  Mechanical ventilation  Weakness  Metabolic encephalopathy  Fever  Assessment and Plan:  Pt with respiratory failure that required intubation, possibly secondary to pneumonia vs edema, treated empirically with Rocephin/Azithromycin, ECHO with diastolic dysfunction but normal EF, and Pulm following for vent management. Family reported UE weakness for a few months prior to this admission and Neurology consulted, possible MG and patient undergoing trial of prednisone and pyridostigmine. This weakness likely limited extubation and necessitated reintubation; patient was extubated on 4/16 but had to be re-intubated that same day. Unclear source of encephalopathy and case discussed with  Neurology, CT scan of the head was unrevealing. Also, patient completed treatment with Azithromycin and was on Rocephin alone, but she spiked a fever overnight and antibiotics changed to Zosyn/Zyvox.   Trach on 4/19 without complications.  Stopped all ABx's.  Remains on vent per trach.  Having trouble clearing secretions.  Added Glycopyrrolate.   SW consulted for LTAC.        Pneumonia of both lungs due to infectious organism  Probably bacterial, but unable to determine.  The patient has a CURB-65 score of 0, and does not meet criteria for healthcare-associated pneumonia initially. CTA chest with bilateral airspace opacification concerning for pneumonia.  Oxygen saturations are improved but requiring a non-rebreather mask; then BIPAP and s/p intubation.  Blood culture NGTD.  Pt completed full course of Azithromycin, and was continued Rocephin until had spike of fever, Abx has been expanded  to Zosyn/Zyvox.    Stopped ABx's.    Sepsis  This patient met criteria for sepsis given tachycardia, tachypnea, and pneumonia; there is no evidence of endo-organ dysfunction.  Blood cultures are NGTD and repeat studies done;  antibiotics as discussed above.  Strep in urine likely normal pennie.    Essential hypertension  BP well controlled.  Continue current management.    Chronic diastolic heart failure  Patient doesn't appear to be in acute heart failure; checked ECHO with EF=65% but with diastolic dysfunction.    Moderate protein malnutrition  Tolerating tube feeds.      Breast mass, left  Planned for surgery, seen by Dr. Sandoval (his patient) and plan to reschedule as outpatient. It is reportedly a hematoma by biopsy, but suspicion for CA not completely ruled out given paraneoplastic syndrome a consideration.      Hypokalemia  Replace as necessary.      Counseling regarding advanced directives and goals of care  Extensive conversations done with family. Pt to remain full code. Pt is still  but estranged from the   and reportedly the grandmother is the major decision maker.       VTE Risk Mitigation         Ordered     enoxaparin injection 40 mg  Daily     Route:  Subcutaneous        04/06/17 2202     Medium Risk of VTE  Once      04/06/17 2202        Critical Care time spent > 40 minutes.       Yovani Arellano MD  Department of Hospital Medicine   [facility]

## 2017-04-23 NOTE — PROGRESS NOTES
0000- Pt restless and respirations in mid 30's. PRN medications given with minimal relief. Notified RT of assessment. RT increased rate to 15 and changed to PRVC mode on vent. Pt no longer tachypneic.

## 2017-04-23 NOTE — PLAN OF CARE
Problem: Patient Care Overview  Goal: Plan of Care Review  Outcome: Ongoing (interventions implemented as appropriate)  Continues on ventilator, weaning as tolerated. Ativan 1mg ivp given as needed for agitation. Tube feeding at 45cc/hr, tolerating well. Afebrile. Bilateral soft wrist restraints to prevent pulling of trach and climbing out of bed. No falls, injuries or skin breakdown.

## 2017-04-23 NOTE — SUBJECTIVE & OBJECTIVE
Interval History: No new issues overnight.    Review of Systems   Unable to perform ROS: Patient nonverbal     Objective:     Vital Signs (Most Recent):  Temp: 98 °F (36.7 °C) (04/23/17 0700)  Pulse: 79 (04/23/17 0900)  Resp: 16 (04/23/17 0900)  BP: (!) 96/59 (04/23/17 0900)  SpO2: 100 % (04/23/17 0900) Vital Signs (24h Range):  Temp:  [98 °F (36.7 °C)-98.6 °F (37 °C)] 98 °F (36.7 °C)  Pulse:  [] 79  Resp:  [8-47] 16  SpO2:  [87 %-100 %] 100 %  BP: ()/(50-95) 96/59     Weight: 75.7 kg (166 lb 14.2 oz)  Body mass index is 46.93 kg/(m^2).    Intake/Output Summary (Last 24 hours) at 04/23/17 1102  Last data filed at 04/23/17 0900   Gross per 24 hour   Intake             1540 ml   Output             1175 ml   Net              365 ml      Physical Exam   Constitutional: She appears well-developed. No distress.   Eyes: Conjunctivae are normal. Right eye exhibits no discharge. Left eye exhibits no discharge.   Neck:   Trach in place   Cardiovascular: Normal rate, regular rhythm and normal heart sounds.    Pulmonary/Chest: No stridor.   Mechanically ventilated per trach.  Coarse BS bilaterally   Abdominal: Soft. Bowel sounds are normal.   Musculoskeletal: She exhibits no deformity.   Neurological:   Grimaces to stimuli.  Poorly responsive.   Skin: Skin is warm and dry.       Significant Labs:   BMP:     Recent Labs  Lab 04/23/17  0137   *      K 4.4      CO2 29   BUN 34*   CREATININE 0.8   CALCIUM 10.4     CBC:   No results for input(s): WBC, HGB, HCT, PLT in the last 48 hours.

## 2017-04-23 NOTE — PROGRESS NOTES
Progress Note  Pulmonology    Admit Date: 4/6/2017   LOS: 17 days     SUBJECTIVE:     Follow-up For:  Vent management.n vent via trach   Opens her eyes - moves ext - no commands   Continuous Infusions:     Scheduled Meds:   albuterol-ipratropium 2.5mg-0.5mg/3mL  3 mL Nebulization Q6H    carvedilol  3.125 mg Oral BID    chlorhexidine  15 mL Mouth/Throat BID    enoxaparin  40 mg Subcutaneous Daily    modafinil  200 mg Oral Daily    pantoprazole  40 mg Intravenous Daily    scopolamine  1 patch Transdermal Q3 Days     Review of Systems:  Review of systems not obtained due to patient factors as not very responsive and on vent via trach.    OBJECTIVE:     Vital Signs Range (Last 24H):  Temp:  [98 °F (36.7 °C)-98.6 °F (37 °C)]   Pulse:  []   Resp:  [9-47]   BP: ()/(47-95)   SpO2:  [87 %-100 %]     I & O (Last 24H):    Intake/Output Summary (Last 24 hours) at 04/23/17 0712  Last data filed at 04/23/17 0700   Gross per 24 hour   Intake             2180 ml   Output             1185 ml   Net              995 ml     Physical Exam:  General: no distress, moderately obese. No distress.  Neck: no carotid bruit  Breast: left breast mass - hard, non-mobile  Lungs:  Shallow, decreased bs bilaterally with weak effort and marked decreased bs bilaterally  Heart: regular rate and rhythm and no murmur  Abdomen: soft, non-tender non-distended; bowel sounds diminished and right nasal feeding tube and rectal tube  Extremities: no cyanosis  or clubbing but has pedal edema and SCDs to LE  Neurologic: minimally responsive    Laboratory:  CBC:     Recent Labs  Lab 04/20/17  0330   WBC 8.68   RBC 4.52   HGB 11.0*   HCT 38.0      MCV 84   MCH 24.3*   MCHC 28.9*     BMP:     Recent Labs  Lab 04/19/17  0350  04/23/17  0137   *  < > 240*   *  < > 138   K 3.5  < > 4.4     < > 102   CO2 22*  < > 29   BUN 34*  < > 34*   CREATININE 0.9  < > 0.8   CALCIUM 10.3  < > 10.4   MG 2.2  --   --    < > = values in this  interval not displayed.  Vent Mode: PRVC  Oxygen Concentration (%):  [30] 30  Resp Rate Total:  [8 br/min-37 br/min] 15 br/min  Vt Set:  [400 mL] 400 mL  PEEP/CPAP:  [5 cmH20] 5 cmH20  Pressure Support:  [10 cmH20] 10 cmH20  Mean Airway Pressure:  [6.6 cmH20-10.5 cmH20] 10.5 cmH20    ABGs:     Recent Labs  Lab 04/23/17  0417   PH 7.461*   PCO2 37.6   PO2 126*   HCO3 26.8   POCSATURATED 99   BE 3     Microbiology Results (last 7 days)     Procedure Component Value Units Date/Time    Blood culture [[id number]] Collected:  04/16/17 0827    Order Status:  Completed Specimen:  Blood Updated:  04/21/17 0903     Blood Culture, Routine No growth after 5 days.    Blood culture [[id number]] Collected:  04/16/17 0823    Order Status:  Completed Specimen:  Blood Updated:  04/21/17 0903     Blood Culture, Routine No growth after 5 days.    Urine culture [[id number]]  (Susceptibility) Collected:  04/16/17 0817    Order Status:  Completed Specimen:  Urine from Urine, Catheterized Updated:  04/18/17 1301     Urine Culture, Routine --     ENTEROCOCCUS FAECIUM  > 100,000 cfu/ml      Culture, Respiratory with Gram Stain [[id number]] Collected:  04/16/17 0817    Order Status:  Completed Specimen:  Respiratory from Endotracheal Aspirate Updated:  04/18/17 0909     Respiratory Culture No significant isolate     Gram Stain (Respiratory) <10 epithelial cells per low power field.     Gram Stain (Respiratory) Many WBC's     Gram Stain (Respiratory) Few Gram positive cocci in clusters     Gram Stain (Respiratory) Few Gram positive cocci in pairs        Chest X-Ray: I personally reviewed the films and findings are:     Findings: Cardiac silhouette may be mildly enlarged.  Patient is rotated to the left, limiting comparison.  There is persistent opacity in the left lung base which appears grossly similar prior.  Blunting of the loss costophrenic angle suggest small pleural effusion.  There are some ill-defined interstitial and faint airspace  opacities in the right lung base which may be slightly worsened from prior.  Findings may represent multifocal infectious/inflammatory process versus edema.  Right PICC, enteric tube, and tracheostomy tube appear unchanged.  No definite pneumothorax appreciated.      Diagnostic Results:  Echo: Reviewed 4/8/17    1 - TDS, large mass/tumor of left breast.     2 - Normal left ventricular systolic function (EF 60-65%).  Normal wall motion.     3 - Left ventricular diastolic dysfunction.     4 - Right ventricular enlargement with low normal to mildly depressed systolic function.     5 - Trivial tricuspid regurgitation.     6 - The estimated PA systolic pressure is 29 mmHg.     CT head 4/14/17  1. No acute intracranial pathology  2.  Remaining findings as discussed above.    CTA 4/6/17  1. There is a 12 cm left breast hematoma.  2. There are low lung volumes with bibasilar dependent regions of consolidation and patchy opacification of the right upper lung zone. The findings may represent developing pneumonia.  3. There is no evidence pulmonary embolus.    ASSESSMENT/PLAN:     1) Acute respiratory failure -presented with weakness and pneumonia. Unable to wean off vent. S/p trach. Vent as above - appears to be stable on vent   2) Weakness - UE. Neurology following. ??MG MG panel was negative. Steroids stopped. ?eiotlogy to weakness.  3) History of HTN  4) Anemia - stable.    Perico Solis

## 2017-04-23 NOTE — PROGRESS NOTES
Received patient on vent settings SIMV(PRVC)+/8/+5/PS10/FIO2 30% Trach size 8.0 Shiley intact, secured, and patent Ambu bag and mask at bed side no signs of any distress at this current time

## 2017-04-24 PROBLEM — A41.9 SEPSIS, UNSPECIFIED: Status: RESOLVED | Noted: 2017-04-06 | Resolved: 2017-04-24

## 2017-04-24 PROBLEM — R73.9 HYPERGLYCEMIA: Status: ACTIVE | Noted: 2017-04-24

## 2017-04-24 LAB
ALLENS TEST: NORMAL
ANION GAP SERPL CALC-SCNC: 9 MMOL/L
BUN SERPL-MCNC: 36 MG/DL
CALCIUM SERPL-MCNC: 10.4 MG/DL
CHLORIDE SERPL-SCNC: 105 MMOL/L
CO2 SERPL-SCNC: 26 MMOL/L
CREAT SERPL-MCNC: 0.8 MG/DL
DELSYS: NORMAL
ERYTHROCYTE [SEDIMENTATION RATE] IN BLOOD BY WESTERGREN METHOD: 15 MM/H
EST. GFR  (AFRICAN AMERICAN): >60 ML/MIN/1.73 M^2
EST. GFR  (NON AFRICAN AMERICAN): >60 ML/MIN/1.73 M^2
FIO2: 30
GLUCOSE SERPL-MCNC: 253 MG/DL
HCO3 UR-SCNC: 25.6 MMOL/L (ref 24–28)
MODE: NORMAL
PCO2 BLDA: 43 MMHG (ref 35–45)
PEEP: 5
PH SMN: 7.38 [PH] (ref 7.35–7.45)
PO2 BLDA: 86 MMHG (ref 80–100)
POC BE: 0 MMOL/L
POC SATURATED O2: 96 % (ref 95–100)
POC TCO2: 27 MMOL/L (ref 23–27)
POCT GLUCOSE: 185 MG/DL (ref 70–110)
POCT GLUCOSE: 209 MG/DL (ref 70–110)
POCT GLUCOSE: 214 MG/DL (ref 70–110)
POCT GLUCOSE: 217 MG/DL (ref 70–110)
POCT GLUCOSE: 245 MG/DL (ref 70–110)
POCT GLUCOSE: 298 MG/DL (ref 70–110)
POTASSIUM SERPL-SCNC: 4 MMOL/L
PS: 10
SAMPLE: NORMAL
SITE: NORMAL
SODIUM SERPL-SCNC: 140 MMOL/L
VT: 400

## 2017-04-24 PROCEDURE — C9113 INJ PANTOPRAZOLE SODIUM, VIA: HCPCS | Performed by: HOSPITALIST

## 2017-04-24 PROCEDURE — 25000003 PHARM REV CODE 250: Performed by: INTERNAL MEDICINE

## 2017-04-24 PROCEDURE — 63700000 PHARM REV CODE 250 ALT 637 W/O HCPCS: Performed by: HOSPITALIST

## 2017-04-24 PROCEDURE — 63600175 PHARM REV CODE 636 W HCPCS: Performed by: HOSPITALIST

## 2017-04-24 PROCEDURE — 80048 BASIC METABOLIC PNL TOTAL CA: CPT

## 2017-04-24 PROCEDURE — 27000221 HC OXYGEN, UP TO 24 HOURS

## 2017-04-24 PROCEDURE — 99900026 HC AIRWAY MAINTENANCE (STAT)

## 2017-04-24 PROCEDURE — 94761 N-INVAS EAR/PLS OXIMETRY MLT: CPT

## 2017-04-24 PROCEDURE — 36415 COLL VENOUS BLD VENIPUNCTURE: CPT

## 2017-04-24 PROCEDURE — 25000242 PHARM REV CODE 250 ALT 637 W/ HCPCS: Performed by: INTERNAL MEDICINE

## 2017-04-24 PROCEDURE — 36600 WITHDRAWAL OF ARTERIAL BLOOD: CPT

## 2017-04-24 PROCEDURE — 82803 BLOOD GASES ANY COMBINATION: CPT

## 2017-04-24 PROCEDURE — 99900035 HC TECH TIME PER 15 MIN (STAT)

## 2017-04-24 PROCEDURE — 83036 HEMOGLOBIN GLYCOSYLATED A1C: CPT

## 2017-04-24 PROCEDURE — 63600175 PHARM REV CODE 636 W HCPCS: Performed by: INTERNAL MEDICINE

## 2017-04-24 PROCEDURE — 20000000 HC ICU ROOM

## 2017-04-24 PROCEDURE — 25000003 PHARM REV CODE 250: Performed by: HOSPITALIST

## 2017-04-24 PROCEDURE — 94640 AIRWAY INHALATION TREATMENT: CPT

## 2017-04-24 RX ORDER — HALOPERIDOL 5 MG/ML
5 INJECTION INTRAMUSCULAR ONCE
Status: COMPLETED | OUTPATIENT
Start: 2017-04-24 | End: 2017-04-24

## 2017-04-24 RX ORDER — MORPHINE SULFATE 10 MG/ML
2 INJECTION INTRAMUSCULAR; INTRAVENOUS; SUBCUTANEOUS
Status: DISCONTINUED | OUTPATIENT
Start: 2017-04-24 | End: 2017-05-08 | Stop reason: HOSPADM

## 2017-04-24 RX ORDER — LORAZEPAM 2 MG/ML
1 INJECTION INTRAMUSCULAR
Status: DISCONTINUED | OUTPATIENT
Start: 2017-04-24 | End: 2017-05-08 | Stop reason: HOSPADM

## 2017-04-24 RX ORDER — OLANZAPINE 15 MG/1
15 TABLET ORAL ONCE
Status: COMPLETED | OUTPATIENT
Start: 2017-04-24 | End: 2017-04-24

## 2017-04-24 RX ADMIN — IPRATROPIUM BROMIDE AND ALBUTEROL SULFATE 3 ML: .5; 3 SOLUTION RESPIRATORY (INHALATION) at 01:04

## 2017-04-24 RX ADMIN — IPRATROPIUM BROMIDE AND ALBUTEROL SULFATE 3 ML: .5; 3 SOLUTION RESPIRATORY (INHALATION) at 07:04

## 2017-04-24 RX ADMIN — LORAZEPAM 1 MG: 2 INJECTION INTRAMUSCULAR; INTRAVENOUS at 12:04

## 2017-04-24 RX ADMIN — CHLORHEXIDINE GLUCONATE 15 ML: 1.2 RINSE ORAL at 09:04

## 2017-04-24 RX ADMIN — CHLORHEXIDINE GLUCONATE 15 ML: 1.2 RINSE ORAL at 08:04

## 2017-04-24 RX ADMIN — PANTOPRAZOLE SODIUM 40 MG: 40 INJECTION, POWDER, FOR SOLUTION INTRAVENOUS at 08:04

## 2017-04-24 RX ADMIN — MORPHINE SULFATE 1 MG: 10 INJECTION INTRAVENOUS at 08:04

## 2017-04-24 RX ADMIN — INSULIN ASPART 2 UNITS: 100 INJECTION, SOLUTION INTRAVENOUS; SUBCUTANEOUS at 05:04

## 2017-04-24 RX ADMIN — GLYCOPYRROLATE 0.1 MG: 0.2 INJECTION, SOLUTION INTRAMUSCULAR; INTRAVENOUS at 02:04

## 2017-04-24 RX ADMIN — LORAZEPAM 1 MG: 2 INJECTION, SOLUTION INTRAMUSCULAR; INTRAVENOUS at 02:04

## 2017-04-24 RX ADMIN — IPRATROPIUM BROMIDE AND ALBUTEROL SULFATE 3 ML: .5; 3 SOLUTION RESPIRATORY (INHALATION) at 02:04

## 2017-04-24 RX ADMIN — MORPHINE SULFATE 2 MG: 10 INJECTION INTRAVENOUS at 10:04

## 2017-04-24 RX ADMIN — HALOPERIDOL LACTATE 5 MG: 5 INJECTION, SOLUTION INTRAMUSCULAR at 10:04

## 2017-04-24 RX ADMIN — RAMELTEON 8 MG: 8 TABLET, FILM COATED ORAL at 03:04

## 2017-04-24 RX ADMIN — INSULIN DETEMIR 7 UNITS: 100 INJECTION, SOLUTION SUBCUTANEOUS at 09:04

## 2017-04-24 RX ADMIN — INSULIN ASPART 3 UNITS: 100 INJECTION, SOLUTION INTRAVENOUS; SUBCUTANEOUS at 12:04

## 2017-04-24 RX ADMIN — ENOXAPARIN SODIUM 40 MG: 100 INJECTION SUBCUTANEOUS at 05:04

## 2017-04-24 RX ADMIN — OLANZAPINE 15 MG: 15 TABLET, FILM COATED ORAL at 10:04

## 2017-04-24 RX ADMIN — LORAZEPAM 1 MG: 2 INJECTION, SOLUTION INTRAMUSCULAR; INTRAVENOUS at 08:04

## 2017-04-24 RX ADMIN — CARVEDILOL 3.12 MG: 3.12 TABLET, FILM COATED ORAL at 08:04

## 2017-04-24 RX ADMIN — INSULIN ASPART 2 UNITS: 100 INJECTION, SOLUTION INTRAVENOUS; SUBCUTANEOUS at 11:04

## 2017-04-24 RX ADMIN — INSULIN ASPART 2 UNITS: 100 INJECTION, SOLUTION INTRAVENOUS; SUBCUTANEOUS at 12:04

## 2017-04-24 RX ADMIN — MORPHINE SULFATE 10 MG: 10 INJECTION INTRAVENOUS at 11:04

## 2017-04-24 RX ADMIN — LORAZEPAM 1 MG: 2 INJECTION INTRAMUSCULAR; INTRAVENOUS at 10:04

## 2017-04-24 RX ADMIN — GLYCOPYRROLATE 0.1 MG: 0.2 INJECTION, SOLUTION INTRAMUSCULAR; INTRAVENOUS at 08:04

## 2017-04-24 NOTE — ASSESSMENT & PLAN NOTE
Hyperglycemia has continued after stopping steroids.  Add nightly Levemir.  Check HgA1c.  No apparent history of DM prior to admission.

## 2017-04-24 NOTE — PROGRESS NOTES
0900- pt very restless, tachypneic, 's; MD notified new orders noted. OK to hold provigil. MD now discontinued med.  1030- pt remains restless and does not yet appear to be comfortably, PO zyprexa given; new orders noted

## 2017-04-24 NOTE — PLAN OF CARE
Problem: Patient Care Overview  Goal: Plan of Care Review  Outcome: Ongoing (interventions implemented as appropriate)  No falls no skin breakdown during shift. Pt remains on vent. PICC saline locked. Trach inner cannula changed. Carter catheter draining sediment and fabiana urine. Flexiseal in place draining brown stool. Morphine used for pain management. Ativan given for anxiety. Afebrile. Tube feeding infusing at goal rate of 45. Vital signs stable. Will continue to assess.

## 2017-04-24 NOTE — PROGRESS NOTES
Received patient on a Servo I Ventilator with settings as follows:  SIMV 15/ 400/ +5 PEEP/ 10 PS/ 30%.  Patient has a size 8.0 Shiley Trach in place.  Ventilator alarms are set and functional and A<BU bag is at the HOB.

## 2017-04-24 NOTE — PLAN OF CARE
04/24/17 1250   Discharge Reassessment   Assessment Type Discharge Planning Reassessment   Can the patient answer the patient profile reliably? No, cognitively impaired   How does the patient rate their overall health at the present time? Fair  (record)   Describe the patient's ability to walk at the present time. Does not walk or unable to take any steps at all   How often would a person be available to care for the patient? Occasionally   Number of comorbid conditions (as recorded on the chart) Five or more   During the past month, has the patient often been bothered by feeling down, depressed or hopeless? No  (record)   During the past month, has the patient often been bothered by little interest or pleasure in doing things? No  (record)   Discharge plan remains the same: Yes   Provided patient/caregiver education on the expected discharge date and the discharge plan No   Discharge Plan [facility]   Discharge Plan B Long-term acute care facility (LTAC)  (alternate)   Change in patient condition or support system No   Patient choice form signed by patient/caregiver No  (per call with grandmother and aunt on phone)   Explained to the the patient/caregiver why the discharge planned changed: No   Involved the patient/caregiver in establishing a new discharge plan: No   patient remains in ICU on vent support. Weaning attempts continue. DEN[phone] this am. Per Martin Fraser updated the application with the insurance this am. Bria informs that the review was with a  this am, agency awaiting response. DENIS will follow in ICU and assist as needed.

## 2017-04-24 NOTE — PROGRESS NOTES
[facility] Hospital Medicine  Progress Note    Patient Name: Radha Pritchett  MRN: [id number]  Patient Class: IP- Inpatient   Admission Date: 4/6/2017  Length of Stay: 18 days  Attending Physician: Yovani Arellano MD  Primary Care Provider: Ferny Iglesias MD        Subjective:     Principal Problem:Acute respiratory failure with hypercapnia    HPI:  Ms. Radha Pritchett is a 46 y.o. female with essential hypertension, chronic diastolic heart failure (LVEF 55-60% Dec 2014), moderate protein malnutrition who presents to [facility] ED with complaints of dyspnea today.  She was scheduled to undergo removal of a breast mass today but was found to be hypoxic.  She does report that she has been short of breath but cannot specify how long.  She has also had some non-productive coughing and some chills without fevers.  Further history is otherwise limited at this time.    Hospital Course:   was admitted with pneumonia and acutely decompensated and was intubated and placed on vent. Pt with UE weakness prior to presentation on this admission which may be contributing to inability to extubate. Neurology following and considering possible MG and patient on therapeutic trial of prednisone and Modafinil. Pt s/p bronch that was unrevealing on 4/11. Pt was extubated on 4/15 but was re-intubated later the same day.  Was febrile and stared on Zyvox and zosyn.  CXR shows possible infiltrate.  Strep growing on UCx.  Trach on 4/19 without complications.        Interval History: Very restless.    Review of Systems   Unable to perform ROS: Mental status change     Objective:     Vital Signs (Most Recent):  Temp: 98.5 °F (36.9 °C) (04/24/17 0730)  Pulse: 109 (04/24/17 0900)  Resp: 17 (04/24/17 0900)  BP: (!) 152/83 (04/24/17 0900)  SpO2: 96 % (04/24/17 0900) Vital Signs (24h Range):  Temp:  [98 °F (36.7 °C)-98.8 °F (37.1 °C)] 98.5 °F (36.9 °C)  Pulse:  [] 109  Resp:  [10-40] 17  SpO2:  [96 %-100 %] 96 %  BP:  ()/() 152/83     Weight: 75.7 kg (166 lb 14.2 oz)  Body mass index is 46.93 kg/(m^2).    Intake/Output Summary (Last 24 hours) at 04/24/17 0939  Last data filed at 04/24/17 0900   Gross per 24 hour   Intake             1960 ml   Output             1100 ml   Net              860 ml      Physical Exam   Constitutional: She appears well-developed. No distress.   Eyes: Conjunctivae are normal. Right eye exhibits no discharge. Left eye exhibits no discharge.   Neck:   Trach in place   Cardiovascular: Normal rate, regular rhythm and normal heart sounds.    Pulmonary/Chest: No stridor.   Mechanically ventilated per trach.  Coarse BS bilaterally   Abdominal: Soft. Bowel sounds are normal.   Musculoskeletal: She exhibits no deformity.   Neurological:   Very restless.  Poorly responsive to commands.   Skin: Skin is warm and dry.       Significant Labs:   BMP:     Recent Labs  Lab 04/24/17  0118   *      K 4.0      CO2 26   BUN 36*   CREATININE 0.8   CALCIUM 10.4     CBC:   No results for input(s): WBC, HGB, HCT, PLT in the last 48 hours.      Assessment/Plan:      * Acute respiratory failure with hypercapnia  Mechanical ventilation  Weakness  Metabolic encephalopathy  Fever  Assessment and Plan:  Pt with respiratory failure that required intubation, possibly secondary to pneumonia vs edema, treated empirically with Rocephin/Azithromycin, ECHO with diastolic dysfunction but normal EF, and Pulm following for vent management. Family reported UE weakness for a few months prior to this admission and Neurology consulted, possible MG and patient undergoing trial of prednisone and pyridostigmine. This weakness likely limited extubation and necessitated reintubation; patient was extubated on 4/16 but had to be re-intubated that same day. Unclear source of encephalopathy and case discussed with Neurology, CT scan of the head was unrevealing. Also, patient completed treatment with Azithromycin and was on  Rocephin alone, but she spiked a fever overnight and antibiotics changed to Zosyn/Zyvox.   Trach on 4/19 without complications.  Stopped all ABx's.  Remains on vent per trach.  Having trouble clearing secretions.  Added Glycopyrrolate.   SW consulted for LTAC.        Pneumonia of both lungs due to infectious organism  Probably bacterial, but unable to determine.  The patient has a CURB-65 score of 0, and does not meet criteria for healthcare-associated pneumonia initially. CTA chest with bilateral airspace opacification concerning for pneumonia.  Oxygen saturations are improved but requiring a non-rebreather mask; then BIPAP and s/p intubation.  Blood culture NGTD.  Pt completed full course of Azithromycin, and was continued Rocephin until had spike of fever, Abx has been expanded  to Zosyn/Zyvox.    Stopped ABx's.    Essential hypertension  BP well controlled.  Continue current management.    Chronic diastolic heart failure  Patient doesn't appear to be in acute heart failure; checked ECHO with EF=65% but with diastolic dysfunction.    Moderate protein malnutrition  Tolerating tube feeds.      Breast mass, left  Planned for surgery, seen by Dr. Sandoval (his patient) and plan to reschedule as outpatient. It is reportedly a hematoma by biopsy, but suspicion for CA not completely ruled out given paraneoplastic syndrome a consideration.      Hypokalemia  Replace as necessary.      Counseling regarding advanced directives and goals of care  Extensive conversations done with family. Pt to remain full code. Pt is still  but estranged from the  and reportedly the grandmother is the major decision maker.       Hyperglycemia  Hyperglycemia has continued after stopping steroids.  Add nightly Levemir.  Check HgA1c.  No apparent history of DM prior to admission.      Sepsis, resolved as of 4/24/2017  This patient met criteria for sepsis given tachycardia, tachypnea, and pneumonia; there is no evidence of  endo-organ dysfunction.  Blood cultures are NGTD and repeat studies done;  antibiotics as discussed above.  Strep in urine likely normal pennie.    VTE Risk Mitigation         Ordered     enoxaparin injection 40 mg  Daily     Route:  Subcutaneous        04/06/17 2202     Medium Risk of VTE  Once      04/06/17 2202        Critical Care time spent > 40 minutes.     Yovani Arellano MD  Department of Hospital Medicine   [facility]

## 2017-04-24 NOTE — PROGRESS NOTES
Progress Note  Pulmonology    Admit Date: 4/6/2017   LOS: 18 days     SUBJECTIVE:     Follow-up For:  Vent management. Patient on vent via trach. Off sedation. Agitated.  Opens her eyes - moves ext - no commands   Continuous Infusions:     Scheduled Meds:   albuterol-ipratropium 2.5mg-0.5mg/3mL  3 mL Nebulization Q6H    carvedilol  3.125 mg Oral BID    chlorhexidine  15 mL Mouth/Throat BID    enoxaparin  40 mg Subcutaneous Daily    pantoprazole  40 mg Intravenous Daily    scopolamine  1 patch Transdermal Q3 Days     Review of Systems:  Review of systems not obtained due to patient agitated and not very responsive.    OBJECTIVE:     Vital Signs Range (Last 24H):  Temp:  [98 °F (36.7 °C)-98.8 °F (37.1 °C)]   Pulse:  []   Resp:  [10-40]   BP: ()/()   SpO2:  [97 %-100 %]     I & O (Last 24H):    Intake/Output Summary (Last 24 hours) at 04/24/17 0849  Last data filed at 04/24/17 0700   Gross per 24 hour   Intake             1845 ml   Output              975 ml   Net              870 ml     Physical Exam:  General: no distress. No distress but agitated. UE restrained but moves her LE more.  Neck: no carotid bruit  Breast: left breast mass - hard, non-mobile  Lungs:  Shallow, decreased bs bilaterally rhonchi. No wheezing.   Heart: regular rate and rhythm and no murmur  Abdomen: soft, non-tender non-distended; bowel sounds diminished and right nasal feeding tube and rectal tube.  Extremities: no cyanosis  or clubbing but has pedal edema and SCDs to LE. RUE picc line.  Neurologic: minimally responsive    Laboratory:  CBC:     Recent Labs  Lab 04/20/17  0330   WBC 8.68   RBC 4.52   HGB 11.0*   HCT 38.0      MCV 84   MCH 24.3*   MCHC 28.9*     BMP:     Recent Labs  Lab 04/19/17  0350  04/24/17  0118   *  < > 253*   *  < > 140   K 3.5  < > 4.0     < > 105   CO2 22*  < > 26   BUN 34*  < > 36*   CREATININE 0.9  < > 0.8   CALCIUM 10.3  < > 10.4   MG 2.2  --   --    < > = values in  this interval not displayed.     Vent Mode: SIMV (PRVC) + PS  Oxygen Concentration (%):  [30] 30  Resp Rate Total:  [12 br/min-41 br/min] 12 br/min  Vt Set:  [400 mL] 400 mL  PEEP/CPAP:  [5 cmH20] 5 cmH20  Pressure Support:  [10 cmH20] 10 cmH20  Mean Airway Pressure:  [6.9 pgH78-35.7 cmH20] 8.6 cmH20    ABGs:     Recent Labs  Lab 04/24/17  0413   PH 7.384   PCO2 43.0   PO2 86   HCO3 25.6   POCSATURATED 96   BE 0     Microbiology Results (last 7 days)     Procedure Component Value Units Date/Time    Blood culture [[id number]] Collected:  04/16/17 0827    Order Status:  Completed Specimen:  Blood Updated:  04/21/17 0903     Blood Culture, Routine No growth after 5 days.    Blood culture [[id number]] Collected:  04/16/17 0823    Order Status:  Completed Specimen:  Blood Updated:  04/21/17 0903     Blood Culture, Routine No growth after 5 days.    Urine culture [[id number]]  (Susceptibility) Collected:  04/16/17 0817    Order Status:  Completed Specimen:  Urine from Urine, Catheterized Updated:  04/18/17 1301     Urine Culture, Routine --     ENTEROCOCCUS FAECIUM  > 100,000 cfu/ml      Culture, Respiratory with Gram Stain [[id number]] Collected:  04/16/17 0817    Order Status:  Completed Specimen:  Respiratory from Endotracheal Aspirate Updated:  04/18/17 0909     Respiratory Culture No significant isolate     Gram Stain (Respiratory) <10 epithelial cells per low power field.     Gram Stain (Respiratory) Many WBC's     Gram Stain (Respiratory) Few Gram positive cocci in clusters     Gram Stain (Respiratory) Few Gram positive cocci in pairs        Chest X-Ray: I personally reviewed the films and findings are: No cxr thisi am    Diagnostic Results:  Echo: Reviewed 4/8/17    1 - TDS, large mass/tumor of left breast.     2 - Normal left ventricular systolic function (EF 60-65%).  Normal wall motion.     3 - Left ventricular diastolic dysfunction.     4 - Right ventricular enlargement with low normal to mildly depressed systolic  function.     5 - Trivial tricuspid regurgitation.     6 - The estimated PA systolic pressure is 29 mmHg.     CT head 4/14/17  1. No acute intracranial pathology  2.  Remaining findings as discussed above.    CTA 4/6/17  1. There is a 12 cm left breast hematoma.  2. There are low lung volumes with bibasilar dependent regions of consolidation and patchy opacification of the right upper lung zone. The findings may represent developing pneumonia.  3. There is no evidence pulmonary embolus.    ASSESSMENT/PLAN:     1) Acute respiratory failure - presented with weakness of UE and pneumonia. Unable to wean off vent. S/p trach. Fair gas exchanged. On rate 12 via trach. PS 10. Agitated. No cxr this am. Rhonchi on exam. Will increase ps a bit and try to decrease rate. Bronchodilators. LTAC?  2) Weakness - UE. Neurology following. ??MG - MG panel was negative. Steroids stopped. ?eiotlogy to weakness. UE remain weak.  3) History of HTN  4) Anemia - stable.    Renae Gunderson

## 2017-04-24 NOTE — PROGRESS NOTES
Following am ABG's, the following changes were made to the Ventilator settings:  The rate was decreased to 12.

## 2017-04-24 NOTE — SUBJECTIVE & OBJECTIVE
Interval History: Very restless.    Review of Systems   Unable to perform ROS: Mental status change     Objective:     Vital Signs (Most Recent):  Temp: 98.5 °F (36.9 °C) (04/24/17 0730)  Pulse: 109 (04/24/17 0900)  Resp: 17 (04/24/17 0900)  BP: (!) 152/83 (04/24/17 0900)  SpO2: 96 % (04/24/17 0900) Vital Signs (24h Range):  Temp:  [98 °F (36.7 °C)-98.8 °F (37.1 °C)] 98.5 °F (36.9 °C)  Pulse:  [] 109  Resp:  [10-40] 17  SpO2:  [96 %-100 %] 96 %  BP: ()/() 152/83     Weight: 75.7 kg (166 lb 14.2 oz)  Body mass index is 46.93 kg/(m^2).    Intake/Output Summary (Last 24 hours) at 04/24/17 0939  Last data filed at 04/24/17 0900   Gross per 24 hour   Intake             1960 ml   Output             1100 ml   Net              860 ml      Physical Exam   Constitutional: She appears well-developed. No distress.   Eyes: Conjunctivae are normal. Right eye exhibits no discharge. Left eye exhibits no discharge.   Neck:   Trach in place   Cardiovascular: Normal rate, regular rhythm and normal heart sounds.    Pulmonary/Chest: No stridor.   Mechanically ventilated per trach.  Coarse BS bilaterally   Abdominal: Soft. Bowel sounds are normal.   Musculoskeletal: She exhibits no deformity.   Neurological:   Very restless.  Poorly responsive to commands.   Skin: Skin is warm and dry.       Significant Labs:   BMP:     Recent Labs  Lab 04/24/17  0118   *      K 4.0      CO2 26   BUN 36*   CREATININE 0.8   CALCIUM 10.4     CBC:   No results for input(s): WBC, HGB, HCT, PLT in the last 48 hours.

## 2017-04-25 LAB
ALLENS TEST: ABNORMAL
ANION GAP SERPL CALC-SCNC: 8 MMOL/L
BUN SERPL-MCNC: 36 MG/DL
CALCIUM SERPL-MCNC: 10.9 MG/DL
CHLORIDE SERPL-SCNC: 107 MMOL/L
CO2 SERPL-SCNC: 28 MMOL/L
CREAT SERPL-MCNC: 0.8 MG/DL
DELSYS: ABNORMAL
ERYTHROCYTE [SEDIMENTATION RATE] IN BLOOD BY WESTERGREN METHOD: 8 MM/H
EST. GFR  (AFRICAN AMERICAN): >60 ML/MIN/1.73 M^2
EST. GFR  (NON AFRICAN AMERICAN): >60 ML/MIN/1.73 M^2
ESTIMATED AVG GLUCOSE: 163 MG/DL
FIO2: 30
GLUCOSE SERPL-MCNC: 158 MG/DL
HBA1C MFR BLD HPLC: 7.3 %
HCO3 UR-SCNC: 27.3 MMOL/L (ref 24–28)
MODE: ABNORMAL
PCO2 BLDA: 49.3 MMHG (ref 35–45)
PEEP: 5
PH SMN: 7.35 [PH] (ref 7.35–7.45)
PO2 BLDA: 86 MMHG (ref 80–100)
POC BE: 1 MMOL/L
POC SATURATED O2: 96 % (ref 95–100)
POC TCO2: 29 MMOL/L (ref 23–27)
POCT GLUCOSE: 148 MG/DL (ref 70–110)
POCT GLUCOSE: 212 MG/DL (ref 70–110)
POCT GLUCOSE: 220 MG/DL (ref 70–110)
POCT GLUCOSE: 241 MG/DL (ref 70–110)
POTASSIUM SERPL-SCNC: 4.3 MMOL/L
PS: 15
SAMPLE: ABNORMAL
SITE: ABNORMAL
SODIUM SERPL-SCNC: 143 MMOL/L
SP02: 100
VT: 400

## 2017-04-25 PROCEDURE — 36415 COLL VENOUS BLD VENIPUNCTURE: CPT

## 2017-04-25 PROCEDURE — 80048 BASIC METABOLIC PNL TOTAL CA: CPT

## 2017-04-25 PROCEDURE — 63600175 PHARM REV CODE 636 W HCPCS: Performed by: INTERNAL MEDICINE

## 2017-04-25 PROCEDURE — 36600 WITHDRAWAL OF ARTERIAL BLOOD: CPT

## 2017-04-25 PROCEDURE — 94761 N-INVAS EAR/PLS OXIMETRY MLT: CPT

## 2017-04-25 PROCEDURE — C9113 INJ PANTOPRAZOLE SODIUM, VIA: HCPCS | Performed by: HOSPITALIST

## 2017-04-25 PROCEDURE — 27000221 HC OXYGEN, UP TO 24 HOURS

## 2017-04-25 PROCEDURE — 25000003 PHARM REV CODE 250: Performed by: INTERNAL MEDICINE

## 2017-04-25 PROCEDURE — 99900035 HC TECH TIME PER 15 MIN (STAT)

## 2017-04-25 PROCEDURE — 94640 AIRWAY INHALATION TREATMENT: CPT

## 2017-04-25 PROCEDURE — 63600175 PHARM REV CODE 636 W HCPCS: Performed by: HOSPITALIST

## 2017-04-25 PROCEDURE — 25000003 PHARM REV CODE 250: Performed by: HOSPITALIST

## 2017-04-25 PROCEDURE — 99232 SBSQ HOSP IP/OBS MODERATE 35: CPT | Mod: ,,, | Performed by: PSYCHIATRY & NEUROLOGY

## 2017-04-25 PROCEDURE — 25000242 PHARM REV CODE 250 ALT 637 W/ HCPCS: Performed by: INTERNAL MEDICINE

## 2017-04-25 PROCEDURE — 94003 VENT MGMT INPAT SUBQ DAY: CPT

## 2017-04-25 PROCEDURE — 99900026 HC AIRWAY MAINTENANCE (STAT)

## 2017-04-25 PROCEDURE — 20000000 HC ICU ROOM

## 2017-04-25 RX ORDER — CHLORHEXIDINE GLUCONATE ORAL RINSE 1.2 MG/ML
15 SOLUTION DENTAL 2 TIMES DAILY
Status: DISCONTINUED | OUTPATIENT
Start: 2017-04-25 | End: 2017-05-08 | Stop reason: HOSPADM

## 2017-04-25 RX ADMIN — LORAZEPAM 1 MG: 2 INJECTION INTRAMUSCULAR; INTRAVENOUS at 07:04

## 2017-04-25 RX ADMIN — SCOPOLAMINE 1.5 MG: 1 PATCH, EXTENDED RELEASE TRANSDERMAL at 09:04

## 2017-04-25 RX ADMIN — INSULIN DETEMIR 7 UNITS: 100 INJECTION, SOLUTION SUBCUTANEOUS at 09:04

## 2017-04-25 RX ADMIN — MORPHINE SULFATE: 10 INJECTION INTRAVENOUS at 02:04

## 2017-04-25 RX ADMIN — LORAZEPAM 1 MG: 2 INJECTION INTRAMUSCULAR; INTRAVENOUS at 11:04

## 2017-04-25 RX ADMIN — IPRATROPIUM BROMIDE AND ALBUTEROL SULFATE 3 ML: .5; 3 SOLUTION RESPIRATORY (INHALATION) at 07:04

## 2017-04-25 RX ADMIN — CHLORHEXIDINE GLUCONATE 15 ML: 1.2 RINSE ORAL at 09:04

## 2017-04-25 RX ADMIN — INSULIN ASPART 4 UNITS: 100 INJECTION, SOLUTION INTRAVENOUS; SUBCUTANEOUS at 11:04

## 2017-04-25 RX ADMIN — ENOXAPARIN SODIUM 40 MG: 100 INJECTION SUBCUTANEOUS at 06:04

## 2017-04-25 RX ADMIN — CARVEDILOL 3.12 MG: 3.12 TABLET, FILM COATED ORAL at 08:04

## 2017-04-25 RX ADMIN — PANTOPRAZOLE SODIUM 40 MG: 40 INJECTION, POWDER, FOR SOLUTION INTRAVENOUS at 08:04

## 2017-04-25 RX ADMIN — CHLORHEXIDINE GLUCONATE 15 ML: 1.2 RINSE ORAL at 08:04

## 2017-04-25 RX ADMIN — IPRATROPIUM BROMIDE AND ALBUTEROL SULFATE 3 ML: .5; 3 SOLUTION RESPIRATORY (INHALATION) at 01:04

## 2017-04-25 RX ADMIN — INSULIN ASPART 4 UNITS: 100 INJECTION, SOLUTION INTRAVENOUS; SUBCUTANEOUS at 06:04

## 2017-04-25 NOTE — PLAN OF CARE
Problem: Patient Care Overview  Goal: Plan of Care Review  Outcome: Ongoing (interventions implemented as appropriate)  Patient in bed agitated and restless. Requires prn Ativan 1mg and Morphine 2mg IVP q2hrs. VS WNL. NSR on cardiac monitor. BP stable. Afebrile. Patient remains on vent per Trach. Unable to wean this shift. Settings remain on SIMV 30%, rate 8, 400 TV, and 5 of Peep. NG tube in place with Peptide 1.5 infusing at goal rate of 45mL/hr, with 150mL of free water flushes. Tolerating tube feedings. Bilateral upper extremities in restraints. Flex-seal fecal management tube in place for frequent liquid stools. CBG's q6hrs. Blood glucose WNL and managed by sliding scale. Plan of care is to transfer to LTAC. Unable to discuss plan with patient.

## 2017-04-25 NOTE — PROGRESS NOTES
Progress Note  Pulmonology    Admit Date: 4/6/2017   LOS: 19 days     SUBJECTIVE: resp distress     Follow-up For:  Vent management. Patient on vent via trach. Off sedation. Agitated.  Opens her eyes - moves ext - no commands   Continuous Infusions:     Scheduled Meds:   albuterol-ipratropium 2.5mg-0.5mg/3mL  3 mL Nebulization Q6H    carvedilol  3.125 mg Oral BID    chlorhexidine  15 mL Mouth/Throat BID    enoxaparin  40 mg Subcutaneous Daily    insulin detemir  7 Units Subcutaneous QHS    pantoprazole  40 mg Intravenous Daily    scopolamine  1 patch Transdermal Q3 Days     Review of Systems:  Review of systems not obtained due to patient agitated and not very responsive.    OBJECTIVE:     Vital Signs Range (Last 24H):  Temp:  [98.2 °F (36.8 °C)-100 °F (37.8 °C)]   Pulse:  []   Resp:  [6-44]   BP: ()/()   SpO2:  [91 %-100 %]     I & O (Last 24H):    Intake/Output Summary (Last 24 hours) at 04/25/17 0851  Last data filed at 04/25/17 0800   Gross per 24 hour   Intake             2170 ml   Output             2300 ml   Net             -130 ml     Physical Exam:  General: no distress. No distress but agitated. UE restrained but moves her LE more.  Neck: no carotid bruit  Breast: left breast mass - hard, non-mobile  Lungs:  Shallow, decreased bs bilaterally rhonchi. No wheezing.   Heart: regular rate and rhythm and no murmur  Abdomen: soft, non-tender non-distended; bowel sounds diminished and right nasal feeding tube and rectal tube.  Extremities: no cyanosis  or clubbing but has pedal edema and SCDs to LE. RUE picc line.  Neurologic: minimally responsive    Laboratory:  CBC:     Recent Labs  Lab 04/20/17  0330   WBC 8.68   RBC 4.52   HGB 11.0*   HCT 38.0      MCV 84   MCH 24.3*   MCHC 28.9*     BMP:     Recent Labs  Lab 04/19/17  0350  04/25/17  0139   *  < > 158*   *  < > 143   K 3.5  < > 4.3     < > 107   CO2 22*  < > 28   BUN 34*  < > 36*   CREATININE 0.9  < > 0.8    CALCIUM 10.3  < > 10.9*   MG 2.2  --   --    < > = values in this interval not displayed.     Vent Mode: PS/CPAP  Oxygen Concentration (%):  [30] 30  Resp Rate Total:  [8 br/min-32 br/min] 26 br/min  Vt Set:  [400 mL] 400 mL  PEEP/CPAP:  [5 cmH20] 5 cmH20  Pressure Support:  [15 cmH20] 15 cmH20  Mean Airway Pressure:  [6.6 cmH20-10.1 cmH20] 10.1 cmH20    ABGs:     Recent Labs  Lab 04/25/17  0418   PH 7.352   PCO2 49.3*   PO2 86   HCO3 27.3   POCSATURATED 96   BE 1     Microbiology Results (last 7 days)     Procedure Component Value Units Date/Time    Blood culture [[id number]] Collected:  04/16/17 0827    Order Status:  Completed Specimen:  Blood Updated:  04/21/17 0903     Blood Culture, Routine No growth after 5 days.    Blood culture [[id number]] Collected:  04/16/17 0823    Order Status:  Completed Specimen:  Blood Updated:  04/21/17 0903     Blood Culture, Routine No growth after 5 days.    Urine culture [[id number]]  (Susceptibility) Collected:  04/16/17 0817    Order Status:  Completed Specimen:  Urine from Urine, Catheterized Updated:  04/18/17 1301     Urine Culture, Routine --     ENTEROCOCCUS FAECIUM  > 100,000 cfu/ml      Culture, Respiratory with Gram Stain [[id number]] Collected:  04/16/17 0817    Order Status:  Completed Specimen:  Respiratory from Endotracheal Aspirate Updated:  04/18/17 0909     Respiratory Culture No significant isolate     Gram Stain (Respiratory) <10 epithelial cells per low power field.     Gram Stain (Respiratory) Many WBC's     Gram Stain (Respiratory) Few Gram positive cocci in clusters     Gram Stain (Respiratory) Few Gram positive cocci in pairs        Chest X-Ray:     Findings: Cardiac silhouette may be mildly enlarged.  Patient is rotated to the left, limiting comparison.  There is persistent opacity in the left lung base which appears grossly similar prior.  Blunting of the loss costophrenic angle suggest small pleural effusion.  There are some ill-defined interstitial and  faint airspace opacities in the right lung base which may be slightly worsened from prior.  Findings may represent multifocal infectious/inflammatory process versus edema.  Right PICC, enteric tube, and tracheostomy tube appear unchanged.  No definite pneumothorax appreciated.      ASSESSMENT/PLAN:     1) Acute respiratory failure - presented with weakness of UE and pneumonia. Unable to wean off vent. S/p trach. Fair gas exchanged. On rate 12 via trach. PS 10. Agitated. No cxr this am. Rhonchi on exam. Will increase ps a bit and try to decrease rate. Bronchodilators. LTAC?  2) Weakness - UE. Neurology following. ??MG - MG panel was negative. Steroids stopped. ?eiotlogy to weakness. UE remain weak.  3) History of HTN  4) Anemia - stable.    Pt now on cpap with some pressure support ; gets quite agitated and apparently moves quite well during these episodes. Now on cpap ; will try trach collar as tolerated.  Undoubtedly a long admission ; would certainly consider LTAC ;    Bird Vega

## 2017-04-25 NOTE — ASSESSMENT & PLAN NOTE
Hyperglycemia has continued after stopping steroids.  Add nightly Levemir.  HgA1c 7.3%.  No apparent history of DM prior to admission.

## 2017-04-25 NOTE — CONSULTS
LTAC consult ([facility])--LTAC consult in progress. [facility] turned down consult on 4/21 as is not in network. DENIS spoke with Kati at [facility] insurance today--[phone]. Patient needs to be turned down by 3 network providers prior to any non network providers considered. [facility] accepted on 4/21--[facility] denied yesterday at 4:39 pm per my conversation this am because not yet in network. Will attempt contact again with [facility] (the only local LTAC provider). Kati referred SW to web site to determine others as she was unable to provide list. DENIS will continue working on this today.  10:30--providers listed on Web site did include [facility] however at last attempt with this insurance, they were in network. No response yet from message left on 4/21. Now sent referral thru [facility] [employer] and left voice mail for Blaire ([phone]). Awaiting return call.   Other providers listed on web site are [facility], both in, [facility]), [facility] in [facility], and [facility] of [facility].   11:00 DENIS contacted Maria Victoria, admissions for the [facility] of [place] [phone] who reports they do not take vent patients. She also represents the [facility] location which is full and has patient's on waiting list. Her company hs the other [facility] locations however she does not assess for these. This locations are over 120 miles away, not listed in [facility] to send inquiries. Will contact if other results declined.   Maria Victoria informs that [facility] [place] may be in network, contact is Maddie at [phone]. DENIS left voice mail and sent [facility] referral.  DENIS will update family today once have more information from above.  1:30--voice mail from 1:04 noted. DENIS called patient aunt Mandy Bull [phone], caller, with update due to above. Mandy reports that patient grandmother remains ill (at baseline the patient lived with  grandmother). Unlikely patient will be able to return home with her grandmother even if returns to baseline. Family hopes that patient will be able to have post acute care  SW provided aunt DENIS direct line for contact.

## 2017-04-25 NOTE — PROGRESS NOTES
[facility] Hospital Medicine  Progress Note    Patient Name: Radha Prtichett  MRN: [id number]  Patient Class: IP- Inpatient   Admission Date: 4/6/2017  Length of Stay: 19 days  Attending Physician: Ashley Zazueta MD  Primary Care Provider: Ferny Iglesias MD        Subjective:     Principal Problem:Acute respiratory failure with hypercapnia    HPI:  Ms. Radha Pritchett is a 46 y.o. female with essential hypertension, chronic diastolic heart failure (LVEF 55-60% Dec 2014), moderate protein malnutrition who presents to [facility] ED with complaints of dyspnea today.  She was scheduled to undergo removal of a breast mass today but was found to be hypoxic.  She does report that she has been short of breath but cannot specify how long.  She has also had some non-productive coughing and some chills without fevers.  Further history is otherwise limited at this time.    Hospital Course:   was admitted with pneumonia and acutely decompensated and was intubated and placed on vent. Pt with UE weakness prior to presentation on this admission which may be contributing to inability to extubate. Neurology following and considering possible MG and patient on therapeutic trial of prednisone and Modafinil. Pt s/p bronch that was unrevealing on 4/11. Pt was extubated on 4/15 but was re-intubated later the same day.  Was febrile and stared on Zyvox and zosyn.  CXR shows possible infiltrate. Urine culture +enterococcus.  Completed course and antibiotics dc'd. Blood culture negative  Trach on 4/19 without complications. Pt still having neuro deficits and brain MRI pending. LTAC placement pending.         Interval History: pt non-responsive to me but more alert this morning per nursing    Review of Systems   Unable to perform ROS: Mental status change     Objective:     Vital Signs (Most Recent):  Temp: 99.1 °F (37.3 °C) (04/25/17 1515)  Pulse: (!) 119 (04/25/17 1515)  Resp: (!) 24 (04/25/17 1515)  BP: 116/67  (04/25/17 1515)  SpO2: (!) 93 % (04/25/17 1515) Vital Signs (24h Range):  Temp:  [98.3 °F (36.8 °C)-99.1 °F (37.3 °C)] 99.1 °F (37.3 °C)  Pulse:  [] 119  Resp:  [6-37] 24  SpO2:  [91 %-100 %] 93 %  BP: ()/(50-81) 116/67     Weight: 72 kg (158 lb 11.7 oz)  Body mass index is 44.64 kg/(m^2).    Intake/Output Summary (Last 24 hours) at 04/25/17 1636  Last data filed at 04/25/17 1600   Gross per 24 hour   Intake             1915 ml   Output             1325 ml   Net              590 ml      Physical Exam   Constitutional: She appears well-developed. No distress.   Eyes: Conjunctivae are normal. Right eye exhibits no discharge. Left eye exhibits no discharge.   Neck:   Trach in place   Cardiovascular: Normal rate, regular rhythm and normal heart sounds.    Pulmonary/Chest: No stridor.   Mechanically ventilated per trach.  Coarse BS bilaterally   Abdominal: Soft. Bowel sounds are normal.   Musculoskeletal: She exhibits no deformity.   Neurological:   Very restless.  Poorly responsive to commands.   Skin: Skin is warm and dry.       Significant Labs:   BMP:   Recent Labs  Lab 04/25/17  0139   *      K 4.3      CO2 28   BUN 36*   CREATININE 0.8   CALCIUM 10.9*     CBC: No results for input(s): WBC, HGB, HCT, PLT in the last 48 hours.  POCT Glucose:   Recent Labs  Lab 04/24/17  2357 04/25/17  0536 04/25/17  1139   POCTGLUCOSE 214* 148* 212*       Significant Imaging: I have reviewed all pertinent imaging results/findings within the past 24 hours.    Assessment/Plan:      * Acute respiratory failure with hypercapnia  Mechanical ventilation  Weakness  Metabolic encephalopathy  Fever  Assessment and Plan:  Pt with respiratory failure that required intubation, possibly secondary to pneumonia vs edema, treated empirically with Rocephin/Azithromycin, ECHO with diastolic dysfunction but normal EF, and Pulm following for vent management. Family reported UE weakness for a few months prior to this  admission and Neurology consulted, possible MG and patient undergoing trial of prednisone and pyridostigmine. This weakness likely limited extubation and necessitated reintubation; patient was extubated on 4/16 but had to be re-intubated that same day. Unclear source of encephalopathy and case discussed with Neurology, CT scan of the head was unrevealing. Also, patient completed treatment with Azithromycin and was on Rocephin alone, but she spiked a fever overnight and antibiotics changed to Zosyn/Zyvox.   Trach on 4/19 without complications.  Stopped all ABx's.  Remains on vent per trach.  Having trouble clearing secretions.  Added Glycopyrrolate.   SW consulted for LTAC.        Pneumonia of both lungs due to infectious organism  Probably bacterial, but unable to determine.  The patient has a CURB-65 score of 0, and does not meet criteria for healthcare-associated pneumonia initially. CTA chest with bilateral airspace opacification concerning for pneumonia.  Oxygen saturations are improved but requiring a non-rebreather mask; then BIPAP and s/p intubation.  Blood culture NGTD.  Pt completed full course of Azithromycin, and was continued Rocephin until had spike of fever, Abx has been expanded  to Zosyn/Zyvox.  Completed course and Abx dc'd.      Essential hypertension  BP well controlled.  Continue current management.    Chronic diastolic heart failure  Patient doesn't appear to be in acute heart failure; checked ECHO with EF=65% but with diastolic dysfunction.    Moderate protein malnutrition  Tolerating tube feeds.      Breast mass, left  Planned for surgery, seen by Dr. Sandoval (his patient) and plan to reschedule as outpatient. It is reportedly a hematoma by biopsy, but suspicion for CA not completely ruled out given paraneoplastic syndrome a consideration.      Hypokalemia  Replace as necessary.      Counseling regarding advanced directives and goals of care  Extensive conversations done with family. Pt to  remain full code. Pt is still  but estranged from the  and reportedly the grandmother is the major decision maker.       Hyperglycemia  Hyperglycemia has continued after stopping steroids.  Add nightly Levemir.  HgA1c 7.3%.  No apparent history of DM prior to admission.      VTE Risk Mitigation         Ordered     enoxaparin injection 40 mg  Daily     Route:  Subcutaneous        04/06/17 2202     Medium Risk of VTE  Once      04/06/17 2202          Ashley Zazueta MD  Department of Hospital Medicine   [facility]

## 2017-04-25 NOTE — SUBJECTIVE & OBJECTIVE
Interval History: pt non-responsive to me but more alert this morning per nursing    Review of Systems   Unable to perform ROS: Mental status change     Objective:     Vital Signs (Most Recent):  Temp: 99.1 °F (37.3 °C) (04/25/17 1515)  Pulse: (!) 119 (04/25/17 1515)  Resp: (!) 24 (04/25/17 1515)  BP: 116/67 (04/25/17 1515)  SpO2: (!) 93 % (04/25/17 1515) Vital Signs (24h Range):  Temp:  [98.3 °F (36.8 °C)-99.1 °F (37.3 °C)] 99.1 °F (37.3 °C)  Pulse:  [] 119  Resp:  [6-37] 24  SpO2:  [91 %-100 %] 93 %  BP: ()/(50-81) 116/67     Weight: 72 kg (158 lb 11.7 oz)  Body mass index is 44.64 kg/(m^2).    Intake/Output Summary (Last 24 hours) at 04/25/17 1636  Last data filed at 04/25/17 1600   Gross per 24 hour   Intake             1915 ml   Output             1325 ml   Net              590 ml      Physical Exam   Constitutional: She appears well-developed. No distress.   Eyes: Conjunctivae are normal. Right eye exhibits no discharge. Left eye exhibits no discharge.   Neck:   Trach in place   Cardiovascular: Normal rate, regular rhythm and normal heart sounds.    Pulmonary/Chest: No stridor.   Mechanically ventilated per trach.  Coarse BS bilaterally   Abdominal: Soft. Bowel sounds are normal.   Musculoskeletal: She exhibits no deformity.   Neurological:   Very restless.  Poorly responsive to commands.   Skin: Skin is warm and dry.       Significant Labs:   BMP:   Recent Labs  Lab 04/25/17  0139   *      K 4.3      CO2 28   BUN 36*   CREATININE 0.8   CALCIUM 10.9*     CBC: No results for input(s): WBC, HGB, HCT, PLT in the last 48 hours.  POCT Glucose:   Recent Labs  Lab 04/24/17  2357 04/25/17  0536 04/25/17  1139   POCTGLUCOSE 214* 148* 212*       Significant Imaging: I have reviewed all pertinent imaging results/findings within the past 24 hours.

## 2017-04-25 NOTE — ASSESSMENT & PLAN NOTE
Probably bacterial, but unable to determine.  The patient has a CURB-65 score of 0, and does not meet criteria for healthcare-associated pneumonia initially. CTA chest with bilateral airspace opacification concerning for pneumonia.  Oxygen saturations are improved but requiring a non-rebreather mask; then BIPAP and s/p intubation.  Blood culture NGTD.  Pt completed full course of Azithromycin, and was continued Rocephin until had spike of fever, Abx has been expanded  to Zosyn/Zyvox.  Completed course and Abx dc'd.

## 2017-04-25 NOTE — PLAN OF CARE
Problem: Patient Care Overview  Goal: Plan of Care Review  Outcome: Ongoing (interventions implemented as appropriate)  Pt remains in ICU, VSS, t max 100.1. Remains on vent, elevated HR and RR this AM and pt very agitated and restless. Now calm and resting post PO zyprexa/iv haldol/ PRN morphine and ativan. Pt moves extremities however does not follow commands or open eyes. Trach site CDI, care performed.Flexi seal remains, MD informed pt has had liquid stools ~ 18 days total, no new orders but to maintain flexi seal for now. TF @ goal and tolerating. UO adequate, li remains. Plan for LTAC, approval pending. No family today. Safety and skin integrity maintained, no new injury noted/precautions in place. POC reviewed with patient.

## 2017-04-25 NOTE — PROGRESS NOTES
Received pt on servoi settings CPAP +5/15/30%. Aero tx given, pt tolerated well. AMBU at \[facility]\"". Pt very agitated. Suctioned small pale yellow from trach.

## 2017-04-25 NOTE — PLAN OF CARE
Problem: Patient Care Overview  Goal: Plan of Care Review  04/25/2017     Recommendations     Recommendation/Intervention: 1) Continue tube feedings 2) RD to monitor/follow-up  Goals: 1) Patient to meet >=85 - 115% of EEN  Nutrition Goal Status: new  Communication of RD Recs: reviewed with REBECCA Amezcua, MPH, RD, LDN

## 2017-04-25 NOTE — PROGRESS NOTES
[facility]  Adult Nutrition  Progress Note    SUMMARY     Recommendations    Recommendation/Intervention: 1) Continue tube feedings 2) RD to monitor/follow-up  Goals: 1) Patient to meet >=85 - 115% of EEN  Nutrition Goal Status: new  Communication of RD Recs: reviewed with RN    Continuum of Care Plan    Referral to Outpatient Services:  (D/C planning: Too soon to determine)    Reason for Assessment    Reason for Assessment: RD follow-up  Diagnosis:  (SOB; pneumonia)  Relevent Medical History: CHF      General Information Comments: TF advanced to 45 cc/hr. Patient extubated. TF running at goal rate with no residuals.     Nutrition Prescription Ordered    Current Diet Order: NPO     Current Nutrition Support Formula Ordered: Impact Peptide 1.5  Current Nutrition Support Rate Ordered: 45 (ml)  Current Nutrition Support Frequency Ordered: continuous  Oral Nutrition Supplement: 2 packets of Beneprotein      Evaluation of Received Nutrients/Fluid Intake    Enteral Calories (kcal): 1620  Enteral Protein (gm): 101  Enteral (Free Water) Fluid (mL): 832  Free Water Flush Fluid (mL): 900    Other Calories (kcal): 50 (Beneprotein)  Total Calories (kcal): 1670     Energy Calories Required: meeting needs  % Kcal Needs: 100     Other Protein (gm): 12     Total Protein (gm/kg): 113  Protein Required: meeting needs  % Protein Needs: 100    Fluid Required: meeting needs  Comments: 5 cc residuals  Tolerance: tolerating     Nutrition Risk Screen     Nutrition Risk Screen: no indicators present    Nutrition/Diet History    Food Preferences: DANIEL     Factors Affecting Nutritional Intake: NPO    Labs/Tests/Procedures/Meds    Diagnostic Test/Procedure Review: reviewed, pertinent  Pertinent Labs Reviewed: reviewed  Pertinent Labs Comments: BUN 36,  - 253  Pertinent Medications Reviewed: reviewed    Physical Findings    Overall Physical Appearance: on oxygen therapy  Tubes: nasogastric tube  Oral/Mouth Cavity:  WDL  Skin:  (blister on foot)    Anthropometrics    Height (inches): 62.13 in  Weight Method: Bed Scale  Weight (kg): 72 kg  Ideal Body Weight (IBW), Female: 110.65 lb  % Ideal Body Weight, Female (lb): 143.45 lb  BMI (kg/m2): 28.91  BMI Grade: 25 - 29.9 - overweight      Estimated/Assessed Needs    Weight Used For Calorie Calculations: 72 kg (158 lb 11.7 oz)   Height (cm): 157.8 cm  Energy Need Method: [place]-St Jeor (1575 - 1707)  RMR ([place]-St. Jeor Equation): 1318.21  Weight Used For Protein Calculations: 50 kg (110 lb 3.7 oz) (IBW)  Protein Requirements: 100-125 g  Fluid Need Method: RDA Method (per MD)    Nutrition Diagnosis     Problem: Inadequate Energy Intake  Etiology: Mechanical Ventilation  As Evidenced by:NPO status/TF initiated  Nutrition Diagnosis: Continues       Monitor and Evaluation    Food and Nutrient Intake: energy intake, food and beverage intake, enteral nutrition intake  Food and Nutrient Adminstration: diet order, enteral and parenteral nutrition administration  Anthropometric Measurements: weight, weight change  Biochemical Data, Medical Tests and Procedures: electrolyte and renal panel, glucose/endocrine profile, gastrointestinal profile  Nutrition-Focused Physical Findings: overall appearance, skin    Nutrition Risk    Level of Risk: other (see comments) (f/u 1x weekly)    Nutrition Follow-Up    RD Follow-up?: Yes

## 2017-04-26 PROBLEM — R29.898 UPPER EXTREMITY WEAKNESS: Status: ACTIVE | Noted: 2017-04-26

## 2017-04-26 LAB
ALLENS TEST: ABNORMAL
ANION GAP SERPL CALC-SCNC: 8 MMOL/L
BUN SERPL-MCNC: 45 MG/DL
CALCIUM SERPL-MCNC: 11.4 MG/DL
CHLORIDE SERPL-SCNC: 106 MMOL/L
CO2 SERPL-SCNC: 30 MMOL/L
CREAT SERPL-MCNC: 0.8 MG/DL
DELSYS: ABNORMAL
EST. GFR  (AFRICAN AMERICAN): >60 ML/MIN/1.73 M^2
EST. GFR  (NON AFRICAN AMERICAN): >60 ML/MIN/1.73 M^2
FIO2: 0.5
GLUCOSE SERPL-MCNC: 221 MG/DL
HCO3 UR-SCNC: 30.6 MMOL/L (ref 24–28)
MIN VOL: 7
MODE: ABNORMAL
PCO2 BLDA: 57.3 MMHG (ref 35–45)
PEEP: 5
PH SMN: 7.33 [PH] (ref 7.35–7.45)
PO2 BLDA: 106 MMHG (ref 80–100)
POC BE: 3 MMOL/L
POC SATURATED O2: 98 % (ref 95–100)
POC TCO2: 32 MMOL/L (ref 23–27)
POCT GLUCOSE: 206 MG/DL (ref 70–110)
POCT GLUCOSE: 211 MG/DL (ref 70–110)
POCT GLUCOSE: 212 MG/DL (ref 70–110)
POCT GLUCOSE: 243 MG/DL (ref 70–110)
POTASSIUM SERPL-SCNC: 5.2 MMOL/L
PS: 15
SAMPLE: ABNORMAL
SITE: ABNORMAL
SODIUM SERPL-SCNC: 144 MMOL/L
SP02: 98
SPONT RATE: 20

## 2017-04-26 PROCEDURE — 63600175 PHARM REV CODE 636 W HCPCS: Performed by: HOSPITALIST

## 2017-04-26 PROCEDURE — 25000003 PHARM REV CODE 250: Performed by: HOSPITALIST

## 2017-04-26 PROCEDURE — 25000242 PHARM REV CODE 250 ALT 637 W/ HCPCS: Performed by: INTERNAL MEDICINE

## 2017-04-26 PROCEDURE — 94003 VENT MGMT INPAT SUBQ DAY: CPT

## 2017-04-26 PROCEDURE — C9113 INJ PANTOPRAZOLE SODIUM, VIA: HCPCS | Performed by: HOSPITALIST

## 2017-04-26 PROCEDURE — 80048 BASIC METABOLIC PNL TOTAL CA: CPT

## 2017-04-26 PROCEDURE — 36415 COLL VENOUS BLD VENIPUNCTURE: CPT

## 2017-04-26 PROCEDURE — 99900035 HC TECH TIME PER 15 MIN (STAT)

## 2017-04-26 PROCEDURE — 63600175 PHARM REV CODE 636 W HCPCS: Performed by: INTERNAL MEDICINE

## 2017-04-26 PROCEDURE — 36600 WITHDRAWAL OF ARTERIAL BLOOD: CPT

## 2017-04-26 PROCEDURE — 25000003 PHARM REV CODE 250: Performed by: INTERNAL MEDICINE

## 2017-04-26 PROCEDURE — 94640 AIRWAY INHALATION TREATMENT: CPT

## 2017-04-26 PROCEDURE — 99900026 HC AIRWAY MAINTENANCE (STAT)

## 2017-04-26 PROCEDURE — 20000000 HC ICU ROOM

## 2017-04-26 RX ORDER — SODIUM CHLORIDE 9 MG/ML
INJECTION, SOLUTION INTRAVENOUS CONTINUOUS
Status: DISCONTINUED | OUTPATIENT
Start: 2017-04-26 | End: 2017-04-29

## 2017-04-26 RX ORDER — NOREPINEPHRINE BITARTRATE/D5W 4MG/250ML
0.04 PLASTIC BAG, INJECTION (ML) INTRAVENOUS CONTINUOUS
Status: DISCONTINUED | OUTPATIENT
Start: 2017-04-26 | End: 2017-05-07

## 2017-04-26 RX ADMIN — LORAZEPAM 1 MG: 2 INJECTION INTRAMUSCULAR; INTRAVENOUS at 02:04

## 2017-04-26 RX ADMIN — MORPHINE SULFATE 2 MG: 10 INJECTION INTRAVENOUS at 09:04

## 2017-04-26 RX ADMIN — CARVEDILOL 3.12 MG: 3.12 TABLET, FILM COATED ORAL at 08:04

## 2017-04-26 RX ADMIN — CHLORHEXIDINE GLUCONATE 15 ML: 1.2 RINSE ORAL at 08:04

## 2017-04-26 RX ADMIN — LORAZEPAM 1 MG: 2 INJECTION INTRAMUSCULAR; INTRAVENOUS at 09:04

## 2017-04-26 RX ADMIN — IPRATROPIUM BROMIDE AND ALBUTEROL SULFATE 3 ML: .5; 3 SOLUTION RESPIRATORY (INHALATION) at 07:04

## 2017-04-26 RX ADMIN — INSULIN ASPART 2 UNITS: 100 INJECTION, SOLUTION INTRAVENOUS; SUBCUTANEOUS at 12:04

## 2017-04-26 RX ADMIN — ACYCLOVIR SODIUM 720 MG: 50 INJECTION, SOLUTION INTRAVENOUS at 04:04

## 2017-04-26 RX ADMIN — ENOXAPARIN SODIUM 40 MG: 100 INJECTION SUBCUTANEOUS at 04:04

## 2017-04-26 RX ADMIN — LORAZEPAM 1 MG: 2 INJECTION INTRAMUSCULAR; INTRAVENOUS at 01:04

## 2017-04-26 RX ADMIN — INSULIN ASPART 4 UNITS: 100 INJECTION, SOLUTION INTRAVENOUS; SUBCUTANEOUS at 12:04

## 2017-04-26 RX ADMIN — INSULIN ASPART 1 UNITS: 100 INJECTION, SOLUTION INTRAVENOUS; SUBCUTANEOUS at 11:04

## 2017-04-26 RX ADMIN — LORAZEPAM 1 MG: 2 INJECTION INTRAMUSCULAR; INTRAVENOUS at 12:04

## 2017-04-26 RX ADMIN — MORPHINE SULFATE 2 MG: 10 INJECTION INTRAVENOUS at 03:04

## 2017-04-26 RX ADMIN — MORPHINE SULFATE 2 MG: 10 INJECTION INTRAVENOUS at 05:04

## 2017-04-26 RX ADMIN — PANTOPRAZOLE SODIUM 40 MG: 40 INJECTION, POWDER, FOR SOLUTION INTRAVENOUS at 08:04

## 2017-04-26 RX ADMIN — INSULIN ASPART 4 UNITS: 100 INJECTION, SOLUTION INTRAVENOUS; SUBCUTANEOUS at 06:04

## 2017-04-26 RX ADMIN — SODIUM CHLORIDE: 0.9 INJECTION, SOLUTION INTRAVENOUS at 04:04

## 2017-04-26 RX ADMIN — IPRATROPIUM BROMIDE AND ALBUTEROL SULFATE 3 ML: .5; 3 SOLUTION RESPIRATORY (INHALATION) at 12:04

## 2017-04-26 NOTE — PLAN OF CARE
04/26/17 1430   Discharge Reassessment   Assessment Type Discharge Planning Reassessment   Can the patient answer the patient profile reliably? No, cognitively impaired   How does the patient rate their overall health at the present time? Fair  (record)   Describe the patient's ability to walk at the present time. Does not walk or unable to take any steps at all   How often would a person be available to care for the patient? Occasionally   Number of comorbid conditions (as recorded on the chart) Five or more   During the past month, has the patient often been bothered by feeling down, depressed or hopeless? No  (record)   During the past month, has the patient often been bothered by little interest or pleasure in doing things? No  (record)   Discharge plan remains the same: Yes   Provided patient/caregiver education on the expected discharge date and the discharge plan Yes   Discharge Plan A Long-term acute care facility (LTAC)  ([facility])   Discharge Plan B Long-term acute care facility (LTAC)  (alternate in [facility])   Change in patient condition or support system No   Patient choice form signed by patient/caregiver No   Involved the patient/caregiver in establishing a new discharge plan: No   patient remains in ICU on vent, weaning attempts continue. SW left voice mail for Blaire at [facility] [phone] and Maddie at [facility] [phone] requesting update on reviews in progress (per [facility]). Additionally, spoke with Bria with [facility] which has accepted pending auth. They can pursue further if patient declined by 3 net work providers. Has declines by 2 thus far ([facility] [place] and in [facility]). At this time awaiting return contacts from network providers. SW will follow in ICU and assist as needed.

## 2017-04-26 NOTE — PLAN OF CARE
Problem: Patient Care Overview  Goal: Plan of Care Review  Outcome: Ongoing (interventions implemented as appropriate)  BP labile. Fluids started. Unable to stay on CPAP long. MRI done. ID consulted. Tube feedings tolerated well. Skin remains unchanged. Blood glucose monitored. Trach intact. Carter intact. 1x BM. Afebrile.

## 2017-04-26 NOTE — ASSESSMENT & PLAN NOTE
Patient doesn't appear to be in acute heart failure; checked ECHO with EF=65% but with diastolic dysfunction.  Gentle IVF and monitor

## 2017-04-26 NOTE — SIGNIFICANT EVENT
Rec'd patient on Servoi ventilator settings are CPAP, +5 PEEP, pressure support 15 and fio2 50%. Aerosol treatment given as ordered. Patient tolerated well.  All ventilator alarms are and set and ambu at [facility].

## 2017-04-26 NOTE — ASSESSMENT & PLAN NOTE
Drop in BP today after MRI  Hold coreg  Gentle IVF - BUN and calcium rising and likely concentrated   levophed if needed to maintain MAP >65

## 2017-04-26 NOTE — PROGRESS NOTES
Progress Note  Pulmonology    Admit Date: 4/6/2017   LOS: 20 days     SUBJECTIVE: resp distress     Follow-up For:  Vent management. Patient on vent via trach. Off sedation. Agitated.  Opens her eyes - moves ext - no commands   Continuous Infusions:     Scheduled Meds:   albuterol-ipratropium 2.5mg-0.5mg/3mL  3 mL Nebulization Q6H    carvedilol  3.125 mg Oral BID    chlorhexidine  15 mL Mouth/Throat BID    enoxaparin  40 mg Subcutaneous Daily    insulin detemir  7 Units Subcutaneous QHS    pantoprazole  40 mg Intravenous Daily    scopolamine  1 patch Transdermal Q3 Days     Review of Systems:  Review of systems not obtained due to patient agitated and not very responsive.    OBJECTIVE:     Vital Signs Range (Last 24H):  Temp:  [98.4 °F (36.9 °C)-99.1 °F (37.3 °C)]   Pulse:  []   Resp:  [11-45]   BP: ()/()   SpO2:  [90 %-100 %]     I & O (Last 24H):    Intake/Output Summary (Last 24 hours) at 04/26/17 0834  Last data filed at 04/26/17 0800   Gross per 24 hour   Intake             1240 ml   Output              300 ml   Net              940 ml     Physical Exam:  General: no distress. No distress but agitated. UE restrained but moves her LE more.  Neck: no carotid bruit  Breast: left breast mass - hard, non-mobile  Lungs:  Shallow, decreased bs bilaterally rhonchi. No wheezing.   Heart: regular rate and rhythm and no murmur  Abdomen: soft, non-tender non-distended; bowel sounds diminished and right nasal feeding tube and rectal tube.  Extremities: no cyanosis  or clubbing but has pedal edema and SCDs to LE. RUE picc line.  Neurologic: minimally responsive    Laboratory:  CBC:     Recent Labs  Lab 04/20/17  0330   WBC 8.68   RBC 4.52   HGB 11.0*   HCT 38.0      MCV 84   MCH 24.3*   MCHC 28.9*     BMP:     Recent Labs  Lab 04/26/17  0127   *      K 5.2*      CO2 30*   BUN 45*   CREATININE 0.8   CALCIUM 11.4*        Vent Mode: CPAP  Oxygen Concentration (%):  [30-50]  40  Resp Rate Total:  [13 br/min-37 br/min] 15 br/min  PEEP/CPAP:  [5 cmH20] 5 cmH20  Pressure Support:  [15 cmH20] 15 cmH20  Mean Airway Pressure:  [6.9 cmH20-8.8 cmH20] 7.3 cmH20    ABGs:     Recent Labs  Lab 04/26/17  0411   PH 7.335*   PCO2 57.3*   PO2 106*   HCO3 30.6*   POCSATURATED 98   BE 3     Microbiology Results (last 7 days)     Procedure Component Value Units Date/Time    Blood culture [[id number]] Collected:  04/16/17 0827    Order Status:  Completed Specimen:  Blood Updated:  04/21/17 0903     Blood Culture, Routine No growth after 5 days.    Blood culture [[id number]] Collected:  04/16/17 0823    Order Status:  Completed Specimen:  Blood Updated:  04/21/17 0903     Blood Culture, Routine No growth after 5 days.        Chest X-Ray:     Findings: Cardiac silhouette may be mildly enlarged.  Patient is rotated to the left, limiting comparison.  There is persistent opacity in the left lung base which appears grossly similar prior.  Blunting of the loss costophrenic angle suggest small pleural effusion.  There are some ill-defined interstitial and faint airspace opacities in the right lung base which may be slightly worsened from prior.  Findings may represent multifocal infectious/inflammatory process versus edema.  Right PICC, enteric tube, and tracheostomy tube appear unchanged.  No definite pneumothorax appreciated.      ASSESSMENT/PLAN:     1) Acute respiratory failure - presented with weakness of UE and pneumonia. Unable to wean off vent. S/p trach. Fair gas exchanged. On rate 12 via trach. PS 10. Agitated. No cxr this am. Rhonchi on exam. Will increase ps a bit and try to decrease rate. Bronchodilators. LTAC?  2) Weakness - UE. Neurology following. ??MG - MG panel was negative. Steroids stopped. ?eiotlogy to weakness. UE remain weak.  3) History of HTN  4) Anemia - stable.    Some congestion this am ; pt placed back on vent support ; still infiltrate L ; will ask Dr Sadler to assess.    Bird GARVIN  [place]

## 2017-04-26 NOTE — ASSESSMENT & PLAN NOTE
Bilateral UE weakness with unknown etiology  MRI today indicates possible herpes encephalitis   D/w neurology - trial acyclovir started

## 2017-04-26 NOTE — CONSULTS
Consult Note  Infectious Disease    Consult Requested By: Ashley Zazueta MD    Reason for Consult: does she have hsv encephalitis?    SUBJECTIVE:     History of Present Illness:  Patient is a 46 y.o. female presents with hypercapneic respiratory failure. She has had episodes of hypercapneic respiratory failure in 2014 as well. She was deemed to have a pneumonia and received antibiotics as well as antibiotics for a vse uti(e.faecium) has had trouble weaning form the ventilator and has a tracheostomy. Neurology has identified ul paresis and ct of the head has confirmed bilateral temporal lobe attenuation that was seen in 2014 as well. An mri confirms the same. The patient is now more awake when off sedation per nursing. She has received empirical steroids and ivig in case this was an encephalitis. The specter of hsv encephalitis was raised based on imaging. She has not had cognitive deficits coming in to be admitted and is apparently following commands now when sedation is stopped.  She also has a left breast mass felt to be a hematoma.    Past Medical History:   Diagnosis Date    CHF (congestive heart failure)     Learning difficulty      Past Surgical History:   Procedure Laterality Date    BREAST SURGERY Left     biopsy    CYST REMOVAL      shoulder      Family History   Problem Relation Age of Onset    Diabetes      Coronary artery disease       Social History   Substance Use Topics    Smoking status: Never Smoker    Smokeless tobacco: Never Used    Alcohol use Yes      Comment: OCCASSIONAL       Review of patient's allergies indicates:  No Known Allergies     Antibiotics     None          Review of Systems:  Review of systems not obtained due to patient factors intubated.    OBJECTIVE:     Vital Signs (Most Recent)  Temp: 97.5 °F (36.4 °C) (04/26/17 1530)  Pulse: 83 (04/26/17 1708)  Resp: 17 (04/26/17 1708)  BP: (!) 92/58 (04/26/17 1645)  SpO2: 99 % (04/26/17 1708)    Temperature Range Min/Max (Last  24H):  Temp:  [97.5 °F (36.4 °C)-99 °F (37.2 °C)]     Physical Exam:  General: well developed, well nourished  Eyes: conjunctivae/corneas clear. PERRL..  HENT: Head:normocephalic, atraumatic. Ears:bilateral TM's and external ear canals normal. Nose: Nares normal. Septum midline. Mucosa normal. No drainage or sinus tenderness., no discharge. Throat: lips, mucosa, and tongue normal; teeth and gums normal and no throat erythema.  Neck: supple, symmetrical, trachea midline, no JVD and thyroid not enlarged, symmetric, no tenderness/mass/nodules  Lungs:  clear to auscultation bilaterally and normal respiratory effort  Cardiovascular: Heart: regular rate and rhythm, S1, S2 normal, no murmur, click, rub or gallop. Chest Wall: no tenderness. Extremities: no cyanosis or edema, or clubbing. Pulses: 2+ and symmetric.  Abdomen/Rectal: Abdomen: soft, non-tender non-distented; bowel sounds normal; no masses,  no organomegaly. Rectal: not examined  Skin: Skin color, texture, turgor normal. No rashes or lesions  Musculoskeletal:no clubbing, cyanosis  Lymph Nodes: No cervical or supraclavicular adenopathy  Neurologic: Mental status: intubated and sedated. ul restrained as she is pulling at et tube etc which speaks against an ul paresis    Laboratory:  CBC  No results for input(s): WBC, RBC, HGB, HCT, PLT in the last 24 hours.  BMP    Recent Labs  Lab 04/26/17  0127   CO2 30*   BUN 45*   CREATININE 0.8   CALCIUM 11.4*     No results for input(s): COLORU, CLARITYU, SPECGRAV, PHUR, PROTEINUA, GLUCOSEU, BILIRUBINCON, BLOODU, WBCU, RBCU, BACTERIA, MUCUS, NITRITE, LEUKOCYTESUR, UROBILINOGEN, HYALINECASTS in the last 168 hours.  Microbiology Results (last 7 days)     Procedure Component Value Units Date/Time    Blood culture [[id number]] Collected:  04/16/17 0827    Order Status:  Completed Specimen:  Blood Updated:  04/21/17 0903     Blood Culture, Routine No growth after 5 days.    Blood culture [[id number]] Collected:  04/16/17 0823     Order Status:  Completed Specimen:  Blood Updated:  04/21/17 0903     Blood Culture, Routine No growth after 5 days.          Diagnostic Results:  Labs: Reviewed  X-Ray: Reviewed  CT: Reviewed  MRI: Reviewed    ASSESSMENT/PLAN:     Active Hospital Problems    Diagnosis  POA    *Acute respiratory failure with hypercapnia [J96.02]  Yes    Upper extremity weakness [R29.898]  Yes    Hyperglycemia [R73.9]  No    Counseling regarding advanced directives and goals of care [Z71.89]  Not Applicable    Hypokalemia [E87.6]  Yes    Breast mass, left [N63]  Yes    Pneumonia of both lungs due to infectious organism [J18.9]  Yes    Essential hypertension [I10]  Yes     Chronic    Chronic diastolic heart failure [I50.32]  Yes     Chronic    Moderate protein malnutrition [E44.0]  Yes     Chronic    Hematoma of breast [N64.89]  Yes      Resolved Hospital Problems    Diagnosis Date Resolved POA    Sepsis [A41.9] 04/24/2017 Yes       1. i see no evidence of hsv encephalitis in that this ct abnormality was present in 2014 and patient is alert when sedation stopped  hsv encephalitis unlikely when she is not encephalopathic  Consider lp and cervical spinal imaging  Dc acyclovir  Sounds like this is a chronic process and not acute  2. hypercapneic respiratory failure may be related to cns process

## 2017-04-26 NOTE — PHYSICIAN QUERY
PT Name: Radha Pritchett  MR #: [id number]     Physician Query Form - Documentation Clarification      CDS/: Nya Bee RN, CCDS               Contact information: [phone]    This form is a permanent document in the medical record.     Query Date: April 26, 2017    By submitting this query, we are merely seeking further clarification of documentation. Please utilize your independent clinical judgment when addressing the question(s) below.    The Medical record reflects the following:    Supporting Clinical Findings Location in Medical Record   Sepsis, resolved as of 4/24/2017     This patient met criteria for sepsis given tachycardia, tachypnea, and pneumonia; there is no evidence of endo-organ dysfunction.       Blood cultures are NGTD and repeat studies done;  antibiotics as discussed above.     Strep in urine likely normal pennie.   Prim PN 4/24   Was febrile and stared on Zyvox and zosyn.   CXR shows possible infiltrate.     Urine culture +enterococcus.     Completed course and antibiotics dc'd.     Blood culture negative   Prim PN 4/25   ENTEROCOCCUS FAECIUM  > 100,000 cfu/ml   Blood cultures- NGTD Urine culture Lab 4/16  Blood Cultures 4/16                                                                            Doctor, Please specify diagnosis or diagnoses associated with above clinical findings.                 Please specify organism associated with Sepsis.       Provider Use Only    (  ) Unspecified organism    (  ) Streptococcus Faecium organism    (  ) Other: ____________________    (x  ) Undeterminable                                                                                                                   [  ] Clinically undetermined

## 2017-04-26 NOTE — PHYSICIAN QUERY
"PT Name: Radha Pritchett  MR #: [id number]    Physician Query Form - Hematology Clarification      CDS/: Nya Bee RN, CCDS               Contact information: [phone]    This form is a permanent document in the medical record.      Query Date: April 26, 2017    By submitting this query, we are merely seeking further clarification of documentation. Please utilize your independent clinical judgment when addressing the question(s) below.    The Medical record contains the following:   Indicators  Supporting Clinical Findings Location in Medical Record   x "Anemia" documented Anemia Stable  Anemia -repeat in am.   Pulm PN 4/15 - 18, 21 - 26  Pulm PN 4/16 - 18   x H & H = On 4/6   H&H: 12.0/44.0  On 4/11 H&H: 10.3/37.8  On 4/20 H&H: 11.0/38.0   Lab    BP =                     HR=      "GI bleeding" documented      Acute bleeding (Non GI site)      Transfusion(s)     x Treatment: Anemia - Repeat in AM   Pulm PN 4/16 - 18   x Other:  HTN  Chronic Diastolic HF  Moderal Protein Malnutrition  Hypokalemia  Hyperglycemia   Pneumonia    Breast mass, left= It is reportedly a hematoma by biopsy, but suspicion for CA not completely ruled out given paraneoplastic syndrome a consideration. Prim PN 4/25      Provider, please specify diagnosis or diagnoses associated with above clinical findings.                     Please further specify "anemia".     [  ] Precipitous drop in Hematocrit    [x  ] Anemia of chronic disease ( Specify chronic disease)      [  ] Neoplastic disease      [  ] Other (Specify) _______________________________     [x  ] Clinically Undetermined     [  ] Other Hematological Diagnosis (please specify): _________________________________    [  ] Clinically Undetermined       Please document in your progress notes daily for the duration of treatment, until resolved, and include in your discharge summary.                                                                                                      "

## 2017-04-26 NOTE — PROGRESS NOTES
[facility] Hospital Medicine  Progress Note    Patient Name: Radha Pritchett  MRN: [id number]  Patient Class: IP- Inpatient   Admission Date: 4/6/2017  Length of Stay: 20 days  Attending Physician: Ashley Zazueta MD  Primary Care Provider: Ferny Iglesias MD        Subjective:     Principal Problem:Acute respiratory failure with hypercapnia    HPI:  Ms. Radha Pritchett is a 46 y.o. female with essential hypertension, chronic diastolic heart failure (LVEF 55-60% Dec 2014), moderate protein malnutrition who presents to [facility] ED with complaints of dyspnea today.  She was scheduled to undergo removal of a breast mass today but was found to be hypoxic.  She does report that she has been short of breath but cannot specify how long.  She has also had some non-productive coughing and some chills without fevers.  Further history is otherwise limited at this time.    Hospital Course:   was admitted with pneumonia and acutely decompensated and was intubated and placed on vent. Pt with UE weakness prior to presentation on this admission which may be contributing to inability to extubate. Neurology following and considering possible MG and patient on therapeutic trial of prednisone and Modafinil. Pt s/p bronch that was unrevealing on 4/11. Pt was extubated on 4/15 but was re-intubated later the same day.  Was febrile and stared on Zyvox and zosyn.  CXR shows possible infiltrate. Urine culture +enterococcus.  Completed course and antibiotics dc'd. Blood culture negative  Trach on 4/19 without complications. Pt still having neuro deficits and brain MRI pending. LTAC placement pending.     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 Started on acyclovir. Drop in BP, will start gentle IVF and levophed to maintain MAP >65.         Interval History: pt alert this morning moving BLE, still ataxic and weak to UE    Review of Systems   Unable to perform ROS: Intubated     Objective:      Vital Signs (Most Recent):  Temp: 97.5 °F (36.4 °C) (04/26/17 1530)  Pulse: 91 (04/26/17 1605)  Resp: (!) 54 (04/26/17 1605)  BP: (!) 84/50 (04/26/17 1545)  SpO2: 95 % (04/26/17 1605) Vital Signs (24h Range):  Temp:  [97.5 °F (36.4 °C)-99 °F (37.2 °C)] 97.5 °F (36.4 °C)  Pulse:  [] 91  Resp:  [11-54] 54  SpO2:  [90 %-100 %] 95 %  BP: ()/() 84/50     Weight: 72 kg (158 lb 11.7 oz)  Body mass index is 44.64 kg/(m^2).    Intake/Output Summary (Last 24 hours) at 04/26/17 1615  Last data filed at 04/26/17 1100   Gross per 24 hour   Intake              820 ml   Output              375 ml   Net              445 ml      Physical Exam   Constitutional: She appears well-developed. No distress.   Eyes: Conjunctivae are normal. Right eye exhibits no discharge. Left eye exhibits no discharge.   Neck:   Trach in place   Cardiovascular: Normal rate, regular rhythm and normal heart sounds.    Pulmonary/Chest: No stridor.   Mechanically ventilated per trach.  Coarse BS bilaterally   Abdominal: Soft. Bowel sounds are normal.   Musculoskeletal: She exhibits no deformity.   Neurological:   Very restless.  Poorly responsive to commands.   Skin: Skin is warm and dry.       Significant Labs:   ABGs:   Recent Labs  Lab 04/26/17  0411   PH 7.335*   PCO2 57.3*   HCO3 30.6*   POCSATURATED 98   BE 3     BMP:   Recent Labs  Lab 04/26/17  0127   *      K 5.2*      CO2 30*   BUN 45*   CREATININE 0.8   CALCIUM 11.4*     CBC: No results for input(s): WBC, HGB, HCT, PLT in the last 48 hours.  POCT Glucose:   Recent Labs  Lab 04/26/17  0010 04/26/17  0629 04/26/17  1250   POCTGLUCOSE 243* 206* 211*       Significant Imaging:   Brain MRI:  Limited evaluation due to patient motion artifact.  Nonspecific increased signal noted at the base of the temporal lobes, which can be seen with herpes encephalitis.  Myelomalacia from remote injury could have a similar appearance.  No evidence of acute  stroke.      Assessment/Plan:      * Acute respiratory failure with hypercapnia  Mechanical ventilation  Weakness  Metabolic encephalopathy  Fever  Assessment and Plan:  Pt with respiratory failure that required intubation, possibly secondary to pneumonia vs edema, treated empirically with Rocephin/Azithromycin, ECHO with diastolic dysfunction but normal EF, and Pulm following for vent management. Family reported UE weakness for a few months prior to this admission and Neurology consulted, possible MG and patient undergoing trial of prednisone and pyridostigmine. This weakness likely limited extubation and necessitated reintubation; patient was extubated on 4/16 but had to be re-intubated that same day. Unclear source of encephalopathy and case discussed with Neurology, CT scan of the head was unrevealing. Also, patient completed treatment with Azithromycin and was on Rocephin alone, but she spiked a fever overnight and antibiotics changed to Zosyn/Zyvox.   Trach on 4/19 without complications.  Stopped all ABx's.  Remains on vent per trach.  Having trouble clearing secretions.  Added Glycopyrrolate.   SW consulted for LTAC.        Hematoma of breast        Pneumonia of both lungs due to infectious organism  Probably bacterial, but unable to determine.  The patient has a CURB-65 score of 0, and does not meet criteria for healthcare-associated pneumonia initially. CTA chest with bilateral airspace opacification concerning for pneumonia.  Oxygen saturations are improved but requiring a non-rebreather mask; then BIPAP and s/p intubation.  Blood culture NGTD.  Pt completed full course of Azithromycin, and was continued Rocephin until had spike of fever, Abx has been expanded  to Zosyn/Zyvox.  Completed course and Abx dc'd.      Essential hypertension  Drop in BP today after MRI  Hold coreg  Gentle IVF - BUN and calcium rising and likely concentrated   levophed if needed to maintain MAP >65    Chronic diastolic heart  failure  Patient doesn't appear to be in acute heart failure; checked ECHO with EF=65% but with diastolic dysfunction.  Gentle IVF and monitor     Moderate protein malnutrition  Tolerating tube feeds.      Breast mass, left  Planned for surgery, seen by Dr. Sandoval (his patient) and plan to reschedule as outpatient. It is reportedly a hematoma by biopsy, but suspicion for CA not completely ruled out given paraneoplastic syndrome a consideration.      Hypokalemia  Replace as necessary.      Counseling regarding advanced directives and goals of care  Extensive conversations done with family. Pt to remain full code. Pt is still  but estranged from the  and reportedly the grandmother is the major decision maker.       Hyperglycemia  Hyperglycemia has continued after stopping steroids.  Add nightly Levemir.  HgA1c 7.3%.  No apparent history of DM prior to admission.      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

## 2017-04-26 NOTE — PROGRESS NOTES
[facility]  Neurology  Progress Note    Patient Name: Radha Pritchett  MRN: [id number]  Admission Date: 4/6/2017  Hospital Length of Stay: 19 days  Code Status: Full Code   Attending Provider: Ashley Zazueta MD  Primary Care Physician: Ferny Iglesias MD   Principal Problem:Acute respiratory failure with hypercapnia    Subjective:     Interval History: 47 y/o female with medical Hx as listed below who was admitted for shortness of breath. Pt was found to have a pneumonia. She decompensasated and is now intubated. Pt has been difficult to to wean off ventilator. It has been reported that pt has been experiencing upper extremity weakness for a while but is perhaps a generalized weakness chronically. No other details at his moment. Pt also has Hx of a left breast mass that according to pathology is benign, possibly a hematoma. Pt has at baseline a learning difficulty.      -4/11/17: Pt remains mechanically ventilated. NIF reported -10.      -4/12/17: Remains mechanically ventilated. Upon sedation vacation follow commands.      -4/13/17: Still unable to wean off vent.       -4/14/17: No improvement reported. Pt remains mechanically ventilated. In previous days pt reported to reach for the ETT when off of sedation.      -4/15/17: Pt tolerating CPAP trial. Moving her extremities.      -4/16/17: Pt extubated yesterday but began to present shallow respirations and somnolence. Markedly hypercapnic on ABG's. Reintubated.       -4/17/17: Unable to wean off vent. Trach planned for Wed/Thurs.      -4/18/17: Pt opening eyes and following commands; still on vent.      -4/19/17: For trach today.       -4/20/17: Pt is S/P trach. Ventilator dependent.    -4/25/17: Pt reports to moves her extremities and able to follow commands.    Current Neurological Medications:     Current Facility-Administered Medications   Medication Dose Route Frequency Provider Last Rate Last Dose    acetaminophen tablet 500 mg  500 mg  Oral Q6H PRN Easton Aaron MD   500 mg at 04/16/17 0823    albuterol-ipratropium 2.5mg-0.5mg/3mL nebulizer solution 3 mL  3 mL Nebulization Q4H PRN Easton Aaron MD   3 mL at 04/24/17 1415    albuterol-ipratropium 2.5mg-0.5mg/3mL nebulizer solution 3 mL  3 mL Nebulization Q6H Renae Gunderson MD   3 mL at 04/25/17 1339    carvedilol tablet 3.125 mg  3.125 mg Oral BID Yovani Arellano MD   3.125 mg at 04/25/17 0835    chlorhexidine 0.12 % solution 15 mL  15 mL Mouth/Throat BID Easton Aaron MD   15 mL at 04/25/17 0835    cloNIDine tablet 0.1 mg  0.1 mg Oral TID PRN Easton Aaron MD        dextrose 50% injection 12.5 g  12.5 g Intravenous PRN Easton Aaron MD        enoxaparin injection 40 mg  40 mg Subcutaneous Daily Easton Aaron MD   40 mg at 04/25/17 1800    glucagon (human recombinant) injection 1 mg  1 mg Intramuscular PRN Easton Aaron MD        glycopyrrolate injection 0.1 mg  0.1 mg Intravenous TID PRN Yovani Arellano MD   0.1 mg at 04/24/17 0824    insulin aspart pen 1-10 Units  1-10 Units Subcutaneous Q6H PRN Easton Aaron MD   4 Units at 04/25/17 1835    insulin detemir pen 7 Units  7 Units Subcutaneous QHS Yovani Arellano MD   7 Units at 04/24/17 2157    lorazepam injection 1 mg  1 mg Intravenous Q2H PRN Yovani Arellano MD   1 mg at 04/25/17 1127    morphine injection 2 mg  2 mg Intravenous Q2H PRN Yovani Arellano MD        ondansetron injection 8 mg  8 mg Intravenous Q8H PRN Easton Aaron MD        pantoprazole injection 40 mg  40 mg Intravenous Daily Lakesha Greer MD   40 mg at 04/25/17 0835    promethazine (PHENERGAN) 12.5 mg in dextrose 5 % 50 mL IVPB  12.5 mg Intravenous Q6H PRN Easton Aaron MD        ramelteon tablet 8 mg  8 mg Oral Nightly PRN Easton Aaron MD   8 mg at 04/24/17 0316    scopolamine 1.5 mg (1 mg over 3 days) 1.5 mg  1 patch Transdermal Q3 Days Lakesha Greer MD   1.5 mg at 04/22/17 2007       Review of Systems   Non-verbal    Objective:     Vital Signs (Most  Recent):  Temp: 99.1 °F (37.3 °C) (04/25/17 1515)  Pulse: (!) 117 (04/25/17 1700)  Resp: (!) 25 (04/25/17 1700)  BP: 116/67 (04/25/17 1515)  SpO2: 97 % (04/25/17 1700) Vital Signs (24h Range):  Temp:  [98.3 °F (36.8 °C)-99.1 °F (37.3 °C)] 99.1 °F (37.3 °C)  Pulse:  [] 117  Resp:  [6-37] 25  SpO2:  [91 %-100 %] 97 %  BP: ()/(50-81) 116/67     Weight: 72 kg (158 lb 11.7 oz)  Body mass index is 44.64 kg/(m^2).    Physical Exam  Constitutional: no distress  ENT: no discharge  Head: Normocephalic.   Eyes: No icteric sclereae  Neck: Neck supple.   Cardiovascular: Normal rate.   Pulmonary/Chest: symmetrical expansion of chest.   Extremities: no edema  Neurological: opens eyes upon verbal stimuli tracks;   Pupils are round at 2 mm and reactive to light; corneal responses are present bilaterally; EOMI; no nystagmus  Moving UE's and LE's on command       Significant Labs:   CBC: No results for input(s): WBC, HGB, HCT, PLT in the last 48 hours.  CMP:   Recent Labs  Lab 04/24/17  0118 04/25/17  0139   * 158*    143   K 4.0 4.3    107   CO2 26 28   BUN 36* 36*   CREATININE 0.8 0.8   CALCIUM 10.4 10.9*   ANIONGAP 9 8   EGFRNONAA >60 >60         Assessment and Plan:     47 y/o female consulted for UE weakness:      1. UE weakness: weakness of uncertain etiology. Weaning off vent has been diffiuclt   -MG panel negative; no presence of antibodies in serum.  CK normal. No response to steroids.  -Trach placed.  -Will obtain MRI of brain now that she is more stable to see if any pathology in brainstem.    Active Diagnoses:    Diagnosis Date Noted POA    PRINCIPAL PROBLEM:  Acute respiratory failure with hypercapnia [J96.02] 04/07/2017 Yes    Hyperglycemia [R73.9] 04/24/2017 No    Counseling regarding advanced directives and goals of care [Z71.89] 04/14/2017 Not Applicable    Hypokalemia [E87.6] 04/09/2017 Yes    Breast mass, left [N63] 04/08/2017 Yes    Pneumonia of both lungs due to infectious  organism [J18.9] 04/06/2017 Yes    Essential hypertension [I10] 04/06/2017 Yes     Chronic    Chronic diastolic heart failure [I50.32] 04/06/2017 Yes     Chronic    Moderate protein malnutrition [E44.0] 04/06/2017 Yes     Chronic      Problems Resolved During this Admission:    Diagnosis Date Noted Date Resolved POA    Sepsis [A41.9] 04/06/2017 04/24/2017 Yes       VTE Risk Mitigation         Ordered     enoxaparin injection 40 mg  Daily     Route:  Subcutaneous        04/06/17 2202     Medium Risk of VTE  Once      04/06/17 2202          Gus Phillips MD  Neurology  [facility]

## 2017-04-26 NOTE — SUBJECTIVE & OBJECTIVE
Interval History: pt alert this morning moving BLE, still ataxic and weak to UE    Review of Systems   Unable to perform ROS: Intubated     Objective:     Vital Signs (Most Recent):  Temp: 97.5 °F (36.4 °C) (04/26/17 1530)  Pulse: 91 (04/26/17 1605)  Resp: (!) 54 (04/26/17 1605)  BP: (!) 84/50 (04/26/17 1545)  SpO2: 95 % (04/26/17 1605) Vital Signs (24h Range):  Temp:  [97.5 °F (36.4 °C)-99 °F (37.2 °C)] 97.5 °F (36.4 °C)  Pulse:  [] 91  Resp:  [11-54] 54  SpO2:  [90 %-100 %] 95 %  BP: ()/() 84/50     Weight: 72 kg (158 lb 11.7 oz)  Body mass index is 44.64 kg/(m^2).    Intake/Output Summary (Last 24 hours) at 04/26/17 1615  Last data filed at 04/26/17 1100   Gross per 24 hour   Intake              820 ml   Output              375 ml   Net              445 ml      Physical Exam   Constitutional: She appears well-developed. No distress.   Eyes: Conjunctivae are normal. Right eye exhibits no discharge. Left eye exhibits no discharge.   Neck:   Trach in place   Cardiovascular: Normal rate, regular rhythm and normal heart sounds.    Pulmonary/Chest: No stridor.   Mechanically ventilated per trach.  Coarse BS bilaterally   Abdominal: Soft. Bowel sounds are normal.   Musculoskeletal: She exhibits no deformity.   Neurological:   Very restless.  Poorly responsive to commands.   Skin: Skin is warm and dry.       Significant Labs:   ABGs:   Recent Labs  Lab 04/26/17  0411   PH 7.335*   PCO2 57.3*   HCO3 30.6*   POCSATURATED 98   BE 3     BMP:   Recent Labs  Lab 04/26/17  0127   *      K 5.2*      CO2 30*   BUN 45*   CREATININE 0.8   CALCIUM 11.4*     CBC: No results for input(s): WBC, HGB, HCT, PLT in the last 48 hours.  POCT Glucose:   Recent Labs  Lab 04/26/17  0010 04/26/17  0629 04/26/17  1250   POCTGLUCOSE 243* 206* 211*       Significant Imaging:   Brain MRI:  Limited evaluation due to patient motion artifact.  Nonspecific increased signal noted at the base of the temporal lobes,  which can be seen with herpes encephalitis.  Myelomalacia from remote injury could have a similar appearance.  No evidence of acute stroke.

## 2017-04-26 NOTE — UM SECONDARY REVIEW
[facility] [employer]    IP Extended Stay > 10    LOS: approved w/o recommendations due to severity of clinical picture     Pt in ICU  w/ trach on vent. Awaiting LTACH approval by insurance. Case discussed w/ Dr. Mcmahon during [facility] meeting

## 2017-04-27 LAB
ALLENS TEST: ABNORMAL
ANION GAP SERPL CALC-SCNC: 8 MMOL/L
BUN SERPL-MCNC: 40 MG/DL
CALCIUM SERPL-MCNC: 11 MG/DL
CHLORIDE SERPL-SCNC: 104 MMOL/L
CO2 SERPL-SCNC: 30 MMOL/L
CREAT SERPL-MCNC: 0.7 MG/DL
DELSYS: ABNORMAL
EST. GFR  (AFRICAN AMERICAN): >60 ML/MIN/1.73 M^2
EST. GFR  (NON AFRICAN AMERICAN): >60 ML/MIN/1.73 M^2
FIO2: 40
GLUCOSE SERPL-MCNC: 186 MG/DL
HCO3 UR-SCNC: 31.3 MMOL/L (ref 24–28)
MODE: ABNORMAL
PCO2 BLDA: 72.1 MMHG (ref 35–45)
PEEP: 5
PH SMN: 7.25 [PH] (ref 7.35–7.45)
PO2 BLDA: 103 MMHG (ref 80–100)
POC BE: 2 MMOL/L
POC SATURATED O2: 96 % (ref 95–100)
POC TCO2: 33 MMOL/L (ref 23–27)
POCT GLUCOSE: 187 MG/DL (ref 70–110)
POCT GLUCOSE: 195 MG/DL (ref 70–110)
POCT GLUCOSE: 208 MG/DL (ref 70–110)
POCT GLUCOSE: 212 MG/DL (ref 70–110)
POCT GLUCOSE: 221 MG/DL (ref 70–110)
POTASSIUM SERPL-SCNC: 4.6 MMOL/L
PS: 15
SAMPLE: ABNORMAL
SITE: ABNORMAL
SODIUM SERPL-SCNC: 142 MMOL/L
SPONT RATE: 12

## 2017-04-27 PROCEDURE — 36415 COLL VENOUS BLD VENIPUNCTURE: CPT

## 2017-04-27 PROCEDURE — 99900026 HC AIRWAY MAINTENANCE (STAT)

## 2017-04-27 PROCEDURE — 63600175 PHARM REV CODE 636 W HCPCS: Performed by: HOSPITALIST

## 2017-04-27 PROCEDURE — 36600 WITHDRAWAL OF ARTERIAL BLOOD: CPT

## 2017-04-27 PROCEDURE — 25000242 PHARM REV CODE 250 ALT 637 W/ HCPCS: Performed by: INTERNAL MEDICINE

## 2017-04-27 PROCEDURE — 99232 SBSQ HOSP IP/OBS MODERATE 35: CPT | Mod: ,,, | Performed by: PSYCHIATRY & NEUROLOGY

## 2017-04-27 PROCEDURE — 25000003 PHARM REV CODE 250: Performed by: HOSPITALIST

## 2017-04-27 PROCEDURE — 25000003 PHARM REV CODE 250: Performed by: INTERNAL MEDICINE

## 2017-04-27 PROCEDURE — 82803 BLOOD GASES ANY COMBINATION: CPT

## 2017-04-27 PROCEDURE — 94003 VENT MGMT INPAT SUBQ DAY: CPT

## 2017-04-27 PROCEDURE — C9113 INJ PANTOPRAZOLE SODIUM, VIA: HCPCS | Performed by: HOSPITALIST

## 2017-04-27 PROCEDURE — 63600175 PHARM REV CODE 636 W HCPCS: Performed by: INTERNAL MEDICINE

## 2017-04-27 PROCEDURE — 80048 BASIC METABOLIC PNL TOTAL CA: CPT

## 2017-04-27 PROCEDURE — 94640 AIRWAY INHALATION TREATMENT: CPT

## 2017-04-27 PROCEDURE — 27100171 HC OXYGEN HIGH FLOW UP TO 24 HOURS

## 2017-04-27 PROCEDURE — 20000000 HC ICU ROOM

## 2017-04-27 PROCEDURE — 99900035 HC TECH TIME PER 15 MIN (STAT)

## 2017-04-27 RX ORDER — FUROSEMIDE 10 MG/ML
40 INJECTION INTRAMUSCULAR; INTRAVENOUS 2 TIMES DAILY
Status: DISCONTINUED | OUTPATIENT
Start: 2017-04-27 | End: 2017-05-02

## 2017-04-27 RX ADMIN — LORAZEPAM 1 MG: 2 INJECTION INTRAMUSCULAR; INTRAVENOUS at 07:04

## 2017-04-27 RX ADMIN — LORAZEPAM 1 MG: 2 INJECTION INTRAMUSCULAR; INTRAVENOUS at 10:04

## 2017-04-27 RX ADMIN — INSULIN ASPART 1 UNITS: 100 INJECTION, SOLUTION INTRAVENOUS; SUBCUTANEOUS at 05:04

## 2017-04-27 RX ADMIN — LORAZEPAM 1 MG: 2 INJECTION INTRAMUSCULAR; INTRAVENOUS at 04:04

## 2017-04-27 RX ADMIN — INSULIN ASPART 4 UNITS: 100 INJECTION, SOLUTION INTRAVENOUS; SUBCUTANEOUS at 12:04

## 2017-04-27 RX ADMIN — INSULIN ASPART 2 UNITS: 100 INJECTION, SOLUTION INTRAVENOUS; SUBCUTANEOUS at 06:04

## 2017-04-27 RX ADMIN — ENOXAPARIN SODIUM 40 MG: 100 INJECTION SUBCUTANEOUS at 04:04

## 2017-04-27 RX ADMIN — LORAZEPAM 1 MG: 2 INJECTION INTRAMUSCULAR; INTRAVENOUS at 12:04

## 2017-04-27 RX ADMIN — SODIUM CHLORIDE: 0.9 INJECTION, SOLUTION INTRAVENOUS at 06:04

## 2017-04-27 RX ADMIN — IPRATROPIUM BROMIDE AND ALBUTEROL SULFATE 3 ML: .5; 3 SOLUTION RESPIRATORY (INHALATION) at 01:04

## 2017-04-27 RX ADMIN — INSULIN ASPART 1 UNITS: 100 INJECTION, SOLUTION INTRAVENOUS; SUBCUTANEOUS at 11:04

## 2017-04-27 RX ADMIN — MORPHINE SULFATE 2 MG: 10 INJECTION INTRAVENOUS at 08:04

## 2017-04-27 RX ADMIN — FUROSEMIDE 40 MG: 10 INJECTION, SOLUTION INTRAMUSCULAR; INTRAVENOUS at 06:04

## 2017-04-27 RX ADMIN — CHLORHEXIDINE GLUCONATE 15 ML: 1.2 RINSE ORAL at 08:04

## 2017-04-27 RX ADMIN — PANTOPRAZOLE SODIUM 40 MG: 40 INJECTION, POWDER, FOR SOLUTION INTRAVENOUS at 10:04

## 2017-04-27 RX ADMIN — RAMELTEON 8 MG: 8 TABLET, FILM COATED ORAL at 09:04

## 2017-04-27 RX ADMIN — GLYCOPYRROLATE 0.1 MG: 0.2 INJECTION, SOLUTION INTRAMUSCULAR; INTRAVENOUS at 08:04

## 2017-04-27 RX ADMIN — IPRATROPIUM BROMIDE AND ALBUTEROL SULFATE 3 ML: .5; 3 SOLUTION RESPIRATORY (INHALATION) at 07:04

## 2017-04-27 RX ADMIN — CARVEDILOL 3.12 MG: 3.12 TABLET, FILM COATED ORAL at 08:04

## 2017-04-27 RX ADMIN — Medication 0.04 MCG/KG/MIN: at 06:04

## 2017-04-27 RX ADMIN — LORAZEPAM 1 MG: 2 INJECTION INTRAMUSCULAR; INTRAVENOUS at 11:04

## 2017-04-27 RX ADMIN — MORPHINE SULFATE 2 MG: 10 INJECTION INTRAVENOUS at 02:04

## 2017-04-27 RX ADMIN — CHLORHEXIDINE GLUCONATE 15 ML: 1.2 RINSE ORAL at 10:04

## 2017-04-27 RX ADMIN — SODIUM CHLORIDE: 0.9 INJECTION, SOLUTION INTRAVENOUS at 05:04

## 2017-04-27 RX ADMIN — LORAZEPAM 1 MG: 2 INJECTION INTRAMUSCULAR; INTRAVENOUS at 02:04

## 2017-04-27 NOTE — PROGRESS NOTES
[facility] [place] [place]  Neurology  Progress Note    Patient Name: Radha Pritchett  MRN: [id number]  Admission Date: 4/6/2017  Hospital Length of Stay: 21 days  Code Status: Full Code   Attending Provider: Ashley Zazueta MD  Primary Care Physician: Ferny Iglesias MD   Principal Problem:Acute respiratory failure with hypercapnia    Subjective:     Interval History: 45 y/o female with medical Hx as listed below who was admitted for shortness of breath. Pt was found to have a pneumonia. She decompensasated and is now intubated. Pt has been difficult to to wean off ventilator. It has been reported that pt has been experiencing upper extremity weakness for a while but is perhaps a generalized weakness chronically. No other details at his moment. Pt also has Hx of a left breast mass that according to pathology is benign, possibly a hematoma. Pt has at baseline a learning difficulty.    -4/27/17: No new changes remains on vent. Opens eyes upon verbal stimulation and moves extremities.    Current Neurological Medications:     Current Facility-Administered Medications   Medication Dose Route Frequency Provider Last Rate Last Dose    0.9%  NaCl infusion   Intravenous Continuous Ashley Zazueta MD 75 mL/hr at 04/27/17 1400      acetaminophen tablet 500 mg  500 mg Oral Q6H PRN Easton Aaron MD   500 mg at 04/16/17 0823    albuterol-ipratropium 2.5mg-0.5mg/3mL nebulizer solution 3 mL  3 mL Nebulization Q4H PRN Easton Aaron MD   3 mL at 04/24/17 1415    albuterol-ipratropium 2.5mg-0.5mg/3mL nebulizer solution 3 mL  3 mL Nebulization Q6H Renae Gunderson MD   3 mL at 04/27/17 1355    carvedilol tablet 3.125 mg  3.125 mg Oral BID Yovani Arellano MD   3.125 mg at 04/26/17 2050    chlorhexidine 0.12 % solution 15 mL  15 mL Mouth/Throat BID Perico Solis MD   15 mL at 04/27/17 1000    cloNIDine tablet 0.1 mg  0.1 mg Oral TID PRN Easton Aaron MD        dextrose 50% injection 12.5 g  12.5 g Intravenous PRN Canchristopher  ARVIND Aaron MD        enoxaparin injection 40 mg  40 mg Subcutaneous Daily Easton Aaron MD   40 mg at 04/26/17 1633    furosemide injection 40 mg  40 mg Intravenous BID Bird Vega MD        glucagon (human recombinant) injection 1 mg  1 mg Intramuscular PRN Easton Aaron MD        glycopyrrolate injection 0.1 mg  0.1 mg Intravenous TID PRN Yovani Arellano MD   0.1 mg at 04/24/17 0824    insulin aspart pen 1-10 Units  1-10 Units Subcutaneous Q6H PRN Easton Aaron MD   4 Units at 04/27/17 1244    insulin detemir pen 10 Units  10 Units Subcutaneous QHS Ashley Zazueta MD   10 Units at 04/26/17 2050    lorazepam injection 1 mg  1 mg Intravenous Q2H PRN Yovani Arellano MD   1 mg at 04/27/17 1256    morphine injection 2 mg  2 mg Intravenous Q2H PRN Yovani Arellano MD   2 mg at 04/27/17 1416    norepinephrine 4 mg in dextrose 5% 250 mL infusion (premix) (titrating)  0.04 mcg/kg/min Intravenous Continuous Ashley Zazueta MD 5 mL/hr at 04/27/17 1400 0.019 mcg/kg/min at 04/27/17 1400    ondansetron injection 8 mg  8 mg Intravenous Q8H PRN Easton Aaron MD        pantoprazole injection 40 mg  40 mg Intravenous Daily Lakesha Greer MD   40 mg at 04/27/17 1000    promethazine (PHENERGAN) 12.5 mg in dextrose 5 % 50 mL IVPB  12.5 mg Intravenous Q6H PRN Easton Aaron MD        ramelteon tablet 8 mg  8 mg Oral Nightly PRN Easton Aaron MD   8 mg at 04/24/17 0316    scopolamine 1.5 mg (1 mg over 3 days) 1.5 mg  1 patch Transdermal Q3 Days Lakesha Greer MD   1.5 mg at 04/25/17 2153       Review of Systems   Mechanically ventilated    Objective:     Vital Signs (Most Recent):  Temp: 98.5 °F (36.9 °C) (04/27/17 1200)  Pulse: 74 (04/27/17 1426)  Resp: 15 (04/27/17 1426)  BP: 139/80 (04/27/17 1417)  SpO2: 100 % (04/27/17 1426) Vital Signs (24h Range):  Temp:  [97.5 °F (36.4 °C)-98.8 °F (37.1 °C)] 98.5 °F (36.9 °C)  Pulse:  [] 74  Resp:  [7-54] 15  SpO2:  [94 %-100 %] 100 %  BP: ()/() 139/80      Weight: 72 kg (158 lb 11.7 oz)  Body mass index is 44.64 kg/(m^2).    Physical Exam  Constitutional: no distress  ENT: no discharge  Head: Normocephalic.   Eyes: No icteric sclereae  Neck: Neck supple.   Cardiovascular: Normal rate.   Pulmonary/Chest: symmetrical expansion of chest.   Extremities: no edema  Neurological: opens eyes upon verbal stimuli; tracks upon repeated verbal stimultion   Pupils are round at 3 mm and reactive to light; corneal responses are present bilaterally; EOMI; no nystagmus  Moving LE's spontaneously       Significant Labs:   CBC: No results for input(s): WBC, HGB, HCT, PLT in the last 48 hours.  CMP:   Recent Labs  Lab 04/26/17  0127 04/27/17  0230   * 186*    142   K 5.2* 4.6    104   CO2 30* 30*   BUN 45* 40*   CREATININE 0.8 0.7   CALCIUM 11.4* 11.0*   ANIONGAP 8 8   EGFRNONAA >60 >60       Significant Imaging:  MRI brain  Impression       Limited evaluation due to patient motion artifact.  Nonspecific increased signal noted at the base of the temporal lobes, which can be seen with herpes encephalitis.  Myelomalacia from remote injury could have a similar appearance.  No evidence of acute stroke.      Electronically signed by: Martinez Ramirez MD  Date: 04/26/17  Time: 15:04        Assessment and Plan:     45 y/o female consulted for UE weakness:      1. UE weakness: chronic weakness of uncertain etiology. Weaning off vent has been difficult. Concern was if her chronic weakness was related to inability to wean off vent. MG panel was obtained: antibodies were negative. CPK normal    -MRI of brain was obtained to see if any pathology in brainstem. MRI limited due to motion artifact but did not demonstrate brainstem abnormality; it showed enhancing in T2 FLAIR of temporal lobes but no DWI enhancing in same area suggesting not an acute process but uncertain about the chronicity of these findings. Herpes encephalitis was another concern for which pt was put shortly on  acyclovir but pt is able to follow commands going against and encephalopathic process.      Due to motion artifact is difficult to see upper levels of c-spine on MRI.   -When able C-spine MRI can be obtained. A higher cervical lesion may cause respiratory problems if affecting spinal accessory nerves/ansa cervicalis.       Active Diagnoses:    Diagnosis Date Noted POA    PRINCIPAL PROBLEM:  Acute respiratory failure with hypercapnia [J96.02] 04/07/2017 Yes    Upper extremity weakness [R29.898] 04/26/2017 Yes    Hyperglycemia [R73.9] 04/24/2017 No    Counseling regarding advanced directives and goals of care [Z71.89] 04/14/2017 Not Applicable    Hypokalemia [E87.6] 04/09/2017 Yes    Breast mass, left [N63] 04/08/2017 Yes    Pneumonia of both lungs due to infectious organism [J18.9] 04/06/2017 Yes    Essential hypertension [I10] 04/06/2017 Yes     Chronic    Chronic diastolic heart failure [I50.32] 04/06/2017 Yes     Chronic    Moderate protein malnutrition [E44.0] 04/06/2017 Yes     Chronic    Hematoma of breast [N64.89] 03/24/2017 Yes      Problems Resolved During this Admission:    Diagnosis Date Noted Date Resolved POA    Sepsis [A41.9] 04/06/2017 04/24/2017 Yes       VTE Risk Mitigation         Ordered     enoxaparin injection 40 mg  Daily     Route:  Subcutaneous        04/06/17 2202     Medium Risk of VTE  Once      04/06/17 2202          Gus Phillips MD  Neurology  [facility]

## 2017-04-27 NOTE — PROGRESS NOTES
Following am ABG's, the following changes were made to the Ventilator settings:  The patient was switched from the CPAP mode to the SIMV mode.  The Ventilator settings are as follows: SIMV 12/ 400/ +5 PEEP/ 15 PS/ 40%.

## 2017-04-27 NOTE — PLAN OF CARE
Problem: Patient Care Overview  Goal: Plan of Care Review  Outcome: Ongoing (interventions implemented as appropriate)  NO falls no skin breakdown during shift. Remains on CPAP setting on vent. NS infusing at 75 mL/h to right upper arm PICC. Trach inner cannula changed. Morphine used for pain. Ativan used for anxiety and restlessness. Restraints remain to bilat upper extremities. Carter in place draining clear fabiana urine. SCD's to bilat lower extremities. Vital signs stable. Will continue to assess.

## 2017-04-27 NOTE — PROGRESS NOTES
Received patient on a Servo I Ventilator with settings as follows:  CPAP/ +5 PEEP/ 15 PS/ 40%.  Patient has a #8.0 Shiley Trach in place.  Ventilator alarms are set and functional and AMBU bag is at the HOB.

## 2017-04-27 NOTE — PLAN OF CARE
Problem: Patient Care Overview  Goal: Plan of Care Review  Outcome: Ongoing (interventions implemented as appropriate)  Remains on ventilator, unable to wean.  NS @ 75cc/hr.  Levophed drip on-going to maintain MAP>65.  B/P labile.  Trach midline, intact, secure. High anxiety & restlessness.  Ativan & morphine prn for comfort.  Restraints remain in use for safety.  Pressure ulcer & fall prevention interventions on-going. No injuries.

## 2017-04-27 NOTE — CONSULTS
Consult Note  Infectious Disease    Consult Requested By: Ashley Zazueta MD    Reason for Consult: does she have hsv encephalitis?    SUBJECTIVE:     History of Present Illness:  Patient is a 46 y.o. female presents with hypercapneic respiratory failure. She has had episodes of hypercapneic respiratory failure in 2014 as well. She was deemed to have a pneumonia and received antibiotics as well as antibiotics for a vse uti(e.faecium) has had trouble weaning form the ventilator and has a tracheostomy. Neurology has identified ul paresis and ct of the head has confirmed bilateral temporal lobe attenuation that was seen in 2014 as well. An mri confirms the same. The patient is now more awake when off sedation per nursing. She has received empirical steroids and ivig in case this was an encephalitis. The specter of hsv encephalitis was raised based on imaging. She has not had cognitive deficits coming in to be admitted and is apparently following commands now when sedation is stopped.  She also has a left breast mass felt to be a hematoma.    Past Medical History:   Diagnosis Date    CHF (congestive heart failure)     Learning difficulty      Past Surgical History:   Procedure Laterality Date    BREAST SURGERY Left     biopsy    CYST REMOVAL      shoulder      Family History   Problem Relation Age of Onset    Diabetes      Coronary artery disease       Social History   Substance Use Topics    Smoking status: Never Smoker    Smokeless tobacco: Never Used    Alcohol use Yes      Comment: OCCASSIONAL       Review of patient's allergies indicates:  No Known Allergies     Antibiotics     None          Review of Systems:  Review of systems not obtained due to patient factors intubated.    OBJECTIVE:     Vital Signs (Most Recent)  Temp: 97.6 °F (36.4 °C) (04/27/17 0730)  Pulse: 80 (04/27/17 1054)  Resp: 12 (04/27/17 1054)  BP: 124/72 (04/27/17 1047)  SpO2: 100 % (04/27/17 1054)    Temperature Range Min/Max (Last  24H):  Temp:  [97.5 °F (36.4 °C)-98.8 °F (37.1 °C)]     Physical Exam:  General: well developed, well nourished  Eyes: conjunctivae/corneas clear. PERRL..  HENT: Head:normocephalic, atraumatic. Ears:bilateral TM's and external ear canals normal. Nose: Nares normal. Septum midline. Mucosa normal. No drainage or sinus tenderness., no discharge. Throat: lips, mucosa, and tongue normal; teeth and gums normal and no throat erythema.  Neck: supple, symmetrical, trachea midline, no JVD and thyroid not enlarged, symmetric, no tenderness/mass/nodules  Lungs:  clear to auscultation bilaterally and normal respiratory effort  Cardiovascular: Heart: regular rate and rhythm, S1, S2 normal, no murmur, click, rub or gallop. Chest Wall: no tenderness. Extremities: no cyanosis or edema, or clubbing. Pulses: 2+ and symmetric.  Abdomen/Rectal: Abdomen: soft, non-tender non-distented; bowel sounds normal; no masses,  no organomegaly. Rectal: not examined  Skin: Skin color, texture, turgor normal. No rashes or lesions  Musculoskeletal:no clubbing, cyanosis  Lymph Nodes: No cervical or supraclavicular adenopathy  Neurologic: Mental status: intubated and sedated. ul restrained as she is pulling at et tube etc which speaks against an ul paresis    Laboratory:  CBC  No results for input(s): WBC, RBC, HGB, HCT, PLT in the last 24 hours.  BMP    Recent Labs  Lab 04/27/17  0230   CO2 30*   BUN 40*   CREATININE 0.7   CALCIUM 11.0*     No results for input(s): COLORU, CLARITYU, SPECGRAV, PHUR, PROTEINUA, GLUCOSEU, BILIRUBINCON, BLOODU, WBCU, RBCU, BACTERIA, MUCUS, NITRITE, LEUKOCYTESUR, UROBILINOGEN, HYALINECASTS in the last 168 hours.  Microbiology Results (last 7 days)     Procedure Component Value Units Date/Time    Blood culture [[id number]] Collected:  04/16/17 0827    Order Status:  Completed Specimen:  Blood Updated:  04/21/17 0903     Blood Culture, Routine No growth after 5 days.    Blood culture [[id number]] Collected:  04/16/17 0823     Order Status:  Completed Specimen:  Blood Updated:  04/21/17 0903     Blood Culture, Routine No growth after 5 days.          Diagnostic Results:  Labs: Reviewed  X-Ray: Reviewed  CT: Reviewed  MRI: Reviewed    ASSESSMENT/PLAN:     Active Hospital Problems    Diagnosis  POA    *Acute respiratory failure with hypercapnia [J96.02]  Yes    Upper extremity weakness [R29.898]  Yes    Hyperglycemia [R73.9]  No    Counseling regarding advanced directives and goals of care [Z71.89]  Not Applicable    Hypokalemia [E87.6]  Yes    Breast mass, left [N63]  Yes    Pneumonia of both lungs due to infectious organism [J18.9]  Yes    Essential hypertension [I10]  Yes     Chronic    Chronic diastolic heart failure [I50.32]  Yes     Chronic    Moderate protein malnutrition [E44.0]  Yes     Chronic    Hematoma of breast [N64.89]  Yes      Resolved Hospital Problems    Diagnosis Date Resolved POA    Sepsis [A41.9] 04/24/2017 Yes       1. i see no evidence of hsv encephalitis in that this ct abnormality was present in 2014 and patient is alert when sedation stopped  hsv encephalitis unlikely when she is not encephalopathic  Consider lp and cervical spinal imaging  Dc acyclovir  Sounds like this is a chronic process and not acute  2. hypercapneic respiratory failure may be related to cns process  She does move all 4 limbs but is not following oral commands

## 2017-04-27 NOTE — PROGRESS NOTES
Discharge plan: patient remains in ICU on vent support--weaning attempts continue  LTAC--DENIS called [facility]  Kati [phone] to report lack of progress with network providers. Gave her names of the 2 denials and information regarding messages left for [facility] and [facility]. Asked her assistance in reaching out to these providers. Per Kati, will need actual denial from another network provider in order for her to consider request from non network provider. She does have a request on file from [employer] (non network).  Additionally, DENIS again left voice mail at [facility] ([phone]) and [facility] ([phone]) requesting call back to discuss their reviews (as indicated in progress in [facility]). Awaiting calls.   DENIS updated Dr Zazueta in bed huddle.   11:57 call from Sue at [facility] for updates. DENIS provided verbal update and faxing current meds, R/T notes last 16 hours, last physician progress notes to [phone] as unable to send thru [facility] this am.

## 2017-04-27 NOTE — PROGRESS NOTES
Progress Note  Pulmonology    Admit Date: 4/6/2017   LOS: 21 days     SUBJECTIVE: resp distress     Follow-up For:  Vent management. Patient on vent via trach. Off sedation. Agitated.  Opens her eyes - moves ext - no commands   Continuous Infusions:   sodium chloride 0.9% 75 mL/hr at 04/27/17 0700    norepinephrine bitartrate-D5W 0.04 mcg/kg/min (04/27/17 0700)     Scheduled Meds:   albuterol-ipratropium 2.5mg-0.5mg/3mL  3 mL Nebulization Q6H    carvedilol  3.125 mg Oral BID    chlorhexidine  15 mL Mouth/Throat BID    enoxaparin  40 mg Subcutaneous Daily    insulin detemir  10 Units Subcutaneous QHS    pantoprazole  40 mg Intravenous Daily    scopolamine  1 patch Transdermal Q3 Days     Review of Systems:  Review of systems not obtained due to patient agitated and not very responsive.    OBJECTIVE:     Vital Signs Range (Last 24H):  Temp:  [97.5 °F (36.4 °C)-98.8 °F (37.1 °C)]   Pulse:  []   Resp:  [7-54]   BP: ()/(50-94)   SpO2:  [94 %-100 %]     I & O (Last 24H):    Intake/Output Summary (Last 24 hours) at 04/27/17 0911  Last data filed at 04/27/17 0700   Gross per 24 hour   Intake          3077.53 ml   Output              970 ml   Net          2107.53 ml     Physical Exam:  General: no distress. No distress but agitated. UE restrained but moves her LE more.  Neck: no carotid bruit  Breast: left breast mass - hard, non-mobile  Lungs:  Shallow, decreased bs bilaterally rhonchi. No wheezing.   Heart: regular rate and rhythm and no murmur  Abdomen: soft, non-tender non-distended; bowel sounds diminished and right nasal feeding tube and rectal tube.  Extremities: no cyanosis  or clubbing but has pedal edema and SCDs to LE. RUE picc line.  Neurologic: minimally responsive    Laboratory:  CBC:   No results for input(s): WBC, RBC, HGB, HCT, PLT, MCV, MCH, MCHC in the last 168 hours.  BMP:     Recent Labs  Lab 04/27/17  0230   *      K 4.6      CO2 30*   BUN 40*   CREATININE 0.7    CALCIUM 11.0*        Vent Mode: SIMV (PRVC) + PS  Oxygen Concentration (%):  [] 40  Resp Rate Total:  [9 br/min-33 br/min] 12 br/min  Vt Set:  [400 mL] 400 mL  PEEP/CPAP:  [5 cmH20] 5 cmH20  Pressure Support:  [15 cmH20] 15 cmH20  Mean Airway Pressure:  [5.7 cmH20-10.4 cmH20] 8.4 cmH20    ABGs:     Recent Labs  Lab 04/27/17  0417   PH 7.245*   PCO2 72.1*   PO2 103*   HCO3 31.3*   POCSATURATED 96   BE 2     Microbiology Results (last 7 days)     Procedure Component Value Units Date/Time    Blood culture [[id number]] Collected:  04/16/17 0827    Order Status:  Completed Specimen:  Blood Updated:  04/21/17 0903     Blood Culture, Routine No growth after 5 days.    Blood culture [[id number]] Collected:  04/16/17 0823    Order Status:  Completed Specimen:  Blood Updated:  04/21/17 0903     Blood Culture, Routine No growth after 5 days.        Chest X-Ray:     Findings: Cardiac silhouette may be mildly enlarged.  Patient is rotated to the left, limiting comparison.  There is persistent opacity in the left lung base which appears grossly similar prior.  Blunting of the loss costophrenic angle suggest small pleural effusion.  There are some ill-defined interstitial and faint airspace opacities in the right lung base which may be slightly worsened from prior.  Findings may represent multifocal infectious/inflammatory process versus edema.  Right PICC, enteric tube, and tracheostomy tube appear unchanged.  No definite pneumothorax appreciated.      ASSESSMENT/PLAN:     1) Acute respiratory failure - presented with weakness of UE and pneumonia. Unable to wean off vent. S/p trach. Fair gas exchanged. On rate 12 via trach. PS 10. Agitated. No cxr this am. Rhonchi on exam. Will increase ps a bit and try to decrease rate. Bronchodilators. LTAC?  2) Weakness - UE. Neurology following. ??MG - MG panel was negative. Steroids stopped. ?eiotlogy to weakness. UE remain weak.  3) History of HTN  4) Anemia - stable.    The  Ventilator settings are as follows: SIMV 12/ 400/ +5 PEEP/ 15 PS/ 40%. CXR unchanged .continue present treatment . Expect not much improvement from this point on.      Bird Vega

## 2017-04-28 LAB
ALLENS TEST: ABNORMAL
ANION GAP SERPL CALC-SCNC: 8 MMOL/L
BUN SERPL-MCNC: 40 MG/DL
CALCIUM SERPL-MCNC: 10.8 MG/DL
CHLORIDE SERPL-SCNC: 100 MMOL/L
CO2 SERPL-SCNC: 33 MMOL/L
CREAT SERPL-MCNC: 0.8 MG/DL
DELSYS: ABNORMAL
ERYTHROCYTE [SEDIMENTATION RATE] IN BLOOD BY WESTERGREN METHOD: 12 MM/H
EST. GFR  (AFRICAN AMERICAN): >60 ML/MIN/1.73 M^2
EST. GFR  (NON AFRICAN AMERICAN): >60 ML/MIN/1.73 M^2
FIO2: 40
GLUCOSE SERPL-MCNC: 183 MG/DL
HCO3 UR-SCNC: 33.6 MMOL/L (ref 24–28)
MODE: ABNORMAL
PCO2 BLDA: 50.8 MMHG (ref 35–45)
PEEP: 5
PH SMN: 7.43 [PH] (ref 7.35–7.45)
PO2 BLDA: 115 MMHG (ref 80–100)
POC BE: 8 MMOL/L
POC SATURATED O2: 99 % (ref 95–100)
POC TCO2: 35 MMOL/L (ref 23–27)
POCT GLUCOSE: 214 MG/DL (ref 70–110)
POCT GLUCOSE: 225 MG/DL (ref 70–110)
POCT GLUCOSE: 231 MG/DL (ref 70–110)
POTASSIUM SERPL-SCNC: 3.8 MMOL/L
PS: 15
SAMPLE: ABNORMAL
SITE: ABNORMAL
SODIUM SERPL-SCNC: 141 MMOL/L
VT: 400

## 2017-04-28 PROCEDURE — 63600175 PHARM REV CODE 636 W HCPCS: Performed by: INTERNAL MEDICINE

## 2017-04-28 PROCEDURE — 94640 AIRWAY INHALATION TREATMENT: CPT

## 2017-04-28 PROCEDURE — 25000003 PHARM REV CODE 250: Performed by: HOSPITALIST

## 2017-04-28 PROCEDURE — C9113 INJ PANTOPRAZOLE SODIUM, VIA: HCPCS | Performed by: HOSPITALIST

## 2017-04-28 PROCEDURE — 82803 BLOOD GASES ANY COMBINATION: CPT

## 2017-04-28 PROCEDURE — 27000221 HC OXYGEN, UP TO 24 HOURS

## 2017-04-28 PROCEDURE — 25000242 PHARM REV CODE 250 ALT 637 W/ HCPCS: Performed by: INTERNAL MEDICINE

## 2017-04-28 PROCEDURE — 36415 COLL VENOUS BLD VENIPUNCTURE: CPT

## 2017-04-28 PROCEDURE — 25000003 PHARM REV CODE 250: Performed by: INTERNAL MEDICINE

## 2017-04-28 PROCEDURE — 99900035 HC TECH TIME PER 15 MIN (STAT)

## 2017-04-28 PROCEDURE — 36600 WITHDRAWAL OF ARTERIAL BLOOD: CPT

## 2017-04-28 PROCEDURE — 80048 BASIC METABOLIC PNL TOTAL CA: CPT

## 2017-04-28 PROCEDURE — 94003 VENT MGMT INPAT SUBQ DAY: CPT

## 2017-04-28 PROCEDURE — 63600175 PHARM REV CODE 636 W HCPCS: Performed by: HOSPITALIST

## 2017-04-28 PROCEDURE — 94761 N-INVAS EAR/PLS OXIMETRY MLT: CPT

## 2017-04-28 PROCEDURE — 99900026 HC AIRWAY MAINTENANCE (STAT)

## 2017-04-28 PROCEDURE — 20000000 HC ICU ROOM

## 2017-04-28 RX ORDER — PROPOFOL 10 MG/ML
5 INJECTION, EMULSION INTRAVENOUS CONTINUOUS PRN
Status: DISCONTINUED | OUTPATIENT
Start: 2017-04-28 | End: 2017-05-08 | Stop reason: HOSPADM

## 2017-04-28 RX ADMIN — MORPHINE SULFATE 2 MG: 10 INJECTION INTRAVENOUS at 10:04

## 2017-04-28 RX ADMIN — CHLORHEXIDINE GLUCONATE 15 ML: 1.2 RINSE ORAL at 08:04

## 2017-04-28 RX ADMIN — PROPOFOL 40.05 MCG/KG/MIN: 10 INJECTION, EMULSION INTRAVENOUS at 04:04

## 2017-04-28 RX ADMIN — IPRATROPIUM BROMIDE AND ALBUTEROL SULFATE 3 ML: .5; 3 SOLUTION RESPIRATORY (INHALATION) at 07:04

## 2017-04-28 RX ADMIN — SODIUM CHLORIDE: 0.9 INJECTION, SOLUTION INTRAVENOUS at 05:04

## 2017-04-28 RX ADMIN — IPRATROPIUM BROMIDE AND ALBUTEROL SULFATE 3 ML: .5; 3 SOLUTION RESPIRATORY (INHALATION) at 01:04

## 2017-04-28 RX ADMIN — PROPOFOL 40 MCG/KG/MIN: 10 INJECTION, EMULSION INTRAVENOUS at 10:04

## 2017-04-28 RX ADMIN — LORAZEPAM 1 MG: 2 INJECTION INTRAMUSCULAR; INTRAVENOUS at 03:04

## 2017-04-28 RX ADMIN — LORAZEPAM 1 MG: 2 INJECTION INTRAMUSCULAR; INTRAVENOUS at 06:04

## 2017-04-28 RX ADMIN — SODIUM CHLORIDE: 0.9 INJECTION, SOLUTION INTRAVENOUS at 06:04

## 2017-04-28 RX ADMIN — PROPOFOL 5 MCG/KG/MIN: 10 INJECTION, EMULSION INTRAVENOUS at 10:04

## 2017-04-28 RX ADMIN — FUROSEMIDE 40 MG: 10 INJECTION, SOLUTION INTRAMUSCULAR; INTRAVENOUS at 05:04

## 2017-04-28 RX ADMIN — INSULIN ASPART 4 UNITS: 100 INJECTION, SOLUTION INTRAVENOUS; SUBCUTANEOUS at 05:04

## 2017-04-28 RX ADMIN — INSULIN ASPART 2 UNITS: 100 INJECTION, SOLUTION INTRAVENOUS; SUBCUTANEOUS at 05:04

## 2017-04-28 RX ADMIN — PANTOPRAZOLE SODIUM 40 MG: 40 INJECTION, POWDER, FOR SOLUTION INTRAVENOUS at 08:04

## 2017-04-28 RX ADMIN — SCOPOLAMINE 1.5 MG: 1 PATCH, EXTENDED RELEASE TRANSDERMAL at 08:04

## 2017-04-28 RX ADMIN — INSULIN ASPART 4 UNITS: 100 INJECTION, SOLUTION INTRAVENOUS; SUBCUTANEOUS at 12:04

## 2017-04-28 RX ADMIN — LORAZEPAM 1 MG: 2 INJECTION INTRAMUSCULAR; INTRAVENOUS at 08:04

## 2017-04-28 RX ADMIN — MORPHINE SULFATE 2 MG: 10 INJECTION INTRAVENOUS at 03:04

## 2017-04-28 RX ADMIN — FUROSEMIDE 40 MG: 10 INJECTION, SOLUTION INTRAMUSCULAR; INTRAVENOUS at 08:04

## 2017-04-28 RX ADMIN — LORAZEPAM 1 MG: 2 INJECTION INTRAMUSCULAR; INTRAVENOUS at 11:04

## 2017-04-28 RX ADMIN — ENOXAPARIN SODIUM 40 MG: 100 INJECTION SUBCUTANEOUS at 04:04

## 2017-04-28 NOTE — PHYSICIAN QUERY
"PT Name: Radha Pritchett  MR #: [id number]     Physician Query Form - Diagnosis Clarification      CDS/: Nya Bee RN, CCDS               Contact information: [phone]    This form is a permanent document in the medical record.     Query Date: April 28, 2017    By submitting this query, we are merely seeking further clarification of documentation.  Please utilize your independent clinical judgment when addressing the question(s) below.     The medical record contains the following:      Findings Supporting Clinical Information Location in Medical Record   VSE UTI (e.faecium)  Per ID CN's 4/26-27 Patient is a 46 y.o. female presents with hypercapneic respiratory failure. She has had episodes of hypercapneic respiratory failure in 2014 as well.     She was deemed to have a pneumonia and received antibiotics as well as antibiotics for a vse uti(e.faecium)     Urine Culture on 4/16  ENTEROCOCCUS FAECIUM  > 100,000 cfu/ml    Zosyn IVPB on 4/6 x 1 dose  Zosyn IVPB on 4/16-22  Linezolid IVPB 4/16-22  Ceftriaxone IVPB 4/7-16  Azithromycin IVPB 4/7-14  Zovirax IVPB 4/26     ID CN's 4/26-27                Lab          MAR     Please clarify if the "UTI (e.faecium)" diagnosis has been:    [  x] Ruled In  [  ] Ruled In, Now Resolved  [  ] Resolved Prior to My Assessment  [  ] Ruled Out  [  ] Clinically insignificant  [  ] Clinically undetermined  [  ] Other/Clarification of findings (please specify)_______________________________    Please document in your progress notes daily for the duration of treatment, until resolved, and include in your discharge summary.                                                                                "

## 2017-04-28 NOTE — PHYSICIAN QUERY
PT Name: Radha Pritchett  MR #: [id number]    Physician Query Form - Perfusion Diagnosis Clarification     CDS/: Nya Bee RN, CCDS               Contact information: [phone]  This form is a permanent document in the medical record.     Query Date: April 28, 2017    By submitting this query, we are merely seeking further clarification of documentation. Please utilize your independent clinical judgment when addressing the question(s) below.    The medical record contains the following:    Indicators   Supporting Clinical Findings   Location in Medical Record   x Acute Illness (e.g. AMI, Sepsis, etc.) Sepsis  Pneumonia  HTN  Moderate Protein Malnutrition  Chronic Diastolic Heart Failure    · Acute Respiratory Failure with hypercapnia  · Hypoxic  · Left breast mass    · Planned for surgery, will place consult to Jaime (his patient).  · admitted with pneumonia and acutely decompensated and was intubated and placed on vent.   H&P                Prim PN 4/8          x Acidosis documented ABG: resp alkalosis and met acidosis   eICU 4/19   x ABGs / Labs WBC: 4.27 - 8.68  Bands:   3.0    Blood cultures: NGTD  Urine Culture: Enterococcus Faecium  Resp culture: no significant isolate   Labs 4/6 - 4/28  Lab 4/7    Lab 4/16   x Vital Signs meets criteria for sepsis given tachycardia, tachypnea, and pneumonia    On 4/26 - 28  BP: 85/55 - 99/56, 118/79 - 137/81  HR: 101 - 88  RR: 13 - 26  Temp: 97.5 - 98.8     H&P          VS Flow Sheet     x Hypotension or Low Blood Pressure documented Drop in BP, will start gentle IVF and levophed to maintain MAP >65.    Pt's BP on low end of normal, and hydrochlorothiazide and lisinopril stopped.   Prim PN 4/26 & 27      Prim PN 4/10-14   x Altered Mental Status or Confusion Lethargy Prim PN 4/7    Diaphoresis, Cold Extremities or Cyanosis      Oliguria     x Medication/Treatment:  -Vasopressors  -Inotropic Drugs  -IV Fluids  -Cardiac Assist Devices  -Hemodynamic  Monitoring  -Blood/Blood Products Zosyn IVPB on 4/6 x 1 dose  Zosyn IVPB on 4/16-22  Linezolid IVPB 4/16-22  Ceftriaxone IVPB 4/7-16  Azithromycin IVPB 4/7-14  Zovirax IVPB 4/26    IVF:    ml bolus on 4/11 x1  NS 1000 ml bolus on 4/10 x 1   NS IV continuous @ 100 ml/hr 4/7-8  NS IV continuous @ 75 ml/hr start 4/26    Norepinephrine IV - keep MAP >65 started on 4/27  On 4/27-28 @ 0.04 mcg/kg/min to 0.01 mcg/kg/min    will start IV fluid hydration and broad spectrum antibiotics    Pt's BP on low end of normal, and hydrochlorothiazide and lisinopril stopped.     NINI TERRAZAS    H&P    Prim PN 4/10-14   x Other: Not on any pressors eICU 4/10     Provider, please specify diagnosis or diagnoses associated with above clinical findings.    [  ] Septic Shock  [  ] Other Shock (please specify): _________________________________  [  ] Shock Unspecified  [  ] Other Condition (please specify): ___________________________________  [ x ] Clinically Undetermined    Please document in your progress notes daily for the duration of treatment until resolved and include in your discharge summary.

## 2017-04-28 NOTE — PROGRESS NOTES
Discharge plan: LTAC--DENIS followed up with calls to [facility]--there is no change in the [facility] response (under review). DENIS called facility [phone]--the admission coordinator has left for day. DENIS has left voice mail daily this week with no return call.  AN [place]--[phone]-left voice mail for Sue.  Awaiting return call.   Spoke with Nabila at [facility] today [phone] yesterday (not in network, but would accept if authorized by insurance). They have bed, can follow patient in EPIC. They are aware that insurance is requiring a 3rd denial from a network provider prior to insurance consideration of this facility.

## 2017-04-28 NOTE — PROGRESS NOTES
[facility] Hospital Medicine  Progress Note    Patient Name: Radha Pritchett  MRN: [id number]  Patient Class: IP- Inpatient   Admission Date: 4/6/2017  Length of Stay: 21 days  Attending Physician: Ashley Zazueta MD  Primary Care Provider: Ferny Iglesias MD        Subjective:     Principal Problem:Acute respiratory failure with hypercapnia    HPI:  Ms. Radha Pritchett is a 46 y.o. female with essential hypertension, chronic diastolic heart failure (LVEF 55-60% Dec 2014), moderate protein malnutrition who presents to [facility] ED with complaints of dyspnea today.  She was scheduled to undergo removal of a breast mass today but was found to be hypoxic.  She does report that she has been short of breath but cannot specify how long.  She has also had some non-productive coughing and some chills without fevers.  Further history is otherwise limited at this time.    Hospital Course:   was admitted with pneumonia and acutely decompensated and was intubated and placed on vent. Pt with UE weakness prior to presentation on this admission which may be contributing to inability to extubate. Neurology following and considering possible MG and patient on therapeutic trial of prednisone and Modafinil. Pt s/p bronch that was unrevealing on 4/11. Pt was extubated on 4/15 but was re-intubated later the same day.  Was febrile and stared on Zyvox and zosyn.  CXR shows possible infiltrate. Urine culture +enterococcus.  Completed course and antibiotics dc'd. Blood culture negative  Trach on 4/19 without complications. Pt still having neuro deficits and brain MRI pending. LTAC placement pending.     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 Started on acyclovir but dc'd  After review of previous CT imaging showed temporal abnormalities.  Drop in BP, will start gentle IVF and levophed to maintain MAP >65. Lasix added with change to rate on vent off CPAP.        Interval History: pt moving  extremities off sedation    Review of Systems   Unable to perform ROS: Intubated     Objective:     Vital Signs (Most Recent):  Temp: 98.8 °F (37.1 °C) (04/27/17 1900)  Pulse: 99 (04/27/17 2100)  Resp: 17 (04/27/17 2100)  BP: 113/67 (04/27/17 2100)  SpO2: 100 % (04/27/17 2100) Vital Signs (24h Range):  Temp:  [97.6 °F (36.4 °C)-98.8 °F (37.1 °C)] 98.8 °F (37.1 °C)  Pulse:  [] 99  Resp:  [7-47] 17  SpO2:  [96 %-100 %] 100 %  BP: ()/() 113/67     Weight: 72 kg (158 lb 11.7 oz)  Body mass index is 44.64 kg/(m^2).    Intake/Output Summary (Last 24 hours) at 04/27/17 2201  Last data filed at 04/27/17 2100   Gross per 24 hour   Intake          4046.36 ml   Output             3800 ml   Net           246.36 ml      Physical Exam   Constitutional: She appears well-developed. No distress.   Eyes: Conjunctivae are normal. Right eye exhibits no discharge. Left eye exhibits no discharge.   Neck:   Trach in place   Cardiovascular: Normal rate, regular rhythm and normal heart sounds.    Pulmonary/Chest: No stridor.   Mechanically ventilated per trach.  Coarse BS bilaterally   Abdominal: Soft. Bowel sounds are normal.   Musculoskeletal: She exhibits no deformity.   Neurological:   Very restless.  Poorly responsive to commands.   Skin: Skin is warm and dry.       Significant Labs:   ABGs:   Recent Labs  Lab 04/27/17  0417   PH 7.245*   PCO2 72.1*   HCO3 31.3*   POCSATURATED 96   BE 2     BMP:   Recent Labs  Lab 04/27/17  0230   *      K 4.6      CO2 30*   BUN 40*   CREATININE 0.7   CALCIUM 11.0*     CBC: No results for input(s): WBC, HGB, HCT, PLT in the last 48 hours.    Significant Imaging: I have reviewed all pertinent imaging results/findings within the past 24 hours.    Assessment/Plan:      * Acute respiratory failure with hypercapnia  Mechanical ventilation  Weakness  Metabolic encephalopathy  Fever  Assessment and Plan:  Pt with respiratory failure that required intubation, possibly  secondary to pneumonia vs edema, treated empirically with Rocephin/Azithromycin, ECHO with diastolic dysfunction but normal EF, and Pulm following for vent management. Family reported UE weakness for a few months prior to this admission and Neurology consulted, possible MG and patient undergoing trial of prednisone and pyridostigmine. This weakness likely limited extubation and necessitated reintubation; patient was extubated on 4/16 but had to be re-intubated that same day. Unclear source of encephalopathy and case discussed with Neurology, CT scan of the head was unrevealing. Also, patient completed treatment with Azithromycin and was on Rocephin alone, but she spiked a fever overnight and antibiotics changed to Zosyn/Zyvox.   Trach on 4/19 without complications.  Stopped all ABx's.  Remains on vent per trach.  Having trouble clearing secretions.  Added Glycopyrrolate.   SW consulted for LTAC.        Hematoma of breast  Follow up at later date       Pneumonia of both lungs due to infectious organism  Probably bacterial, but unable to determine.  The patient has a CURB-65 score of 0, and does not meet criteria for healthcare-associated pneumonia initially. CTA chest with bilateral airspace opacification concerning for pneumonia.  Oxygen saturations are improved but requiring a non-rebreather mask; then BIPAP and s/p intubation.  Blood culture NGTD.  Pt completed full course of Azithromycin, and was continued Rocephin until had spike of fever, Abx has been expanded  to Zosyn/Zyvox.  Completed course and Abx dc'd.      Essential hypertension  Drop in BP today after MRI  Hold coreg  Gentle IVF - BUN and calcium rising and likely concentrated   levophed if needed to maintain MAP >65    Chronic diastolic heart failure  Patient doesn't appear to be in acute heart failure; checked ECHO with EF=65% but with diastolic dysfunction.  Lasix given with change in resp status back on vent off cpap    Moderate protein  malnutrition  Tolerating tube feeds.      Breast mass, left  Planned for surgery, seen by Dr. Sandoval (his patient) and plan to reschedule as outpatient. It is reportedly a hematoma by biopsy, but suspicion for CA not completely ruled out given paraneoplastic syndrome a consideration.      Hypokalemia  Replace as necessary.      Counseling regarding advanced directives and goals of care  Extensive conversations done with family. Pt to remain full code. Pt is still  but estranged from the  and reportedly the grandmother is the major decision maker.       Hyperglycemia  Hyperglycemia has continued after stopping steroids.  Add nightly Levemir.  HgA1c 7.3%.  No apparent history of DM prior to admission.      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

## 2017-04-28 NOTE — PLAN OF CARE
Problem: Patient Care Overview  Goal: Plan of Care Review  Outcome: Ongoing (interventions implemented as appropriate)  No falls no skin breakdown during shift. Remains on SIMV, unable to wean. NS infusing at 75 mL/h to MUNIRA PICC. Peptide 1.5 tube feeding remains at 45 mL/h, tolerating well. PRN morphine and Ativan given for pain, restlessness, and anxiety. Levophed stopped, BP remains stable. Carter draining clear yellow urine. Flexiseal draining brown liquid stool. Vital signs stable. Will continue to assess.

## 2017-04-28 NOTE — ASSESSMENT & PLAN NOTE
Patient doesn't appear to be in acute heart failure; checked ECHO with EF=65% but with diastolic dysfunction.  Lasix given with change in resp status back on vent off cpap

## 2017-04-28 NOTE — PROGRESS NOTES
Progress Note  Pulmonology    Admit Date: 4/6/2017   LOS: 22 days     SUBJECTIVE: resp distress     Follow-up For:  Vent management. Patient on vent via trach. Off sedation. Agitated.  Opens her eyes - moves ext - no commands   Continuous Infusions:   sodium chloride 0.9% 75 mL/hr at 04/28/17 0900    norepinephrine bitartrate-D5W Stopped (04/27/17 2055)     Scheduled Meds:   albuterol-ipratropium 2.5mg-0.5mg/3mL  3 mL Nebulization Q6H    carvedilol  3.125 mg Oral BID    chlorhexidine  15 mL Mouth/Throat BID    enoxaparin  40 mg Subcutaneous Daily    furosemide  40 mg Intravenous BID    insulin detemir  10 Units Subcutaneous QHS    pantoprazole  40 mg Intravenous Daily    scopolamine  1 patch Transdermal Q3 Days     Review of Systems:  Review of systems not obtained due to patient agitated and not very responsive.    OBJECTIVE:     Vital Signs Range (Last 24H):  Temp:  [98.1 °F (36.7 °C)-98.8 °F (37.1 °C)]   Pulse:  []   Resp:  [7-47]   BP: ()/()   SpO2:  [97 %-100 %]     I & O (Last 24H):    Intake/Output Summary (Last 24 hours) at 04/28/17 1024  Last data filed at 04/28/17 0900   Gross per 24 hour   Intake          3875.18 ml   Output             3860 ml   Net            15.18 ml     Physical Exam:  General: no distress. No distress but agitated. UE restrained but moves her LE more.  Neck: no carotid bruit  Breast: left breast mass - hard, non-mobile  Lungs:  Shallow, decreased bs bilaterally rhonchi. No wheezing.   Heart: regular rate and rhythm and no murmur  Abdomen: soft, non-tender non-distended; bowel sounds diminished and right nasal feeding tube and rectal tube.  Extremities: no cyanosis  or clubbing but has pedal edema and SCDs to LE. RUE picc line.  Neurologic: minimally responsive    Laboratory:  CBC:   No results for input(s): WBC, RBC, HGB, HCT, PLT, MCV, MCH, MCHC in the last 168 hours.  BMP:     Recent Labs  Lab 04/28/17  0126   *      K 3.8      CO2 33*    BUN 40*   CREATININE 0.8   CALCIUM 10.8*        Vent Mode: SIMV (PRVC) + PS  Oxygen Concentration (%):  [] 40  Resp Rate Total:  [11 br/min-37 br/min] 12 br/min  Vt Set:  [400 mL] 400 mL  PEEP/CPAP:  [5 cmH20] 5 cmH20  Pressure Support:  [15 cmH20] 15 cmH20  Mean Airway Pressure:  [7.7 cmH20-9.1 cmH20] 9.1 cmH20    ABGs:     Recent Labs  Lab 04/28/17  0408   PH 7.428   PCO2 50.8*   PO2 115*   HCO3 33.6*   POCSATURATED 99   BE 8     Microbiology Results (last 7 days)     ** No results found for the last 168 hours. **        Chest X-Ray:     Findings: Cardiac silhouette may be mildly enlarged.  Patient is rotated to the left, limiting comparison.  There is persistent opacity in the left lung base which appears grossly similar prior.  Blunting of the loss costophrenic angle suggest small pleural effusion.  There are some ill-defined interstitial and faint airspace opacities in the right lung base which may be slightly worsened from prior.  Findings may represent multifocal infectious/inflammatory process versus edema.  Right PICC, enteric tube, and tracheostomy tube appear unchanged.  No definite pneumothorax appreciated.      ASSESSMENT/PLAN:     1) Acute respiratory failure - presented with weakness of UE and pneumonia. Unable to wean off vent. S/p trach. Fair gas exchanged. On rate 12 via trach. PS 10. Agitated. No cxr this am. Rhonchi on exam. Will increase ps a bit and try to decrease rate. Bronchodilators. LTAC?  2) Weakness - UE. Neurology following. ??MG - MG panel was negative. Steroids stopped. ?eiotlogy to weakness. UE remain weak.  3) History of HTN  4) Anemia - stable.    Returned to full vent support ; very agitated ; since back on vent will restart propofol      Bird Vega

## 2017-04-28 NOTE — CONSULTS
Consult Note  Infectious Disease    Consult Requested By: Ashley Zazueta MD    Reason for Consult: does she have hsv encephalitis?    SUBJECTIVE:     History of Present Illness:  Patient is a 46 y.o. female presents with hypercapneic respiratory failure. She has had episodes of hypercapneic respiratory failure in 2014 as well. She was deemed to have a pneumonia and received antibiotics as well as antibiotics for a vse uti(e.faecium) has had trouble weaning form the ventilator and has a tracheostomy. Neurology has identified ul paresis and ct of the head has confirmed bilateral temporal lobe attenuation that was seen in 2014 as well. An mri confirms the same. The patient is now more awake when off sedation per nursing. She has received empirical steroids and ivig in case this was an encephalitis. The specter of hsv encephalitis was raised based on imaging. She has not had cognitive deficits coming in to be admitted and is apparently following commands now when sedation is stopped.  She also has a left breast mass felt to be a hematoma.    Past Medical History:   Diagnosis Date    CHF (congestive heart failure)     Learning difficulty      Past Surgical History:   Procedure Laterality Date    BREAST SURGERY Left     biopsy    CYST REMOVAL      shoulder      Family History   Problem Relation Age of Onset    Diabetes      Coronary artery disease       Social History   Substance Use Topics    Smoking status: Never Smoker    Smokeless tobacco: Never Used    Alcohol use Yes      Comment: OCCASSIONAL       Review of patient's allergies indicates:  No Known Allergies     Antibiotics     None          Review of Systems:  Review of systems not obtained due to patient factors intubated.    OBJECTIVE:     Vital Signs (Most Recent)  Temp: 99 °F (37.2 °C) (04/28/17 1517)  Pulse: 91 (04/28/17 1745)  Resp: (!) 24 (04/28/17 1745)  BP: 101/64 (04/28/17 1702)  SpO2: 97 % (04/28/17 1745)    Temperature Range Min/Max (Last  24H):  Temp:  [98.1 °F (36.7 °C)-99 °F (37.2 °C)]     Physical Exam:  General: well developed, well nourished  Eyes: conjunctivae/corneas clear. PERRL..  HENT: Head:normocephalic, atraumatic. Ears:bilateral TM's and external ear canals normal. Nose: Nares normal. Septum midline. Mucosa normal. No drainage or sinus tenderness., no discharge. Throat: lips, mucosa, and tongue normal; teeth and gums normal and no throat erythema.  Neck: supple, symmetrical, trachea midline, no JVD and thyroid not enlarged, symmetric, no tenderness/mass/nodules  Lungs:  clear to auscultation bilaterally and normal respiratory effort  Cardiovascular: Heart: regular rate and rhythm, S1, S2 normal, no murmur, click, rub or gallop. Chest Wall: no tenderness. Extremities: no cyanosis or edema, or clubbing. Pulses: 2+ and symmetric.  Abdomen/Rectal: Abdomen: soft, non-tender non-distented; bowel sounds normal; no masses,  no organomegaly. Rectal: not examined  Skin: Skin color, texture, turgor normal. No rashes or lesions  Musculoskeletal:no clubbing, cyanosis  Lymph Nodes: No cervical or supraclavicular adenopathy  Neurologic: Mental status: intubated and sedated. ul restrained as she is pulling at et tube etc which speaks against an ul paresis    Laboratory:  CBC  No results for input(s): WBC, RBC, HGB, HCT, PLT in the last 24 hours.  BMP    Recent Labs  Lab 04/28/17  0126   CO2 33*   BUN 40*   CREATININE 0.8   CALCIUM 10.8*     No results for input(s): COLORU, CLARITYU, SPECGRAV, PHUR, PROTEINUA, GLUCOSEU, BILIRUBINCON, BLOODU, WBCU, RBCU, BACTERIA, MUCUS, NITRITE, LEUKOCYTESUR, UROBILINOGEN, HYALINECASTS in the last 168 hours.  Microbiology Results (last 7 days)     ** No results found for the last 168 hours. **          Diagnostic Results:  Labs: Reviewed  X-Ray: Reviewed  CT: Reviewed  MRI: Reviewed    ASSESSMENT/PLAN:     Active Hospital Problems    Diagnosis  POA    *Acute respiratory failure with hypercapnia [J96.02]  Yes    Upper  extremity weakness [R29.898]  Yes    Hyperglycemia [R73.9]  No    Counseling regarding advanced directives and goals of care [Z71.89]  Not Applicable    Hypokalemia [E87.6]  Yes    Breast mass, left [N63]  Yes    Pneumonia of both lungs due to infectious organism [J18.9]  Yes    Essential hypertension [I10]  Yes     Chronic    Chronic diastolic heart failure [I50.32]  Yes     Chronic    Moderate protein malnutrition [E44.0]  Yes     Chronic    Hematoma of breast [N64.89]  Yes      Resolved Hospital Problems    Diagnosis Date Resolved POA    Sepsis [A41.9] 04/24/2017 Yes       1. i see no evidence of hsv encephalitis in that this ct abnormality was present in 2014 and patient is alert when sedation stopped  hsv encephalitis unlikely when she is not encephalopathic  Consider lp and cervical spinal imaging  Dc acyclovir  Sounds like this is a chronic process and not acute  2. hypercapneic respiratory failure may be related to cns process  She does move all 4 limbs but is not following oral commands

## 2017-04-28 NOTE — SUBJECTIVE & OBJECTIVE
Interval History: pt moving extremities off sedation    Review of Systems   Unable to perform ROS: Intubated     Objective:     Vital Signs (Most Recent):  Temp: 98.8 °F (37.1 °C) (04/27/17 1900)  Pulse: 99 (04/27/17 2100)  Resp: 17 (04/27/17 2100)  BP: 113/67 (04/27/17 2100)  SpO2: 100 % (04/27/17 2100) Vital Signs (24h Range):  Temp:  [97.6 °F (36.4 °C)-98.8 °F (37.1 °C)] 98.8 °F (37.1 °C)  Pulse:  [] 99  Resp:  [7-47] 17  SpO2:  [96 %-100 %] 100 %  BP: ()/() 113/67     Weight: 72 kg (158 lb 11.7 oz)  Body mass index is 44.64 kg/(m^2).    Intake/Output Summary (Last 24 hours) at 04/27/17 2201  Last data filed at 04/27/17 2100   Gross per 24 hour   Intake          4046.36 ml   Output             3800 ml   Net           246.36 ml      Physical Exam   Constitutional: She appears well-developed. No distress.   Eyes: Conjunctivae are normal. Right eye exhibits no discharge. Left eye exhibits no discharge.   Neck:   Trach in place   Cardiovascular: Normal rate, regular rhythm and normal heart sounds.    Pulmonary/Chest: No stridor.   Mechanically ventilated per trach.  Coarse BS bilaterally   Abdominal: Soft. Bowel sounds are normal.   Musculoskeletal: She exhibits no deformity.   Neurological:   Very restless.  Poorly responsive to commands.   Skin: Skin is warm and dry.       Significant Labs:   ABGs:   Recent Labs  Lab 04/27/17  0417   PH 7.245*   PCO2 72.1*   HCO3 31.3*   POCSATURATED 96   BE 2     BMP:   Recent Labs  Lab 04/27/17  0230   *      K 4.6      CO2 30*   BUN 40*   CREATININE 0.7   CALCIUM 11.0*     CBC: No results for input(s): WBC, HGB, HCT, PLT in the last 48 hours.    Significant Imaging: I have reviewed all pertinent imaging results/findings within the past 24 hours.

## 2017-04-28 NOTE — PROGRESS NOTES
Received patient on a Servo I Ventilator with settings as follows:  SIMV 12/ 400/ +5 PEEP/ 15 PS/ 40%.  Patient has a size # 8.0 Shiley Trach in place.  Ventilator alarms are set and functional.

## 2017-04-28 NOTE — PROGRESS NOTES
SBT trial initiated and patient placed on CPAP 10 PS/ 5 PEEP. She did not have any respiratory effort of her own and was placed back on her previous SIMV settings.

## 2017-04-28 NOTE — ASSESSMENT & PLAN NOTE
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

## 2017-04-28 NOTE — PLAN OF CARE
Problem: Patient Care Overview  Goal: Plan of Care Review  Outcome: Ongoing (interventions implemented as appropriate)  Remains on ventilator, unable to wean.  NS @ 75cc/hr.  Propofol drip started for high anxiety levels, agitation & restlessness not well controlled with other meds.  Trach midline & intact. Tolerating tube feedings.  Restraints remain in use for safety.  Pressure ulcer & fall prevention interventions on-going.  No hospital acquired injuries noted.

## 2017-04-28 NOTE — PLAN OF CARE
04/28/17 1310   Discharge Reassessment   Assessment Type Discharge Planning Reassessment   Can the patient answer the patient profile reliably? No, cognitively impaired   How does the patient rate their overall health at the present time? Fair  (record)   Describe the patient's ability to walk at the present time. Does not walk or unable to take any steps at all   How often would a person be available to care for the patient? Occasionally   Number of comorbid conditions (as recorded on the chart) Five or more   During the past month, has the patient often been bothered by feeling down, depressed or hopeless? No  (record)   During the past month, has the patient often been bothered by little interest or pleasure in doing things? No  (record)   Discharge plan remains the same: Yes   Provided patient/caregiver education on the expected discharge date and the discharge plan No   Discharge Plan A Long-term acute care facility (LTAC)  ([facility])   Discharge Plan B (insurance network alternate)   Change in patient condition or support system No   Patient choice form signed by patient/caregiver No   Explained to the the patient/caregiver why the discharge planned changed: No   Involved the patient/caregiver in establishing a new discharge plan: No   patient remains in ICU on vent support, weaning continues. No results with placement at this time.

## 2017-04-29 LAB
ALLENS TEST: ABNORMAL
ANION GAP SERPL CALC-SCNC: 10 MMOL/L
BUN SERPL-MCNC: 46 MG/DL
CALCIUM SERPL-MCNC: 10.4 MG/DL
CHLORIDE SERPL-SCNC: 97 MMOL/L
CO2 SERPL-SCNC: 32 MMOL/L
CREAT SERPL-MCNC: 0.8 MG/DL
DELSYS: ABNORMAL
ERYTHROCYTE [SEDIMENTATION RATE] IN BLOOD BY WESTERGREN METHOD: 12 MM/H
EST. GFR  (AFRICAN AMERICAN): >60 ML/MIN/1.73 M^2
EST. GFR  (NON AFRICAN AMERICAN): >60 ML/MIN/1.73 M^2
FIO2: 40
GLUCOSE SERPL-MCNC: 210 MG/DL
HCO3 UR-SCNC: 30.9 MMOL/L (ref 24–28)
MODE: ABNORMAL
PCO2 BLDA: 54.9 MMHG (ref 35–45)
PEEP: 5
PH SMN: 7.36 [PH] (ref 7.35–7.45)
PIP: 23
PO2 BLDA: 62 MMHG (ref 80–100)
POC BE: 4 MMOL/L
POC SATURATED O2: 90 % (ref 95–100)
POC TCO2: 33 MMOL/L (ref 23–27)
POCT GLUCOSE: 221 MG/DL (ref 70–110)
POCT GLUCOSE: 224 MG/DL (ref 70–110)
POCT GLUCOSE: 231 MG/DL (ref 70–110)
POCT GLUCOSE: 234 MG/DL (ref 70–110)
POTASSIUM SERPL-SCNC: 3.7 MMOL/L
PS: 15
SAMPLE: ABNORMAL
SITE: ABNORMAL
SODIUM SERPL-SCNC: 139 MMOL/L
SP02: 97
VT: 400

## 2017-04-29 PROCEDURE — 99900035 HC TECH TIME PER 15 MIN (STAT)

## 2017-04-29 PROCEDURE — 82803 BLOOD GASES ANY COMBINATION: CPT

## 2017-04-29 PROCEDURE — C9113 INJ PANTOPRAZOLE SODIUM, VIA: HCPCS | Performed by: HOSPITALIST

## 2017-04-29 PROCEDURE — 25000003 PHARM REV CODE 250: Performed by: HOSPITALIST

## 2017-04-29 PROCEDURE — 94761 N-INVAS EAR/PLS OXIMETRY MLT: CPT

## 2017-04-29 PROCEDURE — 25000242 PHARM REV CODE 250 ALT 637 W/ HCPCS: Performed by: INTERNAL MEDICINE

## 2017-04-29 PROCEDURE — 80048 BASIC METABOLIC PNL TOTAL CA: CPT

## 2017-04-29 PROCEDURE — 25000003 PHARM REV CODE 250: Performed by: INTERNAL MEDICINE

## 2017-04-29 PROCEDURE — 27000221 HC OXYGEN, UP TO 24 HOURS

## 2017-04-29 PROCEDURE — 94640 AIRWAY INHALATION TREATMENT: CPT

## 2017-04-29 PROCEDURE — 36415 COLL VENOUS BLD VENIPUNCTURE: CPT

## 2017-04-29 PROCEDURE — 36600 WITHDRAWAL OF ARTERIAL BLOOD: CPT

## 2017-04-29 PROCEDURE — 94003 VENT MGMT INPAT SUBQ DAY: CPT

## 2017-04-29 PROCEDURE — 63600175 PHARM REV CODE 636 W HCPCS: Performed by: HOSPITALIST

## 2017-04-29 PROCEDURE — 63600175 PHARM REV CODE 636 W HCPCS: Performed by: INTERNAL MEDICINE

## 2017-04-29 PROCEDURE — 20000000 HC ICU ROOM

## 2017-04-29 PROCEDURE — 99900026 HC AIRWAY MAINTENANCE (STAT)

## 2017-04-29 RX ADMIN — PROPOFOL 45 MCG/KG/MIN: 10 INJECTION, EMULSION INTRAVENOUS at 11:04

## 2017-04-29 RX ADMIN — INSULIN ASPART 2 UNITS: 100 INJECTION, SOLUTION INTRAVENOUS; SUBCUTANEOUS at 12:04

## 2017-04-29 RX ADMIN — IPRATROPIUM BROMIDE AND ALBUTEROL SULFATE 3 ML: .5; 3 SOLUTION RESPIRATORY (INHALATION) at 07:04

## 2017-04-29 RX ADMIN — IPRATROPIUM BROMIDE AND ALBUTEROL SULFATE 3 ML: .5; 3 SOLUTION RESPIRATORY (INHALATION) at 01:04

## 2017-04-29 RX ADMIN — IPRATROPIUM BROMIDE AND ALBUTEROL SULFATE 3 ML: .5; 3 SOLUTION RESPIRATORY (INHALATION) at 12:04

## 2017-04-29 RX ADMIN — LORAZEPAM 1 MG: 2 INJECTION INTRAMUSCULAR; INTRAVENOUS at 08:04

## 2017-04-29 RX ADMIN — PROPOFOL 40 MCG/KG/MIN: 10 INJECTION, EMULSION INTRAVENOUS at 02:04

## 2017-04-29 RX ADMIN — PROPOFOL 45 MCG/KG/MIN: 10 INJECTION, EMULSION INTRAVENOUS at 01:04

## 2017-04-29 RX ADMIN — PANTOPRAZOLE SODIUM 40 MG: 40 INJECTION, POWDER, FOR SOLUTION INTRAVENOUS at 08:04

## 2017-04-29 RX ADMIN — PROPOFOL 40 MCG/KG/MIN: 10 INJECTION, EMULSION INTRAVENOUS at 09:04

## 2017-04-29 RX ADMIN — CHLORHEXIDINE GLUCONATE 15 ML: 1.2 RINSE ORAL at 08:04

## 2017-04-29 RX ADMIN — INSULIN ASPART 4 UNITS: 100 INJECTION, SOLUTION INTRAVENOUS; SUBCUTANEOUS at 05:04

## 2017-04-29 RX ADMIN — LORAZEPAM 1 MG: 2 INJECTION INTRAMUSCULAR; INTRAVENOUS at 01:04

## 2017-04-29 RX ADMIN — INSULIN ASPART 4 UNITS: 100 INJECTION, SOLUTION INTRAVENOUS; SUBCUTANEOUS at 11:04

## 2017-04-29 RX ADMIN — MORPHINE SULFATE 2 MG: 10 INJECTION INTRAVENOUS at 08:04

## 2017-04-29 RX ADMIN — INSULIN ASPART 4 UNITS: 100 INJECTION, SOLUTION INTRAVENOUS; SUBCUTANEOUS at 06:04

## 2017-04-29 RX ADMIN — MORPHINE SULFATE 2 MG: 10 INJECTION INTRAVENOUS at 02:04

## 2017-04-29 RX ADMIN — FUROSEMIDE 40 MG: 10 INJECTION, SOLUTION INTRAMUSCULAR; INTRAVENOUS at 08:04

## 2017-04-29 RX ADMIN — FUROSEMIDE 40 MG: 10 INJECTION, SOLUTION INTRAMUSCULAR; INTRAVENOUS at 05:04

## 2017-04-29 RX ADMIN — ENOXAPARIN SODIUM 40 MG: 100 INJECTION SUBCUTANEOUS at 04:04

## 2017-04-29 RX ADMIN — SODIUM CHLORIDE: 0.9 INJECTION, SOLUTION INTRAVENOUS at 08:04

## 2017-04-29 RX ADMIN — PROPOFOL 45 MCG/KG/MIN: 10 INJECTION, EMULSION INTRAVENOUS at 06:04

## 2017-04-29 NOTE — PLAN OF CARE
Problem: Ventilation, Mechanical Invasive (Adult)  Goal: Signs and Symptoms of Listed Potential Problems Will be Absent, Minimized or Managed (Ventilation, Mechanical Invasive)  Signs and symptoms of listed potential problems will be absent, minimized or managed by discharge/transition of care (reference Ventilation, Mechanical Invasive (Adult) CPG).   Outcome: Ongoing (interventions implemented as appropriate)  Patient received on Simv PRVC - 12  Peep 5 Fio2 40% PS 15 Shiley 8 trach in tach and secured

## 2017-04-29 NOTE — ASSESSMENT & PLAN NOTE
Patient doesn't appear to be in acute heart failure; checked ECHO with EF=65% but with diastolic dysfunction.  Lasix given with change in resp status back on vent, could not tolerate CPAP

## 2017-04-29 NOTE — PLAN OF CARE
Problem: Patient Care Overview  Goal: Plan of Care Review  Outcome: Ongoing (interventions implemented as appropriate)  Patient remains in ICU requiring mechanical ventilation, settings are as follows: SIMV 40%/400/12/5+/15 PS. Propofol used for patient comfort along with PRN morphine and ativan. VSS. Trach completed by RN. No falls or injuries during shift. No new skin breakdown.

## 2017-04-29 NOTE — PROGRESS NOTES
[facility] Hospital Medicine  Progress Note    Patient Name: Radha Pritchett  MRN: [id number]  Patient Class: IP- Inpatient   Admission Date: 4/6/2017  Length of Stay: 22 days  Attending Physician: Ashley Zazueta MD  Primary Care Provider: Ferny Iglesias MD        Subjective:     Principal Problem:Acute respiratory failure with hypercapnia    HPI:  Ms. Radha Pritchett is a 46 y.o. female with essential hypertension, chronic diastolic heart failure (LVEF 55-60% Dec 2014), moderate protein malnutrition who presents to [facility] ED with complaints of dyspnea today.  She was scheduled to undergo removal of a breast mass today but was found to be hypoxic.  She does report that she has been short of breath but cannot specify how long.  She has also had some non-productive coughing and some chills without fevers.  Further history is otherwise limited at this time.    Hospital Course:   was admitted with pneumonia and acutely decompensated and was intubated and placed on vent. Pt with UE weakness prior to presentation on this admission which may be contributing to inability to extubate. Neurology following and considering possible MG and patient on therapeutic trial of prednisone and Modafinil. Pt s/p bronch that was unrevealing on 4/11. Pt was extubated on 4/15 but was re-intubated later the same day.  Was febrile and stared on Zyvox and zosyn.  CXR shows possible infiltrate. Urine culture +enterococcus.  Completed course and antibiotics dc'd. Blood culture negative  Trach on 4/19 without complications. Pt still having neuro deficits and brain MRI pending. LTAC placement pending.     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 Started on acyclovir but dc'd  After review of previous CT imaging showed temporal abnormalities.  Drop in BP, will start gentle IVF and levophed to maintain MAP >65. Lasix added with change to rate on vent off CPAP.        Interval History: gets  agitated off sedation and making weaning difficult    Review of Systems   Unable to perform ROS: Intubated     Objective:     Vital Signs (Most Recent):  Temp: 99.1 °F (37.3 °C) (04/28/17 1900)  Pulse: 90 (04/28/17 1922)  Resp: 20 (04/28/17 1922)  BP: (!) 81/59 (04/28/17 2023)  SpO2: 99 % (04/28/17 1922) Vital Signs (24h Range):  Temp:  [98.1 °F (36.7 °C)-99.1 °F (37.3 °C)] 99.1 °F (37.3 °C)  Pulse:  [] 90  Resp:  [7-41] 20  SpO2:  [97 %-100 %] 99 %  BP: ()/(47-93) 81/59     Weight: 72 kg (158 lb 11.7 oz)  Body mass index is 44.64 kg/(m^2).    Intake/Output Summary (Last 24 hours) at 04/28/17 2157  Last data filed at 04/28/17 1900   Gross per 24 hour   Intake          3376.65 ml   Output             3155 ml   Net           221.65 ml      Physical Exam   Constitutional: She appears well-developed. No distress.   Eyes: Conjunctivae are normal. Right eye exhibits no discharge. Left eye exhibits no discharge.   Neck:   Trach in place   Cardiovascular: Normal rate, regular rhythm and normal heart sounds.    Pulmonary/Chest: No stridor.   Mechanically ventilated per trach.  Coarse BS bilaterally   Abdominal: Soft. Bowel sounds are normal.   Musculoskeletal: She exhibits no deformity.   Neurological:   Very restless.  Poorly responsive to commands.   Skin: Skin is warm and dry.       Significant Labs:   ABGs:   Recent Labs  Lab 04/28/17  0408   PH 7.428   PCO2 50.8*   HCO3 33.6*   POCSATURATED 99   BE 8     BMP:   Recent Labs  Lab 04/28/17  0126   *      K 3.8      CO2 33*   BUN 40*   CREATININE 0.8   CALCIUM 10.8*     CBC: No results for input(s): WBC, HGB, HCT, PLT in the last 48 hours.    Significant Imaging: I have reviewed all pertinent imaging results/findings within the past 24 hours.    Assessment/Plan:      * Acute respiratory failure with hypercapnia  Mechanical ventilation  Weakness  Metabolic encephalopathy  Fever  Assessment and Plan:  Pt with respiratory failure that required  intubation, possibly secondary to pneumonia vs edema, treated empirically with Rocephin/Azithromycin, ECHO with diastolic dysfunction but normal EF, and Pulm following for vent management. Family reported UE weakness for a few months prior to this admission and Neurology consulted, possible MG and patient undergoing trial of prednisone and pyridostigmine. This weakness likely limited extubation and necessitated reintubation; patient was extubated on 4/16 but had to be re-intubated that same day. Unclear source of encephalopathy and case discussed with Neurology, CT scan of the head was unrevealing. Also, patient completed treatment with Azithromycin and was on Rocephin alone, but she spiked a fever overnight and antibiotics changed to Zosyn/Zyvox.   Trach on 4/19 without complications.  Stopped all ABx's.  Remains on vent per trach.  Having trouble clearing secretions.  Added Glycopyrrolate.   SW consulted for LTAC.        Hematoma of breast  Follow up at later date       Pneumonia of both lungs due to infectious organism  Probably bacterial, but unable to determine.  The patient has a CURB-65 score of 0, and does not meet criteria for healthcare-associated pneumonia initially. CTA chest with bilateral airspace opacification concerning for pneumonia.  Oxygen saturations are improved but requiring a non-rebreather mask; then BIPAP and s/p intubation.  Blood culture NGTD.  Pt completed full course of Azithromycin, and was continued Rocephin until had spike of fever, Abx has been expanded  to Zosyn/Zyvox.  Completed course and Abx dc'd.      Essential hypertension  Drop in BP today after MRI  Hold coreg  Gentle IVF - BUN and calcium rising and likely concentrated   levophed if needed to maintain MAP >65    Chronic diastolic heart failure  Patient doesn't appear to be in acute heart failure; checked ECHO with EF=65% but with diastolic dysfunction.  Lasix given with change in resp status back on vent off cpap    Moderate  protein malnutrition  Tolerating tube feeds.      Breast mass, left  Planned for surgery, seen by Dr. Sandoval (his patient) and plan to reschedule as outpatient. It is reportedly a hematoma by biopsy, but suspicion for CA not completely ruled out given paraneoplastic syndrome a consideration.      Hypokalemia  Replace as necessary.      Counseling regarding advanced directives and goals of care  Extensive conversations done with family. Pt to remain full code. Pt is still  but estranged from the  and reportedly the grandmother is the major decision maker.       Hyperglycemia  Hyperglycemia has continued after stopping steroids.  Add nightly Levemir.  HgA1c 7.3%.  No apparent history of DM prior to admission.      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

## 2017-04-29 NOTE — ASSESSMENT & PLAN NOTE
Hyperglycemia has continued after stopping steroids.  Add nightly Levemir, increased to 15 units Qhs  HgA1c 7.3%.  No apparent history of DM prior to admission.

## 2017-04-29 NOTE — PROGRESS NOTES
Results for PINKY OSUNA (MRN [id number]) as of 4/29/2017 05:44   Ref. Range 4/29/2017 05:13   POC PH Latest Ref Range: 7.35 - 7.45  7.358   POC PCO2 Latest Ref Range: 35 - 45 mmHg 54.9 (H)   POC PO2 Latest Ref Range: 80 - 100 mmHg 62 (L)   POC BE Latest Ref Range: -2 to 2 mmol/L 4   POC HCO3 Latest Ref Range: 24 - 28 mmol/L 30.9 (H)   POC SATURATED O2 Latest Ref Range: 95 - 100 % 90 (L)   POC TCO2 Latest Ref Range: 23 - 27 mmol/L 33 (H)   FiO2 Unknown 40   Vt Unknown 400   PiP Unknown 23   PEEP Unknown 5   Sample Unknown ARTERIAL   DelSys Unknown Adult Vent   Allens Test Unknown Pass   Site Unknown LR   Mode Unknown SIMV

## 2017-04-29 NOTE — ASSESSMENT & PLAN NOTE
Coreg held with normal-low BP  IVF held, edema on CXR and lasix given  levophed if needed to maintain MAP >65

## 2017-04-29 NOTE — SUBJECTIVE & OBJECTIVE
Interval History: sedated, on vent     Review of Systems   Unable to perform ROS: Intubated     Objective:     Vital Signs (Most Recent):  Temp: 99.6 °F (37.6 °C) (04/29/17 1130)  Pulse: 85 (04/29/17 1500)  Resp: 12 (04/29/17 1500)  BP: 106/66 (04/29/17 1500)  SpO2: 98 % (04/29/17 1500) Vital Signs (24h Range):  Temp:  [98.7 °F (37.1 °C)-99.6 °F (37.6 °C)] 99.6 °F (37.6 °C)  Pulse:  [] 85  Resp:  [12-49] 12  SpO2:  [95 %-100 %] 98 %  BP: ()/() 106/66     Weight: 74.3 kg (163 lb 12.8 oz)  Body mass index is 46.07 kg/(m^2).    Intake/Output Summary (Last 24 hours) at 04/29/17 1508  Last data filed at 04/29/17 1400   Gross per 24 hour   Intake          3676.45 ml   Output             3485 ml   Net           191.45 ml      Physical Exam   Constitutional: She appears well-developed. No distress.   Eyes: Conjunctivae are normal. Right eye exhibits no discharge. Left eye exhibits no discharge.   Neck:   Trach in place   Cardiovascular: Normal rate, regular rhythm and normal heart sounds.    Pulmonary/Chest: No stridor. No respiratory distress. She has no wheezes.   Mechanically ventilated per trach.     Abdominal: Soft. Bowel sounds are normal.   Musculoskeletal: She exhibits no deformity.   Neurological:   Very restless.  Poorly responsive to commands.   Skin: Skin is warm and dry.       Significant Labs:   BMP:   Recent Labs  Lab 04/29/17  0135   *      K 3.7   CL 97   CO2 32*   BUN 46*   CREATININE 0.8   CALCIUM 10.4     CBC: No results for input(s): WBC, HGB, HCT, PLT in the last 48 hours.  POCT Glucose:   Recent Labs  Lab 04/29/17  0001 04/29/17  0556 04/29/17  1155   POCTGLUCOSE 224* 221* 231*       Significant Imaging: I have reviewed all pertinent imaging results/findings within the past 24 hours.

## 2017-04-29 NOTE — PROGRESS NOTES
Progress Note  Pulmonology    Admit Date: 4/6/2017   LOS: 23 days     SUBJECTIVE: resp distress     Follow-up For:  Vent management. Patient on vent via trach. Off sedation. Agitated.  Opens her eyes - moves ext - no commands   Continuous Infusions:   sodium chloride 0.9% 75 mL/hr at 04/29/17 0802    norepinephrine bitartrate-D5W Stopped (04/27/17 2055)    propofol 40 mcg/kg/min (04/29/17 0927)     Scheduled Meds:   albuterol-ipratropium 2.5mg-0.5mg/3mL  3 mL Nebulization Q6H    carvedilol  3.125 mg Oral BID    chlorhexidine  15 mL Mouth/Throat BID    enoxaparin  40 mg Subcutaneous Daily    furosemide  40 mg Intravenous BID    insulin detemir  10 Units Subcutaneous QHS    pantoprazole  40 mg Intravenous Daily    scopolamine  1 patch Transdermal Q3 Days     Review of Systems:  Review of systems not obtained due to patient agitated and not very responsive.    OBJECTIVE:     Vital Signs Range (Last 24H):  Temp:  [98.7 °F (37.1 °C)-99.4 °F (37.4 °C)]   Pulse:  []   Resp:  [12-49]   BP: ()/(47-85)   SpO2:  [95 %-100 %]     I & O (Last 24H):    Intake/Output Summary (Last 24 hours) at 04/29/17 1127  Last data filed at 04/29/17 0900   Gross per 24 hour   Intake          3470.55 ml   Output             2935 ml   Net           535.55 ml     Physical Exam:  General: no distress. No distress but agitated. UE restrained but moves her LE more.  Neck: no carotid bruit  Breast: left breast mass - hard, non-mobile  Lungs:  Shallow, decreased bs bilaterally rhonchi. No wheezing.   Heart: regular rate and rhythm and no murmur  Abdomen: soft, non-tender non-distended; bowel sounds diminished and right nasal feeding tube and rectal tube.  Extremities: no cyanosis  or clubbing but has pedal edema and SCDs to LE. RUE picc line.  Neurologic: minimally responsive    Laboratory:  CBC:   No results for input(s): WBC, RBC, HGB, HCT, PLT, MCV, MCH, MCHC in the last 168 hours.  BMP:     Recent Labs  Lab 04/29/17  3044    *      K 3.7   CL 97   CO2 32*   BUN 46*   CREATININE 0.8   CALCIUM 10.4        Vent Mode: SIMV (PRVC) + PS  Oxygen Concentration (%):  [] 40  Resp Rate Total:  [12 br/min-24 br/min] 12 br/min  Vt Set:  [400 mL] 400 mL  PEEP/CPAP:  [5 cmH20] 5 cmH20  Pressure Support:  [15 cmH20] 15 cmH20  Mean Airway Pressure:  [7.5 deS61-42.6 cmH20] 8.5 cmH20    ABGs:     Recent Labs  Lab 04/29/17  0513   PH 7.358   PCO2 54.9*   PO2 62*   HCO3 30.9*   POCSATURATED 90*   BE 4     Microbiology Results (last 7 days)     ** No results found for the last 168 hours. **        Chest X-Ray:     Findings: Cardiac silhouette may be mildly enlarged.  Patient is rotated to the left, limiting comparison.  There is persistent opacity in the left lung base which appears grossly similar prior.  Blunting of the loss costophrenic angle suggest small pleural effusion.  There are some ill-defined interstitial and faint airspace opacities in the right lung base which may be slightly worsened from prior.  Findings may represent multifocal infectious/inflammatory process versus edema.  Right PICC, enteric tube, and tracheostomy tube appear unchanged.  No definite pneumothorax appreciated.      ASSESSMENT/PLAN:     1) Acute respiratory failure - presented with weakness of UE and pneumonia. Unable to wean off vent. S/p trach. Fair gas exchanged. On rate 12 via trach. PS 10. Agitated. No cxr this am. Rhonchi on exam. Will increase ps a bit and try to decrease rate. Bronchodilators. LTAC?  2) Weakness - UE. Neurology following. ??MG - MG panel was negative. Steroids stopped. ?eiotlogy to weakness. UE remain weak.  3) History of HTN  4) Anemia - stable.    Pt onSIMV 40%/400/12/5+/15  Prognosis remains very poor.    Bird Vega

## 2017-04-29 NOTE — PROGRESS NOTES
[facility] Hospital Medicine  Progress Note    Patient Name: Radha Pritchett  MRN: [id number]  Patient Class: IP- Inpatient   Admission Date: 4/6/2017  Length of Stay: 23 days  Attending Physician: Ashley Zazueta MD  Primary Care Provider: Ferny Iglesias MD        Subjective:     Principal Problem:Acute respiratory failure with hypercapnia    HPI:  Ms. Radha Pritchett is a 46 y.o. female with essential hypertension, chronic diastolic heart failure (LVEF 55-60% Dec 2014), moderate protein malnutrition who presents to [facility] ED with complaints of dyspnea today.  She was scheduled to undergo removal of a breast mass today but was found to be hypoxic.  She does report that she has been short of breath but cannot specify how long.  She has also had some non-productive coughing and some chills without fevers.  Further history is otherwise limited at this time.    Hospital Course:   was admitted with pneumonia and acutely decompensated and was intubated and placed on vent. Pt with UE weakness prior to presentation on this admission which may be contributing to inability to extubate. Neurology following and considering possible MG and patient on therapeutic trial of prednisone and Modafinil. Pt s/p bronch that was unrevealing on 4/11. Pt was extubated on 4/15 but was re-intubated later the same day.  Was febrile and stared on Zyvox and zosyn.  CXR shows possible infiltrate. Urine culture +enterococcus.  Completed course and antibiotics dc'd. Blood culture negative  Trach on 4/19 without complications.      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 Started on acyclovir but dc'd  After review of previous CT imaging showed temporal abnormalities.    Brief drop in SBP and IVF were given, now dc'd and on lasix IV BID with changes to resp status and back on ventilator.    Pt does not tolerate being off sedation, weaning difficult      Interval History: sedated, on vent      Review of Systems   Unable to perform ROS: Intubated     Objective:     Vital Signs (Most Recent):  Temp: 99.6 °F (37.6 °C) (04/29/17 1130)  Pulse: 85 (04/29/17 1500)  Resp: 12 (04/29/17 1500)  BP: 106/66 (04/29/17 1500)  SpO2: 98 % (04/29/17 1500) Vital Signs (24h Range):  Temp:  [98.7 °F (37.1 °C)-99.6 °F (37.6 °C)] 99.6 °F (37.6 °C)  Pulse:  [] 85  Resp:  [12-49] 12  SpO2:  [95 %-100 %] 98 %  BP: ()/() 106/66     Weight: 74.3 kg (163 lb 12.8 oz)  Body mass index is 46.07 kg/(m^2).    Intake/Output Summary (Last 24 hours) at 04/29/17 1508  Last data filed at 04/29/17 1400   Gross per 24 hour   Intake          3676.45 ml   Output             3485 ml   Net           191.45 ml      Physical Exam   Constitutional: She appears well-developed. No distress.   Eyes: Conjunctivae are normal. Right eye exhibits no discharge. Left eye exhibits no discharge.   Neck:   Trach in place   Cardiovascular: Normal rate, regular rhythm and normal heart sounds.    Pulmonary/Chest: No stridor. No respiratory distress. She has no wheezes.   Mechanically ventilated per trach.     Abdominal: Soft. Bowel sounds are normal.   Musculoskeletal: She exhibits no deformity.   Neurological:   Very restless.  Poorly responsive to commands.   Skin: Skin is warm and dry.       Significant Labs:   BMP:   Recent Labs  Lab 04/29/17  0135   *      K 3.7   CL 97   CO2 32*   BUN 46*   CREATININE 0.8   CALCIUM 10.4     CBC: No results for input(s): WBC, HGB, HCT, PLT in the last 48 hours.  POCT Glucose:   Recent Labs  Lab 04/29/17  0001 04/29/17  0556 04/29/17  1155   POCTGLUCOSE 224* 221* 231*       Significant Imaging: I have reviewed all pertinent imaging results/findings within the past 24 hours.    Assessment/Plan:      * Acute respiratory failure with hypercapnia  Mechanical ventilation  Weakness  Metabolic encephalopathy  Fever  Assessment and Plan:  Pt with respiratory failure that required intubation, possibly  secondary to pneumonia vs edema, treated empirically with Rocephin/Azithromycin, ECHO with diastolic dysfunction but normal EF, and Pulm following for vent management. Family reported UE weakness for a few months prior to this admission and Neurology consulted, possible MG and patient undergoing trial of prednisone and pyridostigmine. This weakness likely limited extubation and necessitated reintubation; patient was extubated on 4/16 but had to be re-intubated that same day. Unclear source of encephalopathy and case discussed with Neurology, CT scan of the head was unrevealing. Also, patient completed treatment with Azithromycin and was on Rocephin alone, but she spiked a fever overnight and antibiotics changed to Zosyn/Zyvox.   Trach on 4/19 without complications.  Stopped all ABx's.  Remains on vent per trach.  Having trouble clearing secretions.  Added Glycopyrrolate.   SW consulted for LTAC.        Hematoma of breast  Follow up at later date       Pneumonia of both lungs due to infectious organism  Probably bacterial, but unable to determine.  The patient has a CURB-65 score of 0, and does not meet criteria for healthcare-associated pneumonia initially. CTA chest with bilateral airspace opacification concerning for pneumonia.  Oxygen saturations are improved but requiring a non-rebreather mask; then BIPAP and s/p intubation.  Blood culture NGTD.  Pt completed full course of Azithromycin, and was continued Rocephin until had spike of fever, Abx has been expanded  to Zosyn/Zyvox.  Completed course and Abx dc'd.      Essential hypertension  Coreg held with normal-low BP  IVF held, edema on CXR and lasix given  levophed if needed to maintain MAP >65    Chronic diastolic heart failure  Patient doesn't appear to be in acute heart failure; checked ECHO with EF=65% but with diastolic dysfunction.  Lasix given with change in resp status back on vent, could not tolerate CPAP    Moderate protein malnutrition  Tolerating  tube feeds.      Breast mass, left  Planned for surgery, seen by Dr. Sandoval (his patient) and plan to reschedule as outpatient. It is reportedly a hematoma by biopsy, but suspicion for CA not completely ruled out given paraneoplastic syndrome a consideration.      Hypokalemia  Replace as necessary.      Counseling regarding advanced directives and goals of care  Extensive conversations done with family. Pt to remain full code. Pt is still  but estranged from the  and reportedly the grandmother is the major decision maker.       Hyperglycemia  Hyperglycemia has continued after stopping steroids.  Add nightly Levemir, increased to 15 units Qhs  HgA1c 7.3%.  No apparent history of DM prior to admission.      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

## 2017-04-29 NOTE — SUBJECTIVE & OBJECTIVE
Interval History: gets agitated off sedation and making weaning difficult    Review of Systems   Unable to perform ROS: Intubated     Objective:     Vital Signs (Most Recent):  Temp: 99.1 °F (37.3 °C) (04/28/17 1900)  Pulse: 90 (04/28/17 1922)  Resp: 20 (04/28/17 1922)  BP: (!) 81/59 (04/28/17 2023)  SpO2: 99 % (04/28/17 1922) Vital Signs (24h Range):  Temp:  [98.1 °F (36.7 °C)-99.1 °F (37.3 °C)] 99.1 °F (37.3 °C)  Pulse:  [] 90  Resp:  [7-41] 20  SpO2:  [97 %-100 %] 99 %  BP: ()/(47-93) 81/59     Weight: 72 kg (158 lb 11.7 oz)  Body mass index is 44.64 kg/(m^2).    Intake/Output Summary (Last 24 hours) at 04/28/17 2157  Last data filed at 04/28/17 1900   Gross per 24 hour   Intake          3376.65 ml   Output             3155 ml   Net           221.65 ml      Physical Exam   Constitutional: She appears well-developed. No distress.   Eyes: Conjunctivae are normal. Right eye exhibits no discharge. Left eye exhibits no discharge.   Neck:   Trach in place   Cardiovascular: Normal rate, regular rhythm and normal heart sounds.    Pulmonary/Chest: No stridor.   Mechanically ventilated per trach.  Coarse BS bilaterally   Abdominal: Soft. Bowel sounds are normal.   Musculoskeletal: She exhibits no deformity.   Neurological:   Very restless.  Poorly responsive to commands.   Skin: Skin is warm and dry.       Significant Labs:   ABGs:   Recent Labs  Lab 04/28/17  0408   PH 7.428   PCO2 50.8*   HCO3 33.6*   POCSATURATED 99   BE 8     BMP:   Recent Labs  Lab 04/28/17  0126   *      K 3.8      CO2 33*   BUN 40*   CREATININE 0.8   CALCIUM 10.8*     CBC: No results for input(s): WBC, HGB, HCT, PLT in the last 48 hours.    Significant Imaging: I have reviewed all pertinent imaging results/findings within the past 24 hours.

## 2017-04-30 LAB
ALLENS TEST: ABNORMAL
DELSYS: ABNORMAL
ERYTHROCYTE [SEDIMENTATION RATE] IN BLOOD BY WESTERGREN METHOD: 10 MM/H
FIO2: 40
HCO3 UR-SCNC: 36.8 MMOL/L (ref 24–28)
MODE: ABNORMAL
PCO2 BLDA: 58.5 MMHG (ref 35–45)
PEEP: 5
PH SMN: 7.41 [PH] (ref 7.35–7.45)
PIP: 27
PO2 BLDA: 111 MMHG (ref 80–100)
POC BE: 10 MMOL/L
POC SATURATED O2: 98 % (ref 95–100)
POC TCO2: 39 MMOL/L (ref 23–27)
POCT GLUCOSE: 227 MG/DL (ref 70–110)
POCT GLUCOSE: 229 MG/DL (ref 70–110)
POCT GLUCOSE: 321 MG/DL (ref 70–110)
POCT GLUCOSE: 452 MG/DL (ref 70–110)
POCT GLUCOSE: 457 MG/DL (ref 70–110)
PS: 15
SAMPLE: ABNORMAL
SITE: ABNORMAL
SP02: 100
VT: 400

## 2017-04-30 PROCEDURE — 63600175 PHARM REV CODE 636 W HCPCS: Performed by: PSYCHIATRY & NEUROLOGY

## 2017-04-30 PROCEDURE — 25000242 PHARM REV CODE 250 ALT 637 W/ HCPCS: Performed by: INTERNAL MEDICINE

## 2017-04-30 PROCEDURE — 63600175 PHARM REV CODE 636 W HCPCS: Performed by: INTERNAL MEDICINE

## 2017-04-30 PROCEDURE — 94761 N-INVAS EAR/PLS OXIMETRY MLT: CPT

## 2017-04-30 PROCEDURE — 94640 AIRWAY INHALATION TREATMENT: CPT

## 2017-04-30 PROCEDURE — C9113 INJ PANTOPRAZOLE SODIUM, VIA: HCPCS | Performed by: HOSPITALIST

## 2017-04-30 PROCEDURE — 99232 SBSQ HOSP IP/OBS MODERATE 35: CPT | Mod: ,,, | Performed by: PSYCHIATRY & NEUROLOGY

## 2017-04-30 PROCEDURE — 82803 BLOOD GASES ANY COMBINATION: CPT

## 2017-04-30 PROCEDURE — 36600 WITHDRAWAL OF ARTERIAL BLOOD: CPT

## 2017-04-30 PROCEDURE — 25000003 PHARM REV CODE 250: Performed by: INTERNAL MEDICINE

## 2017-04-30 PROCEDURE — 27000221 HC OXYGEN, UP TO 24 HOURS

## 2017-04-30 PROCEDURE — 63600175 PHARM REV CODE 636 W HCPCS: Performed by: HOSPITALIST

## 2017-04-30 PROCEDURE — 20000000 HC ICU ROOM

## 2017-04-30 PROCEDURE — 99900026 HC AIRWAY MAINTENANCE (STAT)

## 2017-04-30 PROCEDURE — 99900035 HC TECH TIME PER 15 MIN (STAT)

## 2017-04-30 PROCEDURE — 25000003 PHARM REV CODE 250: Performed by: PSYCHIATRY & NEUROLOGY

## 2017-04-30 PROCEDURE — 94003 VENT MGMT INPAT SUBQ DAY: CPT

## 2017-04-30 RX ADMIN — MORPHINE SULFATE 2 MG: 10 INJECTION INTRAVENOUS at 09:04

## 2017-04-30 RX ADMIN — CHLORHEXIDINE GLUCONATE 15 ML: 1.2 RINSE ORAL at 08:04

## 2017-04-30 RX ADMIN — LORAZEPAM 1 MG: 2 INJECTION INTRAMUSCULAR; INTRAVENOUS at 10:04

## 2017-04-30 RX ADMIN — LORAZEPAM 1 MG: 2 INJECTION INTRAMUSCULAR; INTRAVENOUS at 12:04

## 2017-04-30 RX ADMIN — IPRATROPIUM BROMIDE AND ALBUTEROL SULFATE 3 ML: .5; 3 SOLUTION RESPIRATORY (INHALATION) at 12:04

## 2017-04-30 RX ADMIN — METHYLPREDNISOLONE SODIUM SUCCINATE: 1 INJECTION, POWDER, LYOPHILIZED, FOR SOLUTION INTRAMUSCULAR; INTRAVENOUS at 05:04

## 2017-04-30 RX ADMIN — LORAZEPAM 1 MG: 2 INJECTION INTRAMUSCULAR; INTRAVENOUS at 09:04

## 2017-04-30 RX ADMIN — PROPOFOL 45 MCG/KG/MIN: 10 INJECTION, EMULSION INTRAVENOUS at 04:04

## 2017-04-30 RX ADMIN — ENOXAPARIN SODIUM 40 MG: 100 INJECTION SUBCUTANEOUS at 05:04

## 2017-04-30 RX ADMIN — IPRATROPIUM BROMIDE AND ALBUTEROL SULFATE 3 ML: .5; 3 SOLUTION RESPIRATORY (INHALATION) at 07:04

## 2017-04-30 RX ADMIN — PROPOFOL 20 MCG/KG/MIN: 10 INJECTION, EMULSION INTRAVENOUS at 11:04

## 2017-04-30 RX ADMIN — PROPOFOL 40 MCG/KG/MIN: 10 INJECTION, EMULSION INTRAVENOUS at 09:04

## 2017-04-30 RX ADMIN — INSULIN ASPART 10 UNITS: 100 INJECTION, SOLUTION INTRAVENOUS; SUBCUTANEOUS at 05:04

## 2017-04-30 RX ADMIN — PANTOPRAZOLE SODIUM 40 MG: 40 INJECTION, POWDER, FOR SOLUTION INTRAVENOUS at 08:04

## 2017-04-30 RX ADMIN — INSULIN ASPART 2 UNITS: 100 INJECTION, SOLUTION INTRAVENOUS; SUBCUTANEOUS at 12:04

## 2017-04-30 RX ADMIN — IPRATROPIUM BROMIDE AND ALBUTEROL SULFATE 3 ML: .5; 3 SOLUTION RESPIRATORY (INHALATION) at 01:04

## 2017-04-30 RX ADMIN — FUROSEMIDE 40 MG: 10 INJECTION, SOLUTION INTRAMUSCULAR; INTRAVENOUS at 05:04

## 2017-04-30 RX ADMIN — INSULIN ASPART 4 UNITS: 100 INJECTION, SOLUTION INTRAVENOUS; SUBCUTANEOUS at 06:04

## 2017-04-30 RX ADMIN — INSULIN ASPART 8 UNITS: 100 INJECTION, SOLUTION INTRAVENOUS; SUBCUTANEOUS at 11:04

## 2017-04-30 RX ADMIN — LORAZEPAM 1 MG: 2 INJECTION INTRAMUSCULAR; INTRAVENOUS at 05:04

## 2017-04-30 RX ADMIN — FUROSEMIDE 40 MG: 10 INJECTION, SOLUTION INTRAMUSCULAR; INTRAVENOUS at 08:04

## 2017-04-30 NOTE — PROGRESS NOTES
[facility] Hospital Medicine  Progress Note     Patient Name: Radha Pritchett  MRN: [id number]  Patient Class: IP- Inpatient   Admission Date: 4/6/2017  Attending Physician: Lakesha Greer MD  Primary Care Provider: Ferny Iglesias MD           Subjective:      Principal Problem:Acute respiratory failure with hypercapnia     HPI:  Ms. Radha Pritchett is a 46 y.o. female with essential hypertension, chronic diastolic heart failure (LVEF 55-60% Dec 2014), moderate protein malnutrition who presents to [facility] ED with complaints of dyspnea today. She was scheduled to undergo removal of a breast mass today but was found to be hypoxic. She does report that she has been short of breath but cannot specify how long. She has also had some non-productive coughing and some chills without fevers. Further history is otherwise limited at this time.     Hospital Course:   was admitted with pneumonia and acutely decompensated and was intubated and placed on vent. Pt with UE weakness prior to presentation on this admission which may be contributing to inability to extubate. Neurology following and considering possible MG and patient on therapeutic trial of prednisone and pyridostigmine. Pt s/p bronch that was unrevealing on 4/11. Pt was extubated on 4/15 but was re-intubated later the same day.     Interval History: Pt was failed extubation today and had to be re-intubated this afternoon.     Review of Systems   Unable to perform ROS: Intubated      Objective:      Vital Signs: Reviewed     Intake/Output Summary (Last 24 hours): Reviewed    Physical Exam   Constitutional: She appears well-developed and well-nourished.   obese   HENT:   Head: Normocephalic and atraumatic.   Eyes: Conjunctivae are normal.   Neck: Neck supple.   Cardiovascular: Normal rate and regular rhythm.   Anterior chest with hard, large left breast mass that is palpable.   Pulmonary/Chest:   Poor respiratory effort with decreased breath  sound bilaterally and inspiratory crackles    Abdominal: Soft. Bowel sounds are normal. She exhibits no distension. There is no tenderness.   Musculoskeletal: Normal range of motion. She exhibits no edema.   Neurological:   Sedated on vent.   Skin: Skin is warm and dry. No erythema.   Nursing note and vitals reviewed.        Significant Labs: All pertinent labs within the past 24 hours have been reviewed.     Significant Imaging: I have reviewed and interpreted all pertinent imaging results/findings within the past 24 hours.     Assessment/Plan:       * Acute respiratory failure with hypercapnia  Mechanical ventilation  Weakness  Metabolic encephalopathy  Assessment and Plan:  Pt with respiratory failure that required intubation, possibly secondary to pneumonia vs edema, treated empirically with Rocephin/Azithromycin, ECHO with diastolic dysfunction but normal EF, and Pulm following for vent management. Family reported UE weakness for a few months prior to this admission and Neurology consulted, possible MG and patient undergoing trial of prednisone and pyridostigmine with confirmatory lab still pending. This weakness likely limited extubation and necessitated reintubation; patient was extubated on 4/15 but had to be re-intubated that same day. Unclear source of encephalopathy and case discussed with Neurology, CT scan of the head was unrevealing. Appreciate Neurology and Pulmonary input.     Pneumonia of both lungs due to infectious organism  The patient has a CURB-65 score of 0, and does not meet criteria for healthcare-associated pneumonia initially. CTA chest with bilateral airspace opacification concerning for pneumonia. Oxygen saturations are improved but requiring a non-rebreather mask; then BIPAP and s/p intubation. Blood culture NGTD. Pt completed full course of Azithromycin, and was continued Rocephin.     Sepsis  This patient met criteria for sepsis given tachycardia, tachypnea, and pneumonia; there is no  evidence of endo-organ dysfunction. Blood cultures are NGTD and repeat studies done; antibiotics as discussed above.     Essential hypertension  Pt's BP on low end of normal, and hydrochlorothiazide and lisinopril stopped. Hold parameter placed on Carvedilol.     Chronic diastolic heart failure  Patient doesn't appear to be in acute heart failure; checked ECHO with EF=65% but with diastolic dysfunction.     Moderate protein malnutrition  Tolerating tube feeds.     Breast mass, left  Planned for surgery, seen by Dr. Sandoval (his patient) and plan to reschedule as outpatient. It is reportedly a hematoma by biopsy, but suspicion for CA not completely ruled out given paraneoplastic syndrome a consideration.     Hypokalemia  Will replete, monitor.     Counseling regarding advanced directives and goals of care  Extensive conversations done with family. Pt to remain full code. Pt is still  but estranged from the  and reportedly the grandmother is the major decision maker. Will consult palliative care to help clarify the legal decision maker and to continue conversations in regards to goals of care given if the patient makes no clinical progress soon, decisions regarding trach, etc are soon looming in the horizon. Prognosis diminishing daily with continued lack of progress.        VTE Risk Mitigation           Ordered       enoxaparin injection 40 mg Daily    Route: Subcutaneous          04/06/17 2202       Medium Risk of VTE Once      04/06/17 2202            Critical care time spent: 35 minutes.     Lakesha Greer MD  Department of Hospital Medicine   [facility]

## 2017-04-30 NOTE — PROGRESS NOTES
Progress Note  Pulmonology    Admit Date: 4/6/2017   LOS: 24 days     SUBJECTIVE: resp distress     Follow-up For:  Vent management. Patient on vent via trach. Off sedation. Agitated.  Opens her eyes - moves ext - no commands   Continuous Infusions:   norepinephrine bitartrate-D5W Stopped (04/27/17 2055)    propofol 44.907 mcg/kg/min (04/30/17 0600)     Scheduled Meds:   albuterol-ipratropium 2.5mg-0.5mg/3mL  3 mL Nebulization Q6H    carvedilol  3.125 mg Oral BID    chlorhexidine  15 mL Mouth/Throat BID    enoxaparin  40 mg Subcutaneous Daily    furosemide  40 mg Intravenous BID    insulin detemir  15 Units Subcutaneous QHS    methylPREDNISolone (SOLU-Medrol) IVPB (doses > 250 mg)   Intravenous Daily    pantoprazole  40 mg Intravenous Daily    scopolamine  1 patch Transdermal Q3 Days     Review of Systems:  Review of systems not obtained due to patient agitated and not very responsive.    OBJECTIVE:     Vital Signs Range (Last 24H):  Temp:  [97.9 °F (36.6 °C)-99.6 °F (37.6 °C)]   Pulse:  []   Resp:  [10-36]   BP: ()/()   SpO2:  [96 %-100 %]     I & O (Last 24H):    Intake/Output Summary (Last 24 hours) at 04/30/17 0720  Last data filed at 04/30/17 0600   Gross per 24 hour   Intake          2979.79 ml   Output             3625 ml   Net          -645.21 ml     Physical Exam:  General: no distress. No distress but agitated. UE restrained but moves her LE more.  Neck: no carotid bruit  Breast: left breast mass - hard, non-mobile  Lungs:  Shallow, decreased bs bilaterally rhonchi. No wheezing.   Heart: regular rate and rhythm and no murmur  Abdomen: soft, non-tender non-distended; bowel sounds diminished and right nasal feeding tube and rectal tube.  Extremities: no cyanosis  or clubbing but has pedal edema and SCDs to LE. RUE picc line.  Neurologic: minimally responsive    Laboratory:  CBC:   No results for input(s): WBC, RBC, HGB, HCT, PLT, MCV, MCH, MCHC in the last 168 hours.  BMP:      Recent Labs  Lab 04/29/17  0135   *      K 3.7   CL 97   CO2 32*   BUN 46*   CREATININE 0.8   CALCIUM 10.4        Vent Mode: SIMV (PRVC) + PS  Oxygen Concentration (%):  [] 40  Resp Rate Total:  [9 br/min-33 br/min] 15 br/min  Vt Set:  [400 mL] 400 mL  PEEP/CPAP:  [5 cmH20] 5 cmH20  Pressure Support:  [15 cmH20] 15 cmH20  Mean Airway Pressure:  [5.8 gwJ68-63.1 cmH20] 9 cmH20    ABGs:     Recent Labs  Lab 04/30/17  0422   PH 7.407   PCO2 58.5*   PO2 111*   HCO3 36.8*   POCSATURATED 98   BE 10     Microbiology Results (last 7 days)     ** No results found for the last 168 hours. **        Chest X-Ray:     Tracheostomy tube, right-sided PICC line and enteric catheter in similar position. Cardiopulmonary status is unchanged noting persistent abnormal pulmonary parenchymal attenuation, left greater than right, suggesting edema, hemorrhage, pneumonia or a noninfectious inflammatory process. No pneumothorax.      ASSESSMENT/PLAN:     1) Acute respiratory failure - presented with weakness of UE and pneumonia. Unable to wean off vent. S/p trach. Fair gas exchanged. On rate 12 via trach. PS 10. Agitated. No cxr this am. Rhonchi on exam. Will increase ps a bit and try to decrease rate. Bronchodilators. LTAC?  2) Weakness - UE. Neurology following. ??MG - MG panel was negative. Steroids stopped. ?eiotlogy to weakness. UE remain weak.  3) History of HTN  4) Anemia - stable.    Remains much the same ; did not tolerate weaning process and now back on full support ; SIMV 40%/400/10/5+/15 pressure support.Started on steroids yesterday . Will follow.    Bird Vega

## 2017-04-30 NOTE — PROGRESS NOTES
SBT initiated on the patient HR 85, RR 13, BP 88/50 sats 94%. Pt placed back on SIMV due to her HR increasing to 105, and the BP increasing to 146/72. Pt in and out of states of agitation. Will continue to monitor.

## 2017-04-30 NOTE — PLAN OF CARE
Problem: Patient Care Overview  Goal: Plan of Care Review  Outcome: Ongoing (interventions implemented as appropriate)  Patient remains in ICU requiring mechanical ventilation, settings are as follows: SIMV 40%/400/10/5+/15 PS. Propofol used for patient comfort along with PRN morphine and ativan. VSS. Trach completed by RN. Restraints remain in place for patient safety. No falls or injuries during shift. No new skin breakdown.

## 2017-04-30 NOTE — PLAN OF CARE
Problem: Patient Care Overview  Goal: Plan of Care Review  Outcome: Ongoing (interventions implemented as appropriate)  No falls or new skin breakdown throughout shift. TMAX 99.6. Patient continues to pull at medical devices despite teaching. Unable to wean vent settings. Plan of care reviewed with patient.

## 2017-04-30 NOTE — PLAN OF CARE
Problem: Patient Care Overview  Goal: Plan of Care Review  Outcome: Ongoing (interventions implemented as appropriate)  Unable to wean patient from vent today. No falls or new skin breakdown throughout shift. Afebrile. Patient still pulling at tubing despite teaching. CBG max 457 today. Supplemental insulin given and MD Zazueta notified. Pans for MRI tomorrow. Plan of care reviewed with patient and family at bedside.

## 2017-04-30 NOTE — PROGRESS NOTES
[facility].   Results for PINKY OSUNA (MRN [id number]) as of 4/30/2017 05:32   Ref. Range 4/30/2017 04:22   POC PH Latest Ref Range: 7.35 - 7.45  7.407   POC PCO2 Latest Ref Range: 35 - 45 mmHg 58.5 (HH)   POC PO2 Latest Ref Range: 80 - 100 mmHg 111 (H)   POC BE Latest Ref Range: -2 to 2 mmol/L 10   POC HCO3 Latest Ref Range: 24 - 28 mmol/L 36.8 (H)   POC SATURATED O2 Latest Ref Range: 95 - 100 % 98   POC TCO2 Latest Ref Range: 23 - 27 mmol/L 39 (H)   FiO2 Unknown 40   Vt Unknown 400   PiP Unknown 27   PEEP Unknown 5   Sample Unknown ARTERIAL   DelSys Unknown Adult Vent   Allens Test Unknown Pass   Site Unknown LR   Mode Unknown SIMV

## 2017-04-30 NOTE — PROGRESS NOTES
[facility]  [place]  [place] Note    Patient Name: Radha Pritchett  MRN: [id number]  Admission Date: 4/6/2017  Hospital Length of Stay: 24 days  Code Status: Full Code   Attending Provider: Ashley Zazueta MD  Primary Care Physician: Ferny Iglesias MD   Principal Problem:Acute respiratory failure with hypercapnia    Subjective:     Interval History: 47 y/o female with medical Hx as listed below who was admitted for shortness of breath. Pt was found to have a pneumonia. She decompensasated and is now intubated. Pt has been difficult to to wean off ventilator. It has been reported that pt has been experiencing upper extremity weakness for a while but is perhaps a generalized weakness chronically. No other details at his moment. Pt also has Hx of a left breast mass that according to pathology is benign, possibly a hematoma. Pt has at baseline a learning difficulty.     -4/27/17: No new changes remains on vent. Opens eyes upon verbal stimulation and moves extremities.    -4/30/17: Pt continues with weakness in UE's although able to move then spotnaeously. Unable to wean from vent.  Pt has Hx of chronic weakness in UE's and chronic hypercapnia.    Current Neurological Medications:    Current Facility-Administered Medications   Medication Dose Route Frequency Provider Last Rate Last Dose    acetaminophen tablet 500 mg  500 mg Oral Q6H PRN Easton Aaron MD   500 mg at 04/16/17 0823    albuterol-ipratropium 2.5mg-0.5mg/3mL nebulizer solution 3 mL  3 mL Nebulization Q4H PRN Easton Aaron MD   3 mL at 04/24/17 1415    albuterol-ipratropium 2.5mg-0.5mg/3mL nebulizer solution 3 mL  3 mL Nebulization Q6H Renae Gunderson MD   3 mL at 04/30/17 0751    carvedilol tablet 3.125 mg  3.125 mg Oral BID Yovani Arellano MD   3.125 mg at 04/27/17 2036    chlorhexidine 0.12 % solution 15 mL  15 mL Mouth/Throat BID Perico Solis MD   15 mL at 04/30/17 0826    cloNIDine tablet 0.1 mg  0.1 mg Oral TID PRN  Easton Aaron MD        dextrose 50% injection 12.5 g  12.5 g Intravenous PRN Easton Aaron MD        enoxaparin injection 40 mg  40 mg Subcutaneous Daily Easton Aaron MD   40 mg at 04/29/17 1649    furosemide injection 40 mg  40 mg Intravenous BID Bird Vega MD   40 mg at 04/30/17 0826    glucagon (human recombinant) injection 1 mg  1 mg Intramuscular PRN Easton Aaron MD        glycopyrrolate injection 0.1 mg  0.1 mg Intravenous TID PRN Yovani Arellano MD   0.1 mg at 04/27/17 2036    insulin aspart pen 1-10 Units  1-10 Units Subcutaneous Q6H PRN Easton Aaron MD   8 Units at 04/30/17 1130    insulin detemir pen 15 Units  15 Units Subcutaneous QHS Ashley Zazueta MD   15 Units at 04/29/17 2024    lorazepam injection 1 mg  1 mg Intravenous Q2H PRN Yovani Arellano MD   1 mg at 04/30/17 0920    methylPREDNISolone sodium succinate (SOLU-MEDROL) 1,000 mg in dextrose 5 % 100 mL IVPB   Intravenous Daily Gus Phillips MD        morphine injection 2 mg  2 mg Intravenous Q2H PRN Yovani Arellano MD   2 mg at 04/29/17 2032    norepinephrine 4 mg in dextrose 5% 250 mL infusion (premix) (titrating)  0.04 mcg/kg/min Intravenous Continuous Ashley Zazueta MD   Stopped at 04/27/17 2055    ondansetron injection 8 mg  8 mg Intravenous Q8H PRN Easton Aaron MD        pantoprazole injection 40 mg  40 mg Intravenous Daily Lakesha Greer MD   40 mg at 04/30/17 0826    promethazine (PHENERGAN) 12.5 mg in dextrose 5 % 50 mL IVPB  12.5 mg Intravenous Q6H PRN Easton Aaron MD        propofol (DIPRIVAN) 10 mg/mL infusion  5 mcg/kg/min Intravenous Continuous PRN Bird Vega MD 10.8 mL/hr at 04/30/17 1217 25 mcg/kg/min at 04/30/17 1217    ramelteon tablet 8 mg  8 mg Oral Nightly PRN Easton Aaron MD   8 mg at 04/27/17 2132    scopolamine 1.5 mg (1 mg over 3 days) 1.5 mg  1 patch Transdermal Q3 Days Lakesha Greer MD   1.5 mg at 04/28/17 2020       Review of Systems   Mechanically ventilated    Objective:      Vital Signs (Most Recent):  Temp: 98.2 °F (36.8 °C) (04/30/17 1130)  Pulse: 89 (04/30/17 1200)  Resp: 15 (04/30/17 1200)  BP: 106/70 (04/30/17 1200)  SpO2: 97 % (04/30/17 1200) Vital Signs (24h Range):  Temp:  [97.9 °F (36.6 °C)-98.8 °F (37.1 °C)] 98.2 °F (36.8 °C)  Pulse:  [] 89  Resp:  [10-36] 15  SpO2:  [94 %-100 %] 97 %  BP: ()/() 106/70     Weight: 73.9 kg (162 lb 14.7 oz)  Body mass index is 45.82 kg/(m^2).    Physical Exam  Constitutional: no distress  ENT: no discharge  Head: Normocephalic.   Eyes: No icteric sclereae  Neck: Neck supple.   Cardiovascular: Normal rate.   Pulmonary/Chest: symmetrical expansion of chest.   Extremities: no edema  Neurological: opens eyes upon verbal stimuli; tracks upon repeated verbal stimultion   Pupils are round at 3 mm and reactive to light; corneal responses are present bilaterally; EOMI; no nystagmus  Moving LE's spontaneously         Assessment and Plan:     47 y/o female consulted for UE weakness:      1. UE weakness: chronic weakness of uncertain etiology. Weaning off vent has been difficult. Concern was if her chronic weakness was related to inability to wean off vent. MG panel was obtained: antibodies were negative. CPK normal   -MRI of brain was obtained to see if any pathology in brainstem. MRI limited due to motion artifact but did not demonstrate brainstem abnormality; it showed enhancing in T2 FLAIR of temporal lobes but no DWI enhancing in same area suggesting not an acute process but uncertain about the chronicity of these findings. Herpes encephalitis was another concern for which pt was put shortly on acyclovir but pt is able to follow commands going against and encephalopathic process.     Due to motion artifact is difficult to see upper levels of c-spine on MRI.  -When able C-spine MRI can be obtained. A higher cervical lesion (transverse myelitis, syringomyelia among others) may cause respiratory problems as well as weakness of  extremities.    Active Diagnoses:    Diagnosis Date Noted POA    PRINCIPAL PROBLEM:  Acute respiratory failure with hypercapnia [J96.02] 04/07/2017 Yes    Upper extremity weakness [R29.898] 04/26/2017 Yes    Hyperglycemia [R73.9] 04/24/2017 No    Counseling regarding advanced directives and goals of care [Z71.89] 04/14/2017 Not Applicable    Hypokalemia [E87.6] 04/09/2017 Yes    Breast mass, left [N63] 04/08/2017 Yes    Pneumonia of both lungs due to infectious organism [J18.9] 04/06/2017 Yes    Essential hypertension [I10] 04/06/2017 Yes     Chronic    Chronic diastolic heart failure [I50.32] 04/06/2017 Yes     Chronic    Moderate protein malnutrition [E44.0] 04/06/2017 Yes     Chronic    Hematoma of breast [N64.89] 03/24/2017 Yes      Problems Resolved During this Admission:    Diagnosis Date Noted Date Resolved POA    Sepsis [A41.9] 04/06/2017 04/24/2017 Yes       VTE Risk Mitigation         Ordered     enoxaparin injection 40 mg  Daily     Route:  Subcutaneous        04/06/17 2202     Medium Risk of VTE  Once      04/06/17 2202          Gus Phillips MD  Neurology  [facility]

## 2017-05-01 LAB
ALLENS TEST: ABNORMAL
ANION GAP SERPL CALC-SCNC: 13 MMOL/L
ANION GAP SERPL CALC-SCNC: 15 MMOL/L
BASOPHILS # BLD AUTO: 0 K/UL
BASOPHILS NFR BLD: 0 %
BUN SERPL-MCNC: 90 MG/DL
BUN SERPL-MCNC: 97 MG/DL
CALCIUM SERPL-MCNC: 10.6 MG/DL
CALCIUM SERPL-MCNC: 10.9 MG/DL
CHLORIDE SERPL-SCNC: 89 MMOL/L
CHLORIDE SERPL-SCNC: 90 MMOL/L
CO2 SERPL-SCNC: 30 MMOL/L
CO2 SERPL-SCNC: 31 MMOL/L
CREAT SERPL-MCNC: 1.5 MG/DL
CREAT SERPL-MCNC: 1.5 MG/DL
DELSYS: ABNORMAL
DIFFERENTIAL METHOD: ABNORMAL
EOSINOPHIL # BLD AUTO: 0 K/UL
EOSINOPHIL NFR BLD: 0 %
ERYTHROCYTE [DISTWIDTH] IN BLOOD BY AUTOMATED COUNT: 17.9 %
ERYTHROCYTE [SEDIMENTATION RATE] IN BLOOD BY WESTERGREN METHOD: 10 MM/H
EST. GFR  (AFRICAN AMERICAN): 48 ML/MIN/1.73 M^2
EST. GFR  (AFRICAN AMERICAN): 48 ML/MIN/1.73 M^2
EST. GFR  (NON AFRICAN AMERICAN): 41 ML/MIN/1.73 M^2
EST. GFR  (NON AFRICAN AMERICAN): 41 ML/MIN/1.73 M^2
FIO2: 40
GLUCOSE SERPL-MCNC: 577 MG/DL
GLUCOSE SERPL-MCNC: 612 MG/DL
GLUCOSE SERPL-MCNC: 624 MG/DL
HCO3 UR-SCNC: 34 MMOL/L (ref 24–28)
HCT VFR BLD AUTO: 35.1 %
HGB BLD-MCNC: 10.9 G/DL
LYMPHOCYTES # BLD AUTO: 0.6 K/UL
LYMPHOCYTES NFR BLD: 12.4 %
MCH RBC QN AUTO: 25.6 PG
MCHC RBC AUTO-ENTMCNC: 31.1 %
MCV RBC AUTO: 82 FL
MODE: ABNORMAL
MONOCYTES # BLD AUTO: 0.1 K/UL
MONOCYTES NFR BLD: 3.1 %
NEUTROPHILS # BLD AUTO: 3.8 K/UL
NEUTROPHILS NFR BLD: 84.1 %
PCO2 BLDA: 64.5 MMHG (ref 35–45)
PEEP: 5
PH SMN: 7.33 [PH] (ref 7.35–7.45)
PLATELET # BLD AUTO: 158 K/UL
PMV BLD AUTO: ABNORMAL FL
PO2 BLDA: 83 MMHG (ref 80–100)
POC BE: 6 MMOL/L
POC SATURATED O2: 95 % (ref 95–100)
POC TCO2: 36 MMOL/L (ref 23–27)
POCT GLUCOSE: 153 MG/DL (ref 70–110)
POCT GLUCOSE: 195 MG/DL (ref 70–110)
POCT GLUCOSE: 217 MG/DL (ref 70–110)
POCT GLUCOSE: 238 MG/DL (ref 70–110)
POCT GLUCOSE: 294 MG/DL (ref 70–110)
POCT GLUCOSE: 306 MG/DL (ref 70–110)
POCT GLUCOSE: 328 MG/DL (ref 70–110)
POCT GLUCOSE: 371 MG/DL (ref 70–110)
POCT GLUCOSE: 377 MG/DL (ref 70–110)
POCT GLUCOSE: 470 MG/DL (ref 70–110)
POCT GLUCOSE: >500 MG/DL (ref 70–110)
POTASSIUM SERPL-SCNC: 4.6 MMOL/L
POTASSIUM SERPL-SCNC: 5.1 MMOL/L
PS: 15
RBC # BLD AUTO: 4.26 M/UL
SAMPLE: ABNORMAL
SITE: ABNORMAL
SODIUM SERPL-SCNC: 134 MMOL/L
SODIUM SERPL-SCNC: 134 MMOL/L
VT: 400
WBC # BLD AUTO: 4.53 K/UL

## 2017-05-01 PROCEDURE — 99900035 HC TECH TIME PER 15 MIN (STAT)

## 2017-05-01 PROCEDURE — 63600175 PHARM REV CODE 636 W HCPCS: Performed by: HOSPITALIST

## 2017-05-01 PROCEDURE — 25000003 PHARM REV CODE 250: Performed by: HOSPITALIST

## 2017-05-01 PROCEDURE — 80048 BASIC METABOLIC PNL TOTAL CA: CPT | Mod: 91

## 2017-05-01 PROCEDURE — 94003 VENT MGMT INPAT SUBQ DAY: CPT

## 2017-05-01 PROCEDURE — 99232 SBSQ HOSP IP/OBS MODERATE 35: CPT | Mod: ,,, | Performed by: PSYCHIATRY & NEUROLOGY

## 2017-05-01 PROCEDURE — 94640 AIRWAY INHALATION TREATMENT: CPT

## 2017-05-01 PROCEDURE — 25000242 PHARM REV CODE 250 ALT 637 W/ HCPCS: Performed by: INTERNAL MEDICINE

## 2017-05-01 PROCEDURE — 99900026 HC AIRWAY MAINTENANCE (STAT)

## 2017-05-01 PROCEDURE — 63600175 PHARM REV CODE 636 W HCPCS: Performed by: INTERNAL MEDICINE

## 2017-05-01 PROCEDURE — 20000000 HC ICU ROOM

## 2017-05-01 PROCEDURE — 25000003 PHARM REV CODE 250: Performed by: INTERNAL MEDICINE

## 2017-05-01 PROCEDURE — 25000003 PHARM REV CODE 250: Performed by: PSYCHIATRY & NEUROLOGY

## 2017-05-01 PROCEDURE — 63600175 PHARM REV CODE 636 W HCPCS: Performed by: PSYCHIATRY & NEUROLOGY

## 2017-05-01 PROCEDURE — C9113 INJ PANTOPRAZOLE SODIUM, VIA: HCPCS | Performed by: HOSPITALIST

## 2017-05-01 PROCEDURE — 36600 WITHDRAWAL OF ARTERIAL BLOOD: CPT

## 2017-05-01 PROCEDURE — 82803 BLOOD GASES ANY COMBINATION: CPT

## 2017-05-01 PROCEDURE — 85025 COMPLETE CBC W/AUTO DIFF WBC: CPT

## 2017-05-01 PROCEDURE — 36415 COLL VENOUS BLD VENIPUNCTURE: CPT

## 2017-05-01 PROCEDURE — 82947 ASSAY GLUCOSE BLOOD QUANT: CPT

## 2017-05-01 RX ORDER — ONDANSETRON 2 MG/ML
8 INJECTION INTRAMUSCULAR; INTRAVENOUS EVERY 8 HOURS PRN
Status: DISCONTINUED | OUTPATIENT
Start: 2017-05-01 | End: 2017-05-08 | Stop reason: HOSPADM

## 2017-05-01 RX ORDER — ACETAMINOPHEN 500 MG
500 TABLET ORAL EVERY 6 HOURS PRN
Status: DISCONTINUED | OUTPATIENT
Start: 2017-05-01 | End: 2017-05-08 | Stop reason: HOSPADM

## 2017-05-01 RX ORDER — IPRATROPIUM BROMIDE AND ALBUTEROL SULFATE 2.5; .5 MG/3ML; MG/3ML
3 SOLUTION RESPIRATORY (INHALATION) EVERY 4 HOURS PRN
Status: DISCONTINUED | OUTPATIENT
Start: 2017-05-01 | End: 2017-05-08 | Stop reason: HOSPADM

## 2017-05-01 RX ORDER — RAMELTEON 8 MG/1
8 TABLET ORAL NIGHTLY PRN
Status: DISCONTINUED | OUTPATIENT
Start: 2017-05-01 | End: 2017-05-08 | Stop reason: HOSPADM

## 2017-05-01 RX ORDER — ENOXAPARIN SODIUM 100 MG/ML
40 INJECTION SUBCUTANEOUS EVERY 24 HOURS
Status: DISCONTINUED | OUTPATIENT
Start: 2017-05-01 | End: 2017-05-08 | Stop reason: HOSPADM

## 2017-05-01 RX ORDER — SODIUM CHLORIDE 9 MG/ML
INJECTION, SOLUTION INTRAVENOUS CONTINUOUS
Status: DISCONTINUED | OUTPATIENT
Start: 2017-05-01 | End: 2017-05-02

## 2017-05-01 RX ORDER — CLONIDINE HYDROCHLORIDE 0.1 MG/1
0.1 TABLET ORAL 3 TIMES DAILY PRN
Status: DISCONTINUED | OUTPATIENT
Start: 2017-05-01 | End: 2017-05-08 | Stop reason: HOSPADM

## 2017-05-01 RX ADMIN — CARVEDILOL 3.12 MG: 3.12 TABLET, FILM COATED ORAL at 08:05

## 2017-05-01 RX ADMIN — SODIUM CHLORIDE: 0.9 INJECTION, SOLUTION INTRAVENOUS at 03:05

## 2017-05-01 RX ADMIN — PROPOFOL 35 MCG/KG/MIN: 10 INJECTION, EMULSION INTRAVENOUS at 09:05

## 2017-05-01 RX ADMIN — LORAZEPAM 1 MG: 2 INJECTION INTRAMUSCULAR; INTRAVENOUS at 05:05

## 2017-05-01 RX ADMIN — FENTANYL CITRATE: 50 INJECTION, SOLUTION INTRAMUSCULAR; INTRAVENOUS at 07:05

## 2017-05-01 RX ADMIN — METHYLPREDNISOLONE SODIUM SUCCINATE: 1 INJECTION, POWDER, LYOPHILIZED, FOR SOLUTION INTRAMUSCULAR; INTRAVENOUS at 05:05

## 2017-05-01 RX ADMIN — IPRATROPIUM BROMIDE AND ALBUTEROL SULFATE 3 ML: .5; 3 SOLUTION RESPIRATORY (INHALATION) at 07:05

## 2017-05-01 RX ADMIN — PROPOFOL 50 MCG/KG/MIN: 10 INJECTION, EMULSION INTRAVENOUS at 02:05

## 2017-05-01 RX ADMIN — PANTOPRAZOLE SODIUM 40 MG: 40 INJECTION, POWDER, FOR SOLUTION INTRAVENOUS at 08:05

## 2017-05-01 RX ADMIN — FUROSEMIDE 40 MG: 10 INJECTION, SOLUTION INTRAMUSCULAR; INTRAVENOUS at 05:05

## 2017-05-01 RX ADMIN — SODIUM CHLORIDE: 0.9 INJECTION, SOLUTION INTRAVENOUS at 08:05

## 2017-05-01 RX ADMIN — INSULIN HUMAN 7 UNITS: 100 INJECTION, SOLUTION PARENTERAL at 01:05

## 2017-05-01 RX ADMIN — CHLORHEXIDINE GLUCONATE 15 ML: 1.2 RINSE ORAL at 08:05

## 2017-05-01 RX ADMIN — SODIUM CHLORIDE 15 UNITS/HR: 900 INJECTION, SOLUTION INTRAVENOUS at 02:05

## 2017-05-01 RX ADMIN — LORAZEPAM 1 MG: 2 INJECTION INTRAMUSCULAR; INTRAVENOUS at 07:05

## 2017-05-01 RX ADMIN — FUROSEMIDE 40 MG: 10 INJECTION, SOLUTION INTRAMUSCULAR; INTRAVENOUS at 08:05

## 2017-05-01 RX ADMIN — IPRATROPIUM BROMIDE AND ALBUTEROL SULFATE 3 ML: .5; 3 SOLUTION RESPIRATORY (INHALATION) at 01:05

## 2017-05-01 RX ADMIN — INSULIN DETEMIR 15 UNITS: 100 INJECTION, SOLUTION SUBCUTANEOUS at 08:05

## 2017-05-01 RX ADMIN — ENOXAPARIN SODIUM 40 MG: 100 INJECTION SUBCUTANEOUS at 05:05

## 2017-05-01 RX ADMIN — SCOPOLAMINE 1.5 MG: 1 PATCH, EXTENDED RELEASE TRANSDERMAL at 08:05

## 2017-05-01 RX ADMIN — FENTANYL CITRATE 25 MCG/HR: 50 INJECTION, SOLUTION INTRAMUSCULAR; INTRAVENOUS at 01:05

## 2017-05-01 RX ADMIN — PROPOFOL 15 MCG/KG/MIN: 10 INJECTION, EMULSION INTRAVENOUS at 07:05

## 2017-05-01 RX ADMIN — LORAZEPAM 1 MG: 2 INJECTION INTRAMUSCULAR; INTRAVENOUS at 11:05

## 2017-05-01 RX ADMIN — INSULIN ASPART 4 UNITS: 100 INJECTION, SOLUTION INTRAVENOUS; SUBCUTANEOUS at 11:05

## 2017-05-01 RX ADMIN — SODIUM CHLORIDE: 0.9 INJECTION, SOLUTION INTRAVENOUS at 11:05

## 2017-05-01 RX ADMIN — SODIUM CHLORIDE 5 UNITS/HR: 900 INJECTION, SOLUTION INTRAVENOUS at 01:05

## 2017-05-01 NOTE — PROGRESS NOTES
[facility] [place]  Adult Nutrition  Progress Note    SUMMARY     Recommendations    Recommendation/Intervention: 1) TF recs:  Peptamen VHP (Bariatric) @ 50 cc/hr with 1 packet of Beneprotein, provides 1225 calories (1394 with propofol), 122 g protein, 1008 cc free water 2) Flushes per MD 3) Check for residuals Q4 hours. Hold if > 250 cc  Goals: 1) Patient's tube feedings will change within 72 hours  Nutrition Goal Status: new  Communication of RD Recs: reviewed with RN    Continuum of Care Plan    Referral to Outpatient Services:  (D/C planning: Too soon to determine)    Reason for Assessment    Reason for Assessment: RD follow-up  Diagnosis:  (SOB; pneumonia)  Relevent Medical History: CHF   Interdisciplinary Rounds: attended     General Information Comments: Patient intubated. propofol running. TF on hold due to elevated blood sugars per RN. Patient on steroids. Last dose possibly tonight.     Nutrition Prescription Ordered    Current Diet Order: NPO     Current Nutrition Support Formula Ordered: Impact Peptide 1.5  Current Nutrition Support Rate Ordered: 45 (ml)  Current Nutrition Support Frequency Ordered: continuous  Oral Nutrition Supplement: 2 packets of Beneprotein      Evaluation of Received Nutrients/Fluid Intake    Enteral Calories (kcal): 1620  Enteral Protein (gm): 101  Enteral (Free Water) Fluid (mL): 832  Free Water Flush Fluid (mL): 900     Other Calories (kcal): 219 (propofol + beneprotein)  Total Calories (kcal): 1839     Energy Calories Required: exceed needs  % Kcal Needs: 133     Other Protein (gm): 12  Total Protein (gm/kg): 113  Protein Required: meeting needs  % Protein Needs: 100    Fluid Required: exceed needs (Net I/O +125.2)  Comments: 5 cc residuals  Tolerance: tolerating (0 residuals this morning; currently not running)     Nutrition Risk Screen     Nutrition Risk Screen: no indicators present    Nutrition/Diet History    Food Preferences: DANIEL    Factors Affecting  Nutritional Intake: NPO    Labs/Tests/Procedures/Meds    Diagnostic Test/Procedure Review: reviewed, pertinent  Pertinent Labs Reviewed: reviewed    Recent Labs  Lab 04/30/17  1746   POCTGLUCOSE [id number]*     Pertinent Medications Reviewed: reviewed  Pertinent Medications Comments: propofol    Physical Findings    Overall Physical Appearance: on ventilator support  Tubes: nasogastric tube  Oral/Mouth Cavity: WDL  Skin:  (blister)    Anthropometrics    Height (inches): 62.13 in  Weight Method: Bed Scale  Weight (kg): 72 kg  Ideal Body Weight (IBW), Female: 110.65 lb  % Ideal Body Weight, Female (lb): 143.45 lb  BMI (kg/m2): 28.91  BMI Grade: 25 - 29.9 - overweight    Estimated/Assessed Needs    Weight Used For Calorie Calculations: 72 kg (158 lb 11.7 oz)   Height (cm): 157.8 cm  Energy Need Method: [facility] (1382)  RMR ([place]. Jeor Equation): 1318.21  Weight Used For Protein Calculations: 50 kg (110 lb 3.7 oz) (IBW)  Protein Requirements: 100-125 g  Fluid Need Method: RDA Method (per MD)    Nutrition Diagnosis      Problem: Inadequate Energy Intake  Etiology: Mechanical Ventilation  As Evidenced by:NPO status/TF initiated  Nutrition Diagnosis: Continues       Monitor and Evaluation    Food and Nutrient Intake: energy intake, food and beverage intake, enteral nutrition intake  Food and Nutrient Adminstration: diet order, enteral and parenteral nutrition administration  Anthropometric Measurements: weight, weight change  Biochemical Data, Medical Tests and Procedures: electrolyte and renal panel, glucose/endocrine profile, gastrointestinal profile  Nutrition-Focused Physical Findings: overall appearance, skin    Nutrition Risk    Level of Risk: other (see comments) (f/u 1x weekly)    Nutrition Follow-Up    RD Follow-up?: Yes

## 2017-05-01 NOTE — CONSULTS
Consult Note  Infectious Disease    Consult Requested By: Ashley Zazueta MD    Reason for Consult: does she have hsv encephalitis?    SUBJECTIVE:     History of Present Illness:  Patient is a 46 y.o. female presents with hypercapneic respiratory failure. She has had episodes of hypercapneic respiratory failure in 2014 as well. She was deemed to have a pneumonia and received antibiotics as well as antibiotics for a vse uti(e.faecium) has had trouble weaning form the ventilator and has a tracheostomy. Neurology has identified ul paresis and ct of the head has confirmed bilateral temporal lobe attenuation that was seen in 2014 as well. An mri confirms the same. The patient is now more awake when off sedation per nursing. She has received empirical steroids and ivig in case this was an encephalitis. The specter of hsv encephalitis was raised based on imaging. She has not had cognitive deficits coming in to be admitted and is apparently following commands now when sedation is stopped.  She also has a left breast mass felt to be a hematoma.    Past Medical History:   Diagnosis Date    CHF (congestive heart failure)     Learning difficulty      Past Surgical History:   Procedure Laterality Date    BREAST SURGERY Left     biopsy    CYST REMOVAL      shoulder      Family History   Problem Relation Age of Onset    Diabetes      Coronary artery disease       Social History   Substance Use Topics    Smoking status: Never Smoker    Smokeless tobacco: Never Used    Alcohol use Yes      Comment: OCCASSIONAL       Review of patient's allergies indicates:  No Known Allergies     Antibiotics     None          Review of Systems:  Review of systems not obtained due to patient factors intubated.    OBJECTIVE:     Vital Signs (Most Recent)  Temp: 98 °F (36.7 °C) (05/01/17 0715)  Pulse: 86 (05/01/17 1030)  Resp: 12 (05/01/17 1030)  BP: 94/60 (05/01/17 1030)  SpO2: 100 % (05/01/17 1030)    Temperature Range Min/Max (Last  24H):  Temp:  [98 °F (36.7 °C)-98.9 °F (37.2 °C)]     Physical Exam:  General: well developed, well nourished  Eyes: conjunctivae/corneas clear. PERRL..  HENT: Head:normocephalic, atraumatic. Ears:bilateral TM's and external ear canals normal. Nose: Nares normal. Septum midline. Mucosa normal. No drainage or sinus tenderness., no discharge. Throat: lips, mucosa, and tongue normal; teeth and gums normal and no throat erythema.  Neck: supple, symmetrical, trachea midline, no JVD and thyroid not enlarged, symmetric, no tenderness/mass/nodules  Lungs:  clear to auscultation bilaterally and normal respiratory effort  Cardiovascular: Heart: regular rate and rhythm, S1, S2 normal, no murmur, click, rub or gallop. Chest Wall: no tenderness. Extremities: no cyanosis or edema, or clubbing. Pulses: 2+ and symmetric.  Abdomen/Rectal: Abdomen: soft, non-tender non-distented; bowel sounds normal; no masses,  no organomegaly. Rectal: not examined  Skin: Skin color, texture, turgor normal. No rashes or lesions  Musculoskeletal:no clubbing, cyanosis  Lymph Nodes: No cervical or supraclavicular adenopathy  Neurologic: Mental status: intubated and sedated. ul restrained as she is pulling at et tube etc which speaks against an ul paresis    Laboratory:  CBC    Recent Labs  Lab 05/01/17  0345   WBC 4.53   RBC 4.26   HGB 10.9*   HCT 35.1*        BMP    Recent Labs  Lab 05/01/17  0714   CO2 31*   BUN 97*   CREATININE 1.5*   CALCIUM 10.6*     No results for input(s): COLORU, CLARITYU, SPECGRAV, PHUR, PROTEINUA, GLUCOSEU, BILIRUBINCON, BLOODU, WBCU, RBCU, BACTERIA, MUCUS, NITRITE, LEUKOCYTESUR, UROBILINOGEN, HYALINECASTS in the last 168 hours.  Microbiology Results (last 7 days)     ** No results found for the last 168 hours. **          Diagnostic Results:  Labs: Reviewed  X-Ray: Reviewed  CT: Reviewed  MRI: Reviewed    ASSESSMENT/PLAN:     Active Hospital Problems    Diagnosis  POA    *Acute respiratory failure with hypercapnia  [J96.02]  Yes    Upper extremity weakness [R29.898]  Yes    Hyperglycemia [R73.9]  No    Counseling regarding advanced directives and goals of care [Z71.89]  Not Applicable    Hypokalemia [E87.6]  Yes    Breast mass, left [N63]  Yes    Pneumonia of both lungs due to infectious organism [J18.9]  Yes    Essential hypertension [I10]  Yes     Chronic    Chronic diastolic heart failure [I50.32]  Yes     Chronic    Moderate protein malnutrition [E44.0]  Yes     Chronic    Hematoma of breast [N64.89]  Yes      Resolved Hospital Problems    Diagnosis Date Resolved POA    Sepsis [A41.9] 04/24/2017 Yes       1. i see no evidence of hsv encephalitis in that this ct abnormality was present in 2014 and patient is alert when sedation stopped  hsv encephalitis unlikely when she is not encephalopathic  Consider lp and cervical spinal imaging  Dc acyclovir  Sounds like this is a chronic process and not acute  2. hypercapneic respiratory failure may be related to cns process  She does move all 4 limbs but is not following oral commands  Diarrhea and c diff negative 4/14/17

## 2017-05-01 NOTE — PLAN OF CARE
Problem: Patient Care Overview  Goal: Plan of Care Review  Outcome: Ongoing (interventions implemented as appropriate)  Patient remains in ICU requiring mechanical ventilation, no changes to vent settings. Fentanyl added for patient comfort, propofol remains in place. CBG >500 despite 30 units of nightly levemir, started on insulin gtt at 5 units/hr. VSS. Flexiseal and li remain intact with adequate output. Restraints remain in place for patient safety. Plans for MRI of neck today. No falls or injuries noted. No new new skin breakdown noted.

## 2017-05-01 NOTE — ASSESSMENT & PLAN NOTE
Bilateral UE weakness with unknown etiology  IV steroids dc'd  D/w neurology - trial acyclovir started then stopped after review of previous head CT that showed chronic findings  MRI c/spine in am

## 2017-05-01 NOTE — ASSESSMENT & PLAN NOTE
Hyperglycemia worsened with steroids as anticipated, titrate insulin   Levemir 15 units Q hs SSI  HgA1c 7.3%.  No apparent history of DM prior to admission.

## 2017-05-01 NOTE — PROGRESS NOTES
1200 rass +3. RR 27-30. sats decreased to 88%. Called and spoke with RT. Replaced on simv. Restarted sedation. titrating for rass -2. Decreased fentanyl by half of previous dose.

## 2017-05-01 NOTE — ASSESSMENT & PLAN NOTE
Hyperglycemia worsened with steroids as anticipated, titrate insulin   Add nightly Levemir, increased to 30 units Qhs  HgA1c 7.3%.  No apparent history of DM prior to admission.

## 2017-05-01 NOTE — ASSESSMENT & PLAN NOTE
Bilateral UE weakness with unknown etiology  MRI today indicates possible herpes encephalitis   D/w neurology - trial acyclovir started then stopped after review of previous head CT that showed chronic findings  MRI c/spine in am

## 2017-05-01 NOTE — PROGRESS NOTES
[facility] Hospital Medicine  Progress Note    Patient Name: Radha Pritchett  MRN: [id number]  Patient Class: IP- Inpatient   Admission Date: 4/6/2017  Length of Stay: 25 days  Attending Physician: Ashley Zazueta MD  Primary Care Provider: Ferny Iglesias MD        Subjective:     Principal Problem:Acute respiratory failure with hypercapnia    HPI:  Ms. Radha Pritchett is a 46 y.o. female with essential hypertension, chronic diastolic heart failure (LVEF 55-60% Dec 2014), moderate protein malnutrition who presents to [facility] ED with complaints of dyspnea today.  She was scheduled to undergo removal of a breast mass today but was found to be hypoxic.  She does report that she has been short of breath but cannot specify how long.  She has also had some non-productive coughing and some chills without fevers.  Further history is otherwise limited at this time.    Hospital Course:   was admitted with pneumonia and acutely decompensated and was intubated and placed on vent. Pt with UE weakness prior to presentation on this admission which may be contributing to inability to extubate. Neurology following and considering possible MG and patient on therapeutic trial of prednisone and Modafinil. Pt s/p bronch that was unrevealing on 4/11. Pt was extubated on 4/15 but was re-intubated later the same day.  Was febrile and stared on Zyvox and zosyn.  CXR shows possible infiltrate. Urine culture +enterococcus.  Completed course and antibiotics dc'd. Blood culture negative  Trach on 4/19 without complications.      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 Started on acyclovir but dc'd  After review of previous CT imaging showed temporal abnormalities.      Brief drop in SBP and IVF were given, now dc'd and on lasix IV BID with changes to resp status and back on ventilator.    Pt does not tolerate being off sedation, weaning difficult. IV steroids dc'd. Started insulin drip  overnight and now off.     Plan for MRI c-spine 5/2/2017. Repeat left breast ultrasound. Unsure if causing more pain ?increased in size      Interval History: pt with elevated blood sugars on insulin drip, now off and back to basal insulin and novolog SS. Resumed tube feeds. IV steroids off. Pt still quite agitated requiring ativan, fentanyl and propofol, PRN morphine.     Review of Systems   Unable to perform ROS: Intubated     Objective:     Vital Signs (Most Recent):  Temp: 98 °F (36.7 °C) (05/01/17 1530)  Pulse: 98 (05/01/17 1800)  Resp: (!) 22 (05/01/17 1800)  BP: (!) 149/84 (05/01/17 1800)  SpO2: 98 % (05/01/17 1800) Vital Signs (24h Range):  Temp:  [98 °F (36.7 °C)-98.9 °F (37.2 °C)] 98 °F (36.7 °C)  Pulse:  [] 98  Resp:  [8-30] 22  SpO2:  [95 %-100 %] 98 %  BP: ()/() 149/84     Weight: 73.9 kg (162 lb 14.7 oz)  Body mass index is 45.82 kg/(m^2).    Intake/Output Summary (Last 24 hours) at 05/01/17 1844  Last data filed at 05/01/17 1800   Gross per 24 hour   Intake          2776.37 ml   Output             2575 ml   Net           201.37 ml      Physical Exam   Constitutional: She appears well-developed. No distress.   Eyes: Conjunctivae are normal. Right eye exhibits no discharge. Left eye exhibits no discharge.   Neck:   Trach in place   Cardiovascular: Normal rate, regular rhythm and normal heart sounds.    Pulmonary/Chest: No stridor. No respiratory distress. She has no wheezes.   Mechanically ventilated per trach.     Abdominal: Soft. Bowel sounds are normal.   Musculoskeletal: She exhibits no deformity.   Neurological:   Very restless.  Poorly responsive to commands.   Skin: Skin is warm and dry.       Significant Labs:   BMP:   Recent Labs  Lab 05/01/17  0714 05/01/17  0942   * 577*   *  --    K 5.1  --    CL 90*  --    CO2 31*  --    BUN 97*  --    CREATININE 1.5*  --    CALCIUM 10.6*  --      CBC:   Recent Labs  Lab 05/01/17  0345   WBC 4.53   HGB 10.9*   HCT 35.1*   PLT  158     POCT Glucose:   Recent Labs  Lab 05/01/17  1534 05/01/17  1636 05/01/17  1735   POCTGLUCOSE 238* 217* 195*       Significant Imaging: I have reviewed all pertinent imaging results/findings within the past 24 hours.    Assessment/Plan:      * Acute respiratory failure with hypercapnia  Mechanical ventilation  Weakness  Metabolic encephalopathy  Fever  Assessment and Plan:  Pt with respiratory failure that required intubation, possibly secondary to pneumonia vs edema, treated empirically with Rocephin/Azithromycin, ECHO with diastolic dysfunction but normal EF, and Pulm following for vent management. Family reported UE weakness for a few months prior to this admission and Neurology consulted, possible MG and patient undergoing trial of prednisone and pyridostigmine. This weakness likely limited extubation and necessitated reintubation; patient was extubated on 4/16 but had to be re-intubated that same day. Unclear source of encephalopathy and case discussed with Neurology, CT scan of the head was unrevealing. Also, patient completed treatment with Azithromycin and was on Rocephin alone, but she spiked a fever overnight and antibiotics changed to Zosyn/Zyvox.   Trach on 4/19 without complications.  Stopped all ABx's.  Remains on vent per trach.  Having trouble clearing secretions.  Added Glycopyrrolate.   SW consulted for LTAC.        Hematoma of breast  Follow up at later date   Repeat left breast ultrasound ?larger      Pneumonia of both lungs due to infectious organism  Probably bacterial, but unable to determine.  The patient has a CURB-65 score of 0, and does not meet criteria for healthcare-associated pneumonia initially. CTA chest with bilateral airspace opacification concerning for pneumonia.  Oxygen saturations are improved but requiring a non-rebreather mask; then BIPAP and s/p intubation.  Blood culture NGTD.  Pt completed full course of Azithromycin, and was continued Rocephin until had spike of  fever, Abx has been expanded  to Zosyn/Zyvox.  Completed course and Abx dc'd.      Essential hypertension  Coreg held with normal-low BP  IVF held, edema on CXR and lasix given  levophed if needed to maintain MAP >65    Chronic diastolic heart failure  Patient doesn't appear to be in acute heart failure; checked ECHO with EF=65% but with diastolic dysfunction.  Lasix given with change in resp status back on vent, could not tolerate CPAP    Moderate protein malnutrition  Resume tube feedings      Breast mass, left  Planned for surgery, seen by Dr. Sandoval (his patient) and plan to reschedule as outpatient. It is reportedly a hematoma by biopsy, but suspicion for CA not completely ruled out given paraneoplastic syndrome a consideration.      Hypokalemia  Replace as necessary.      Counseling regarding advanced directives and goals of care  Extensive conversations done with family. Pt to remain full code. Pt is still  but estranged from the  and reportedly the grandmother is the major decision maker.       Hyperglycemia  Hyperglycemia worsened with steroids as anticipated, titrate insulin   Levemir 15 units Q hs SSI  HgA1c 7.3%.  No apparent history of DM prior to admission.      Upper extremity weakness  Bilateral UE weakness with unknown etiology  IV steroids dc'd  D/w neurology - trial acyclovir started then stopped after review of previous head CT that showed chronic findings  MRI c/spine in am       VTE Risk Mitigation         Ordered     enoxaparin injection 40 mg  Daily     Route:  Subcutaneous        05/01/17 1520     Medium Risk of VTE  Once      04/06/17 2202          Ashley Zazueta MD  Department of Hospital Medicine   [facility]

## 2017-05-01 NOTE — PLAN OF CARE
05/01/17 1551   Discharge Reassessment   Assessment Type Discharge Planning Reassessment   Can the patient answer the patient profile reliably? No, cognitively impaired   How does the patient rate their overall health at the present time? Fair   Describe the patient's ability to walk at the present time. Does not walk or unable to take any steps at all   How often would a person be available to care for the patient? Occasionally   Number of comorbid conditions (as recorded on the chart) Five or more   During the past month, has the patient often been bothered by feeling down, depressed or hopeless? No   During the past month, has the patient often been bothered by little interest or pleasure in doing things? No   Discharge plan remains the same: Yes   Provided patient/caregiver education on the expected discharge date and the discharge plan No   Discharge Plan A Long-term acute care facility (LTAC)   Discharge Plan B Other  (to be determined)   Change in patient condition or support system No   Patient choice form signed by patient/caregiver No   Explained to the the patient/caregiver why the discharge planned changed: No   Involved the patient/caregiver in establishing a new discharge plan: No   patient remains in ICU, vent support. She is now on insulin gtt nad fentanyl. She will have MRI of nech  And abdomin  once more stable. DENIS left voice mail for admissions at [facility] to determine if need more information ([facility] indicates under review), awaiting return call.  DENIS left voice mail for Martin at [facility] that have no progress on either remaining network facilities, and that patient is not currently stable for discharge to LTAC today. DENIS will follow in ICU and assist as needed.

## 2017-05-01 NOTE — SUBJECTIVE & OBJECTIVE
Interval History: blood sugars increased on steroids, otherwise no acute changes, off levophed     Review of Systems   Unable to perform ROS: Intubated     Objective:     Vital Signs (Most Recent):  Temp: 98.4 °F (36.9 °C) (04/30/17 1600)  Pulse: 110 (04/30/17 2118)  Resp: (!) 30 (04/30/17 2118)  BP: (!) 89/54 (04/30/17 2100)  SpO2: 99 % (04/30/17 2118) Vital Signs (24h Range):  Temp:  [97.9 °F (36.6 °C)-98.4 °F (36.9 °C)] 98.4 °F (36.9 °C)  Pulse:  [] 110  Resp:  [10-30] 30  SpO2:  [94 %-100 %] 99 %  BP: ()/(36-83) 89/54     Weight: 73.9 kg (162 lb 14.7 oz)  Body mass index is 45.82 kg/(m^2).    Intake/Output Summary (Last 24 hours) at 04/30/17 2159  Last data filed at 04/30/17 2100   Gross per 24 hour   Intake          2361.31 ml   Output             2625 ml   Net          -263.69 ml      Physical Exam   Constitutional: She appears well-developed. No distress.   Eyes: Conjunctivae are normal. Right eye exhibits no discharge. Left eye exhibits no discharge.   Neck:   Trach in place   Cardiovascular: Normal rate, regular rhythm and normal heart sounds.    Pulmonary/Chest: No stridor. No respiratory distress. She has no wheezes.   Mechanically ventilated per trach.     Abdominal: Soft. Bowel sounds are normal.   Musculoskeletal: She exhibits no deformity.   Neurological:   Very restless.  Poorly responsive to commands.   Skin: Skin is warm and dry.       Significant Labs:   BMP:   Recent Labs  Lab 04/29/17  0135   *      K 3.7   CL 97   CO2 32*   BUN 46*   CREATININE 0.8   CALCIUM 10.4     CBC: No results for input(s): WBC, HGB, HCT, PLT in the last 48 hours.  POCT Glucose:   Recent Labs  Lab 04/30/17  1127 04/30/17  1741 04/30/17  1746   POCTGLUCOSE 321* 457* 452*       Significant Imaging: I have reviewed all pertinent imaging results/findings within the past 24 hours.

## 2017-05-01 NOTE — PROGRESS NOTES
[facility] Hospital Medicine  Progress Note    Patient Name: Radha Pritchett  MRN: [id number]  Patient Class: IP- Inpatient   Admission Date: 4/6/2017  Length of Stay: 24 days  Attending Physician: Ashley Zazueta MD  Primary Care Provider: Ferny Iglesias MD        Subjective:     Principal Problem:Acute respiratory failure with hypercapnia    HPI:  Ms. Radha Pritchett is a 46 y.o. female with essential hypertension, chronic diastolic heart failure (LVEF 55-60% Dec 2014), moderate protein malnutrition who presents to [facility] ED with complaints of dyspnea today.  She was scheduled to undergo removal of a breast mass today but was found to be hypoxic.  She does report that she has been short of breath but cannot specify how long.  She has also had some non-productive coughing and some chills without fevers.  Further history is otherwise limited at this time.    Hospital Course:   was admitted with pneumonia and acutely decompensated and was intubated and placed on vent. Pt with UE weakness prior to presentation on this admission which may be contributing to inability to extubate. Neurology following and considering possible MG and patient on therapeutic trial of prednisone and Modafinil. Pt s/p bronch that was unrevealing on 4/11. Pt was extubated on 4/15 but was re-intubated later the same day.  Was febrile and stared on Zyvox and zosyn.  CXR shows possible infiltrate. Urine culture +enterococcus.  Completed course and antibiotics dc'd. Blood culture negative  Trach on 4/19 without complications.      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 Started on acyclovir but dc'd  After review of previous CT imaging showed temporal abnormalities.    Brief drop in SBP and IVF were given, now dc'd and on lasix IV BID with changes to resp status and back on ventilator.    Pt does not tolerate being off sedation, weaning difficult. On IV steroids x 3 days. MRI in  am      Interval History: blood sugars increased on steroids, otherwise no acute changes, off levophed     Review of Systems   Unable to perform ROS: Intubated     Objective:     Vital Signs (Most Recent):  Temp: 98.4 °F (36.9 °C) (04/30/17 1600)  Pulse: 110 (04/30/17 2118)  Resp: (!) 30 (04/30/17 2118)  BP: (!) 89/54 (04/30/17 2100)  SpO2: 99 % (04/30/17 2118) Vital Signs (24h Range):  Temp:  [97.9 °F (36.6 °C)-98.4 °F (36.9 °C)] 98.4 °F (36.9 °C)  Pulse:  [] 110  Resp:  [10-30] 30  SpO2:  [94 %-100 %] 99 %  BP: ()/(36-83) 89/54     Weight: 73.9 kg (162 lb 14.7 oz)  Body mass index is 45.82 kg/(m^2).    Intake/Output Summary (Last 24 hours) at 04/30/17 2159  Last data filed at 04/30/17 2100   Gross per 24 hour   Intake          2361.31 ml   Output             2625 ml   Net          -263.69 ml      Physical Exam   Constitutional: She appears well-developed. No distress.   Eyes: Conjunctivae are normal. Right eye exhibits no discharge. Left eye exhibits no discharge.   Neck:   Trach in place   Cardiovascular: Normal rate, regular rhythm and normal heart sounds.    Pulmonary/Chest: No stridor. No respiratory distress. She has no wheezes.   Mechanically ventilated per trach.     Abdominal: Soft. Bowel sounds are normal.   Musculoskeletal: She exhibits no deformity.   Neurological:   Very restless.  Poorly responsive to commands.   Skin: Skin is warm and dry.       Significant Labs:   BMP:   Recent Labs  Lab 04/29/17  0135   *      K 3.7   CL 97   CO2 32*   BUN 46*   CREATININE 0.8   CALCIUM 10.4     CBC: No results for input(s): WBC, HGB, HCT, PLT in the last 48 hours.  POCT Glucose:   Recent Labs  Lab 04/30/17  1127 04/30/17  1741 04/30/17  1746   POCTGLUCOSE 321* 457* 452*       Significant Imaging: I have reviewed all pertinent imaging results/findings within the past 24 hours.    Assessment/Plan:      * Acute respiratory failure with hypercapnia  Mechanical ventilation  Weakness  Metabolic  encephalopathy  Fever  Assessment and Plan:  Pt with respiratory failure that required intubation, possibly secondary to pneumonia vs edema, treated empirically with Rocephin/Azithromycin, ECHO with diastolic dysfunction but normal EF, and Pulm following for vent management. Family reported UE weakness for a few months prior to this admission and Neurology consulted, possible MG and patient undergoing trial of prednisone and pyridostigmine. This weakness likely limited extubation and necessitated reintubation; patient was extubated on 4/16 but had to be re-intubated that same day. Unclear source of encephalopathy and case discussed with Neurology, CT scan of the head was unrevealing. Also, patient completed treatment with Azithromycin and was on Rocephin alone, but she spiked a fever overnight and antibiotics changed to Zosyn/Zyvox.   Trach on 4/19 without complications.  Stopped all ABx's.  Remains on vent per trach.  Having trouble clearing secretions.  Added Glycopyrrolate.   SW consulted for LTAC.        Hematoma of breast  Follow up at later date       Pneumonia of both lungs due to infectious organism  Probably bacterial, but unable to determine.  The patient has a CURB-65 score of 0, and does not meet criteria for healthcare-associated pneumonia initially. CTA chest with bilateral airspace opacification concerning for pneumonia.  Oxygen saturations are improved but requiring a non-rebreather mask; then BIPAP and s/p intubation.  Blood culture NGTD.  Pt completed full course of Azithromycin, and was continued Rocephin until had spike of fever, Abx has been expanded  to Zosyn/Zyvox.  Completed course and Abx dc'd.      Essential hypertension  Coreg held with normal-low BP  IVF held, edema on CXR and lasix given  levophed if needed to maintain MAP >65    Chronic diastolic heart failure  Patient doesn't appear to be in acute heart failure; checked ECHO with EF=65% but with diastolic dysfunction.  Lasix given with  change in resp status back on vent, could not tolerate CPAP    Moderate protein malnutrition  Tolerating tube feeds.      Breast mass, left  Planned for surgery, seen by Dr. Sandoval (his patient) and plan to reschedule as outpatient. It is reportedly a hematoma by biopsy, but suspicion for CA not completely ruled out given paraneoplastic syndrome a consideration.      Hypokalemia  Replace as necessary.      Counseling regarding advanced directives and goals of care  Extensive conversations done with family. Pt to remain full code. Pt is still  but estranged from the  and reportedly the grandmother is the major decision maker.       Hyperglycemia  Hyperglycemia worsened with steroids as anticipated, titrate insulin   Add nightly Levemir, increased to 30 units Qhs  HgA1c 7.3%.  No apparent history of DM prior to admission.      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

## 2017-05-01 NOTE — PLAN OF CARE
Problem: Patient Care Overview  Goal: Plan of Care Review  05/01/2017     Recommendations     Recommendation/Intervention: 1) TF recs:  Peptamen VHP (Bariatric) @ 50 cc/hr with 1 packet of Beneprotein, provides 1225 calories (1394 with propofol), 122 g protein, 1008 cc free water 2) Flushes per MD 3) Check for residuals Q4 hours. Hold if > 250 cc  Goals: 1) Patient's tube feedings will change within 72 hours  Nutrition Goal Status: new  Communication of RD Recs: reviewed with REBECCA Amezcua, MPH, RD, LDN

## 2017-05-01 NOTE — PROGRESS NOTES
Progress Note  Pulmonology    Admit Date: 4/6/2017   LOS: 25 days     SUBJECTIVE: resp distress     Follow-up For:  Vent management. Patient on vent via trach. Off sedation. Agitated.  Opens her eyes - moves ext - no commands   Continuous Infusions:   sodium chloride 0.9% 125 mL/hr at 05/01/17 0835    fentanyl 17.5 mL/hr at 05/01/17 0600    insulin (HUMAN R) infusion (adults) 7 Units/hr (05/01/17 0805)    norepinephrine bitartrate-D5W Stopped (04/27/17 2055)    propofol 15 mcg/kg/min (05/01/17 0701)     Scheduled Meds:   albuterol-ipratropium 2.5mg-0.5mg/3mL  3 mL Nebulization Q6H    carvedilol  3.125 mg Oral BID    chlorhexidine  15 mL Mouth/Throat BID    enoxaparin  40 mg Subcutaneous Daily    furosemide  40 mg Intravenous BID    insulin detemir  30 Units Subcutaneous QHS    methylPREDNISolone (SOLU-Medrol) IVPB (doses > 250 mg)   Intravenous Daily    pantoprazole  40 mg Intravenous Daily    scopolamine  1 patch Transdermal Q3 Days     Review of Systems:  Review of systems not obtained due to patient agitated and not very responsive.    OBJECTIVE:     Vital Signs Range (Last 24H):  Temp:  [98 °F (36.7 °C)-98.9 °F (37.2 °C)]   Pulse:  []   Resp:  [10-30]   BP: ()/(38-82)   SpO2:  [94 %-100 %]     I & O (Last 24H):    Intake/Output Summary (Last 24 hours) at 05/01/17 0919  Last data filed at 05/01/17 0800   Gross per 24 hour   Intake           2101.4 ml   Output             2160 ml   Net            -58.6 ml     Physical Exam:  General: no distress. No distress but agitated. UE restrained but moves her LE more.  Neck: no carotid bruit  Breast: left breast mass - hard, non-mobile  Lungs:  Shallow, decreased bs bilaterally rhonchi. No wheezing.   Heart: regular rate and rhythm and no murmur  Abdomen: soft, non-tender non-distended; bowel sounds diminished and right nasal feeding tube and rectal tube.  Extremities: no cyanosis  or clubbing but has pedal edema and SCDs to [facility] pic  line.  Neurologic: minimally responsive    Laboratory:  CBC:     Recent Labs  Lab 05/01/17  0345   WBC 4.53   RBC 4.26   HGB 10.9*   HCT 35.1*      MCV 82   MCH 25.6*   MCHC 31.1*     BMP:     Recent Labs  Lab 05/01/17  0714   *   *   K 5.1   CL 90*   CO2 31*   BUN 97*   CREATININE 1.5*   CALCIUM 10.6*        Vent Mode: SIMV (PRVC) + PS  Oxygen Concentration (%):  [40] 40  Resp Rate Total:  [9 br/min-24 br/min] 10 br/min  Vt Set:  [400 mL] 400 mL  PEEP/CPAP:  [5 cmH20] 5 cmH20  Pressure Support:  [15 cmH20] 15 cmH20  Mean Airway Pressure:  [7.3 ojN17-41.7 cmH20] 8.7 cmH20    ABGs:     Recent Labs  Lab 05/01/17  0443   PH 7.330*   PCO2 64.5*   PO2 83   HCO3 34.0*   POCSATURATED 95   BE 6     Microbiology Results (last 7 days)     ** No results found for the last 168 hours. **        Chest X-Ray:     Findings: Cardiac silhouette may be mildly enlarged.  Patient is rotated to the left, limiting comparison.  There is persistent opacity in the left lung base which appears grossly similar prior.  Blunting of the loss costophrenic angle suggest small pleural effusion.  There are some ill-defined interstitial and faint airspace opacities in the right lung base which may be slightly worsened from prior.  Findings may represent multifocal infectious/inflammatory process versus edema.  Right PICC, enteric tube, and tracheostomy tube appear unchanged.  No definite pneumothorax appreciated.      ASSESSMENT/PLAN:     1) Acute respiratory failure - presented with weakness of UE and pneumonia. Unable to wean off vent. S/p trach. Fair gas exchanged. On rate 12 via trach. PS 10. Agitated. No cxr this am. Rhonchi on exam. Will increase ps a bit and try to decrease rate. Bronchodilators. LTAC?  2) Weakness - UE. Neurology following. ??MG - MG panel was negative. Steroids stopped. ?eiotlogy to weakness. UE remain weak.  3) History of HTN  4) Anemia - stable.      Remains much the same ; efforts to restart weaning  under way but success not expected.      Bird Vega

## 2017-05-01 NOTE — PROGRESS NOTES
Rec'd patient trached with #8.0 Michelle.  Patient remain on Servoi ventilator settings are SIMV, rate 10, tidal volume 400, +5 PEEP, pressure support 15 and fio2 40%.  Aerosol treatment given as ordered.  Patient tolerated treatment well.  All ventilator alarms are set and ambu at HOB>

## 2017-05-01 NOTE — PROGRESS NOTES
[facility]  Neurology  Progress Note    Patient Name: Radha Pritchett  MRN: [id number]  Admission Date: 4/6/2017  Hospital Length of Stay: 25 days  Code Status: Full Code   Attending Provider: Ashley Zazueta MD  Primary Care Physician: Ferny Iglesias MD   Principal Problem:Acute respiratory failure with hypercapnia    Subjective:     Interval History: 47 y/o female with medical Hx as listed below who was admitted for shortness of breath. Pt was found to have a pneumonia. She decompensasated and is now intubated. Pt has been difficult to to wean off ventilator. It has been reported that pt has been experiencing upper extremity weakness for a while but is perhaps a generalized weakness chronically. No other details at his moment. Pt also has Hx of a left breast mass that according to pathology is benign, possibly a hematoma. Pt has at baseline a learning difficulty.      -4/27/17: No new changes remains on vent. Opens eyes upon verbal stimulation and moves extremities.     -4/30/17: Pt continues with weakness in UE's although able to move then spotnaeously. Unable to wean from vent.  Pt has Hx of chronic weakness in UE's and chronic hypercapnia.    -5/1/17: No changes in neurological status.     Current Neurological Medications:     Current Facility-Administered Medications   Medication Dose Route Frequency Provider Last Rate Last Dose    0.9%  NaCl infusion   Intravenous Continuous Ashley Zazueta  mL/hr at 05/01/17 1400      acetaminophen tablet 500 mg  500 mg Oral Q6H PRN Easton Aaron MD   500 mg at 04/16/17 0823    albuterol-ipratropium 2.5mg-0.5mg/3mL nebulizer solution 3 mL  3 mL Nebulization Q4H PRN Easton Aaron MD   3 mL at 04/24/17 1415    albuterol-ipratropium 2.5mg-0.5mg/3mL nebulizer solution 3 mL  3 mL Nebulization Q6H Renae Gunderson MD   3 mL at 05/01/17 1312    carvedilol tablet 3.125 mg  3.125 mg Oral BID Yovani Arellano MD   3.125 mg at 04/27/17 2033     chlorhexidine 0.12 % solution 15 mL  15 mL Mouth/Throat BID Perico Solis MD   15 mL at 05/01/17 0818    cloNIDine tablet 0.1 mg  0.1 mg Oral TID PRN Easton Aaron MD        dextrose 50% injection 12.5 g  12.5 g Intravenous PRN Easton Aaron MD        dextrose 50% injection 12.5 g  12.5 g Intravenous PRN Jun Burleson MD        dextrose 50% injection 25 g  25 g Intravenous PRN Jun Burleson MD        enoxaparin injection 40 mg  40 mg Subcutaneous Daily Easton Aaron MD   40 mg at 04/30/17 1726    fentaNYL (SUBLIMAZE) 2,500 mcg in dextrose 5 % 250 mL infusion   Intravenous Continuous Jun Burleson MD 14.5 mL/hr at 05/01/17 1400      furosemide injection 40 mg  40 mg Intravenous BID Bird Vega MD   40 mg at 05/01/17 0818    glucagon (human recombinant) injection 1 mg  1 mg Intramuscular PRN Easton Aaron MD        glycopyrrolate injection 0.1 mg  0.1 mg Intravenous TID PRN Yovani Arellano MD   0.1 mg at 04/27/17 2036    insulin aspart pen 1-10 Units  1-10 Units Subcutaneous Q6H PRN Easton Aaron MD   10 Units at 04/30/17 1742    insulin detemir pen 30 Units  30 Units Subcutaneous QHS Ashley Zazueta MD   30 Units at 04/30/17 2057    insulin regular (Humulin R) 100 Units in sodium chloride 0.9% 100 mL infusion  7 Units/hr Intravenous Continuous Ashley Zazueta MD 15 mL/hr at 05/01/17 1412 15 Units/hr at 05/01/17 1412    lorazepam injection 1 mg  1 mg Intravenous Q2H PRN Yovani Arellano MD   1 mg at 04/30/17 2213    morphine injection 2 mg  2 mg Intravenous Q2H PRN Yovani Arellano MD   2 mg at 04/30/17 2135    norepinephrine 4 mg in dextrose 5% 250 mL infusion (premix) (titrating)  0.04 mcg/kg/min Intravenous Continuous Ashley Zazueta MD   Stopped at 04/27/17 2055    ondansetron injection 8 mg  8 mg Intravenous Q8H PRN Easton Aaron MD        pantoprazole injection 40 mg  40 mg Intravenous Daily Lakesha Greer MD   40 mg at 05/01/17 0818    promethazine  (PHENERGAN) 12.5 mg in dextrose 5 % 50 mL IVPB  12.5 mg Intravenous Q6H PRN Easton Aaron MD        propofol (DIPRIVAN) 10 mg/mL infusion  5 mcg/kg/min Intravenous Continuous PRN Bird Vega MD 6.5 mL/hr at 05/01/17 1400 15.046 mcg/kg/min at 05/01/17 1400    ramelteon tablet 8 mg  8 mg Oral Nightly PRN Easton Aaron MD   8 mg at 04/27/17 2132    scopolamine 1.5 mg (1 mg over 3 days) 1.5 mg  1 patch Transdermal Q3 Days Lakesha Greer MD   1.5 mg at 04/28/17 2020       Review of Systems   Unable to obtain ROS; intubated    Objective:     Vital Signs (Most Recent):  Temp: 98.2 °F (36.8 °C) (05/01/17 1130)  Pulse: 80 (05/01/17 1312)  Resp: 12 (05/01/17 1312)  BP: (!) 95/57 (05/01/17 1300)  SpO2: 97 % (05/01/17 1312) Vital Signs (24h Range):  Temp:  [98 °F (36.7 °C)-98.9 °F (37.2 °C)] 98.2 °F (36.8 °C)  Pulse:  [] 80  Resp:  [8-30] 12  SpO2:  [95 %-100 %] 97 %  BP: ()/() 95/57     Weight: 73.9 kg (162 lb 14.7 oz)  Body mass index is 45.82 kg/(m^2).    Physical Exam  Constitutional: no distress  ENT: no discharge  Head: Normocephalic.   Eyes: No icteric sclereae  Neck: Neck supple.   Cardiovascular: Normal rate.   Pulmonary/Chest: symmetrical expansion of chest.   Extremities: no edema  Neurological: opens eyes upon verbal stimuli; tracks upon repeated verbal stimultion   Pupils are round at 3 mm and reactive to light; corneal responses are present bilaterally; EOMI; no nystagmus  Moving LE's spontaneously; UE movements in elbow and hands but limited at shoulder       Significant Labs:   CBC:   Recent Labs  Lab 05/01/17  0345   WBC 4.53   HGB 10.9*   HCT 35.1*        CMP:   Recent Labs  Lab 05/01/17  0345 05/01/17  0714 05/01/17  0942   * 624* 577*   * 134*  --    K 4.6 5.1  --    CL 89* 90*  --    CO2 30* 31*  --    BUN 90* 97*  --    CREATININE 1.5* 1.5*  --    CALCIUM 10.9* 10.6*  --    ANIONGAP 15 13  --    EGFRNONAA 41* 41*  --            Assessment and Plan:     45 y/o  female consulted for UE weakness:      1. UE weakness: chronic weakness of uncertain etiology. Weaning off vent has been difficult. Concern was if her chronic weakness was related to inability to wean off vent. MG panel was obtained: antibodies were negative. CPK normal   -MRI of brain was obtained to see if any pathology in brainstem. MRI limited due to motion artifact but did not demonstrate brainstem abnormality; it showed enhancing in T2 FLAIR of temporal lobes but no DWI enhancing in same area suggesting not an acute process but uncertain about the chronicity of these findings. Herpes encephalitis was another concern for which pt was put shortly on acyclovir but pt is able to follow commands going against and encephalopathic process.   Due to motion artifact is difficult to see upper levels of c-spine on MRI.  -C-spine MRI ordered but will obtain when pt more stable. A high cervical lesion (transverse myelitis, syringomyelia among others) may cause respiratory problems as well as weakness of extremities.    Active Diagnoses:    Diagnosis Date Noted POA    PRINCIPAL PROBLEM:  Acute respiratory failure with hypercapnia [J96.02] 04/07/2017 Yes    Upper extremity weakness [R29.898] 04/26/2017 Yes    Hyperglycemia [R73.9] 04/24/2017 No    Counseling regarding advanced directives and goals of care [Z71.89] 04/14/2017 Not Applicable    Hypokalemia [E87.6] 04/09/2017 Yes    Breast mass, left [N63] 04/08/2017 Yes    Pneumonia of both lungs due to infectious organism [J18.9] 04/06/2017 Yes    Essential hypertension [I10] 04/06/2017 Yes     Chronic    Chronic diastolic heart failure [I50.32] 04/06/2017 Yes     Chronic    Moderate protein malnutrition [E44.0] 04/06/2017 Yes     Chronic    Hematoma of breast [N64.89] 03/24/2017 Yes      Problems Resolved During this Admission:    Diagnosis Date Noted Date Resolved POA    Sepsis [A41.9] 04/06/2017 04/24/2017 Yes       VTE Risk Mitigation         Ordered      enoxaparin injection 40 mg  Daily     Route:  Subcutaneous        04/06/17 2202     Medium Risk of VTE  Once      04/06/17 2202          Gus Phillips MD  Neurology  [facility]

## 2017-05-02 PROBLEM — N63.20 MASS OF BREAST, LEFT: Status: ACTIVE | Noted: 2017-03-24

## 2017-05-02 LAB
ALLENS TEST: ABNORMAL
ALLENS TEST: ABNORMAL
ANION GAP SERPL CALC-SCNC: 12 MMOL/L
BUN SERPL-MCNC: 80 MG/DL
CALCIUM SERPL-MCNC: 10.2 MG/DL
CHLORIDE SERPL-SCNC: 93 MMOL/L
CO2 SERPL-SCNC: 33 MMOL/L
CREAT SERPL-MCNC: 1.1 MG/DL
DELSYS: ABNORMAL
DELSYS: ABNORMAL
ERYTHROCYTE [SEDIMENTATION RATE] IN BLOOD BY WESTERGREN METHOD: 12 MM/H
EST. GFR  (AFRICAN AMERICAN): >60 ML/MIN/1.73 M^2
EST. GFR  (NON AFRICAN AMERICAN): >60 ML/MIN/1.73 M^2
FIO2: 40
FIO2: 40
GLUCOSE SERPL-MCNC: 485 MG/DL
HCO3 UR-SCNC: 35.2 MMOL/L (ref 24–28)
HCO3 UR-SCNC: 35.7 MMOL/L (ref 24–28)
MIN VOL: 10
MODE: ABNORMAL
MODE: ABNORMAL
PCO2 BLDA: 54.8 MMHG (ref 35–45)
PCO2 BLDA: 60.8 MMHG (ref 35–45)
PEEP: 5
PEEP: 5
PH SMN: 7.38 [PH] (ref 7.35–7.45)
PH SMN: 7.42 [PH] (ref 7.35–7.45)
PO2 BLDA: 76 MMHG (ref 80–100)
PO2 BLDA: 85 MMHG (ref 80–100)
POC BE: 9 MMOL/L
POC BE: 9 MMOL/L
POC SATURATED O2: 95 % (ref 95–100)
POC SATURATED O2: 96 % (ref 95–100)
POC TCO2: 37 MMOL/L (ref 23–27)
POC TCO2: 37 MMOL/L (ref 23–27)
POCT GLUCOSE: 214 MG/DL (ref 70–110)
POCT GLUCOSE: 247 MG/DL (ref 70–110)
POCT GLUCOSE: 288 MG/DL (ref 70–110)
POCT GLUCOSE: 349 MG/DL (ref 70–110)
POTASSIUM SERPL-SCNC: 3.7 MMOL/L
PS: 10
PS: 15
SAMPLE: ABNORMAL
SAMPLE: ABNORMAL
SITE: ABNORMAL
SITE: ABNORMAL
SODIUM SERPL-SCNC: 138 MMOL/L
SP02: 96
SPONT RATE: 29
VT: 400

## 2017-05-02 PROCEDURE — 82803 BLOOD GASES ANY COMBINATION: CPT

## 2017-05-02 PROCEDURE — 36600 WITHDRAWAL OF ARTERIAL BLOOD: CPT

## 2017-05-02 PROCEDURE — 99900035 HC TECH TIME PER 15 MIN (STAT)

## 2017-05-02 PROCEDURE — 27000221 HC OXYGEN, UP TO 24 HOURS

## 2017-05-02 PROCEDURE — 63600175 PHARM REV CODE 636 W HCPCS: Performed by: HOSPITALIST

## 2017-05-02 PROCEDURE — 94003 VENT MGMT INPAT SUBQ DAY: CPT

## 2017-05-02 PROCEDURE — 25000242 PHARM REV CODE 250 ALT 637 W/ HCPCS: Performed by: INTERNAL MEDICINE

## 2017-05-02 PROCEDURE — 63600175 PHARM REV CODE 636 W HCPCS: Performed by: INTERNAL MEDICINE

## 2017-05-02 PROCEDURE — 27200966 HC CLOSED SUCTION SYSTEM

## 2017-05-02 PROCEDURE — 80048 BASIC METABOLIC PNL TOTAL CA: CPT

## 2017-05-02 PROCEDURE — 25000003 PHARM REV CODE 250: Performed by: PSYCHIATRY & NEUROLOGY

## 2017-05-02 PROCEDURE — 99900026 HC AIRWAY MAINTENANCE (STAT)

## 2017-05-02 PROCEDURE — 25000003 PHARM REV CODE 250: Performed by: HOSPITALIST

## 2017-05-02 PROCEDURE — C9113 INJ PANTOPRAZOLE SODIUM, VIA: HCPCS | Performed by: HOSPITALIST

## 2017-05-02 PROCEDURE — 94761 N-INVAS EAR/PLS OXIMETRY MLT: CPT

## 2017-05-02 PROCEDURE — 99232 SBSQ HOSP IP/OBS MODERATE 35: CPT | Mod: ,,, | Performed by: PSYCHIATRY & NEUROLOGY

## 2017-05-02 PROCEDURE — 94640 AIRWAY INHALATION TREATMENT: CPT

## 2017-05-02 PROCEDURE — 36415 COLL VENOUS BLD VENIPUNCTURE: CPT

## 2017-05-02 PROCEDURE — 25000003 PHARM REV CODE 250: Performed by: INTERNAL MEDICINE

## 2017-05-02 PROCEDURE — 20000000 HC ICU ROOM

## 2017-05-02 RX ORDER — OLANZAPINE 10 MG/2ML
5 INJECTION, POWDER, FOR SOLUTION INTRAMUSCULAR ONCE AS NEEDED
Status: DISCONTINUED | OUTPATIENT
Start: 2017-05-02 | End: 2017-05-02

## 2017-05-02 RX ORDER — OLANZAPINE 10 MG/2ML
10 INJECTION, POWDER, FOR SOLUTION INTRAMUSCULAR ONCE AS NEEDED
Status: COMPLETED | OUTPATIENT
Start: 2017-05-02 | End: 2017-05-02

## 2017-05-02 RX ORDER — FUROSEMIDE 10 MG/ML
20 INJECTION INTRAMUSCULAR; INTRAVENOUS 2 TIMES DAILY
Status: DISCONTINUED | OUTPATIENT
Start: 2017-05-02 | End: 2017-05-03

## 2017-05-02 RX ORDER — SCOLOPAMINE TRANSDERMAL SYSTEM 1 MG/1
1 PATCH, EXTENDED RELEASE TRANSDERMAL
Status: DISCONTINUED | OUTPATIENT
Start: 2017-05-04 | End: 2017-05-08 | Stop reason: HOSPADM

## 2017-05-02 RX ADMIN — INSULIN ASPART 6 UNITS: 100 INJECTION, SOLUTION INTRAVENOUS; SUBCUTANEOUS at 02:05

## 2017-05-02 RX ADMIN — OLANZAPINE 10 MG: 10 INJECTION, POWDER, LYOPHILIZED, FOR SOLUTION INTRAMUSCULAR at 05:05

## 2017-05-02 RX ADMIN — FUROSEMIDE 40 MG: 10 INJECTION, SOLUTION INTRAMUSCULAR; INTRAVENOUS at 09:05

## 2017-05-02 RX ADMIN — INSULIN ASPART 8 UNITS: 100 INJECTION, SOLUTION INTRAVENOUS; SUBCUTANEOUS at 06:05

## 2017-05-02 RX ADMIN — CARVEDILOL 3.12 MG: 3.12 TABLET, FILM COATED ORAL at 08:05

## 2017-05-02 RX ADMIN — PANTOPRAZOLE SODIUM 40 MG: 40 INJECTION, POWDER, FOR SOLUTION INTRAVENOUS at 09:05

## 2017-05-02 RX ADMIN — PROPOFOL 10 MCG/KG/MIN: 10 INJECTION, EMULSION INTRAVENOUS at 09:05

## 2017-05-02 RX ADMIN — IPRATROPIUM BROMIDE AND ALBUTEROL SULFATE 3 ML: .5; 3 SOLUTION RESPIRATORY (INHALATION) at 01:05

## 2017-05-02 RX ADMIN — LORAZEPAM 1 MG: 2 INJECTION INTRAMUSCULAR; INTRAVENOUS at 11:05

## 2017-05-02 RX ADMIN — INSULIN ASPART 2 UNITS: 100 INJECTION, SOLUTION INTRAVENOUS; SUBCUTANEOUS at 11:05

## 2017-05-02 RX ADMIN — IPRATROPIUM BROMIDE AND ALBUTEROL SULFATE 3 ML: .5; 3 SOLUTION RESPIRATORY (INHALATION) at 07:05

## 2017-05-02 RX ADMIN — INSULIN DETEMIR 15 UNITS: 100 INJECTION, SOLUTION SUBCUTANEOUS at 08:05

## 2017-05-02 RX ADMIN — FUROSEMIDE 20 MG: 10 INJECTION, SOLUTION INTRAMUSCULAR; INTRAVENOUS at 05:05

## 2017-05-02 RX ADMIN — LORAZEPAM 1 MG: 2 INJECTION INTRAMUSCULAR; INTRAVENOUS at 03:05

## 2017-05-02 RX ADMIN — INSULIN ASPART 4 UNITS: 100 INJECTION, SOLUTION INTRAVENOUS; SUBCUTANEOUS at 06:05

## 2017-05-02 RX ADMIN — LORAZEPAM 1 MG: 2 INJECTION INTRAMUSCULAR; INTRAVENOUS at 09:05

## 2017-05-02 RX ADMIN — MORPHINE SULFATE 2 MG: 10 INJECTION INTRAVENOUS at 09:05

## 2017-05-02 RX ADMIN — ENOXAPARIN SODIUM 40 MG: 100 INJECTION SUBCUTANEOUS at 05:05

## 2017-05-02 RX ADMIN — CHLORHEXIDINE GLUCONATE 15 ML: 1.2 RINSE ORAL at 08:05

## 2017-05-02 RX ADMIN — CARVEDILOL 3.12 MG: 3.12 TABLET, FILM COATED ORAL at 09:05

## 2017-05-02 RX ADMIN — CHLORHEXIDINE GLUCONATE 15 ML: 1.2 RINSE ORAL at 09:05

## 2017-05-02 RX ADMIN — LORAZEPAM 1 MG: 2 INJECTION INTRAMUSCULAR; INTRAVENOUS at 08:05

## 2017-05-02 RX ADMIN — PROPOFOL 35 MCG/KG/MIN: 10 INJECTION, EMULSION INTRAVENOUS at 04:05

## 2017-05-02 NOTE — PLAN OF CARE
Problem: Patient Care Overview  Goal: Plan of Care Review  Outcome: Ongoing (interventions implemented as appropriate)  Unable to wean vent settings today. No falls or new skin breakdown throughout shift. Afebrile. VSS. Tube feeds restarted @45 ml/hr. Insulin drip weaned off. Patient still pulling at tubing despite teaching. Plans for MRI of neck and ultrasound of Left breast tomorrow. Plan of care reviewed with patient.

## 2017-05-02 NOTE — PROGRESS NOTES
1915 ultrasound tech, called icu unit to speak with rn, informed rn unable to do ultrasound inpt. Informed MD driss no longer on unit or on call. Driss will leave message for head tech in am, may be able to complete procedure tomorrow.

## 2017-05-02 NOTE — ASSESSMENT & PLAN NOTE
Patient doesn't appear to be in acute heart failure; checked ECHO with EF=65% but with diastolic dysfunction.  Lasix given

## 2017-05-02 NOTE — ASSESSMENT & PLAN NOTE
Bilateral UE weakness with unknown etiology  IV steroids dc'd  D/w neurology - trial acyclovir started then stopped after review of previous head CT that showed chronic findings  MRI c/spine today.

## 2017-05-02 NOTE — PROGRESS NOTES
Progress Note  Pulmonology    Admit Date: 4/6/2017   LOS: 26 days     SUBJECTIVE: resp distress     Follow-up For:  Vent management. Patient on vent via trach. Off sedation. Agitated.  Opens her eyes - moves ext - no commands   Continuous Infusions:   sodium chloride 0.9% 75 mL/hr at 05/02/17 0500    fentanyl Stopped (05/02/17 0800)    norepinephrine bitartrate-D5W Stopped (04/27/17 2055)    propofol Stopped (05/02/17 0800)     Scheduled Meds:   albuterol-ipratropium 2.5mg-0.5mg/3mL  3 mL Nebulization Q6H    carvedilol  3.125 mg Oral BID    chlorhexidine  15 mL Mouth/Throat BID    enoxaparin  40 mg Subcutaneous Daily    furosemide  40 mg Intravenous BID    insulin detemir  15 Units Subcutaneous QHS    pantoprazole  40 mg Intravenous Daily    [START ON 5/4/2017] scopolamine  1 patch Transdermal Q3 Days     Review of Systems:  Review of systems not obtained due to patient agitated and not very responsive.    OBJECTIVE:     Vital Signs Range (Last 24H):  Temp:  [97.5 °F (36.4 °C)-98.2 °F (36.8 °C)]   Pulse:  []   Resp:  [8-33]   BP: ()/()   SpO2:  [95 %-100 %]     I & O (Last 24H):    Intake/Output Summary (Last 24 hours) at 05/02/17 0948  Last data filed at 05/02/17 0500   Gross per 24 hour   Intake           3470.8 ml   Output             2660 ml   Net            810.8 ml     Physical Exam:  General: no distress. No distress but agitated. UE restrained but moves her LE more.  Neck: no carotid bruit  Breast: left breast mass - hard, non-mobile  Lungs:  Shallow, decreased bs bilaterally rhonchi. No wheezing.   Heart: regular rate and rhythm and no murmur  Abdomen: soft, non-tender non-distended; bowel sounds diminished and right nasal feeding tube and rectal tube.  Extremities: no cyanosis  or clubbing but has pedal edema and SCDs to LE. RUE picc line.  Neurologic: minimally responsive    Laboratory:  CBC:     Recent Labs  Lab 05/01/17  0345   WBC 4.53   RBC 4.26   HGB 10.9*   HCT 35.1*       MCV 82   MCH 25.6*   MCHC 31.1*     BMP:     Recent Labs  Lab 05/02/17  0420   *      K 3.7   CL 93*   CO2 33*   BUN 80*   CREATININE 1.1   CALCIUM 10.2        Vent Mode: (P) CPAP  Oxygen Concentration (%):  [] 40  Resp Rate Total:  [8 br/min-26 br/min] 19 br/min  Vt Set:  [400 mL] 400 mL  PEEP/CPAP:  [5 cmH20] 5 cmH20  Pressure Support:  [10 cmH20-15 cmH20] 10 cmH20  Mean Airway Pressure:  [6.1 gvH76-75.2 cmH20] 7.5 cmH20    ABGs:     Recent Labs  Lab 05/02/17  0924   PH 7.416   PCO2 54.8*   PO2 76*   HCO3 35.2*   POCSATURATED 95   BE 9     Microbiology Results (last 7 days)     ** No results found for the last 168 hours. **        Chest X-Ray:     Findings: Cardiac silhouette may be mildly enlarged.  Patient is rotated to the left, limiting comparison.  There is persistent opacity in the left lung base which appears grossly similar prior.  Blunting of the loss costophrenic angle suggest small pleural effusion.  There are some ill-defined interstitial and faint airspace opacities in the right lung base which may be slightly worsened from prior.  Findings may represent multifocal infectious/inflammatory process versus edema.  Right PICC, enteric tube, and tracheostomy tube appear unchanged.  No definite pneumothorax appreciated.      ASSESSMENT/PLAN:     1) Acute respiratory failure - presented with weakness of UE and pneumonia. Unable to wean off vent. S/p trach. Fair gas exchanged. On rate 12 via trach. PS 10. Agitated. No cxr this am. Rhonchi on exam. Will increase ps a bit and try to decrease rate. Bronchodilators. LTAC?  2) Weakness - UE. Neurology following. ??MG - MG panel was negative. Steroids stopped. ?eiotlogy to weakness. UE remain weak.  3) History of HTN  4) Anemia - stable.    On cpap presently ; occasionally agitated. Neuro attending ;trach collar at some time      Bird Vega

## 2017-05-02 NOTE — PLAN OF CARE
Problem: Patient Care Overview  Goal: Plan of Care Review  Outcome: Ongoing (interventions implemented as appropriate)  Pt remains in ICU requiring mechanical ventilation. Tracheostomy without complications. Propofol and fentanyl for sedation with PRN ativan being given as well. NS @ 75 ml/hr. Tolerating TF's. Carter with good output. MRI spine/neck planned today and U/S of L breast planned for today. Remains free from fall, injury, or breakdown throughout shift.

## 2017-05-02 NOTE — ASSESSMENT & PLAN NOTE
Hyperglycemia worsened with steroids as anticipated, titrate insulin   Levemir 15 units Q hs SSI  HgA1c 7.3%.  No apparent history of DM prior to admission.  Off steroid,hofuly hyperglycemia improve.

## 2017-05-02 NOTE — SUBJECTIVE & OBJECTIVE
Interval History: pt is off sedation,at this time,but agitated,recieved IV Ativan.    Review of Systems   Unable to perform ROS: Intubated     Objective:     Vital Signs (Most Recent):  Temp: 98 °F (36.7 °C) (05/02/17 0730)  Pulse: 89 (05/02/17 1030)  Resp: 15 (05/02/17 1030)  BP: 131/89 (05/02/17 1030)  SpO2: (!) 93 % (05/02/17 1030) Vital Signs (24h Range):  Temp:  [97.5 °F (36.4 °C)-98.2 °F (36.8 °C)] 98 °F (36.7 °C)  Pulse:  [] 89  Resp:  [8-37] 15  SpO2:  [93 %-100 %] 93 %  BP: ()/() 131/89     Weight: 73.9 kg (162 lb 14.7 oz)  Body mass index is 45.82 kg/(m^2).    Intake/Output Summary (Last 24 hours) at 05/02/17 1053  Last data filed at 05/02/17 0500   Gross per 24 hour   Intake           3338.8 ml   Output             2560 ml   Net            778.8 ml      Physical Exam   Constitutional: She appears well-developed. No distress.   Eyes: Conjunctivae are normal. Right eye exhibits no discharge. Left eye exhibits no discharge.   Neck:   Trach in place   Cardiovascular: Normal rate, regular rhythm and normal heart sounds.    Pulmonary/Chest: No stridor. No respiratory distress. She has no wheezes.   Mechanically ventilated per trach.     Abdominal: Soft. Bowel sounds are normal.   Musculoskeletal: She exhibits no deformity.   Neurological:   Very restless.  Poorly responsive to commands.   Skin: Skin is warm and dry.       Significant Labs:   BMP:     Recent Labs  Lab 05/02/17  0420   *      K 3.7   CL 93*   CO2 33*   BUN 80*   CREATININE 1.1   CALCIUM 10.2     CBC:     Recent Labs  Lab 05/01/17  0345   WBC 4.53   HGB 10.9*   HCT 35.1*        POCT Glucose:     Recent Labs  Lab 05/01/17  1832 05/01/17  2319 05/02/17  0623   POCTGLUCOSE 153* 306* 349*       Significant Imaging: I have reviewed all pertinent imaging results/findings within the past 24 hours.

## 2017-05-02 NOTE — CONSULTS
Consult Note  Infectious Disease    Consult Requested By: Antonieta Carpenter MD    Reason for Consult: does she have hsv encephalitis?    SUBJECTIVE:     History of Present Illness:  Patient is a 46 y.o. female presents with hypercapneic respiratory failure. She has had episodes of hypercapneic respiratory failure in 2014 as well. She was deemed to have a pneumonia and received antibiotics as well as antibiotics for a vse uti(e.faecium) has had trouble weaning form the ventilator and has a tracheostomy. Neurology has identified ul paresis and ct of the head has confirmed bilateral temporal lobe attenuation that was seen in 2014 as well. An mri confirms the same. The patient is now more awake when off sedation per nursing. She has received empirical steroids and ivig in case this was an encephalitis. The specter of hsv encephalitis was raised based on imaging. She has not had cognitive deficits coming in to be admitted and is agitated now when sedation is stopped.  She also has a left breast mass felt to be a hematoma.    Past Medical History:   Diagnosis Date    CHF (congestive heart failure)     Learning difficulty      Past Surgical History:   Procedure Laterality Date    BREAST SURGERY Left     biopsy    CYST REMOVAL      shoulder      Family History   Problem Relation Age of Onset    Diabetes      Coronary artery disease       Social History   Substance Use Topics    Smoking status: Never Smoker    Smokeless tobacco: Never Used    Alcohol use Yes      Comment: OCCASSIONAL       Review of patient's allergies indicates:  No Known Allergies     Antibiotics     None          Review of Systems:  Review of systems not obtained due to patient factors intubated.    OBJECTIVE:     Vital Signs (Most Recent)  Temp: 97.8 °F (36.6 °C) (05/02/17 0330)  Pulse: (!) 59 (05/02/17 0742)  Resp: 12 (05/02/17 0742)  BP: 123/76 (05/02/17 0330)  SpO2: 100 % (05/02/17 0742)    Temperature Range Min/Max (Last 24H):  Temp:   [97.5 °F (36.4 °C)-98.2 °F (36.8 °C)]     Physical Exam:  General: well developed, well nourished  Eyes: conjunctivae/corneas clear. PERRL..  HENT: Head:normocephalic, atraumatic. Ears:bilateral TM's and external ear canals normal. Nose: Nares normal. Septum midline. Mucosa normal. No drainage or sinus tenderness., no discharge. Throat: lips, mucosa, and tongue normal; teeth and gums normal and no throat erythema.  Neck: supple, symmetrical, trachea midline, no JVD and thyroid not enlarged, symmetric, no tenderness/mass/nodules  Lungs:  clear to auscultation bilaterally and normal respiratory effort  Cardiovascular: Heart: regular rate and rhythm, S1, S2 normal, no murmur, click, rub or gallop. Chest Wall: no tenderness. Extremities: no cyanosis or edema, or clubbing. Pulses: 2+ and symmetric.  Abdomen/Rectal: Abdomen: soft, non-tender non-distented; bowel sounds normal; no masses,  no organomegaly. Rectal: not examined  Skin: Skin color, texture, turgor normal. No rashes or lesions  Musculoskeletal:no clubbing, cyanosis  Lymph Nodes: No cervical or supraclavicular adenopathy  Neurologic: Mental status: intubated and sedated. ul restrained as she is pulling at et tube etc which speaks against an ul paresis    Laboratory:  CBC  No results for input(s): WBC, RBC, HGB, HCT, PLT in the last 24 hours.  BMP    Recent Labs  Lab 05/02/17  0420   CO2 33*   BUN 80*   CREATININE 1.1   CALCIUM 10.2     No results for input(s): COLORU, CLARITYU, SPECGRAV, PHUR, PROTEINUA, GLUCOSEU, BILIRUBINCON, BLOODU, WBCU, RBCU, BACTERIA, MUCUS, NITRITE, LEUKOCYTESUR, UROBILINOGEN, HYALINECASTS in the last 168 hours.  Microbiology Results (last 7 days)     ** No results found for the last 168 hours. **          Diagnostic Results:  Labs: Reviewed  X-Ray: Reviewed  CT: Reviewed  MRI: Reviewed    ASSESSMENT/PLAN:     Active Hospital Problems    Diagnosis  POA    *Acute respiratory failure with hypercapnia [J96.02]  Yes    Upper extremity  weakness [R29.898]  Yes    Hyperglycemia [R73.9]  No    Counseling regarding advanced directives and goals of care [Z71.89]  Not Applicable    Hypokalemia [E87.6]  Yes    Breast mass, left [N63]  Yes    Pneumonia of both lungs due to infectious organism [J18.9]  Yes    Essential hypertension [I10]  Yes     Chronic    Chronic diastolic heart failure [I50.32]  Yes     Chronic    Moderate protein malnutrition [E44.0]  Yes     Chronic    Hematoma of breast [N64.89]  Yes      Resolved Hospital Problems    Diagnosis Date Resolved POA    Sepsis [A41.9] 04/24/2017 Yes       1. i see no evidence of hsv encephalitis in that this ct abnormality was present in 2014 and patient is alert when sedation stopped  hsv encephalitis unlikely when she is not encephalopathic  Consider lp and cervical spinal imaging  Dc acyclovir  Sounds like this is a chronic process and not acute  2. hypercapneic respiratory failure may be related to cns process  She does move all 4 limbs but is not following oral commands  Diarrhea and c diff negative 4/14/17  She may benefit form round the clock low dose ativan or resperidol to help deal with agitation and weaning process- she is still on iv propofol

## 2017-05-02 NOTE — PROGRESS NOTES
[facility] Hospital Medicine  Progress Note    Patient Name: Radha Pritchett  MRN: [id number]  Patient Class: IP- Inpatient   Admission Date: 4/6/2017  Length of Stay: 26 days  Attending Physician: Antonieta Carpenter MD  Primary Care Provider: Ferny Iglesias MD        Subjective:     Principal Problem:Acute respiratory failure with hypercapnia    HPI:  Ms. Radha Pritchett is a 46 y.o. female with essential hypertension, chronic diastolic heart failure (LVEF 55-60% Dec 2014), moderate protein malnutrition who presents to [facility] ED with complaints of dyspnea today.  She was scheduled to undergo removal of a breast mass today but was found to be hypoxic.  She does report that she has been short of breath but cannot specify how long.  She has also had some non-productive coughing and some chills without fevers.  Further history is otherwise limited at this time.    Hospital Course:   was admitted with pneumonia and acutely decompensated and was intubated and placed on vent. Pt with UE weakness prior to presentation on this admission which may be contributing to inability to extubate. Neurology following and considering possible MG and patient on therapeutic trial of prednisone and Modafinil. Pt s/p bronch that was unrevealing on 4/11. Pt was extubated on 4/15 but was re-intubated later the same day.  Was febrile and stared on Zyvox and zosyn.  CXR shows possible infiltrate. Urine culture +enterococcus.  Completed course and antibiotics dc'd. Blood culture negative  Trach on 4/19 without complications.      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 Started on acyclovir but dc'd  After review of previous CT imaging showed temporal abnormalities.      Brief drop in SBP and IVF were given, now dc'd and on lasix IV BID with changes to resp status and back on ventilator.    Pt does not tolerate being off sedation, weaning difficult. IV steroids dc'd. Started insulin drip  overnight and now off.     Plan for MRI c-spine 5/2/2017. Repeat left breast ultrasound show same size of mass,      Interval History: pt is off sedation,at this time,but agitated,recieved IV Ativan.    Review of Systems   Unable to perform ROS: Intubated     Objective:     Vital Signs (Most Recent):  Temp: 98 °F (36.7 °C) (05/02/17 0730)  Pulse: 89 (05/02/17 1030)  Resp: 15 (05/02/17 1030)  BP: 131/89 (05/02/17 1030)  SpO2: (!) 93 % (05/02/17 1030) Vital Signs (24h Range):  Temp:  [97.5 °F (36.4 °C)-98.2 °F (36.8 °C)] 98 °F (36.7 °C)  Pulse:  [] 89  Resp:  [8-37] 15  SpO2:  [93 %-100 %] 93 %  BP: ()/() 131/89     Weight: 73.9 kg (162 lb 14.7 oz)  Body mass index is 45.82 kg/(m^2).    Intake/Output Summary (Last 24 hours) at 05/02/17 1053  Last data filed at 05/02/17 0500   Gross per 24 hour   Intake           3338.8 ml   Output             2560 ml   Net            778.8 ml      Physical Exam   Constitutional: She appears well-developed. No distress.   Eyes: Conjunctivae are normal. Right eye exhibits no discharge. Left eye exhibits no discharge.   Neck:   Trach in place   Cardiovascular: Normal rate, regular rhythm and normal heart sounds.    Pulmonary/Chest: No stridor. No respiratory distress. She has no wheezes.   Mechanically ventilated per trach.     Abdominal: Soft. Bowel sounds are normal.   Musculoskeletal: She exhibits no deformity.   Neurological:   Very restless.  Poorly responsive to commands.   Skin: Skin is warm and dry.       Significant Labs:   BMP:     Recent Labs  Lab 05/02/17  0420   *      K 3.7   CL 93*   CO2 33*   BUN 80*   CREATININE 1.1   CALCIUM 10.2     CBC:     Recent Labs  Lab 05/01/17  0345   WBC 4.53   HGB 10.9*   HCT 35.1*        POCT Glucose:     Recent Labs  Lab 05/01/17  1832 05/01/17  2319 05/02/17  0623   POCTGLUCOSE 153* 306* 349*       Significant Imaging: I have reviewed all pertinent imaging results/findings within the past 24  hours.    Assessment/Plan:      * Acute respiratory failure with hypercapnia  Mechanical ventilation  Weakness  Metabolic encephalopathy  Fever  Assessment and Plan:  Pt with respiratory failure that required intubation, possibly secondary to pneumonia vs edema, treated empirically with Rocephin/Azithromycin, ECHO with diastolic dysfunction but normal EF, and Pulm following for vent management. Family reported UE weakness for a few months prior to this admission and Neurology consulted, possible MG and patient undergoing trial of prednisone and pyridostigmine. This weakness likely limited extubation and necessitated reintubation; patient was extubated on 4/16 but had to be re-intubated that same day. Unclear source of encephalopathy and case discussed with Neurology, CT scan of the head was unrevealing. Also, patient completed treatment with Azithromycin and was on Rocephin alone, but she spiked a fever overnight and antibiotics changed to Zosyn/Zyvox.   Trach on 4/19 without complications.  Stopped all ABx's.  Remains on vent per trach.  Having trouble clearing secretions.  Added Glycopyrrolate.   SW consulted for LTAC.  On CPAP at this time.      Mass of breast, left  Follow up at later date   Repeat left breast ultrasound,shoe same finding breast mass.      Pneumonia of both lungs due to infectious organism  Probably bacterial, but unable to determine.  The patient has a CURB-65 score of 0, and does not meet criteria for healthcare-associated pneumonia initially. CTA chest with bilateral airspace opacification concerning for pneumonia.  Oxygen saturations are improved but requiring a non-rebreather mask; then BIPAP and s/p intubation.  Blood culture NGTD.  Pt completed full course of Azithromycin, and was continued Rocephin until had spike of fever, Abx has been expanded  to Zosyn/Zyvox.  Completed course and Abx dc'd.      Essential hypertension  Coreg held with normal-low BP  IVF held, edema on CXR and lasix  given  levophed if needed to maintain MAP >65    Chronic diastolic heart failure  Patient doesn't appear to be in acute heart failure; checked ECHO with EF=65% but with diastolic dysfunction.  Lasix given     Moderate protein malnutrition  Resume tube feedings      Breast mass, left  Planned for surgery, seen by Dr. Sandoval (his patient) and plan to reschedule as outpatient. It is reportedly a hematoma by biopsy, but suspicion for CA not completely ruled out given paraneoplastic syndrome a consideration.      Hypokalemia  Replace as necessary.      Counseling regarding advanced directives and goals of care  Extensive conversations done with family. Pt to remain full code. Pt is still  but estranged from the  and reportedly the grandmother is the major decision maker.       Hyperglycemia  Hyperglycemia worsened with steroids as anticipated, titrate insulin   Levemir 15 units Q hs SSI  HgA1c 7.3%.  No apparent history of DM prior to admission.  Off steroid,hofuly hyperglycemia improve.      Upper extremity weakness  Bilateral UE weakness with unknown etiology  IV steroids dc'd  D/w neurology - trial acyclovir started then stopped after review of previous head CT that showed chronic findings  MRI c/spine today.      VTE Risk Mitigation         Ordered     enoxaparin injection 40 mg  Daily     Route:  Subcutaneous        05/01/17 1520     Medium Risk of VTE  Once      04/06/17 2202          Antonieta Carpenter MD  Department of Hospital Medicine   [facility] [place]

## 2017-05-02 NOTE — PROGRESS NOTES
[facility] [place]  Neurology  Progress Note    Patient Name: Radha Pritchett  MRN: [id number]  Admission Date: 4/6/2017  Hospital Length of Stay: 26 days  Code Status: Full Code   Attending Provider: Antonieta Carpenter MD  Primary Care Physician: Ferny Iglesias MD   Principal Problem:Acute respiratory failure with hypercapnia    Subjective:     Interval History: 47 y/o female with medical Hx as listed below who was admitted for shortness of breath. Pt was found to have a pneumonia. She decompensasated and is now intubated. Pt has been difficult to to wean off ventilator. It has been reported that pt has been experiencing upper extremity weakness for a while but is perhaps a generalized weakness chronically. No other details at his moment. Pt also has Hx of a left breast mass that according to pathology is benign, possibly a hematoma. Pt has at baseline a learning difficulty.      -4/27/17: No new changes remains on vent. Opens eyes upon verbal stimulation and moves extremities.      -4/30/17: Pt continues with weakness in UE's although able to move then spotnaeously. Unable to wean from vent.  Pt has Hx of chronic weakness in UE's and chronic hypercapnia.     -5/1/17: No changes in neurological status.      -5/2/17: Today for MRI of C-spine. No new issues this morning.    Current Neurological Medications:     Current Facility-Administered Medications   Medication Dose Route Frequency Provider Last Rate Last Dose    acetaminophen tablet 500 mg  500 mg Oral Q6H PRN Ashley Zazueta MD        albuterol-ipratropium 2.5mg-0.5mg/3mL nebulizer solution 3 mL  3 mL Nebulization Q6H Renae Gunderson MD   3 mL at 05/02/17 1349    albuterol-ipratropium 2.5mg-0.5mg/3mL nebulizer solution 3 mL  3 mL Nebulization Q4H PRN Ashley Zazueta MD        carvedilol tablet 3.125 mg  3.125 mg Oral BID Yovani Arellano MD   3.125 mg at 05/02/17 0949    chlorhexidine 0.12 % solution 15 mL  15 mL Mouth/Throat BID Perico  YEISON Solis MD   15 mL at 05/02/17 0949    cloNIDine tablet 0.1 mg  0.1 mg Oral TID PRN Ashley Zazueta MD        dextrose 50% injection 12.5 g  12.5 g Intravenous PRN Easton Aaron MD        enoxaparin injection 40 mg  40 mg Subcutaneous Daily Ashley Zazueta MD   40 mg at 05/01/17 1712    fentaNYL (SUBLIMAZE) 2,500 mcg in dextrose 5 % 250 mL infusion   Intravenous Continuous Jun Burleson MD   Stopped at 05/02/17 0800    furosemide injection 20 mg  20 mg Intravenous BID Antonieta Carpenter MD        glucagon (human recombinant) injection 1 mg  1 mg Intramuscular PRN Easton Aaron MD        glycopyrrolate injection 0.1 mg  0.1 mg Intravenous TID PRN Yovani Arellano MD   0.1 mg at 04/27/17 2036    insulin aspart pen 1-10 Units  1-10 Units Subcutaneous Q6H PRN Easton Aaron MD   6 Units at 05/02/17 1420    insulin detemir pen 15 Units  15 Units Subcutaneous QHS Ashley Zazueta MD   15 Units at 05/01/17 2013    lorazepam injection 1 mg  1 mg Intravenous Q2H PRN Yovani Arellano MD   1 mg at 05/02/17 1556    morphine injection 2 mg  2 mg Intravenous Q2H PRN Yovani Arellano MD   2 mg at 04/30/17 2135    norepinephrine 4 mg in dextrose 5% 250 mL infusion (premix) (titrating)  0.04 mcg/kg/min Intravenous Continuous Ashley Zazueta MD   Stopped at 04/27/17 2055    olanzapine injection 10 mg  10 mg Intramuscular Once PRN Gus Phillips MD        ondansetron injection 8 mg  8 mg Intravenous Q8H PRN Ashley Zazueta MD        pantoprazole injection 40 mg  40 mg Intravenous Daily Lakesha Greer MD   40 mg at 05/02/17 0949    promethazine (PHENERGAN) 12.5 mg in dextrose 5 % 50 mL IVPB  12.5 mg Intravenous Q6H PRN Ashley Zazueta MD        propofol (DIPRIVAN) 10 mg/mL infusion  5 mcg/kg/min Intravenous Continuous PRN Bird Vega MD   Stopped at 05/02/17 0800    ramelteon tablet 8 mg  8 mg Oral Nightly PRN Ashley Zazueta MD        [START ON 5/4/2017] scopolamine 1.5 mg (1 mg over  3 days) 1.5 mg  1 patch Transdermal Q3 Days Antonieta Carpenter MD           Review of Systems   Unable to obtain as pt is intubated.    Objective:     Vital Signs (Most Recent):  Temp: 98.9 °F (37.2 °C) (05/02/17 1500)  Pulse: 94 (05/02/17 1611)  Resp: (!) 23 (05/02/17 1611)  BP: 133/84 (05/02/17 1345)  SpO2: 95 % (05/02/17 1611) Vital Signs (24h Range):  Temp:  [97.5 °F (36.4 °C)-98.9 °F (37.2 °C)] 98.9 °F (37.2 °C)  Pulse:  [] 94  Resp:  [12-45] 23  SpO2:  [93 %-100 %] 95 %  BP: ()/() 133/84     Weight: 73.9 kg (162 lb 14.7 oz)  Body mass index is 45.82 kg/(m^2).    Physical Exam  Constitutional: no distress  ENT: no discharge  Head: Normocephalic.   Eyes: No icteric sclereae  Neck: Neck supple.   Cardiovascular: Normal rate.   Pulmonary/Chest: symmetrical expansion of chest.   Extremities: no edema  Neurological: opens eyes upon verbal stimuli; not following commands   Pupils are round at 3 mm and reactive to light; corneal responses are present bilaterally; EOMI; no nystagmus  Moving LE's spontaneously; UE movements in elbow and hands but limited at shoulder       Significant Labs:   CBC:   Recent Labs  Lab 05/01/17  0345   WBC 4.53   HGB 10.9*   HCT 35.1*        CMP:   Recent Labs  Lab 05/01/17  0345 05/01/17  0714 05/01/17  0942 05/02/17  0420   * 624* 577* 485*   * 134*  --  138   K 4.6 5.1  --  3.7   CL 89* 90*  --  93*   CO2 30* 31*  --  33*   BUN 90* 97*  --  80*   CREATININE 1.5* 1.5*  --  1.1   CALCIUM 10.9* 10.6*  --  10.2   ANIONGAP 15 13  --  12   EGFRNONAA 41* 41*  --  >60       Significant Imaging:  MRI brain  Impression    There are mild degenerative changes of the cervical spine. The study is compromised by patient motion.      Electronically signed by: LIZZIE BENITES MD  Date: 05/02/17  Time: 13:43        Assessment and Plan:     45 y/o female consulted for UE weakness:      1. UE weakness: chronic weakness of uncertain etiology. Weaning off vent has  been difficult. Concern was if her chronic weakness was related to inability to wean off vent. MG panel was obtained: antibodies were negative. CPK normal   -MRI of brain was obtained to see if any pathology in brainstem. MRI limited due to motion artifact but did not demonstrate brainstem abnormality; it showed enhancing in T2 FLAIR of temporal lobes but no DWI enhancing in same area suggesting not an acute process but uncertain about the chronicity of these findings.   Due to motion artifact is difficult to see upper levels of c-spine on MRI.  -C-spine MRI showed no lesions.  -Trial of olanzapine to see if we can keep pt off sedation.    Active Diagnoses:    Diagnosis Date Noted POA    PRINCIPAL PROBLEM:  Acute respiratory failure with hypercapnia [J96.02] 04/07/2017 Yes    Upper extremity weakness [R29.898] 04/26/2017 Yes    Hyperglycemia [R73.9] 04/24/2017 No    Counseling regarding advanced directives and goals of care [Z71.89] 04/14/2017 Not Applicable    Hypokalemia [E87.6] 04/09/2017 Yes    Breast mass, left [N63] 04/08/2017 Yes    Pneumonia of both lungs due to infectious organism [J18.9] 04/06/2017 Yes    Essential hypertension [I10] 04/06/2017 Yes     Chronic    Chronic diastolic heart failure [I50.32] 04/06/2017 Yes     Chronic    Moderate protein malnutrition [E44.0] 04/06/2017 Yes     Chronic    Mass of breast, left [N63] 03/24/2017 Yes      Problems Resolved During this Admission:    Diagnosis Date Noted Date Resolved POA    Sepsis [A41.9] 04/06/2017 04/24/2017 Yes       VTE Risk Mitigation         Ordered     enoxaparin injection 40 mg  Daily     Route:  Subcutaneous        05/01/17 1520     Medium Risk of VTE  Once      04/06/17 2202          Gus Phillips MD  Neurology  [facility] [place]

## 2017-05-02 NOTE — ASSESSMENT & PLAN NOTE
Mechanical ventilation  Weakness  Metabolic encephalopathy  Fever  Assessment and Plan:  Pt with respiratory failure that required intubation, possibly secondary to pneumonia vs edema, treated empirically with Rocephin/Azithromycin, ECHO with diastolic dysfunction but normal EF, and Pulm following for vent management. Family reported UE weakness for a few months prior to this admission and Neurology consulted, possible MG and patient undergoing trial of prednisone and pyridostigmine. This weakness likely limited extubation and necessitated reintubation; patient was extubated on 4/16 but had to be re-intubated that same day. Unclear source of encephalopathy and case discussed with Neurology, CT scan of the head was unrevealing. Also, patient completed treatment with Azithromycin and was on Rocephin alone, but she spiked a fever overnight and antibiotics changed to Zosyn/Zyvox.   Trach on 4/19 without complications.  Stopped all ABx's.  Remains on vent per trach.  Having trouble clearing secretions.  Added Glycopyrrolate.   SW consulted for LTAC.  On CPAP at this time.

## 2017-05-03 LAB
ALLENS TEST: ABNORMAL
ALLENS TEST: ABNORMAL
ANION GAP SERPL CALC-SCNC: 13 MMOL/L
BUN SERPL-MCNC: 87 MG/DL
CALCIUM SERPL-MCNC: 11.2 MG/DL
CHLORIDE SERPL-SCNC: 94 MMOL/L
CO2 SERPL-SCNC: 38 MMOL/L
CREAT SERPL-MCNC: 1.1 MG/DL
DELSYS: ABNORMAL
DELSYS: ABNORMAL
ERYTHROCYTE [SEDIMENTATION RATE] IN BLOOD BY WESTERGREN METHOD: 12 MM/H
ERYTHROCYTE [SEDIMENTATION RATE] IN BLOOD BY WESTERGREN METHOD: 6 MM/H
EST. GFR  (AFRICAN AMERICAN): >60 ML/MIN/1.73 M^2
EST. GFR  (NON AFRICAN AMERICAN): >60 ML/MIN/1.73 M^2
FIO2: 40
FIO2: 40
GLUCOSE SERPL-MCNC: 214 MG/DL
HCO3 UR-SCNC: 41.2 MMOL/L (ref 24–28)
HCO3 UR-SCNC: 42 MMOL/L (ref 24–28)
MODE: ABNORMAL
MODE: ABNORMAL
PCO2 BLDA: 39.9 MMHG (ref 35–45)
PCO2 BLDA: 76.4 MMHG (ref 35–45)
PEEP: 5
PEEP: 5
PH SMN: 7.34 [PH] (ref 7.35–7.45)
PH SMN: 7.63 [PH] (ref 7.35–7.45)
PIP: 20
PO2 BLDA: 64 MMHG (ref 80–100)
PO2 BLDA: 67 MMHG (ref 80–100)
POC BE: 12 MMOL/L
POC BE: 19 MMOL/L
POC SATURATED O2: 89 % (ref 95–100)
POC SATURATED O2: 96 % (ref 95–100)
POC TCO2: 43 MMOL/L (ref 23–27)
POC TCO2: 43 MMOL/L (ref 23–27)
POCT GLUCOSE: 217 MG/DL (ref 70–110)
POCT GLUCOSE: 325 MG/DL (ref 70–110)
POTASSIUM SERPL-SCNC: 3.5 MMOL/L
PS: 10
PS: 10
SAMPLE: ABNORMAL
SAMPLE: ABNORMAL
SITE: ABNORMAL
SITE: ABNORMAL
SODIUM SERPL-SCNC: 145 MMOL/L
SP02: 100
VT: 400
VT: 400

## 2017-05-03 PROCEDURE — 63600175 PHARM REV CODE 636 W HCPCS: Performed by: INTERNAL MEDICINE

## 2017-05-03 PROCEDURE — 36600 WITHDRAWAL OF ARTERIAL BLOOD: CPT

## 2017-05-03 PROCEDURE — 63600175 PHARM REV CODE 636 W HCPCS: Performed by: HOSPITALIST

## 2017-05-03 PROCEDURE — 36415 COLL VENOUS BLD VENIPUNCTURE: CPT

## 2017-05-03 PROCEDURE — 94003 VENT MGMT INPAT SUBQ DAY: CPT

## 2017-05-03 PROCEDURE — 80048 BASIC METABOLIC PNL TOTAL CA: CPT

## 2017-05-03 PROCEDURE — C9113 INJ PANTOPRAZOLE SODIUM, VIA: HCPCS | Performed by: HOSPITALIST

## 2017-05-03 PROCEDURE — 27200966 HC CLOSED SUCTION SYSTEM

## 2017-05-03 PROCEDURE — 87040 BLOOD CULTURE FOR BACTERIA: CPT

## 2017-05-03 PROCEDURE — 99900026 HC AIRWAY MAINTENANCE (STAT)

## 2017-05-03 PROCEDURE — 82803 BLOOD GASES ANY COMBINATION: CPT

## 2017-05-03 PROCEDURE — 25000003 PHARM REV CODE 250: Performed by: HOSPITALIST

## 2017-05-03 PROCEDURE — 94761 N-INVAS EAR/PLS OXIMETRY MLT: CPT

## 2017-05-03 PROCEDURE — 27000221 HC OXYGEN, UP TO 24 HOURS

## 2017-05-03 PROCEDURE — 20000000 HC ICU ROOM

## 2017-05-03 PROCEDURE — 94640 AIRWAY INHALATION TREATMENT: CPT

## 2017-05-03 PROCEDURE — 25000003 PHARM REV CODE 250: Performed by: PSYCHIATRY & NEUROLOGY

## 2017-05-03 PROCEDURE — 99900035 HC TECH TIME PER 15 MIN (STAT)

## 2017-05-03 PROCEDURE — 25000242 PHARM REV CODE 250 ALT 637 W/ HCPCS: Performed by: INTERNAL MEDICINE

## 2017-05-03 PROCEDURE — 25000003 PHARM REV CODE 250: Performed by: INTERNAL MEDICINE

## 2017-05-03 RX ORDER — QUETIAPINE FUMARATE 25 MG/1
25 TABLET, FILM COATED ORAL 2 TIMES DAILY
Status: DISCONTINUED | OUTPATIENT
Start: 2017-05-03 | End: 2017-05-07

## 2017-05-03 RX ADMIN — IPRATROPIUM BROMIDE AND ALBUTEROL SULFATE 3 ML: .5; 3 SOLUTION RESPIRATORY (INHALATION) at 12:05

## 2017-05-03 RX ADMIN — IPRATROPIUM BROMIDE AND ALBUTEROL SULFATE 3 ML: .5; 3 SOLUTION RESPIRATORY (INHALATION) at 07:05

## 2017-05-03 RX ADMIN — QUETIAPINE FUMARATE 25 MG: 25 TABLET, FILM COATED ORAL at 08:05

## 2017-05-03 RX ADMIN — PROPOFOL 25 MCG/KG/MIN: 10 INJECTION, EMULSION INTRAVENOUS at 02:05

## 2017-05-03 RX ADMIN — FUROSEMIDE 20 MG: 10 INJECTION, SOLUTION INTRAMUSCULAR; INTRAVENOUS at 09:05

## 2017-05-03 RX ADMIN — Medication 0.04 MCG/KG/MIN: at 11:05

## 2017-05-03 RX ADMIN — INSULIN ASPART 8 UNITS: 100 INJECTION, SOLUTION INTRAVENOUS; SUBCUTANEOUS at 05:05

## 2017-05-03 RX ADMIN — PANTOPRAZOLE SODIUM 40 MG: 40 INJECTION, POWDER, FOR SOLUTION INTRAVENOUS at 09:05

## 2017-05-03 RX ADMIN — CHLORHEXIDINE GLUCONATE 15 ML: 1.2 RINSE ORAL at 08:05

## 2017-05-03 RX ADMIN — Medication 0.1 MCG/KG/MIN: at 08:05

## 2017-05-03 RX ADMIN — INSULIN ASPART 3 UNITS: 100 INJECTION, SOLUTION INTRAVENOUS; SUBCUTANEOUS at 11:05

## 2017-05-03 RX ADMIN — QUETIAPINE FUMARATE 25 MG: 25 TABLET, FILM COATED ORAL at 10:05

## 2017-05-03 RX ADMIN — INSULIN ASPART 4 UNITS: 100 INJECTION, SOLUTION INTRAVENOUS; SUBCUTANEOUS at 05:05

## 2017-05-03 RX ADMIN — IPRATROPIUM BROMIDE AND ALBUTEROL SULFATE 3 ML: .5; 3 SOLUTION RESPIRATORY (INHALATION) at 01:05

## 2017-05-03 RX ADMIN — CHLORHEXIDINE GLUCONATE 15 ML: 1.2 RINSE ORAL at 09:05

## 2017-05-03 RX ADMIN — LORAZEPAM 1 MG: 2 INJECTION INTRAMUSCULAR; INTRAVENOUS at 08:05

## 2017-05-03 RX ADMIN — LORAZEPAM 1 MG: 2 INJECTION INTRAMUSCULAR; INTRAVENOUS at 06:05

## 2017-05-03 RX ADMIN — ENOXAPARIN SODIUM 40 MG: 100 INJECTION SUBCUTANEOUS at 05:05

## 2017-05-03 RX ADMIN — PROPOFOL 30 MCG/KG/MIN: 10 INJECTION, EMULSION INTRAVENOUS at 08:05

## 2017-05-03 RX ADMIN — MORPHINE SULFATE 2 MG: 10 INJECTION INTRAVENOUS at 02:05

## 2017-05-03 RX ADMIN — INSULIN DETEMIR 15 UNITS: 100 INJECTION, SOLUTION SUBCUTANEOUS at 08:05

## 2017-05-03 NOTE — PLAN OF CARE
Problem: Patient Care Overview  Goal: Plan of Care Review  Outcome: Ongoing (interventions implemented as appropriate)  * Now on SIMV vent setting: Fio2-40%, R-6, Peep-5, TV-400, PS-10  * Unable to keep pt comfortable with prn ativan and morphine only. Started on Propofol gtt at 10 mcg/kg/min  * Still on tube feeding Impact Peptide 1.5 @ 45cc/hr, free water flushes q 4 hrs  * Blood sugars in 200s.   * Still on tevin soft wrist restraints.   * Oral care and Trach care done  * On Flexiseal for liquid consistency stool.  * Remained free from falls or injuries  * VSS, NADN

## 2017-05-03 NOTE — UM SECONDARY REVIEW
Medical Affairs    IP Extended Stay > 10      45 yo F  Day 27   ICU   Hx: HTN, CHF     admitted with pneumonia and acutely decompensated and was intubated and placed on vent. Pt with UE weakness prior to presentation on this admission which may be contributing to inability to extubate. Neurology following and considering possible MG and patient on therapeutic trial of prednisone and Modafinil. Pt s/p bronch that was unrevealing on 4/11. Pt was extubated on 4/15 but was re-intubated later the same day.  Was febrile and stared on Zyvox and zosyn. CXR shows possible infiltrate. Urine culture +enterococcus. Completed course and antibiotics dc'd. Blood culture negative  Trach on 4/19 without complications.      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 Started on acyclovir but dc'd After review of previous CT imaging showed temporal abnormalities.   ID does not think it is HSV encephalitis.  Brief drop in SBP and IVF were given, now dc'd and on lasix IV BID with changes to resp status and back on ventilator.   Pt does not tolerate being off sedation, weaning difficult. IV steroids dc'd. Started insulin drip overnight and now off. Hyperglycemia with SQ insulin improved.     MRI c-spine 5/2/2017 show no lesions. Repeat left breast ultrasound show same size of mass    5/3: pt BP is 60-80s . pt started on levophed. Pt remains on vent w/ trach     [facility] has been trying to get pt into LTACH, but needed 3 denials from in-network facilities before insurance will auth an out of network. Got 3rd  denial today     LOS: approved an agreement with D/C plan     Dr. Mcmahon responded on 6/27: late review approved

## 2017-05-03 NOTE — PROGRESS NOTES
[facility] Hospital Medicine  Progress Note    Patient Name: Radha Pritchett  MRN: [id number]  Patient Class: IP- Inpatient   Admission Date: 4/6/2017  Length of Stay: 27 days  Attending Physician: Antonieta Carpenter MD  Primary Care Provider: Ferny Iglesias MD        Subjective:     Principal Problem:Acute respiratory failure with hypercapnia    HPI:  Ms. Radha Pritchett is a 46 y.o. female with essential hypertension, chronic diastolic heart failure (LVEF 55-60% Dec 2014), moderate protein malnutrition who presents to [facility] ED with complaints of dyspnea today.  She was scheduled to undergo removal of a breast mass today but was found to be hypoxic.  She does report that she has been short of breath but cannot specify how long.  She has also had some non-productive coughing and some chills without fevers.  Further history is otherwise limited at this time.    Hospital Course:   was admitted with pneumonia and acutely decompensated and was intubated and placed on vent. Pt with UE weakness prior to presentation on this admission which may be contributing to inability to extubate. Neurology following and considering possible MG and patient on therapeutic trial of prednisone and Modafinil. Pt s/p bronch that was unrevealing on 4/11. Pt was extubated on 4/15 but was re-intubated later the same day.  Was febrile and stared on Zyvox and zosyn.  CXR shows possible infiltrate. Urine culture +enterococcus.  Completed course and antibiotics dc'd. Blood culture negative  Trach on 4/19 without complications.      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 Started on acyclovir but dc'd  After review of previous CT imaging showed temporal abnormalities.    ID does not think it is HSV encephalitis.  Brief drop in SBP and IVF were given, now dc'd and on lasix IV BID with changes to resp status and back on ventilator.    Pt does not tolerate being off sedation, weaning  difficult. IV steroids dc'd. Started insulin drip overnight and now off. Hyperglycemia with SQ insulin improved.     MRI c-spine 5/2/2017 show no lesions. Repeat left breast ultrasound show same size of mass,      Interval History: pt is off sedation,at this time.    Review of Systems   Unable to perform ROS: Intubated     Objective:     Vital Signs (Most Recent):  Temp: 98.7 °F (37.1 °C) (05/03/17 1015)  Pulse: 81 (05/03/17 1015)  Resp: 15 (05/03/17 1015)  BP: (!) 97/56 (05/03/17 1015)  SpO2: 100 % (05/03/17 1015) Vital Signs (24h Range):  Temp:  [98.7 °F (37.1 °C)-99.3 °F (37.4 °C)] 98.7 °F (37.1 °C)  Pulse:  [] 81  Resp:  [6-49] 15  SpO2:  [92 %-100 %] 100 %  BP: ()/() 97/56     Weight: 72.5 kg (159 lb 13.3 oz)  Body mass index is 44.95 kg/(m^2).    Intake/Output Summary (Last 24 hours) at 05/03/17 1115  Last data filed at 05/03/17 1015   Gross per 24 hour   Intake          1657.27 ml   Output             1250 ml   Net           407.27 ml      Physical Exam   Constitutional: She appears well-developed. No distress.   Eyes: Conjunctivae are normal. Right eye exhibits no discharge. Left eye exhibits no discharge.   Neck:   Trach in place   Cardiovascular: Normal rate, regular rhythm and normal heart sounds.    Pulmonary/Chest: No stridor. No respiratory distress. She has no wheezes.   Mechanically ventilated per trach.     Abdominal: Soft. Bowel sounds are normal.   Musculoskeletal: She exhibits no deformity.   Neurological:   Very restless.  Poorly responsive to commands.   Skin: Skin is warm and dry.       Significant Labs:   BMP:     Recent Labs  Lab 05/03/17  0400   *      K 3.5   CL 94*   CO2 38*   BUN 87*   CREATININE 1.1   CALCIUM 11.2*     CBC:   No results for input(s): WBC, HGB, HCT, PLT in the last 48 hours.  POCT Glucose:     Recent Labs  Lab 05/02/17  1418 05/02/17  1802 05/02/17  2325   POCTGLUCOSE 288* 247* 214*       Significant Imaging: I have reviewed all pertinent  imaging results/findings within the past 24 hours.    Assessment/Plan:      * Acute respiratory failure with hypercapnia  Mechanical ventilation  Weakness  Metabolic encephalopathy  Fever  Assessment and Plan:  Pt with respiratory failure that required intubation, possibly secondary to pneumonia vs edema, treated empirically with Rocephin/Azithromycin, ECHO with diastolic dysfunction but normal EF, and Pulm following for vent management. Family reported UE weakness for a few months prior to this admission and Neurology consulted, possible MG and patient undergoing trial of prednisone and pyridostigmine. This weakness likely limited extubation and necessitated reintubation; patient was extubated on 4/16 but had to be re-intubated that same day. Unclear source of encephalopathy and case discussed with Neurology, CT scan of the head was unrevealing. Also, patient completed treatment with Azithromycin and was on Rocephin alone, but she spiked a fever overnight and antibiotics changed to Zosyn/Zyvox.   Trach on 4/19 without complications.  Stopped all ABx's.  Remains on vent per trach.  Having trouble clearing secretions.  Added Glycopyrrolate.   SW consulted for LTAC.  On SIMV  at this time.      Mass of breast, left  Follow up at later date   Repeat left breast ultrasound,shoe same size  finding breast mass.      Pneumonia of both lungs due to infectious organism  Probably bacterial, but unable to determine.  The patient has a CURB-65 score of 0, and does not meet criteria for healthcare-associated pneumonia initially. CTA chest with bilateral airspace opacification concerning for pneumonia.  Oxygen saturations are improved but requiring a non-rebreather mask; then BIPAP and s/p intubation.  Blood culture NGTD.  Pt completed full course of Azithromycin, and was continued Rocephin until had spike of fever, Abx has been expanded  to Zosyn/Zyvox.  Completed course and Abx dc'd.      Essential hypertension  Coreg held with  normal-low BP  IVF held, edema on CXR and lasix given  levophed if needed to maintain MAP >65    Chronic diastolic heart failure  Patient doesn't appear to be in acute heart failure; checked ECHO with EF=65% but with diastolic dysfunction.  Lasix given     Moderate protein malnutrition  Resume tube feedings      Breast mass, left  Planned for surgery, seen by Dr. Sandoval (his patient) and plan to reschedule as outpatient. It is reportedly a hematoma by biopsy, but suspicion for CA not completely ruled out given paraneoplastic syndrome a consideration.      Hypokalemia  Replace as necessary.      Counseling regarding advanced directives and goals of care  Extensive conversations done with family. Pt to remain full code. Pt is still  but estranged from the  and reportedly the grandmother is the major decision maker.       Hyperglycemia  Hyperglycemia worsened with steroids as anticipated, titrate insulin   Levemir 15 units Q hs SSI  HgA1c 7.3%.  No apparent history of DM prior to admission.  Off steroid,improved wit SQ insulin.      Upper extremity weakness  Bilateral UE weakness with unknown etiology  IV steroids dc'd  D/w neurology - trial acyclovir started then stopped after review of previous head CT that showed chronic findings  MRI c/spine show no lesions.  unremarkable MG antibodies.      VTE Risk Mitigation         Ordered     enoxaparin injection 40 mg  Daily     Route:  Subcutaneous        05/01/17 1520     Medium Risk of VTE  Once      04/06/17 2202          Antonieta Carpenter MD  Department of Hospital Medicine   [facility] [place]

## 2017-05-03 NOTE — PLAN OF CARE
Problem: Patient Care Overview  Goal: Plan of Care Review  Outcome: Ongoing (interventions implemented as appropriate)  No visitors today.  Placed on CPAP this am, tolerating well.  Propofol and Fentanyl not given this shift, agitation controlled with prn Ativan and one time dose of Zyprexa admin this evening.  MRI C-Spine today, no acute findings.  Opens eyes at times to command, but does not follow any other commands.  Multiple diarrhea stool, Flexi seal placed.  Blood sugars remain mid 200s.  Remains free from falls or other hospital acquired injury.

## 2017-05-03 NOTE — ASSESSMENT & PLAN NOTE
Bilateral UE weakness with unknown etiology  IV steroids dc'd  D/w neurology - trial acyclovir started then stopped after review of previous head CT that showed chronic findings  MRI c/spine show no lesions.  unremarkable MG antibodies.

## 2017-05-03 NOTE — ASSESSMENT & PLAN NOTE
Hyperglycemia worsened with steroids as anticipated, titrate insulin   Levemir 15 units Q hs SSI  HgA1c 7.3%.  No apparent history of DM prior to admission.  Off steroid,improved wit SQ insulin.

## 2017-05-03 NOTE — PLAN OF CARE
Patient went apnea on vent therefore she was place back on SIMV 400/ rr 12/peep +5/pressure support 10/Fio24%. Dr. Fierro was contacted with eicu and was ok with vent settings.

## 2017-05-03 NOTE — PROGRESS NOTES
Progress Note  Pulmonology    Admit Date: 4/6/2017   LOS: 27 days     SUBJECTIVE: resp distress     Follow-up For:  Vent management. Patient on vent via trach. Off sedation. Agitated.  Opens her eyes - moves ext - no commands   Continuous Infusions:   fentanyl Stopped (05/02/17 0800)    norepinephrine bitartrate-D5W Stopped (04/27/17 2055)    propofol 10 mcg/kg/min (05/03/17 0951)     Scheduled Meds:   albuterol-ipratropium 2.5mg-0.5mg/3mL  3 mL Nebulization Q6H    carvedilol  3.125 mg Oral BID    chlorhexidine  15 mL Mouth/Throat BID    enoxaparin  40 mg Subcutaneous Daily    furosemide  20 mg Intravenous BID    insulin detemir  15 Units Subcutaneous QHS    pantoprazole  40 mg Intravenous Daily    quetiapine  25 mg Oral BID    [START ON 5/4/2017] scopolamine  1 patch Transdermal Q3 Days     Review of Systems:  Review of systems not obtained due to patient agitated and not very responsive.    OBJECTIVE:     Vital Signs Range (Last 24H):  Temp:  [98.4 °F (36.9 °C)-99.3 °F (37.4 °C)]   Pulse:  []   Resp:  [7-49]   BP: ()/()   SpO2:  [92 %-100 %]     I & O (Last 24H):    Intake/Output Summary (Last 24 hours) at 05/03/17 1006  Last data filed at 05/03/17 0800   Gross per 24 hour   Intake          1552.27 ml   Output             2200 ml   Net          -647.73 ml     Physical Exam:  General: no distress. No distress but agitated. UE restrained but moves her LE more.  Neck: no carotid bruit  Breast: left breast mass - hard, non-mobile  Lungs:  Shallow, decreased bs bilaterally rhonchi. No wheezing.   Heart: regular rate and rhythm and no murmur  Abdomen: soft, non-tender non-distended; bowel sounds diminished and right nasal feeding tube and rectal tube.  Extremities: no cyanosis  or clubbing but has pedal edema and SCDs to LE. RUE picc line.  Neurologic: minimally responsive    Laboratory:  CBC:     Recent Labs  Lab 05/01/17  0345   WBC 4.53   RBC 4.26   HGB 10.9*   HCT 35.1*      MCV  82   MCH 25.6*   MCHC 31.1*     BMP:     Recent Labs  Lab 05/03/17  0400   *      K 3.5   CL 94*   CO2 38*   BUN 87*   CREATININE 1.1   CALCIUM 11.2*        Vent Mode: SIMV (PRVC) + PS  Oxygen Concentration (%):  [40-50] 50  Resp Rate Total:  [6 br/min-46 br/min] 13 br/min  Vt Set:  [400 mL] 400 mL  PEEP/CPAP:  [5 cmH20] 5 cmH20  Pressure Support:  [10 cmH20] 10 cmH20  Mean Airway Pressure:  [6.2 arE82-21.9 cmH20] 6.2 cmH20    ABGs:     Recent Labs  Lab 05/03/17  0733   PH 7.339*   PCO2 76.4*   PO2 64*   HCO3 41.2*   POCSATURATED 89*   BE 12     Microbiology Results (last 7 days)     Procedure Component Value Units Date/Time    Blood culture [[id number]]     Order Status:  Sent Specimen:  Blood     Blood culture [[id number]]     Order Status:  Sent Specimen:  Blood         Chest X-Ray:     Tracheostomy tube, right-sided PICC line and enteric catheter are in stable position. Cardiopulmonary status is unchanged noting persistent abnormal pulmonary parenchymal attenuation, left and right, suggesting edema, hemorrhage, pneumonia or a noninfectious inflammatory process.  Possible left sided layering pleural fluid. No pneumothorax.      ASSESSMENT/PLAN:     1) Acute respiratory failure - presented with weakness of UE and pneumonia. Unable to wean off vent. S/p trach. Fair gas exchanged. On rate 12 via trach. PS 10. Agitated. No cxr this am. Rhonchi on exam. Will increase ps a bit and try to decrease rate. Bronchodilators. LTAC?  2) Weakness - UE. Neurology following. ??MG - MG panel was negative. Steroids stopped. ?eiotlogy to weakness. UE remain weak.  3) History of HTN  4) Anemia - stable.    Back on vent support because of increasing CO2. Neurological events are impeding vent weaning.      Bird Vega

## 2017-05-03 NOTE — PROGRESS NOTES
Patient remains intubated on the servo-i vent with the following settings: SIMV, 6, 400, 5, 10, 40%. AMBU bag at \[facility]\"", all alarms are set and functioning . Will continue to monitor. Spare trach at bedside. ABG done at 0733 and shown to Dr. Fung  and increased Fi02 to 50%.

## 2017-05-03 NOTE — PLAN OF CARE
Problem: Patient Care Overview  Goal: Plan of Care Review  Outcome: Ongoing (interventions implemented as appropriate)  Pt remains in ICU mechanically ventilated. Rate increased back to 12 and FiO2 increased to 50%. Patient very agitated/restless this morning off of sedation. Sedation restarted; tolerating well. Requires bilateral soft wrist restraints for attempting to pull at trach & NGT. Seroquel BID started for agitation. Seems to work well in combination with propofol; less agitated and appears comfortable. Levophed started for hypotension. Carter with good urine output; initially was red but cleared up. Rectal tube intact with minimal output; without skin breakdown falls or injuries this shift.

## 2017-05-03 NOTE — SUBJECTIVE & OBJECTIVE
Interval History: pt is off sedation,at this time.    Review of Systems   Unable to perform ROS: Intubated     Objective:     Vital Signs (Most Recent):  Temp: 98.7 °F (37.1 °C) (05/03/17 1015)  Pulse: 81 (05/03/17 1015)  Resp: 15 (05/03/17 1015)  BP: (!) 97/56 (05/03/17 1015)  SpO2: 100 % (05/03/17 1015) Vital Signs (24h Range):  Temp:  [98.7 °F (37.1 °C)-99.3 °F (37.4 °C)] 98.7 °F (37.1 °C)  Pulse:  [] 81  Resp:  [6-49] 15  SpO2:  [92 %-100 %] 100 %  BP: ()/() 97/56     Weight: 72.5 kg (159 lb 13.3 oz)  Body mass index is 44.95 kg/(m^2).    Intake/Output Summary (Last 24 hours) at 05/03/17 1115  Last data filed at 05/03/17 1015   Gross per 24 hour   Intake          1657.27 ml   Output             1250 ml   Net           407.27 ml      Physical Exam   Constitutional: She appears well-developed. No distress.   Eyes: Conjunctivae are normal. Right eye exhibits no discharge. Left eye exhibits no discharge.   Neck:   Trach in place   Cardiovascular: Normal rate, regular rhythm and normal heart sounds.    Pulmonary/Chest: No stridor. No respiratory distress. She has no wheezes.   Mechanically ventilated per trach.     Abdominal: Soft. Bowel sounds are normal.   Musculoskeletal: She exhibits no deformity.   Neurological:   Very restless.  Poorly responsive to commands.   Skin: Skin is warm and dry.       Significant Labs:   BMP:     Recent Labs  Lab 05/03/17  0400   *      K 3.5   CL 94*   CO2 38*   BUN 87*   CREATININE 1.1   CALCIUM 11.2*     CBC:   No results for input(s): WBC, HGB, HCT, PLT in the last 48 hours.  POCT Glucose:     Recent Labs  Lab 05/02/17  1418 05/02/17  1802 05/02/17  2325   POCTGLUCOSE 288* 247* 214*       Significant Imaging: I have reviewed all pertinent imaging results/findings within the past 24 hours.

## 2017-05-03 NOTE — PROGRESS NOTES
Received patient on vent settings PS/CPAP 10/+5/ FIO2 40% Trach size 8.0 Shiley patent, intact, and secured Ambu bag and mask at bed side no signs of any distress at this current time

## 2017-05-03 NOTE — PROGRESS NOTES
LTAC follow up: network agency [facility] has declined patient thru [employer] message. DENIS updated patient's  Kati [phone] that now with 3 denials (others [facility] and [facility]). Also reported that [facility] has not yet responded to fax, [employer] or voice mail. Per Kati, the auth will need to start again.. Can contact [employer] to resubmit updated clinical information. DENIS spoke with Bria [phone] who informs will do so today. DENIS will update tx team, family, continue to follow in ICU and assist as needed.

## 2017-05-03 NOTE — CONSULTS
Consult Note  Infectious Disease    Consult Requested By: Antonieta Carpenter MD    Reason for Consult: does she have hsv encephalitis?    SUBJECTIVE:     History of Present Illness:  Patient is a 46 y.o. female presents with hypercapneic respiratory failure. She has had episodes of hypercapneic respiratory failure in 2014 as well. She was deemed to have a pneumonia and received antibiotics as well as antibiotics for a vse uti(e.faecium) has had trouble weaning form the ventilator and has a tracheostomy. Neurology has identified ul paresis and ct of the head has confirmed bilateral temporal lobe attenuation that was seen in 2014 as well. An mri confirms the same. The patient is now more awake when off sedation per nursing. She has received empirical steroids and ivig in case this was an encephalitis. The specter of hsv encephalitis was raised based on imaging. She has not had cognitive deficits coming in to be admitted and is agitated now when sedation is stopped.  She also has a left breast mass felt to be a hematoma.    Past Medical History:   Diagnosis Date    CHF (congestive heart failure)     Learning difficulty      Past Surgical History:   Procedure Laterality Date    BREAST SURGERY Left     biopsy    CYST REMOVAL      shoulder      Family History   Problem Relation Age of Onset    Diabetes      Coronary artery disease       Social History   Substance Use Topics    Smoking status: Never Smoker    Smokeless tobacco: Never Used    Alcohol use Yes      Comment: OCCASSIONAL       Review of patient's allergies indicates:  No Known Allergies     Antibiotics     None          Review of Systems:  Review of systems not obtained due to patient factors intubated.    OBJECTIVE:     Vital Signs (Most Recent)  Temp: 98.9 °F (37.2 °C) (05/03/17 0315)  Pulse: 92 (05/03/17 0800)  Resp: 18 (05/03/17 0800)  BP: 132/71 (05/03/17 0800)  SpO2: 100 % (05/03/17 0800)    Temperature Range Min/Max (Last 24H):  Temp:  [98.4  °F (36.9 °C)-99.3 °F (37.4 °C)]     Physical Exam:  General: well developed, well nourished  Eyes: conjunctivae/corneas clear. PERRL..  HENT: Head:normocephalic, atraumatic. Ears:bilateral TM's and external ear canals normal. Nose: Nares normal. Septum midline. Mucosa normal. No drainage or sinus tenderness., no discharge. Throat: lips, mucosa, and tongue normal; teeth and gums normal and no throat erythema.  Neck: supple, symmetrical, trachea midline, no JVD and thyroid not enlarged, symmetric, no tenderness/mass/nodules  Lungs:  clear to auscultation bilaterally and normal respiratory effort  Cardiovascular: Heart: regular rate and rhythm, S1, S2 normal, no murmur, click, rub or gallop. Chest Wall: no tenderness. Extremities: no cyanosis or edema, or clubbing. Pulses: 2+ and symmetric.  Abdomen/Rectal: Abdomen: soft, non-tender non-distented; bowel sounds normal; no masses,  no organomegaly. Rectal: not examined  Skin: Skin color, texture, turgor normal. No rashes or lesions  Musculoskeletal:no clubbing, cyanosis  Lymph Nodes: No cervical or supraclavicular adenopathy  Neurologic: Mental status: intubated and sedated. ul restrained as she is pulling at et tube etc which speaks against an ul paresis    Laboratory:  CBC  No results for input(s): WBC, RBC, HGB, HCT, PLT in the last 24 hours.  BMP    Recent Labs  Lab 05/03/17  0400   CO2 38*   BUN 87*   CREATININE 1.1   CALCIUM 11.2*     No results for input(s): COLORU, CLARITYU, SPECGRAV, PHUR, PROTEINUA, GLUCOSEU, BILIRUBINCON, BLOODU, WBCU, RBCU, BACTERIA, MUCUS, NITRITE, LEUKOCYTESUR, UROBILINOGEN, HYALINECASTS in the last 168 hours.  Microbiology Results (last 7 days)     Procedure Component Value Units Date/Time    Blood culture [[id number]]     Order Status:  Sent Specimen:  Blood     Blood culture [[id number]]     Order Status:  Sent Specimen:  Blood           Diagnostic Results:  Labs: Reviewed  X-Ray: Reviewed  CT: Reviewed  MRI: Reviewed    ASSESSMENT/PLAN:      Active Hospital Problems    Diagnosis  POA    *Acute respiratory failure with hypercapnia [J96.02]  Yes    Upper extremity weakness [R29.898]  Yes    Hyperglycemia [R73.9]  No    Counseling regarding advanced directives and goals of care [Z71.89]  Not Applicable    Hypokalemia [E87.6]  Yes    Breast mass, left [N63]  Yes    Pneumonia of both lungs due to infectious organism [J18.9]  Yes    Essential hypertension [I10]  Yes     Chronic    Chronic diastolic heart failure [I50.32]  Yes     Chronic    Moderate protein malnutrition [E44.0]  Yes     Chronic    Mass of breast, left [N63]  Yes      Resolved Hospital Problems    Diagnosis Date Resolved POA    Sepsis [A41.9] 04/24/2017 Yes       1. i see no evidence of hsv encephalitis in that this ct abnormality was present in 2014 and patient is alert when sedation stopped  hsv encephalitis unlikely when she is not encephalopathic  Consider lp and cervical spinal imaging  Dc acyclovir  Sounds like this is a chronic process and not acute  2. hypercapneic respiratory failure may be related to cns process  She does move all 4 limbs but is not following oral commands  Diarrhea and c diff negative 4/14/17  lp deal with agitation and weaning process- she is still on iv propofol  Hypotensive overnight abd better this am  Still very agitated  Blood cultures x2   at high risk of nosocomial infections  as long as she needs iv access, is vent dependant and is bedbound

## 2017-05-03 NOTE — ASSESSMENT & PLAN NOTE
Mechanical ventilation  Weakness  Metabolic encephalopathy  Fever  Assessment and Plan:  Pt with respiratory failure that required intubation, possibly secondary to pneumonia vs edema, treated empirically with Rocephin/Azithromycin, ECHO with diastolic dysfunction but normal EF, and Pulm following for vent management. Family reported UE weakness for a few months prior to this admission and Neurology consulted, possible MG and patient undergoing trial of prednisone and pyridostigmine. This weakness likely limited extubation and necessitated reintubation; patient was extubated on 4/16 but had to be re-intubated that same day. Unclear source of encephalopathy and case discussed with Neurology, CT scan of the head was unrevealing. Also, patient completed treatment with Azithromycin and was on Rocephin alone, but she spiked a fever overnight and antibiotics changed to Zosyn/Zyvox.   Trach on 4/19 without complications.  Stopped all ABx's.  Remains on vent per trach.  Having trouble clearing secretions.  Added Glycopyrrolate.   SW consulted for LTAC.  On SIMV  at this time.

## 2017-05-04 PROBLEM — R57.9 SHOCK CIRCULATORY: Status: ACTIVE | Noted: 2017-04-06

## 2017-05-04 LAB
ALLENS TEST: ABNORMAL
ANION GAP SERPL CALC-SCNC: 15 MMOL/L
BUN SERPL-MCNC: 89 MG/DL
CALCIUM SERPL-MCNC: 11.1 MG/DL
CHLORIDE SERPL-SCNC: 92 MMOL/L
CO2 SERPL-SCNC: 34 MMOL/L
CREAT SERPL-MCNC: 1.2 MG/DL
DELSYS: ABNORMAL
ERYTHROCYTE [SEDIMENTATION RATE] IN BLOOD BY WESTERGREN METHOD: 12 MM/H
EST. GFR  (AFRICAN AMERICAN): >60 ML/MIN/1.73 M^2
EST. GFR  (NON AFRICAN AMERICAN): 54 ML/MIN/1.73 M^2
FIO2: 50
GLUCOSE SERPL-MCNC: 294 MG/DL
HCO3 UR-SCNC: 39.2 MMOL/L (ref 24–28)
MODE: ABNORMAL
PCO2 BLDA: 47.1 MMHG (ref 35–45)
PEEP: 5
PH SMN: 7.53 [PH] (ref 7.35–7.45)
PO2 BLDA: 133 MMHG (ref 80–100)
POC BE: 15 MMOL/L
POC SATURATED O2: 99 % (ref 95–100)
POC TCO2: 41 MMOL/L (ref 23–27)
POCT GLUCOSE: 224 MG/DL (ref 70–110)
POCT GLUCOSE: 233 MG/DL (ref 70–110)
POCT GLUCOSE: 281 MG/DL (ref 70–110)
POCT GLUCOSE: 299 MG/DL (ref 70–110)
POCT GLUCOSE: 307 MG/DL (ref 70–110)
POCT GLUCOSE: 315 MG/DL (ref 70–110)
POCT GLUCOSE: 391 MG/DL (ref 70–110)
POCT GLUCOSE: 409 MG/DL (ref 70–110)
POTASSIUM SERPL-SCNC: 3.7 MMOL/L
PS: 10
SAMPLE: ABNORMAL
SITE: ABNORMAL
SODIUM SERPL-SCNC: 141 MMOL/L
SP02: 100
VT: 400

## 2017-05-04 PROCEDURE — 99900026 HC AIRWAY MAINTENANCE (STAT)

## 2017-05-04 PROCEDURE — 99900035 HC TECH TIME PER 15 MIN (STAT)

## 2017-05-04 PROCEDURE — 25000003 PHARM REV CODE 250: Performed by: INTERNAL MEDICINE

## 2017-05-04 PROCEDURE — 63600175 PHARM REV CODE 636 W HCPCS: Performed by: HOSPITALIST

## 2017-05-04 PROCEDURE — 25000003 PHARM REV CODE 250: Performed by: PSYCHIATRY & NEUROLOGY

## 2017-05-04 PROCEDURE — 94003 VENT MGMT INPAT SUBQ DAY: CPT

## 2017-05-04 PROCEDURE — 63600175 PHARM REV CODE 636 W HCPCS: Performed by: INTERNAL MEDICINE

## 2017-05-04 PROCEDURE — 94761 N-INVAS EAR/PLS OXIMETRY MLT: CPT

## 2017-05-04 PROCEDURE — C9113 INJ PANTOPRAZOLE SODIUM, VIA: HCPCS | Performed by: HOSPITALIST

## 2017-05-04 PROCEDURE — 80048 BASIC METABOLIC PNL TOTAL CA: CPT

## 2017-05-04 PROCEDURE — 36415 COLL VENOUS BLD VENIPUNCTURE: CPT

## 2017-05-04 PROCEDURE — 25000003 PHARM REV CODE 250: Performed by: HOSPITALIST

## 2017-05-04 PROCEDURE — 20000000 HC ICU ROOM

## 2017-05-04 PROCEDURE — 94640 AIRWAY INHALATION TREATMENT: CPT

## 2017-05-04 PROCEDURE — 25000242 PHARM REV CODE 250 ALT 637 W/ HCPCS: Performed by: INTERNAL MEDICINE

## 2017-05-04 PROCEDURE — 36600 WITHDRAWAL OF ARTERIAL BLOOD: CPT

## 2017-05-04 RX ADMIN — SCOPOLAMINE 1.5 MG: 1 PATCH, EXTENDED RELEASE TRANSDERMAL at 08:05

## 2017-05-04 RX ADMIN — INSULIN ASPART 2 UNITS: 100 INJECTION, SOLUTION INTRAVENOUS; SUBCUTANEOUS at 08:05

## 2017-05-04 RX ADMIN — QUETIAPINE FUMARATE 25 MG: 25 TABLET, FILM COATED ORAL at 08:05

## 2017-05-04 RX ADMIN — INSULIN DETEMIR 15 UNITS: 100 INJECTION, SOLUTION SUBCUTANEOUS at 08:05

## 2017-05-04 RX ADMIN — IPRATROPIUM BROMIDE AND ALBUTEROL SULFATE 3 ML: .5; 3 SOLUTION RESPIRATORY (INHALATION) at 07:05

## 2017-05-04 RX ADMIN — PROPOFOL 30 MCG/KG/MIN: 10 INJECTION, EMULSION INTRAVENOUS at 08:05

## 2017-05-04 RX ADMIN — PANTOPRAZOLE SODIUM 40 MG: 40 INJECTION, POWDER, FOR SOLUTION INTRAVENOUS at 08:05

## 2017-05-04 RX ADMIN — CHLORHEXIDINE GLUCONATE 15 ML: 1.2 RINSE ORAL at 08:05

## 2017-05-04 RX ADMIN — Medication 0.1 MCG/KG/MIN: at 08:05

## 2017-05-04 RX ADMIN — MORPHINE SULFATE 2 MG: 10 INJECTION INTRAVENOUS at 03:05

## 2017-05-04 RX ADMIN — INSULIN ASPART 2 UNITS: 100 INJECTION, SOLUTION INTRAVENOUS; SUBCUTANEOUS at 11:05

## 2017-05-04 RX ADMIN — INSULIN ASPART 8 UNITS: 100 INJECTION, SOLUTION INTRAVENOUS; SUBCUTANEOUS at 05:05

## 2017-05-04 RX ADMIN — MORPHINE SULFATE 2 MG: 10 INJECTION INTRAVENOUS at 08:05

## 2017-05-04 RX ADMIN — PROPOFOL 20 MCG/KG/MIN: 10 INJECTION, EMULSION INTRAVENOUS at 08:05

## 2017-05-04 RX ADMIN — ENOXAPARIN SODIUM 40 MG: 100 INJECTION SUBCUTANEOUS at 05:05

## 2017-05-04 RX ADMIN — IPRATROPIUM BROMIDE AND ALBUTEROL SULFATE 3 ML: .5; 3 SOLUTION RESPIRATORY (INHALATION) at 12:05

## 2017-05-04 RX ADMIN — Medication 0.05 MCG/KG/MIN: at 02:05

## 2017-05-04 RX ADMIN — LORAZEPAM 1 MG: 2 INJECTION INTRAMUSCULAR; INTRAVENOUS at 12:05

## 2017-05-04 RX ADMIN — MORPHINE SULFATE 2 MG: 10 INJECTION INTRAVENOUS at 05:05

## 2017-05-04 RX ADMIN — PROPOFOL 35 MCG/KG/MIN: 10 INJECTION, EMULSION INTRAVENOUS at 04:05

## 2017-05-04 RX ADMIN — IPRATROPIUM BROMIDE AND ALBUTEROL SULFATE 3 ML: .5; 3 SOLUTION RESPIRATORY (INHALATION) at 02:05

## 2017-05-04 RX ADMIN — INSULIN DETEMIR 15 UNITS: 100 INJECTION, SOLUTION SUBCUTANEOUS at 02:05

## 2017-05-04 NOTE — PROGRESS NOTES
Progress Note  Pulmonology    Admit Date: 4/6/2017   LOS: 28 days     SUBJECTIVE: resp distress     Follow-up For:  Vent management. Patient on vent via trach. Off sedation. Agitated.  Opens her eyes - moves ext - no commands   Continuous Infusions:   fentanyl Stopped (05/02/17 0800)    norepinephrine bitartrate-D5W 0.15 mcg/kg/min (05/04/17 0901)    propofol 30 mcg/kg/min (05/04/17 0849)     Scheduled Meds:   albuterol-ipratropium 2.5mg-0.5mg/3mL  3 mL Nebulization Q6H    carvedilol  3.125 mg Oral BID    chlorhexidine  15 mL Mouth/Throat BID    enoxaparin  40 mg Subcutaneous Daily    insulin detemir  15 Units Subcutaneous QHS    pantoprazole  40 mg Intravenous Daily    quetiapine  25 mg Oral BID    scopolamine  1 patch Transdermal Q3 Days     Review of Systems:  Review of systems not obtained due to patient agitated and not very responsive.    OBJECTIVE:     Vital Signs Range (Last 24H):  Temp:  [98.6 °F (37 °C)-99.4 °F (37.4 °C)]   Pulse:  []   Resp:  [12-50]   BP: ()/()   SpO2:  [99 %-100 %]     I & O (Last 24H):    Intake/Output Summary (Last 24 hours) at 05/04/17 0947  Last data filed at 05/04/17 0600   Gross per 24 hour   Intake          2499.23 ml   Output             1865 ml   Net           634.23 ml     Physical Exam:  General: no distress. No distress but agitated. UE restrained but moves her LE more.  Neck: no carotid bruit  Breast: left breast mass - hard, non-mobile  Lungs:  Shallow, decreased bs bilaterally rhonchi. No wheezing.   Heart: regular rate and rhythm and no murmur  Abdomen: soft, non-tender non-distended; bowel sounds diminished and right nasal feeding tube and rectal tube.  Extremities: no cyanosis  or clubbing but has pedal edema and SCDs to LE. RUE picc line.  Neurologic: minimally responsive    Laboratory:  CBC:     Recent Labs  Lab 05/01/17  0345   WBC 4.53   RBC 4.26   HGB 10.9*   HCT 35.1*      MCV 82   MCH 25.6*   MCHC 31.1*     BMP:     Recent  Labs  Lab 05/04/17  0338   *      K 3.7   CL 92*   CO2 34*   BUN 89*   CREATININE 1.2   CALCIUM 11.1*        Vent Mode: SIMV (PRVC) + PS  Oxygen Concentration (%):  [50] 50  Resp Rate Total:  [11 br/min-35 br/min] 13 br/min  Vt Set:  [400 mL] 400 mL  PEEP/CPAP:  [5 cmH20] 5 cmH20  Pressure Support:  [10 cmH20] 10 cmH20  Mean Airway Pressure:  [6.6 oxE09-78.8 cmH20] 8.1 cmH20    ABGs:     Recent Labs  Lab 05/04/17  0445   PH 7.527*   PCO2 47.1*   PO2 133*   HCO3 39.2*   POCSATURATED 99   BE 15     Microbiology Results (last 7 days)     Procedure Component Value Units Date/Time    Blood culture [[id number]] Collected:  05/03/17 1019    Order Status:  Completed Specimen:  Blood Updated:  05/03/17 1712     Blood Culture, Routine No Growth to date    Blood culture [[id number]] Collected:  05/03/17 1000    Order Status:  Completed Specimen:  Blood Updated:  05/03/17 1712     Blood Culture, Routine No Growth to date        Chest X-Ray:     Tracheostomy tube, right-sided PICC line and enteric catheter are in stable position. Cardiopulmonary status is unchanged noting persistent abnormal pulmonary parenchymal attenuation, left and right, suggesting edema, hemorrhage, pneumonia or a noninfectious inflammatory process.  Possible left sided layering pleural fluid. No pneumothorax.      ASSESSMENT/PLAN:     1) Acute respiratory failure - presented with weakness of UE and pneumonia. Unable to wean off vent. S/p trach. Fair gas exchanged. On rate 12 via trach. PS 10. Agitated. No cxr this am. Rhonchi on exam. Will increase ps a bit and try to decrease rate. Bronchodilators. LTAC?  2) Weakness - UE. Neurology following. ??MG - MG panel was negative. Steroids stopped. ?eiotlogy to weakness. UE remain weak.  3) History of HTN  4) Anemia - stable.    Vs stable ; abg excellent ;on settings SIMV 12/400/+5/10/50%. Neuro unchanging ; not much use weaning to cpap again ; will maintain at this level until neuro improves  .      Bird Vega

## 2017-05-04 NOTE — PLAN OF CARE
05/04/17 1331   Discharge Reassessment   Assessment Type Discharge Planning Reassessment   Can the patient answer the patient profile reliably? No, cognitively impaired   How does the patient rate their overall health at the present time? Fair  (record)   Describe the patient's ability to walk at the present time. Does not walk or unable to take any steps at all   How often would a person be available to care for the patient? Infrequently   Number of comorbid conditions (as recorded on the chart) Five or more   During the past month, has the patient often been bothered by feeling down, depressed or hopeless? No  (record)   During the past month, has the patient often been bothered by little interest or pleasure in doing things? No  (record)   Discharge plan remains the same: Yes   Provided patient/caregiver education on the expected discharge date and the discharge plan Yes  (to aunt Agueda)   Discharge Plan A Long-term acute care facility (LTAC)   Discharge Plan B Other  (to be determined)   Change in patient condition or support system No   Patient choice form signed by patient/caregiver No   Explained to the the patient/caregiver why the discharge planned changed: No   Involved the patient/caregiver in establishing a new discharge plan: No   patient remains in ICU, vent support, sedated. Weaning attempts continue. DENIS updated with Alyssa with [facility]. Per Alyssa, has submitted updated information to insurance yesterday and today. DENIS then contacted aunt Agueda Matute with updates. Ms Romeo will update patient's grandmother who again is in the hospital. DENIS will follow in ICU and assist as needed.

## 2017-05-04 NOTE — PLAN OF CARE
Problem: Patient Care Overview  Goal: Plan of Care Review  Outcome: Ongoing (interventions implemented as appropriate)  Pt is still on SIMV vent setting: Fio2-50%, R-12, TV-400, Peep-5, PS-10. On Propofol gtt @ 20 and levophed gtt @ 0.1. Intermittent agitation with facial grimaces but able to keep more comfortable with prn Morphine, ativan and scheduled Seroquel BID. Unable to open eyes or follow commands during sedation vacation, gets increasingly agitated. Still on tube feeding Impact peptide @ 45 cc/hr, no residual, free water flushes 150cc q 4 hrs. Blood glucose-281, 315. Carter draining tea colored urine. Rectal tube still intact draining small green liquid stool.  Still on tevin soft wrist restraints. No falls or injury noted. ROMIE LEWIS.     Maryellen Garcia RN

## 2017-05-04 NOTE — PROGRESS NOTES
[facility] Hospital Medicine  Progress Note    Patient Name: Radha Pritchett  MRN: [id number]  Patient Class: IP- Inpatient   Admission Date: 4/6/2017  Length of Stay: 28 days  Attending Physician: Antonieta Carpenter MD  Primary Care Provider: Ferny Iglesias MD        Subjective:     Principal Problem:Acute respiratory failure with hypercapnia    HPI:  Ms. Radha Pritchett is a 46 y.o. female with essential hypertension, chronic diastolic heart failure (LVEF 55-60% Dec 2014), moderate protein malnutrition who presents to [facility] ED with complaints of dyspnea today.  She was scheduled to undergo removal of a breast mass today but was found to be hypoxic.  She does report that she has been short of breath but cannot specify how long.  She has also had some non-productive coughing and some chills without fevers.  Further history is otherwise limited at this time.    Hospital Course:   was admitted with pneumonia and acutely decompensated and was intubated and placed on vent. Pt with UE weakness prior to presentation on this admission which may be contributing to inability to extubate. Neurology following and considering possible MG and patient on therapeutic trial of prednisone and Modafinil. Pt s/p bronch that was unrevealing on 4/11. Pt was extubated on 4/15 but was re-intubated later the same day.  Was febrile and stared on Zyvox and zosyn.  CXR shows possible infiltrate. Urine culture +enterococcus.  Completed course and antibiotics dc'd. Blood culture negative  Trach on 4/19 without complications.      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 Started on acyclovir but dc'd  After review of previous CT imaging showed temporal abnormalities.    ID does not think it is HSV encephalitis.  Brief drop in SBP and IVF were given, now dc'd and was on lasix IV BID ,ofcv and on on levophed with changes to resp status and back on ventilator.    Pt does not tolerate being  off sedation, weaning difficult. IV steroids dc'd. Started insulin drip overnight and now off. Hyperglycemia with SQ insulin improved.     MRI c-spine 5/2/2017 show no lesions.MG Ab is negative. Repeat left breast ultrasound show same size of mass,  Need off continues sedation and  pressure support to qualify for LTAC placement.prn sedation are OK.      Interval History: pt is off sedation,but on levophed,at this time.    Review of Systems   Unable to perform ROS: Intubated     Objective:     Vital Signs (Most Recent):  Temp: 98.6 °F (37 °C) (05/04/17 0315)  Pulse: 86 (05/04/17 0751)  Resp: 14 (05/04/17 0751)  BP: (!) 93/59 (05/04/17 0600)  SpO2: 100 % (05/04/17 0751) Vital Signs (24h Range):  Temp:  [98.6 °F (37 °C)-99.4 °F (37.4 °C)] 98.6 °F (37 °C)  Pulse:  [] 86  Resp:  [6-50] 14  SpO2:  [99 %-100 %] 100 %  BP: ()/() 93/59     Weight: 72 kg (158 lb 11.7 oz)  Body mass index is 44.64 kg/(m^2).    Intake/Output Summary (Last 24 hours) at 05/04/17 0926  Last data filed at 05/04/17 0600   Gross per 24 hour   Intake          2499.23 ml   Output             1865 ml   Net           634.23 ml      Physical Exam   Constitutional: She appears well-developed. No distress.   Eyes: Conjunctivae are normal. Right eye exhibits no discharge. Left eye exhibits no discharge.   Neck:   Trach in place   Cardiovascular: Normal rate, regular rhythm and normal heart sounds.    Pulmonary/Chest: No stridor. No respiratory distress. She has no wheezes.   Mechanically ventilated per trach.     Abdominal: Soft. Bowel sounds are normal.   Musculoskeletal: She exhibits no deformity.   Neurological:   Very restless.  Poorly responsive to commands.   Skin: Skin is warm and dry.       Significant Labs:   BMP:     Recent Labs  Lab 05/04/17  0338   *      K 3.7   CL 92*   CO2 34*   BUN 89*   CREATININE 1.2   CALCIUM 11.1*     CBC:   No results for input(s): WBC, HGB, HCT, PLT in the last 48 hours.  POCT Glucose:      Recent Labs  Lab 05/03/17  2314 05/04/17  0510 05/04/17  0514   POCTGLUCOSE 281* 299* 315*       Significant Imaging: I have reviewed all pertinent imaging results/findings within the past 24 hours.    Assessment/Plan:      * Acute respiratory failure with hypercapnia  Mechanical ventilation  Weakness  Metabolic encephalopathy  Fever  Assessment and Plan:  Pt with respiratory failure that required intubation, possibly secondary to pneumonia vs edema, treated empirically with Rocephin/Azithromycin, ECHO with diastolic dysfunction but normal EF, and Pulm following for vent management. Family reported UE weakness for a few months prior to this admission and Neurology consulted, possible MG and patient undergoing trial of prednisone and pyridostigmine. This weakness likely limited extubation and necessitated reintubation; patient was extubated on 4/16 but had to be re-intubated that same day. Unclear source of encephalopathy and case discussed with Neurology, CT scan of the head was unrevealing. Also, patient completed treatment with Azithromycin and was on Rocephin alone, but she spiked a fever overnight and antibiotics changed to Zosyn/Zyvox.   Trach on 4/19 without complications.  Stopped all ABx's.  Remains on vent per trach.  Having trouble clearing secretions.  Added Glycopyrrolate.   SW consulted for LTAC.  On SIMV  at this time.      Mass of breast, left  Follow up at later date   Repeat left breast ultrasound,shoe same size  finding breast mass.      Pneumonia of both lungs due to infectious organism  Probably bacterial, but unable to determine.  The patient has a CURB-65 score of 0, and does not meet criteria for healthcare-associated pneumonia initially. CTA chest with bilateral airspace opacification concerning for pneumonia.  Oxygen saturations are improved but requiring a non-rebreather mask; then BIPAP and s/p intubation.  Blood culture NGTD.  Pt completed full course of Azithromycin, and was  continued Rocephin until had spike of fever, Abx has been expanded  to Zosyn/Zyvox.  Completed course and Abx dc'd.      Shock circulatory  Coreg held with normal-low BP  IVF held, edema on CXR and lasix given  levophed if needed to maintain MAP >65    Chronic diastolic heart failure  Patient doesn't appear to be in acute heart failure; checked ECHO with EF=65% but with diastolic dysfunction.  Lasix given     Moderate protein malnutrition  Resume tube feedings      Breast mass, left  Planned for surgery, seen by Dr. Sandoval (his patient) and plan to reschedule as outpatient. It is reportedly a hematoma by biopsy, but suspicion for CA not completely ruled out given paraneoplastic syndrome a consideration.      Hypokalemia  Replace as necessary.      Counseling regarding advanced directives and goals of care  Extensive conversations done with family. Pt to remain full code. Pt is still  but estranged from the  and reportedly the grandmother is the major decision maker.       Hyperglycemia  Hyperglycemia worsened with steroids as anticipated, titrate insulin   Levemir 15 units Q hs SSI  HgA1c 7.3%.  No apparent history of DM prior to admission.  Off steroid,improved wit SQ insulin.      Upper extremity weakness  Bilateral UE weakness with unknown etiology  IV steroids dc'd  D/w neurology - trial acyclovir started then stopped after review of previous head CT that showed chronic findings  MRI c/spine show no lesions.  unremarkable MG antibodies.      VTE Risk Mitigation         Ordered     enoxaparin injection 40 mg  Daily     Route:  Subcutaneous        05/01/17 1520     Medium Risk of VTE  Once      04/06/17 2202          Antonieta Carpenter MD  Department of Hospital Medicine   [facility]

## 2017-05-04 NOTE — PROGRESS NOTES
Received pt mon sevoi settings SIMV 12/400/+5/10/50%. Aero tx given, pt tolerated well. [facility]BU at [facility].

## 2017-05-04 NOTE — PROGRESS NOTES
[facility] [place]  [place] Note    SUMMARY     Recommendations    Recommendation/Intervention: 1) TF recs:  Peptamen VHP (Bariatric) @ 50 cc/hr with 1 packet of Beneprotein, provides 1225 calories (1394 with propofol), 122 g protein, 1008 cc free water 2) Flushes per MD 3) Check for residuals Q4 hours. Hold if > 250 cc  Goals: 1) Patient's tube feedings will change within 72 hours  Nutrition Goal Status: goal not met  Communication of RD Recs: reviewed with physician    Continuum of Care Plan    Referral to Outpatient Services:  (D/C planning: Too soon to determine. Will monitor/follow-up.)    Reason for Assessment    Reason for Assessment: RD follow-up  Diagnosis:  (SOB; pneumonia)  Relevent Medical History: CHF   General Information Comments: Patient intubated and sedated. TF running at goal rate and tolerated well. No residuals noted. Patient exceeding estimated energy needs.     Nutrition Prescription Ordered    Current Diet Order: NPO     Current Nutrition Support Formula Ordered: Impact Peptide 1.5  Current Nutrition Support Rate Ordered: 45 (ml)  Current Nutrition Support Frequency Ordered: continuous  Oral Nutrition Supplement: 2 packets of Beneprotein      Evaluation of Received Nutrients/Fluid Intake    Enteral Calories (kcal): 1620  Enteral Protein (gm): 101  Enteral (Free Water) Fluid (mL): 832  Free Water Flush Fluid (mL): 900    Other Calories (kcal): 393 (propofol + beneprotein)  Total Calories (kcal): 2013     Energy Calories Required: exceed needs  % Kcal Needs: 137     Other Protein (gm): 12     Total Protein (gm/kg): 113  Protein Required: meeting needs  % Protein Needs: 100    Fluid Required: exceed needs (Net I/O +611.4)  Comments: 0 residuals noted  Tolerance: tolerating     Nutrition Risk Screen     Nutrition Risk Screen: no indicators present    Nutrition/Diet History    Food Preferences: DANIEL    Factors Affecting Nutritional Intake: NPO, on mechanical  ventilation    Labs/Tests/Procedures/Meds    Diagnostic Test/Procedure Review: reviewed, pertinent  Pertinent Labs Reviewed: reviewed  B - 294    BMP  Lab Results   Component Value Date     2017    K 3.7 2017    CL 92 (L) 2017    CO2 34 (H) 2017    BUN 89 (H) 2017    CREATININE 1.2 2017    CALCIUM 11.1 (H) 2017    ANIONGAP 15 2017    ESTGFRAFRICA >60 2017    EGFRNONAA 54 (A) 2017     Lab Results   Component Value Date    CALCIUM 11.1 (H) 2017    PHOS 2.7 2017     Lab Results   Component Value Date    ALBUMIN 2.4 (L) 2017     Pertinent Medications Reviewed: reviewed  Pertinent Medications Comments: propofol    Physical Findings    Overall Physical Appearance: on ventilator support  Tubes: nasogastric tube  Oral/Mouth Cavity: WDL  Skin:  (blister)    Anthropometrics    Height (inches): 62.13 in  Weight Method: Bed Scale  Weight (kg): 72 kg  Ideal Body Weight (IBW), Female: 110.65 lb  % Ideal Body Weight, Female (lb): 143.45 lb  BMI (kg/m2): 28.91  BMI Grade: 25 - 29.9 - overweight      Estimated/Assessed Needs    Weight Used For Calorie Calculations: 72 kg (158 lb 11.7 oz)   Height (cm): 157.8 cm  Energy Need Method: [facility] (1469)  RMR (Madison-St. Jeor Equation): 1318.21  Weight Used For Protein Calculations: 50 kg (110 lb 3.7 oz) (IBW)  Protein Requirements: 100-125 g  Fluid Need Method: RDA Method (per MD)    Nutrition Diagnosis      Problem: Excessive enteral nutrition infusion  Etiology: tube feeding formula/rate  As Evidenced by:  Patient meeting 137% of estimated energy needs  Nutrition Diagnosis: new    Monitor and Evaluation    Food and Nutrient Intake: energy intake, food and beverage intake, enteral nutrition intake  Food and Nutrient Adminstration: diet order, enteral and parenteral nutrition administration  Anthropometric Measurements: weight, weight change  Biochemical Data, Medical Tests and Procedures:  electrolyte and renal panel, glucose/endocrine profile, gastrointestinal profile  Nutrition-Focused Physical Findings: overall appearance, skin    Nutrition Risk    Level of Risk: other (see comments) (f/u 2x weekly)    Nutrition Follow-Up    RD Follow-up?: Yes

## 2017-05-04 NOTE — CONSULTS
Consult Note  Infectious Disease    Consult Requested By: Antonieta Carpenter MD    Reason for Consult: does she have hsv encephalitis?    SUBJECTIVE:     History of Present Illness:  Patient is a 46 y.o. female presents with hypercapneic respiratory failure. She has had episodes of hypercapneic respiratory failure in 2014 as well. She was deemed to have a pneumonia and received antibiotics as well as antibiotics for a vse uti(e.faecium) has had trouble weaning form the ventilator and has a tracheostomy. Neurology has identified ul paresis and ct of the head has confirmed bilateral temporal lobe attenuation that was seen in 2014 as well. An mri confirms the same. The patient is now more awake when off sedation per nursing. She has received empirical steroids and ivig in case this was an encephalitis. The specter of hsv encephalitis was raised based on imaging. She has not had cognitive deficits coming in to be admitted and is agitated now when sedation is stopped.  She also has a left breast mass felt to be a hematoma.    Past Medical History:   Diagnosis Date    CHF (congestive heart failure)     Learning difficulty      Past Surgical History:   Procedure Laterality Date    BREAST SURGERY Left     biopsy    CYST REMOVAL      shoulder      Family History   Problem Relation Age of Onset    Diabetes      Coronary artery disease       Social History   Substance Use Topics    Smoking status: Never Smoker    Smokeless tobacco: Never Used    Alcohol use Yes      Comment: OCCASSIONAL       Review of patient's allergies indicates:  No Known Allergies     Antibiotics     None          Review of Systems:  Review of systems not obtained due to patient factors intubated.    OBJECTIVE:     Vital Signs (Most Recent)  Temp: 98.6 °F (37 °C) (05/04/17 0315)  Pulse: 86 (05/04/17 0751)  Resp: 14 (05/04/17 0751)  BP: (!) 93/59 (05/04/17 0600)  SpO2: 100 % (05/04/17 0751)    Temperature Range Min/Max (Last 24H):  Temp:   [98.6 °F (37 °C)-99.4 °F (37.4 °C)]     Physical Exam:  General: well developed, well nourished  Eyes: conjunctivae/corneas clear. PERRL..  HENT: Head:normocephalic, atraumatic. Ears:bilateral TM's and external ear canals normal. Nose: Nares normal. Septum midline. Mucosa normal. No drainage or sinus tenderness., no discharge. Throat: lips, mucosa, and tongue normal; teeth and gums normal and no throat erythema.  Neck: supple, symmetrical, trachea midline, no JVD and thyroid not enlarged, symmetric, no tenderness/mass/nodules  Lungs:  clear to auscultation bilaterally and normal respiratory effort  Cardiovascular: Heart: regular rate and rhythm, S1, S2 normal, no murmur, click, rub or gallop. Chest Wall: no tenderness. Extremities: no cyanosis or edema, or clubbing. Pulses: 2+ and symmetric.  Abdomen/Rectal: Abdomen: soft, non-tender non-distented; bowel sounds normal; no masses,  no organomegaly. Rectal: not examined  Skin: Skin color, texture, turgor normal. No rashes or lesions  Musculoskeletal:no clubbing, cyanosis  Lymph Nodes: No cervical or supraclavicular adenopathy  Neurologic: Mental status: intubated and sedated. ul restrained as she is pulling at et tube etc which speaks against an ul paresis    Laboratory:  CBC  No results for input(s): WBC, RBC, HGB, HCT, PLT in the last 24 hours.  BMP    Recent Labs  Lab 05/04/17  0338   CO2 34*   BUN 89*   CREATININE 1.2   CALCIUM 11.1*     No results for input(s): COLORU, CLARITYU, SPECGRAV, PHUR, PROTEINUA, GLUCOSEU, BILIRUBINCON, BLOODU, WBCU, RBCU, BACTERIA, MUCUS, NITRITE, LEUKOCYTESUR, UROBILINOGEN, HYALINECASTS in the last 168 hours.  Microbiology Results (last 7 days)     Procedure Component Value Units Date/Time    Blood culture [[id number]] Collected:  05/03/17 1019    Order Status:  Completed Specimen:  Blood Updated:  05/03/17 1712     Blood Culture, Routine No Growth to date    Blood culture [[id number]] Collected:  05/03/17 1000    Order Status:   Completed Specimen:  Blood Updated:  05/03/17 1712     Blood Culture, Routine No Growth to date          Diagnostic Results:  Labs: Reviewed  X-Ray: Reviewed  CT: Reviewed  MRI: Reviewed    ASSESSMENT/PLAN:     Active Hospital Problems    Diagnosis  POA    *Acute respiratory failure with hypercapnia [J96.02]  Yes    Upper extremity weakness [R29.898]  Yes    Hyperglycemia [R73.9]  No    Counseling regarding advanced directives and goals of care [Z71.89]  Not Applicable    Hypokalemia [E87.6]  Yes    Breast mass, left [N63]  Yes    Pneumonia of both lungs due to infectious organism [J18.9]  Yes    Shock circulatory [R57.9]  No    Chronic diastolic heart failure [I50.32]  Yes     Chronic    Moderate protein malnutrition [E44.0]  Yes     Chronic    Mass of breast, left [N63]  Yes      Resolved Hospital Problems    Diagnosis Date Resolved POA    Sepsis [A41.9] 04/24/2017 Yes       1. i see no evidence of hsv encephalitis in that this ct abnormality was present in 2014 and patient is more alert when sedation stopped  Sounds like this is a chronic process and not acute  2. hypercapneic respiratory failure may be related to cns process  She does move all 4 limbs but is not following oral commands  Diarrhea and c diff negative 4/14/17  Blood cultures x2 negative so far   at high risk of nosocomial infections  as long as she needs iv access, is vent dependant and is bedbound

## 2017-05-04 NOTE — PLAN OF CARE
Problem: Patient Care Overview  Goal: Plan of Care Review  Outcome: Ongoing (interventions implemented as appropriate)  Patient continues in ICU, status largely unchanged. Levo gtt continued at 0.1. Vent weaning not well tolerated. Patient withdrawing from pain and otherwise demonstrating movements that don't seem purposeful. Patient agitated and restless off and on throughout shift with increase in vitals and lower body movement. Prop gtt continued. Midday glucose high, MD informed, new order placed. No new sign of injury or fall, will continue to monitor.

## 2017-05-04 NOTE — SUBJECTIVE & OBJECTIVE
Interval History: pt is off sedation,but on levophed,at this time.    Review of Systems   Unable to perform ROS: Intubated     Objective:     Vital Signs (Most Recent):  Temp: 98.6 °F (37 °C) (05/04/17 0315)  Pulse: 86 (05/04/17 0751)  Resp: 14 (05/04/17 0751)  BP: (!) 93/59 (05/04/17 0600)  SpO2: 100 % (05/04/17 0751) Vital Signs (24h Range):  Temp:  [98.6 °F (37 °C)-99.4 °F (37.4 °C)] 98.6 °F (37 °C)  Pulse:  [] 86  Resp:  [6-50] 14  SpO2:  [99 %-100 %] 100 %  BP: ()/() 93/59     Weight: 72 kg (158 lb 11.7 oz)  Body mass index is 44.64 kg/(m^2).    Intake/Output Summary (Last 24 hours) at 05/04/17 0926  Last data filed at 05/04/17 0600   Gross per 24 hour   Intake          2499.23 ml   Output             1865 ml   Net           634.23 ml      Physical Exam   Constitutional: She appears well-developed. No distress.   Eyes: Conjunctivae are normal. Right eye exhibits no discharge. Left eye exhibits no discharge.   Neck:   Trach in place   Cardiovascular: Normal rate, regular rhythm and normal heart sounds.    Pulmonary/Chest: No stridor. No respiratory distress. She has no wheezes.   Mechanically ventilated per trach.     Abdominal: Soft. Bowel sounds are normal.   Musculoskeletal: She exhibits no deformity.   Neurological:   Very restless.  Poorly responsive to commands.   Skin: Skin is warm and dry.       Significant Labs:   BMP:     Recent Labs  Lab 05/04/17  0338   *      K 3.7   CL 92*   CO2 34*   BUN 89*   CREATININE 1.2   CALCIUM 11.1*     CBC:   No results for input(s): WBC, HGB, HCT, PLT in the last 48 hours.  POCT Glucose:     Recent Labs  Lab 05/03/17  2314 05/04/17  0510 05/04/17  0514   POCTGLUCOSE 281* 299* 315*       Significant Imaging: I have reviewed all pertinent imaging results/findings within the past 24 hours.

## 2017-05-05 LAB
ALLENS TEST: ABNORMAL
ANION GAP SERPL CALC-SCNC: 11 MMOL/L
BUN SERPL-MCNC: 66 MG/DL
CALCIUM SERPL-MCNC: 11.5 MG/DL
CHLORIDE SERPL-SCNC: 94 MMOL/L
CO2 SERPL-SCNC: 36 MMOL/L
CREAT SERPL-MCNC: 1.1 MG/DL
DELSYS: ABNORMAL
ERYTHROCYTE [SEDIMENTATION RATE] IN BLOOD BY WESTERGREN METHOD: 12 MM/H
EST. GFR  (AFRICAN AMERICAN): >60 ML/MIN/1.73 M^2
EST. GFR  (NON AFRICAN AMERICAN): >60 ML/MIN/1.73 M^2
FIO2: 40
GLUCOSE SERPL-MCNC: 275 MG/DL
HCO3 UR-SCNC: 36.3 MMOL/L (ref 24–28)
MODE: ABNORMAL
PCO2 BLDA: 40.7 MMHG (ref 35–45)
PEEP: 5
PH SMN: 7.56 [PH] (ref 7.35–7.45)
PIP: 20
PO2 BLDA: 87 MMHG (ref 80–100)
POC BE: 13 MMOL/L
POC SATURATED O2: 98 % (ref 95–100)
POC TCO2: 37 MMOL/L (ref 23–27)
POCT GLUCOSE: 272 MG/DL (ref 70–110)
POCT GLUCOSE: 288 MG/DL (ref 70–110)
POCT GLUCOSE: 307 MG/DL (ref 70–110)
POTASSIUM SERPL-SCNC: 4 MMOL/L
PS: 10
SAMPLE: ABNORMAL
SITE: ABNORMAL
SODIUM SERPL-SCNC: 141 MMOL/L
SP02: 100
VT: 400

## 2017-05-05 PROCEDURE — 27200966 HC CLOSED SUCTION SYSTEM

## 2017-05-05 PROCEDURE — 80048 BASIC METABOLIC PNL TOTAL CA: CPT

## 2017-05-05 PROCEDURE — 94003 VENT MGMT INPAT SUBQ DAY: CPT

## 2017-05-05 PROCEDURE — 63600175 PHARM REV CODE 636 W HCPCS: Performed by: HOSPITALIST

## 2017-05-05 PROCEDURE — 63600175 PHARM REV CODE 636 W HCPCS: Performed by: INTERNAL MEDICINE

## 2017-05-05 PROCEDURE — 25000003 PHARM REV CODE 250: Performed by: HOSPITALIST

## 2017-05-05 PROCEDURE — 25000242 PHARM REV CODE 250 ALT 637 W/ HCPCS: Performed by: INTERNAL MEDICINE

## 2017-05-05 PROCEDURE — 25000003 PHARM REV CODE 250: Performed by: INTERNAL MEDICINE

## 2017-05-05 PROCEDURE — 20000000 HC ICU ROOM

## 2017-05-05 PROCEDURE — 27000221 HC OXYGEN, UP TO 24 HOURS

## 2017-05-05 PROCEDURE — 94761 N-INVAS EAR/PLS OXIMETRY MLT: CPT

## 2017-05-05 PROCEDURE — 36600 WITHDRAWAL OF ARTERIAL BLOOD: CPT

## 2017-05-05 PROCEDURE — 99900026 HC AIRWAY MAINTENANCE (STAT)

## 2017-05-05 PROCEDURE — C9113 INJ PANTOPRAZOLE SODIUM, VIA: HCPCS | Performed by: HOSPITALIST

## 2017-05-05 PROCEDURE — 25000003 PHARM REV CODE 250: Performed by: PSYCHIATRY & NEUROLOGY

## 2017-05-05 PROCEDURE — 94640 AIRWAY INHALATION TREATMENT: CPT

## 2017-05-05 PROCEDURE — 36415 COLL VENOUS BLD VENIPUNCTURE: CPT

## 2017-05-05 RX ORDER — FUROSEMIDE 10 MG/ML
20 INJECTION INTRAMUSCULAR; INTRAVENOUS ONCE
Status: COMPLETED | OUTPATIENT
Start: 2017-05-05 | End: 2017-05-05

## 2017-05-05 RX ADMIN — INSULIN ASPART 6 UNITS: 100 INJECTION, SOLUTION INTRAVENOUS; SUBCUTANEOUS at 06:05

## 2017-05-05 RX ADMIN — QUETIAPINE FUMARATE 25 MG: 25 TABLET, FILM COATED ORAL at 09:05

## 2017-05-05 RX ADMIN — CHLORHEXIDINE GLUCONATE 15 ML: 1.2 RINSE ORAL at 09:05

## 2017-05-05 RX ADMIN — LORAZEPAM 1 MG: 2 INJECTION INTRAMUSCULAR; INTRAVENOUS at 07:05

## 2017-05-05 RX ADMIN — PROPOFOL 20.6 MCG/KG/MIN: 10 INJECTION, EMULSION INTRAVENOUS at 06:05

## 2017-05-05 RX ADMIN — IPRATROPIUM BROMIDE AND ALBUTEROL SULFATE 3 ML: .5; 3 SOLUTION RESPIRATORY (INHALATION) at 07:05

## 2017-05-05 RX ADMIN — Medication 0.1 MCG/KG/MIN: at 03:05

## 2017-05-05 RX ADMIN — IPRATROPIUM BROMIDE AND ALBUTEROL SULFATE 3 ML: .5; 3 SOLUTION RESPIRATORY (INHALATION) at 01:05

## 2017-05-05 RX ADMIN — IPRATROPIUM BROMIDE AND ALBUTEROL SULFATE 3 ML: .5; 3 SOLUTION RESPIRATORY (INHALATION) at 02:05

## 2017-05-05 RX ADMIN — MORPHINE SULFATE 2 MG: 10 INJECTION INTRAVENOUS at 02:05

## 2017-05-05 RX ADMIN — CARVEDILOL 3.12 MG: 3.12 TABLET, FILM COATED ORAL at 09:05

## 2017-05-05 RX ADMIN — PANTOPRAZOLE SODIUM 40 MG: 40 INJECTION, POWDER, FOR SOLUTION INTRAVENOUS at 09:05

## 2017-05-05 RX ADMIN — PROPOFOL 40 MCG/KG/MIN: 10 INJECTION, EMULSION INTRAVENOUS at 06:05

## 2017-05-05 RX ADMIN — FUROSEMIDE 20 MG: 10 INJECTION, SOLUTION INTRAMUSCULAR; INTRAVENOUS at 09:05

## 2017-05-05 RX ADMIN — PROPOFOL 35 MCG/KG/MIN: 10 INJECTION, EMULSION INTRAVENOUS at 03:05

## 2017-05-05 RX ADMIN — INSULIN ASPART 8 UNITS: 100 INJECTION, SOLUTION INTRAVENOUS; SUBCUTANEOUS at 11:05

## 2017-05-05 RX ADMIN — LORAZEPAM 1 MG: 2 INJECTION INTRAMUSCULAR; INTRAVENOUS at 03:05

## 2017-05-05 RX ADMIN — ENOXAPARIN SODIUM 40 MG: 100 INJECTION SUBCUTANEOUS at 05:05

## 2017-05-05 RX ADMIN — MORPHINE SULFATE 2 MG: 10 INJECTION INTRAVENOUS at 06:05

## 2017-05-05 RX ADMIN — Medication 0.09 MCG/KG/MIN: at 11:05

## 2017-05-05 NOTE — SIGNIFICANT EVENT
Patient received on servoi ventilator with charted settings SIMV rate 12, tidal volume 400  ml, peep +5, pressure support 10, Fi02 50% with FI02 decreased to 45%. All ventilator alarms on, set and functioning. ambu bag and mask at bedside. Patient has 8.0 SHILEY in midline position. Will continue to monitor.

## 2017-05-05 NOTE — ASSESSMENT & PLAN NOTE
Extensive conversations done with family. Pt to remain full code. Pt is still  but estranged from the  and reportedly the grandmother is the major decision maker,but she is also hospitalized,spoke with aunt lydia leonard,she say she is right now decision maker,updated her.

## 2017-05-05 NOTE — PROGRESS NOTES
Progress Note  Pulmonology    Admit Date: 4/6/2017   LOS: 29 days     SUBJECTIVE: resp distress     Follow-up For:  Vent management. Patient on vent via trach. Off sedation. Agitated.  Opens her eyes - moves ext - no commands   Continuous Infusions:   fentanyl Stopped (05/02/17 0800)    norepinephrine bitartrate-D5W 0.1 mcg/kg/min (05/05/17 0615)    propofol 40 mcg/kg/min (05/05/17 0655)     Scheduled Meds:   albuterol-ipratropium 2.5mg-0.5mg/3mL  3 mL Nebulization Q6H    carvedilol  3.125 mg Oral BID    chlorhexidine  15 mL Mouth/Throat BID    enoxaparin  40 mg Subcutaneous Daily    insulin detemir  15 Units Subcutaneous QHS    pantoprazole  40 mg Intravenous Daily    quetiapine  25 mg Oral BID    scopolamine  1 patch Transdermal Q3 Days     Review of Systems:  Review of systems not obtained due to patient agitated and not very responsive.    OBJECTIVE:     Vital Signs Range (Last 24H):  Temp:  [97.7 °F (36.5 °C)-98.2 °F (36.8 °C)]   Pulse:  []   Resp:  [12-30]   BP: ()/(49-92)   SpO2:  [99 %-100 %]     I & O (Last 24H):    Intake/Output Summary (Last 24 hours) at 05/05/17 0802  Last data filed at 05/05/17 0700   Gross per 24 hour   Intake           1926.8 ml   Output             1675 ml   Net            251.8 ml     Physical Exam:  General: no distress. No distress but agitated. UE restrained but moves her LE more.  Neck: no carotid bruit  Breast: left breast mass - hard, non-mobile  Lungs:  Shallow, decreased bs bilaterally rhonchi. No wheezing.   Heart: regular rate and rhythm and no murmur  Abdomen: soft, non-tender non-distended; bowel sounds diminished and right nasal feeding tube and rectal tube.  Extremities: no cyanosis  or clubbing but has pedal edema and SCDs to LE. RUE picc line.  Neurologic: minimally responsive    Laboratory:  CBC:     Recent Labs  Lab 05/01/17  0345   WBC 4.53   RBC 4.26   HGB 10.9*   HCT 35.1*      MCV 82   MCH 25.6*   MCHC 31.1*     BMP:     Recent  Labs  Lab 05/05/17  0143   *      K 4.0   CL 94*   CO2 36*   BUN 66*   CREATININE 1.1   CALCIUM 11.5*        Vent Mode: SIMV (PRVC) + PS  Oxygen Concentration (%):  [40-50] 40  Resp Rate Total:  [12 br/min-37 br/min] 12 br/min  Vt Set:  [400 mL] 400 mL  PEEP/CPAP:  [5 cmH20] 5 cmH20  Pressure Support:  [10 cmH20] 10 cmH20  Mean Airway Pressure:  [7.8 cmH20-8.3 cmH20] 7.8 cmH20    ABGs:     Recent Labs  Lab 05/05/17  0437   PH 7.558*   PCO2 40.7   PO2 87   HCO3 36.3*   POCSATURATED 98   BE 13     Microbiology Results (last 7 days)     Procedure Component Value Units Date/Time    Blood culture [[id number]] Collected:  05/03/17 1019    Order Status:  Completed Specimen:  Blood Updated:  05/04/17 1103     Blood Culture, Routine No Growth to date     Blood Culture, Routine No Growth to date    Blood culture [[id number]] Collected:  05/03/17 1000    Order Status:  Completed Specimen:  Blood Updated:  05/04/17 1103     Blood Culture, Routine No Growth to date     Blood Culture, Routine No Growth to date        Chest X-Ray:     Tracheostomy tube, right-sided PICC line and enteric catheter are in stable position. Cardiopulmonary status is unchanged noting persistent abnormal pulmonary parenchymal attenuation, left and right, suggesting edema, hemorrhage, pneumonia or a noninfectious inflammatory process.  Possible left sided layering pleural fluid. No pneumothorax.      ASSESSMENT/PLAN:     1) Acute respiratory failure - presented with weakness of UE and pneumonia. Unable to wean off vent. S/p trach. Fair gas exchanged. On rate 12 via trach. PS 10. Agitated. No cxr this am. Rhonchi on exam. Will increase ps a bit and try to decrease rate. Bronchodilators. LTAC?  2) Weakness - UE. Neurology following. ??MG - MG panel was negative. Steroids stopped. ?eiotlogy to weakness. UE remain weak.  3) History of HTN  4) Anemia - stable.    Very stable pulmonary wise but no neuro improvement ; will d/c daily  studies.      Bird Vega

## 2017-05-05 NOTE — PROGRESS NOTES
LTAC--call from Alyssa with [employer] at 3:32 that insurance has auth'd and contract approved. She has not yet reached family. Needed to know if patient will come this afternoon or Monday (5/8). DENIS contacted Dr Carpenter who informs due to blood pressure fluctuations today and BUN results, will not plan to discharge today. If stable will plan for Monday morning. DENIS called [employer], spoke with Za who will relay message to Alyssa. DENIS left voice mail for patient aunt Agueda Matute with progress. DENIS then received call from Rona with [facility] insurance confirming auth. Auth good thru Monday. She will need notification by 3 pm on Monday if patient discharges or of not stable for discharge.   DENIS spoke with UR REBECCA Mcgrath regarding plan. DENIS updated patient REBECCA Elliott and Charge REBECCA Camacho.

## 2017-05-05 NOTE — ASSESSMENT & PLAN NOTE
Hyperglycemia worsened with steroids as anticipated, titrate insulin   Levemir 20 units Q hs SSI  HgA1c 7.3%.  No apparent history of DM prior to admission.  Off steroid,improved wit SQ insulin.

## 2017-05-05 NOTE — PLAN OF CARE
Problem: Patient Care Overview  Goal: Plan of Care Review  Outcome: Ongoing (interventions implemented as appropriate)  Still remains on vent setting SIMV, FIO2-40%, R-12, TV-400, Peep-5, PS-10. On Propofol gtt @ 35 mcg, Levophed gtt @ 0.1mcg, and prn morphine for comfort. Unable to wean off sedation, gets increasingly agitated.  Tube feeding changed to peptamen bariatric, now @ 30cc/hr (goal-45cc/hr) with 150cc  free water flushes q 4 hrs.  Carter draining tea colored cloudy urine. Minimal stool from rectal tube. Still on tevin soft wrist restraints for trying to pull at ETT. Remained free from falls or injuries. Turned q 2 hrs. VSS. ROMIE Garcia RN

## 2017-05-05 NOTE — PROGRESS NOTES
SBT done, patient failed at this time due to apnea.  Patient placed back on SIMV 8/400/+5/10ps/40%

## 2017-05-05 NOTE — CONSULTS
Consult Note  Infectious Disease    Consult Requested By: Antonieta Carpenter MD    Reason for Consult: does she have hsv encephalitis?    SUBJECTIVE:     History of Present Illness:  Patient is a 46 y.o. female presents with hypercapneic respiratory failure. She has had episodes of hypercapneic respiratory failure in 2014 as well. She was deemed to have a pneumonia and received antibiotics as well as antibiotics for a vse uti(e.faecium) has had trouble weaning form the ventilator and has a tracheostomy. Neurology has identified ul paresis and ct of the head has confirmed bilateral temporal lobe attenuation that was seen in 2014 as well. An mri confirms the same. The patient is now more awake when off sedation per nursing. She has received empirical steroids and ivig in case this was an encephalitis. The specter of hsv encephalitis was raised based on imaging. She has not had cognitive deficits coming in to be admitted and is agitated now when sedation is stopped.  She also has a left breast mass felt to be a hematoma.    Past Medical History:   Diagnosis Date    CHF (congestive heart failure)     Learning difficulty      Past Surgical History:   Procedure Laterality Date    BREAST SURGERY Left     biopsy    CYST REMOVAL      shoulder      Family History   Problem Relation Age of Onset    Diabetes      Coronary artery disease       Social History   Substance Use Topics    Smoking status: Never Smoker    Smokeless tobacco: Never Used    Alcohol use Yes      Comment: OCCASSIONAL       Review of patient's allergies indicates:  No Known Allergies     Antibiotics     None          Review of Systems:  Review of systems not obtained due to patient factors intubated.    OBJECTIVE:     Vital Signs (Most Recent)  Temp: 97.9 °F (36.6 °C) (05/05/17 0315)  Pulse: 74 (05/05/17 1145)  Resp: (!) 9 (05/05/17 1145)  BP: 114/65 (05/05/17 0915)  SpO2: 100 % (05/05/17 1145)    Temperature Range Min/Max (Last 24H):  Temp:   [97.7 °F (36.5 °C)-98.2 °F (36.8 °C)]     Physical Exam:  General: well developed, well nourished  Eyes: conjunctivae/corneas clear. PERRL..  HENT: Head:normocephalic, atraumatic. Ears:bilateral TM's and external ear canals normal. Nose: Nares normal. Septum midline. Mucosa normal. No drainage or sinus tenderness., no discharge. Throat: lips, mucosa, and tongue normal; teeth and gums normal and no throat erythema.  Neck: supple, symmetrical, trachea midline, no JVD and thyroid not enlarged, symmetric, no tenderness/mass/nodules  Lungs:  clear to auscultation bilaterally and normal respiratory effort  Cardiovascular: Heart: regular rate and rhythm, S1, S2 normal, no murmur, click, rub or gallop. Chest Wall: no tenderness. Extremities: no cyanosis or edema, or clubbing. Pulses: 2+ and symmetric.  Abdomen/Rectal: Abdomen: soft, non-tender non-distented; bowel sounds normal; no masses,  no organomegaly. Rectal: not examined  Skin: Skin color, texture, turgor normal. No rashes or lesions  Musculoskeletal:no clubbing, cyanosis  Lymph Nodes: No cervical or supraclavicular adenopathy  Neurologic: Mental status: intubated and sedated. ul restrained as she is pulling at et tube etc which speaks against an ul paresis    Laboratory:  CBC  No results for input(s): WBC, RBC, HGB, HCT, PLT in the last 24 hours.  [facility]    Recent Labs  Lab 05/05/17  0143   CO2 36*   BUN 66*   CREATININE 1.1   CALCIUM 11.5*     No results for input(s): COLORU, CLARITYU, SPECGRAV, PHUR, PROTEINUA, GLUCOSEU, BILIRUBINCON, BLOODU, WBCU, RBCU, BACTERIA, MUCUS, NITRITE, LEUKOCYTESUR, UROBILINOGEN, HYALINECASTS in the last 168 hours.  Microbiology Results (last 7 days)     Procedure Component Value Units Date/Time    Blood culture [[id number]] Collected:  05/03/17 1019    Order Status:  Completed Specimen:  Blood Updated:  05/05/17 1103     Blood Culture, Routine No Growth to date     Blood Culture, Routine No Growth to date     Blood Culture, Routine No  Growth to date    Blood culture [[id number]] Collected:  05/03/17 1000    Order Status:  Completed Specimen:  Blood Updated:  05/05/17 1103     Blood Culture, Routine No Growth to date     Blood Culture, Routine No Growth to date     Blood Culture, Routine No Growth to date          Diagnostic Results:  Labs: Reviewed  X-Ray: Reviewed  CT: Reviewed  MRI: Reviewed    ASSESSMENT/PLAN:     Active Hospital Problems    Diagnosis  POA    *Acute respiratory failure with hypercapnia [J96.02]  Yes    Upper extremity weakness [R29.898]  Yes    Hyperglycemia [R73.9]  No    Counseling regarding advanced directives and goals of care [Z71.89]  Not Applicable    Hypokalemia [E87.6]  Yes    Breast mass, left [N63]  Yes    Pneumonia of both lungs due to infectious organism [J18.9]  Yes    Shock circulatory [R57.9]  No    Chronic diastolic heart failure [I50.32]  Yes     Chronic    Moderate protein malnutrition [E44.0]  Yes     Chronic    Mass of breast, left [N63]  Yes      Resolved Hospital Problems    Diagnosis Date Resolved POA    Sepsis [A41.9] 04/24/2017 Yes       1. i see no evidence of hsv encephalitis in that this ct abnormality was present in 2014 and patient is more alert when sedation stopped  Sounds like this is a chronic process and not acute  2. hypercapneic respiratory failure may be related to cns process, now alkalotic with ph of 7.55 and bicarb of 36  Defer diuretics, ? contraction alkalosis with rising bun  She does move all 4 limbs but is not following oral commands  Diarrhea and c diff negative 4/14/17  Blood cultures x2 negative so far   at high risk of nosocomial infections  as long as she needs iv access, is vent dependant and is bedbound

## 2017-05-05 NOTE — ASSESSMENT & PLAN NOTE
Mechanical ventilation  Weakness  Metabolic encephalopathy  Fever  Assessment and Plan:  Pt with respiratory failure that required intubation, possibly secondary to pneumonia vs edema, treated empirically with Rocephin/Azithromycin, ECHO with diastolic dysfunction but normal EF, and Pulm following for vent management. Family reported UE weakness for a few months prior to this admission and Neurology consulted, possible MG and patient undergoing trial of prednisone and pyridostigmine. This weakness likely limited extubation and necessitated reintubation; patient was extubated on 4/16 but had to be re-intubated that same day. Unclear source of encephalopathy and case discussed with Neurology, CT scan of the head was unrevealing. Also, patient completed treatment with Azithromycin and was on Rocephin alone, but she spiked a fever overnight and antibiotics changed to Zosyn/Zyvox.   Trach on 4/19 without complications.  Stopped all ABx's.  Remains on vent per trach.  Having trouble clearing secretions.  Added Glycopyrrolate.   SW consulted for LTAC.  On SIMV  at this time.  Prn IV lasix.

## 2017-05-05 NOTE — PROGRESS NOTES
[facility] Hospital Medicine  Progress Note    Patient Name: Radha Pritchett  MRN: [id number]  Patient Class: IP- Inpatient   Admission Date: 4/6/2017  Length of Stay: 29 days  Attending Physician: Antonieta Carpenter MD  Primary Care Provider: Ferny Iglesias MD        Subjective:     Principal Problem:Acute respiratory failure with hypercapnia    HPI:  Ms. Radha Pritchett is a 46 y.o. female with essential hypertension, chronic diastolic heart failure (LVEF 55-60% Dec 2014), moderate protein malnutrition who presents to [facility] ED with complaints of dyspnea today.  She was scheduled to undergo removal of a breast mass today but was found to be hypoxic.  She does report that she has been short of breath but cannot specify how long.  She has also had some non-productive coughing and some chills without fevers.  Further history is otherwise limited at this time.    Hospital Course:   was admitted with pneumonia and acutely decompensated and was intubated and placed on vent. Pt with UE weakness prior to presentation on this admission which may be contributing to inability to extubate. Neurology following and considering possible MG and patient on therapeutic trial of prednisone and Modafinil. Pt s/p bronch that was unrevealing on 4/11. Pt was extubated on 4/15 but was re-intubated later the same day.  Was febrile and stared on Zyvox and zosyn.  CXR shows possible infiltrate. Urine culture +enterococcus.  Completed course and antibiotics dc'd. Blood culture negative  Trach on 4/19 without complications.      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 Started on acyclovir but dc'd  After review of previous CT imaging showed temporal abnormalities.    ID does not think it is HSV encephalitis.  Brief drop in SBP and IVF were given, now dc'd and was on lasix IV BID ,ofcv and on on levophed with changes to resp status and back on ventilator.    Pt does not tolerate being  off sedation, weaning difficult. IV steroids dc'd. Started insulin drip overnight and now off. Hyperglycemia with SQ insulin improved.     MRI c-spine 5/2/2017 show no lesions.MG Ab is negative. Repeat left breast ultrasound show same size of mass,  Need off continues sedation and  pressure support to qualify for LTAC placement.prn sedation are OK.  On prn IV lasix for fluid overload.  Updated the aunt Mylene leonard.      Interval History: pt is off and on  Sedation,and  on levophed,at this time.    Review of Systems   Unable to perform ROS: Intubated     Objective:     Vital Signs (Most Recent):  Temp: 97.9 °F (36.6 °C) (05/05/17 0315)  Pulse: 73 (05/05/17 0757)  Resp: 18 (05/05/17 0757)  BP: 114/74 (05/05/17 0600)  SpO2: 100 % (05/05/17 0757) Vital Signs (24h Range):  Temp:  [97.7 °F (36.5 °C)-98.2 °F (36.8 °C)] 97.9 °F (36.6 °C)  Pulse:  [] 73  Resp:  [12-30] 18  SpO2:  [99 %-100 %] 100 %  BP: ()/(49-92) 114/74     Weight: 76.2 kg (167 lb 15.9 oz)  Body mass index is 47.24 kg/(m^2).    Intake/Output Summary (Last 24 hours) at 05/05/17 0838  Last data filed at 05/05/17 0700   Gross per 24 hour   Intake           1926.8 ml   Output             1675 ml   Net            251.8 ml      Physical Exam   Constitutional: She appears well-developed. No distress.   Eyes: Conjunctivae are normal. Right eye exhibits no discharge. Left eye exhibits no discharge.   Neck:   Trach in place   Cardiovascular: Normal rate, regular rhythm and normal heart sounds.    Pulmonary/Chest: No stridor. No respiratory distress. She has no wheezes.   Mechanically ventilated per trach.     Abdominal: Soft. Bowel sounds are normal.   Musculoskeletal: She exhibits no deformity.   Neurological:   Very restless.  Poorly responsive to commands.   Skin: Skin is warm and dry.       Significant Labs:   BMP:     Recent Labs  Lab 05/05/17  0143   *      K 4.0   CL 94*   CO2 36*   BUN 66*   CREATININE 1.1   CALCIUM 11.5*     CBC:    No results for input(s): WBC, HGB, HCT, PLT in the last 48 hours.  POCT Glucose:     Recent Labs  Lab 05/04/17 2023 05/04/17  2321 05/05/17  0614   POCTGLUCOSE 233* 224* 272*       Significant Imaging: I have reviewed all pertinent imaging results/findings within the past 24 hours.    Assessment/Plan:      * Acute respiratory failure with hypercapnia  Mechanical ventilation  Weakness  Metabolic encephalopathy  Fever  Assessment and Plan:  Pt with respiratory failure that required intubation, possibly secondary to pneumonia vs edema, treated empirically with Rocephin/Azithromycin, ECHO with diastolic dysfunction but normal EF, and Pulm following for vent management. Family reported UE weakness for a few months prior to this admission and Neurology consulted, possible MG and patient undergoing trial of prednisone and pyridostigmine. This weakness likely limited extubation and necessitated reintubation; patient was extubated on 4/16 but had to be re-intubated that same day. Unclear source of encephalopathy and case discussed with Neurology, CT scan of the head was unrevealing. Also, patient completed treatment with Azithromycin and was on Rocephin alone, but she spiked a fever overnight and antibiotics changed to Zosyn/Zyvox.   Trach on 4/19 without complications.  Stopped all ABx's.  Remains on vent per trach.  Having trouble clearing secretions.  Added Glycopyrrolate.   SW consulted for LTAC.  On SIMV  at this time.  Prn IV lasix.      Mass of breast, left  Follow up at later date   Repeat left breast ultrasound,show same size  finding breast mass.      Pneumonia of both lungs due to infectious organism  Probably bacterial, but unable to determine.  The patient has a CURB-65 score of 0, and does not meet criteria for healthcare-associated pneumonia initially. CTA chest with bilateral airspace opacification concerning for pneumonia.  Oxygen saturations are improved but requiring a non-rebreather mask; then BIPAP  and s/p intubation.  Blood culture NGTD.  Pt completed full course of Azithromycin, and was continued Rocephin until had spike of fever, Abx has been expanded  to Zosyn/Zyvox.  Completed course and Abx dc'd.      Shock circulatory  Coreg held with normal-low BP  IVF held, edema on CXR and lasix given  levophed if needed to maintain MAP >65    Chronic diastolic heart failure  Patient doesn't appear to be in acute heart failure; checked ECHO with EF=65% but with diastolic dysfunction.  Lasix given     Moderate protein malnutrition  Resume tube feedings      Breast mass, left  Planned for surgery, seen by Dr. Sandoval (his patient) and plan to reschedule as outpatient. It is reportedly a hematoma by biopsy, but suspicion for CA not completely ruled out given paraneoplastic syndrome a consideration.      Hypokalemia  Replace as necessary.      Counseling regarding advanced directives and goals of care  Extensive conversations done with family. Pt to remain full code. Pt is still  but estranged from the  and reportedly the grandmother is the major decision maker,but she is also hospitalized,spoke with aunt lydia leonard,she say she is right now decision maker,updated her.      Hyperglycemia  Hyperglycemia worsened with steroids as anticipated, titrate insulin   Levemir 20 units Q hs SSI  HgA1c 7.3%.  No apparent history of DM prior to admission.  Off steroid,improved wit SQ insulin.      Upper extremity weakness  Bilateral UE weakness with unknown etiology  IV steroids dc'd  D/w neurology - trial acyclovir started then stopped after review of previous head CT that showed chronic findings  MRI c/spine show no lesions.  unremarkable MG antibodies.      VTE Risk Mitigation         Ordered     enoxaparin injection 40 mg  Daily     Route:  Subcutaneous        05/01/17 1520     Medium Risk of VTE  Once      04/06/17 2202          Antonieta Carpenter MD  Department of Hospital Medicine   [facility]

## 2017-05-05 NOTE — PROGRESS NOTES
LTAC--call from Licha from [facility] on 5/4/17 at 2:30 pm.  She reports now wants updated from the original referral, can send in am (today). SW to fax to [phone] as having difficulty with [facility]. [facility]IS now faxing last progress notes from primary, ID, neurology, R/T, overview page (with meds, vitals, lines), 72 hour hr labs, 7 day radiology. As this is a network facility that has just responded after multiple attempts, DENIS will continue to communicate even though patient is accepted at [facility] pending auth.  10:15 [facility] LT[facility] follow up--Alyssa [phone] reports that has been in communication with  K[facility] and is following up again today. DENIS confirmed that agency has all relevant family phone numbers. Awaiting auth.   11:20 fax to [facility] did not go thru. DENIS confirmed correct number attempted. Will try again now.

## 2017-05-06 LAB
ANION GAP SERPL CALC-SCNC: 9 MMOL/L
BUN SERPL-MCNC: 53 MG/DL
CALCIUM SERPL-MCNC: 11.5 MG/DL
CHLORIDE SERPL-SCNC: 93 MMOL/L
CO2 SERPL-SCNC: 36 MMOL/L
CREAT SERPL-MCNC: 1 MG/DL
EST. GFR  (AFRICAN AMERICAN): >60 ML/MIN/1.73 M^2
EST. GFR  (NON AFRICAN AMERICAN): >60 ML/MIN/1.73 M^2
GLUCOSE SERPL-MCNC: 208 MG/DL
POCT GLUCOSE: 240 MG/DL (ref 70–110)
POCT GLUCOSE: 244 MG/DL (ref 70–110)
POCT GLUCOSE: 248 MG/DL (ref 70–110)
POCT GLUCOSE: 248 MG/DL (ref 70–110)
POCT GLUCOSE: 317 MG/DL (ref 70–110)
POTASSIUM SERPL-SCNC: 3.7 MMOL/L
SODIUM SERPL-SCNC: 138 MMOL/L

## 2017-05-06 PROCEDURE — 20000000 HC ICU ROOM

## 2017-05-06 PROCEDURE — 63600175 PHARM REV CODE 636 W HCPCS: Performed by: INTERNAL MEDICINE

## 2017-05-06 PROCEDURE — 25000242 PHARM REV CODE 250 ALT 637 W/ HCPCS: Performed by: INTERNAL MEDICINE

## 2017-05-06 PROCEDURE — 89220 SPUTUM SPECIMEN COLLECTION: CPT

## 2017-05-06 PROCEDURE — 25000003 PHARM REV CODE 250: Performed by: PSYCHIATRY & NEUROLOGY

## 2017-05-06 PROCEDURE — 94003 VENT MGMT INPAT SUBQ DAY: CPT

## 2017-05-06 PROCEDURE — 63600175 PHARM REV CODE 636 W HCPCS: Performed by: HOSPITALIST

## 2017-05-06 PROCEDURE — 94640 AIRWAY INHALATION TREATMENT: CPT

## 2017-05-06 PROCEDURE — 94761 N-INVAS EAR/PLS OXIMETRY MLT: CPT

## 2017-05-06 PROCEDURE — 25000003 PHARM REV CODE 250: Performed by: INTERNAL MEDICINE

## 2017-05-06 PROCEDURE — 27000221 HC OXYGEN, UP TO 24 HOURS

## 2017-05-06 PROCEDURE — C9113 INJ PANTOPRAZOLE SODIUM, VIA: HCPCS | Performed by: HOSPITALIST

## 2017-05-06 PROCEDURE — 99900026 HC AIRWAY MAINTENANCE (STAT)

## 2017-05-06 PROCEDURE — 80048 BASIC METABOLIC PNL TOTAL CA: CPT

## 2017-05-06 PROCEDURE — 36415 COLL VENOUS BLD VENIPUNCTURE: CPT

## 2017-05-06 PROCEDURE — 25000003 PHARM REV CODE 250: Performed by: HOSPITALIST

## 2017-05-06 RX ORDER — MIDODRINE HYDROCHLORIDE 2.5 MG/1
2.5 TABLET ORAL 3 TIMES DAILY
Status: DISCONTINUED | OUTPATIENT
Start: 2017-05-06 | End: 2017-05-07

## 2017-05-06 RX ADMIN — LORAZEPAM 1 MG: 2 INJECTION INTRAMUSCULAR; INTRAVENOUS at 05:05

## 2017-05-06 RX ADMIN — INSULIN ASPART 4 UNITS: 100 INJECTION, SOLUTION INTRAVENOUS; SUBCUTANEOUS at 04:05

## 2017-05-06 RX ADMIN — MORPHINE SULFATE 2 MG: 10 INJECTION INTRAVENOUS at 03:05

## 2017-05-06 RX ADMIN — INSULIN ASPART 4 UNITS: 100 INJECTION, SOLUTION INTRAVENOUS; SUBCUTANEOUS at 05:05

## 2017-05-06 RX ADMIN — IPRATROPIUM BROMIDE AND ALBUTEROL SULFATE 3 ML: .5; 3 SOLUTION RESPIRATORY (INHALATION) at 07:05

## 2017-05-06 RX ADMIN — GLYCOPYRROLATE 0.1 MG: 0.2 INJECTION, SOLUTION INTRAMUSCULAR; INTRAVENOUS at 09:05

## 2017-05-06 RX ADMIN — CHLORHEXIDINE GLUCONATE 15 ML: 1.2 RINSE ORAL at 08:05

## 2017-05-06 RX ADMIN — PROPOFOL 25 MCG/KG/MIN: 10 INJECTION, EMULSION INTRAVENOUS at 03:05

## 2017-05-06 RX ADMIN — MIDODRINE HYDROCHLORIDE 2.5 MG: 2.5 TABLET ORAL at 09:05

## 2017-05-06 RX ADMIN — PANTOPRAZOLE SODIUM 40 MG: 40 INJECTION, POWDER, FOR SOLUTION INTRAVENOUS at 08:05

## 2017-05-06 RX ADMIN — ENOXAPARIN SODIUM 40 MG: 100 INJECTION SUBCUTANEOUS at 04:05

## 2017-05-06 RX ADMIN — INSULIN ASPART 8 UNITS: 100 INJECTION, SOLUTION INTRAVENOUS; SUBCUTANEOUS at 11:05

## 2017-05-06 RX ADMIN — LORAZEPAM 1 MG: 2 INJECTION INTRAMUSCULAR; INTRAVENOUS at 11:05

## 2017-05-06 RX ADMIN — INSULIN ASPART 2 UNITS: 100 INJECTION, SOLUTION INTRAVENOUS; SUBCUTANEOUS at 12:05

## 2017-05-06 RX ADMIN — MORPHINE SULFATE 2 MG: 10 INJECTION INTRAVENOUS at 07:05

## 2017-05-06 RX ADMIN — QUETIAPINE FUMARATE 25 MG: 25 TABLET, FILM COATED ORAL at 08:05

## 2017-05-06 RX ADMIN — INSULIN ASPART 2 UNITS: 100 INJECTION, SOLUTION INTRAVENOUS; SUBCUTANEOUS at 11:05

## 2017-05-06 RX ADMIN — IPRATROPIUM BROMIDE AND ALBUTEROL SULFATE 3 ML: .5; 3 SOLUTION RESPIRATORY (INHALATION) at 01:05

## 2017-05-06 RX ADMIN — MORPHINE SULFATE 2 MG: 10 INJECTION INTRAVENOUS at 11:05

## 2017-05-06 RX ADMIN — MIDODRINE HYDROCHLORIDE 2.5 MG: 2.5 TABLET ORAL at 01:05

## 2017-05-06 RX ADMIN — CHLORHEXIDINE GLUCONATE 15 ML: 1.2 RINSE ORAL at 09:05

## 2017-05-06 RX ADMIN — PROPOFOL 15 MCG/KG/MIN: 10 INJECTION, EMULSION INTRAVENOUS at 03:05

## 2017-05-06 RX ADMIN — LORAZEPAM 1 MG: 2 INJECTION INTRAMUSCULAR; INTRAVENOUS at 12:05

## 2017-05-06 RX ADMIN — GLYCOPYRROLATE 0.1 MG: 0.2 INJECTION, SOLUTION INTRAMUSCULAR; INTRAVENOUS at 03:05

## 2017-05-06 RX ADMIN — LORAZEPAM 1 MG: 2 INJECTION INTRAMUSCULAR; INTRAVENOUS at 06:05

## 2017-05-06 RX ADMIN — Medication 0.04 MCG/KG/MIN: at 04:05

## 2017-05-06 RX ADMIN — LORAZEPAM 1 MG: 2 INJECTION INTRAMUSCULAR; INTRAVENOUS at 09:05

## 2017-05-06 RX ADMIN — QUETIAPINE FUMARATE 25 MG: 25 TABLET, FILM COATED ORAL at 09:05

## 2017-05-06 NOTE — PROGRESS NOTES
[facility] Hospital Medicine  Progress Note    Patient Name: Radha Pritchett  MRN: [id number]  Patient Class: IP- Inpatient   Admission Date: 4/6/2017  Length of Stay: 30 days  Attending Physician: Ashley Zazueta MD  Primary Care Provider: Ferny Iglesias MD        Subjective:     Principal Problem:Acute respiratory failure with hypercapnia    HPI:  Ms. Radha Pritchett is a 46 y.o. female with essential hypertension, chronic diastolic heart failure (LVEF 55-60% Dec 2014), moderate protein malnutrition who presents to [facility] ED with complaints of dyspnea today.  She was scheduled to undergo removal of a breast mass today but was found to be hypoxic.  She does report that she has been short of breath but cannot specify how long.  She has also had some non-productive coughing and some chills without fevers.  Further history is otherwise limited at this time.    Hospital Course:   was admitted with pneumonia and acutely decompensated and was intubated and placed on vent. Pt with UE weakness prior to presentation on this admission which may be contributing to inability to extubate. Neurology following and considering possible MG and patient on therapeutic trial of prednisone and Modafinil. Pt s/p bronch that was unrevealing on 4/11. Pt was extubated on 4/15 but was re-intubated later the same day.  Was febrile and stared on Zyvox and zosyn.  CXR shows possible infiltrate. Urine culture +enterococcus.  Completed course and antibiotics dc'd. Blood culture negative  Trach on 4/19 without complications.      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 Started on acyclovir but dc'd  After review of previous CT imaging showed temporal abnormalities.    ID does not think it is HSV encephalitis.  Brief drop in SBP and IVF were given, now dc'd and was on lasix IV BID ,ofcv and on on levophed with changes to resp status and back on ventilator.    Pt does not tolerate being off  sedation, weaning difficult. IV steroids dc'd. Started insulin drip overnight and now off. Hyperglycemia with SQ insulin improved.     MRI c-spine 5/2/2017 show no lesions.MG Ab is negative. Repeat left breast ultrasound show same size of mass,  Need off continues sedation and  pressure support to qualify for LTAC placement.prn sedation are OK.  On prn IV lasix for fluid overload.  Updated the aunt Mylene leonard.    PT accepted and LTAC authorized - plan for transfer Monday       Interval History: pt is off lasix with improved BUN, requiring very little levophed    Review of Systems   Unable to perform ROS: Intubated   Endocrine: Positive for heat intolerance.     Objective:     Vital Signs (Most Recent):  Temp: 97.8 °F (36.6 °C) (05/06/17 0730)  Pulse: 77 (05/06/17 1012)  Resp: 17 (05/06/17 1012)  BP: 92/60 (05/06/17 1000)  SpO2: 98 % (05/06/17 1012) Vital Signs (24h Range):  Temp:  [97.8 °F (36.6 °C)-99.3 °F (37.4 °C)] 97.8 °F (36.6 °C)  Pulse:  [] 77  Resp:  [8-36] 17  SpO2:  [98 %-100 %] 98 %  BP: ()/(44-92) 92/60     Weight: 76.2 kg (167 lb 15.9 oz)  Body mass index is 47.24 kg/(m^2).    Intake/Output Summary (Last 24 hours) at 05/06/17 1049  Last data filed at 05/06/17 1000   Gross per 24 hour   Intake          2086.79 ml   Output             1690 ml   Net           396.79 ml      Physical Exam   Constitutional: She appears well-developed. No distress.   Eyes: Conjunctivae are normal. Right eye exhibits no discharge. Left eye exhibits no discharge.   Neck:   Trach in place   Cardiovascular: Normal rate, regular rhythm and normal heart sounds.    Pulmonary/Chest: No stridor. No respiratory distress. She has no wheezes.   Mechanically ventilated per trach.     Abdominal: Soft. Bowel sounds are normal.   Musculoskeletal: She exhibits no deformity.   Neurological:   Very restless.  Poorly responsive to commands.   Skin: Skin is warm and dry.       Significant Labs:   BMP:   Recent Labs  Lab  05/06/17  0135   *      K 3.7   CL 93*   CO2 36*   BUN 53*   CREATININE 1.0   CALCIUM 11.5*     CBC: No results for input(s): WBC, HGB, HCT, PLT in the last 48 hours.  POCT Glucose:   Recent Labs  Lab 05/05/17  1821 05/06/17  0010 05/06/17  0549   POCTGLUCOSE 288* 244* 240*       Significant Imaging: I have reviewed all pertinent imaging results/findings within the past 24 hours.    Assessment/Plan:      * Acute respiratory failure with hypercapnia  Mechanical ventilation  Weakness  Metabolic encephalopathy  Fever  Assessment and Plan:  Pt with respiratory failure that required intubation, possibly secondary to pneumonia vs edema, treated empirically with Rocephin/Azithromycin, ECHO with diastolic dysfunction but normal EF, and Pulm following for vent management. Family reported UE weakness for a few months prior to this admission and Neurology consulted, possible MG and patient undergoing trial of prednisone and pyridostigmine. This weakness likely limited extubation and necessitated reintubation; patient was extubated on 4/16 but had to be re-intubated that same day. Unclear source of encephalopathy and case discussed with Neurology, CT scan of the head was unrevealing. Also, patient completed treatment with Azithromycin and was on Rocephin alone, but she spiked a fever overnight and antibiotics changed to Zosyn/Zyvox.   Trach on 4/19 without complications.  Stopped all ABx's.  Remains on vent per trach.  Having trouble clearing secretions.  Added Glycopyrrolate.   SW consulted for LTAC - possible transfer on Monday 5/8  On SIMV  at this time.  Prn IV lasix.      Mass of breast, left  Follow up at later date   Repeat left breast ultrasound,show same size  finding breast mass.      Pneumonia of both lungs due to infectious organism  Probably bacterial, but unable to determine.  The patient has a CURB-65 score of 0, and does not meet criteria for healthcare-associated pneumonia initially. CTA chest with  bilateral airspace opacification concerning for pneumonia.  Oxygen saturations are improved but requiring a non-rebreather mask; then BIPAP and s/p intubation.  Blood culture NGTD.  Pt completed full course of Azithromycin, and was continued Rocephin until had spike of fever, Abx has been expanded  to Zosyn/Zyvox.  Completed course and Abx dc'd.      Shock circulatory  Coreg held with normal-low BP  IVF held, edema on CXR and lasix given - now improved  levophed if needed to maintain MAP >65    Chronic diastolic heart failure  Patient doesn't appear to be in acute heart failure; checked ECHO with EF=65% but with diastolic dysfunction.  Lasix held    Moderate protein malnutrition  Resume tube feedings      Breast mass, left  Planned for surgery, seen by Dr. Sandoval (his patient) and plan to reschedule as outpatient. It is reportedly a hematoma by biopsy, but suspicion for CA not completely ruled out given paraneoplastic syndrome a consideration.      Hypokalemia  Replace as necessary.      Counseling regarding advanced directives and goals of care  Extensive conversations done with family. Pt to remain full code. Pt is still  but estranged from the  and reportedly the grandmother is the major decision maker,but she is also hospitalized,spoke with aunt lydia leonard,she say she is right now decision maker,updated her.      Hyperglycemia  Hyperglycemia worsened with steroids as anticipated, titrate insulin   Levemir 25units Q hs SSI  HgA1c 7.3%.  No apparent history of DM prior to admission.  Off steroid,improved with SQ insulin.      Upper extremity weakness  Bilateral UE weakness with unknown etiology  IV steroids dc'd  D/w neurology - trial acyclovir started then stopped after review of previous head CT that showed chronic findings  MRI c/spine show no lesions.  unremarkable MG antibodies.      VTE Risk Mitigation         Ordered     enoxaparin injection 40 mg  Daily     Route:  Subcutaneous         05/01/17 1520     Medium Risk of VTE  Once      04/06/17 2202          Ashley Zazueta MD  Department of Hospital Medicine   [facility]

## 2017-05-06 NOTE — ASSESSMENT & PLAN NOTE
Mechanical ventilation  Weakness  Metabolic encephalopathy  Fever  Assessment and Plan:  Pt with respiratory failure that required intubation, possibly secondary to pneumonia vs edema, treated empirically with Rocephin/Azithromycin, ECHO with diastolic dysfunction but normal EF, and Pulm following for vent management. Family reported UE weakness for a few months prior to this admission and Neurology consulted, possible MG and patient undergoing trial of prednisone and pyridostigmine. This weakness likely limited extubation and necessitated reintubation; patient was extubated on 4/16 but had to be re-intubated that same day. Unclear source of encephalopathy and case discussed with Neurology, CT scan of the head was unrevealing. Also, patient completed treatment with Azithromycin and was on Rocephin alone, but she spiked a fever overnight and antibiotics changed to Zosyn/Zyvox.   Trach on 4/19 without complications.  Stopped all ABx's.  Remains on vent per trach.  Having trouble clearing secretions.  Added Glycopyrrolate.   SW consulted for LTAC - possible transfer on Monday 5/8  On SIMV  at this time.  Prn IV lasix.

## 2017-05-06 NOTE — PLAN OF CARE
Problem: Patient Care Overview  Goal: Plan of Care Review  Outcome: Ongoing (interventions implemented as appropriate)  Plan of care formulated and reviewed with patient. Patient unable to participate in care. Neuro is responsive only to noxious stimuli. vss stable with pressor. Unable to wean significantly over shift. Propofol in use. Was weaned for a rass of -2. uo was adequate. Patient tolerated TF and was increased over shift. Heel protective boots were placed for patient safety and skin integrity. Unable to wean vent further patient had cheyne-morgan like respirations with periods of apnea when sedation was on hold. No family was present during shift.

## 2017-05-06 NOTE — ASSESSMENT & PLAN NOTE
Coreg held with normal-low BP  IVF held, edema on CXR and lasix given - now improved  levophed if needed to maintain MAP >65

## 2017-05-06 NOTE — ASSESSMENT & PLAN NOTE
Hyperglycemia worsened with steroids as anticipated, titrate insulin   Levemir 25units Q hs SSI  HgA1c 7.3%.  No apparent history of DM prior to admission.  Off steroid,improved with SQ insulin.

## 2017-05-06 NOTE — PROGRESS NOTES
Progress Note  Pulmonology    Admit Date: 4/6/2017   LOS: 30 days     SUBJECTIVE: resp distress     Follow-up For:  Vent management. Patient on vent via trach. Off sedation. No change   Continuous Infusions:   fentanyl Stopped (05/02/17 0800)    norepinephrine bitartrate-D5W Stopped (05/06/17 0630)    propofol Stopped (05/06/17 0530)     Scheduled Meds:   albuterol-ipratropium 2.5mg-0.5mg/3mL  3 mL Nebulization Q6H    carvedilol  3.125 mg Oral BID    chlorhexidine  15 mL Mouth/Throat BID    enoxaparin  40 mg Subcutaneous Daily    insulin detemir  20 Units Subcutaneous QHS    pantoprazole  40 mg Intravenous Daily    quetiapine  25 mg Oral BID    scopolamine  1 patch Transdermal Q3 Days     Review of Systems:  Review of systems not obtained due to patient agitated and not very responsive.    OBJECTIVE:     Vital Signs Range (Last 24H):  Temp:  [97.9 °F (36.6 °C)-99.3 °F (37.4 °C)]   Pulse:  []   Resp:  [8-39]   BP: ()/(50-92)   SpO2:  [98 %-100 %]     I & O (Last 24H):    Intake/Output Summary (Last 24 hours) at 05/06/17 0744  Last data filed at 05/06/17 0600   Gross per 24 hour   Intake          2222.27 ml   Output             1650 ml   Net           572.27 ml     Physical Exam:  General: no distress. No distress but agitated.   Neck: no carotid bruit- trach   Breast: left breast mass - hard, non-mobile  Lungs:  Shallow, decreased bs bilaterally rhonchi. No wheezing.   Heart: regular rate and rhythm and no murmur  Abdomen: soft, non-tender non-distended; bowel sounds diminished and right nasal feeding tube and rectal tube.  Extremities: no cyanosis  or clubbing but has pedal edema and SCDs to LE. RUE picc line.  Neurologic: minimally responsive    Laboratory:  CBC:     Recent Labs  Lab 05/01/17  0345   WBC 4.53   RBC 4.26   HGB 10.9*   HCT 35.1*      MCV 82   MCH 25.6*   MCHC 31.1*     BMP:     Recent Labs  Lab 05/06/17  0135   *      K 3.7   CL 93*   CO2 36*   BUN 53*    CREATININE 1.0   CALCIUM 11.5*        Vent Mode: SIMV (PRVC) + PS  Oxygen Concentration (%):  [40] 40  Resp Rate Total:  [7 br/min-43 br/min] 10 br/min  Vt Set:  [400 mL] 400 mL  PEEP/CPAP:  [5 cmH20] 5 cmH20  Pressure Support:  [10 cmH20] 10 cmH20  Mean Airway Pressure:  [6.3 wwJ36-92.4 cmH20] 9.5 cmH20    ABGs:     Recent Labs  Lab 05/05/17  0437   PH 7.558*   PCO2 40.7   PO2 87   HCO3 36.3*   POCSATURATED 98   BE 13     Microbiology Results (last 7 days)     Procedure Component Value Units Date/Time    Blood culture [[id number]] Collected:  05/03/17 1019    Order Status:  Completed Specimen:  Blood Updated:  05/05/17 1103     Blood Culture, Routine No Growth to date     Blood Culture, Routine No Growth to date     Blood Culture, Routine No Growth to date    Blood culture [[id number]] Collected:  05/03/17 1000    Order Status:  Completed Specimen:  Blood Updated:  05/05/17 1103     Blood Culture, Routine No Growth to date     Blood Culture, Routine No Growth to date     Blood Culture, Routine No Growth to date        Chest X-Ray:     No new CXR .      ASSESSMENT/PLAN:     1) Acute respiratory failure - presented with weakness of UE and pneumonia. Unable to wean off vent. S/p trach. Fair gas exchanged. No change   2) Weakness - poorly responsive - no improvement   3) History of HTN  4) Anemia - stable.          Perico Solis

## 2017-05-06 NOTE — SUBJECTIVE & OBJECTIVE
Interval History: pt is off lasix with improved BUN, requiring very little levophed    Review of Systems   Unable to perform ROS: Intubated   Endocrine: Positive for heat intolerance.     Objective:     Vital Signs (Most Recent):  Temp: 97.8 °F (36.6 °C) (05/06/17 0730)  Pulse: 77 (05/06/17 1012)  Resp: 17 (05/06/17 1012)  BP: 92/60 (05/06/17 1000)  SpO2: 98 % (05/06/17 1012) Vital Signs (24h Range):  Temp:  [97.8 °F (36.6 °C)-99.3 °F (37.4 °C)] 97.8 °F (36.6 °C)  Pulse:  [] 77  Resp:  [8-36] 17  SpO2:  [98 %-100 %] 98 %  BP: ()/(44-92) 92/60     Weight: 76.2 kg (167 lb 15.9 oz)  Body mass index is 47.24 kg/(m^2).    Intake/Output Summary (Last 24 hours) at 05/06/17 1049  Last data filed at 05/06/17 1000   Gross per 24 hour   Intake          2086.79 ml   Output             1690 ml   Net           396.79 ml      Physical Exam   Constitutional: She appears well-developed. No distress.   Eyes: Conjunctivae are normal. Right eye exhibits no discharge. Left eye exhibits no discharge.   Neck:   Trach in place   Cardiovascular: Normal rate, regular rhythm and normal heart sounds.    Pulmonary/Chest: No stridor. No respiratory distress. She has no wheezes.   Mechanically ventilated per trach.     Abdominal: Soft. Bowel sounds are normal.   Musculoskeletal: She exhibits no deformity.   Neurological:   Very restless.  Poorly responsive to commands.   Skin: Skin is warm and dry.       Significant Labs:   BMP:   Recent Labs  Lab 05/06/17  0135   *      K 3.7   CL 93*   CO2 36*   BUN 53*   CREATININE 1.0   CALCIUM 11.5*     CBC: No results for input(s): WBC, HGB, HCT, PLT in the last 48 hours.  POCT Glucose:   Recent Labs  Lab 05/05/17  1821 05/06/17  0010 05/06/17  0549   POCTGLUCOSE 288* 244* 240*       Significant Imaging: I have reviewed all pertinent imaging results/findings within the past 24 hours.

## 2017-05-06 NOTE — PROGRESS NOTES
Received patient on vent settings SIMV(PRVC)+/8/+5/FIO2 40% Trach size 8.0 Kiaraley secured, patent,and intact Ambu bag and mask at bed side no signs of any distress at this current time

## 2017-05-06 NOTE — PROGRESS NOTES
Received report from Marlene with patient on SimvPRVC 8  peep 5 Fio2 30% 8 shiley in tach and secured Ambu and mask at HOB

## 2017-05-06 NOTE — PLAN OF CARE
Problem: Patient Care Overview  Goal: Plan of Care Review  Outcome: Ongoing (interventions implemented as appropriate)  Patient remains in ICU overnight on ventilator via tracheostomy, inability to wean, vent settings remain SIMV FIO2-40%, Rate-8, TV-400, PEEP-5. Propofol was turned off at 0530. Ativan given 3 times. Morphine given once. Levophed turned off at 0630. Large amount of oral secretions, Glycopyrrolate given once. Patient still moves all 4 extremities spontaneously, but no purposeful movement. Does not follow commands, but withdraws to pain. Carter intact, 575 urine output. Flexiseal in place. Tube feeding running at goal rate of 50 mL/hr, tolerating well. Trach care done. No falls, injuries, or skin breakdown throughout shift. Fall and pressure ulcer precautions in place.

## 2017-05-07 PROBLEM — I95.9 HYPOTENSION: Status: ACTIVE | Noted: 2017-05-07

## 2017-05-07 LAB
ANION GAP SERPL CALC-SCNC: 10 MMOL/L
BUN SERPL-MCNC: 48 MG/DL
CALCIUM SERPL-MCNC: 11.2 MG/DL
CHLORIDE SERPL-SCNC: 94 MMOL/L
CO2 SERPL-SCNC: 33 MMOL/L
CREAT SERPL-MCNC: 0.9 MG/DL
EST. GFR  (AFRICAN AMERICAN): >60 ML/MIN/1.73 M^2
EST. GFR  (NON AFRICAN AMERICAN): >60 ML/MIN/1.73 M^2
GLUCOSE SERPL-MCNC: 264 MG/DL
POCT GLUCOSE: 246 MG/DL (ref 70–110)
POCT GLUCOSE: 250 MG/DL (ref 70–110)
POCT GLUCOSE: 291 MG/DL (ref 70–110)
POTASSIUM SERPL-SCNC: 3.7 MMOL/L
SODIUM SERPL-SCNC: 137 MMOL/L

## 2017-05-07 PROCEDURE — C9113 INJ PANTOPRAZOLE SODIUM, VIA: HCPCS | Performed by: HOSPITALIST

## 2017-05-07 PROCEDURE — 63600175 PHARM REV CODE 636 W HCPCS: Performed by: INTERNAL MEDICINE

## 2017-05-07 PROCEDURE — 63600175 PHARM REV CODE 636 W HCPCS: Performed by: HOSPITALIST

## 2017-05-07 PROCEDURE — 94640 AIRWAY INHALATION TREATMENT: CPT

## 2017-05-07 PROCEDURE — 27000221 HC OXYGEN, UP TO 24 HOURS

## 2017-05-07 PROCEDURE — 25000003 PHARM REV CODE 250: Performed by: HOSPITALIST

## 2017-05-07 PROCEDURE — 99900026 HC AIRWAY MAINTENANCE (STAT)

## 2017-05-07 PROCEDURE — 25000242 PHARM REV CODE 250 ALT 637 W/ HCPCS: Performed by: INTERNAL MEDICINE

## 2017-05-07 PROCEDURE — 94003 VENT MGMT INPAT SUBQ DAY: CPT

## 2017-05-07 PROCEDURE — 25000003 PHARM REV CODE 250: Performed by: INTERNAL MEDICINE

## 2017-05-07 PROCEDURE — 80048 BASIC METABOLIC PNL TOTAL CA: CPT

## 2017-05-07 PROCEDURE — 27200966 HC CLOSED SUCTION SYSTEM

## 2017-05-07 PROCEDURE — 36415 COLL VENOUS BLD VENIPUNCTURE: CPT

## 2017-05-07 PROCEDURE — 25000003 PHARM REV CODE 250: Performed by: PSYCHIATRY & NEUROLOGY

## 2017-05-07 PROCEDURE — 20000000 HC ICU ROOM

## 2017-05-07 PROCEDURE — 82533 TOTAL CORTISOL: CPT

## 2017-05-07 RX ORDER — MIDODRINE HYDROCHLORIDE 5 MG/1
10 TABLET ORAL 3 TIMES DAILY
Status: DISCONTINUED | OUTPATIENT
Start: 2017-05-07 | End: 2017-05-07

## 2017-05-07 RX ORDER — QUETIAPINE FUMARATE 25 MG/1
50 TABLET, FILM COATED ORAL 2 TIMES DAILY
Status: DISCONTINUED | OUTPATIENT
Start: 2017-05-07 | End: 2017-05-08

## 2017-05-07 RX ORDER — MIDODRINE HYDROCHLORIDE 5 MG/1
5 TABLET ORAL 3 TIMES DAILY
Status: DISCONTINUED | OUTPATIENT
Start: 2017-05-07 | End: 2017-05-08 | Stop reason: HOSPADM

## 2017-05-07 RX ADMIN — MIDODRINE HYDROCHLORIDE 2.5 MG: 2.5 TABLET ORAL at 05:05

## 2017-05-07 RX ADMIN — QUETIAPINE FUMARATE 50 MG: 25 TABLET, FILM COATED ORAL at 08:05

## 2017-05-07 RX ADMIN — PROPOFOL 15 MCG/KG/MIN: 10 INJECTION, EMULSION INTRAVENOUS at 02:05

## 2017-05-07 RX ADMIN — MIDODRINE HYDROCHLORIDE 5 MG: 5 TABLET ORAL at 09:05

## 2017-05-07 RX ADMIN — QUETIAPINE FUMARATE 25 MG: 25 TABLET, FILM COATED ORAL at 08:05

## 2017-05-07 RX ADMIN — PANTOPRAZOLE SODIUM 40 MG: 40 INJECTION, POWDER, FOR SOLUTION INTRAVENOUS at 08:05

## 2017-05-07 RX ADMIN — CHLORHEXIDINE GLUCONATE 15 ML: 1.2 RINSE ORAL at 08:05

## 2017-05-07 RX ADMIN — INSULIN ASPART 4 UNITS: 100 INJECTION, SOLUTION INTRAVENOUS; SUBCUTANEOUS at 06:05

## 2017-05-07 RX ADMIN — MORPHINE SULFATE 2 MG: 10 INJECTION INTRAVENOUS at 12:05

## 2017-05-07 RX ADMIN — IPRATROPIUM BROMIDE AND ALBUTEROL SULFATE 3 ML: .5; 3 SOLUTION RESPIRATORY (INHALATION) at 01:05

## 2017-05-07 RX ADMIN — INSULIN ASPART 6 UNITS: 100 INJECTION, SOLUTION INTRAVENOUS; SUBCUTANEOUS at 11:05

## 2017-05-07 RX ADMIN — IPRATROPIUM BROMIDE AND ALBUTEROL SULFATE 3 ML: .5; 3 SOLUTION RESPIRATORY (INHALATION) at 07:05

## 2017-05-07 RX ADMIN — MORPHINE SULFATE 2 MG: 10 INJECTION INTRAVENOUS at 08:05

## 2017-05-07 RX ADMIN — GLYCOPYRROLATE 0.1 MG: 0.2 INJECTION, SOLUTION INTRAMUSCULAR; INTRAVENOUS at 07:05

## 2017-05-07 RX ADMIN — SCOPOLAMINE 1.5 MG: 1 PATCH, EXTENDED RELEASE TRANSDERMAL at 08:05

## 2017-05-07 RX ADMIN — LORAZEPAM 1 MG: 2 INJECTION INTRAMUSCULAR; INTRAVENOUS at 05:05

## 2017-05-07 RX ADMIN — LORAZEPAM 1 MG: 2 INJECTION INTRAMUSCULAR; INTRAVENOUS at 07:05

## 2017-05-07 RX ADMIN — LORAZEPAM 1 MG: 2 INJECTION INTRAMUSCULAR; INTRAVENOUS at 12:05

## 2017-05-07 RX ADMIN — MIDODRINE HYDROCHLORIDE 2.5 MG: 2.5 TABLET ORAL at 01:05

## 2017-05-07 RX ADMIN — INSULIN ASPART 4 UNITS: 100 INJECTION, SOLUTION INTRAVENOUS; SUBCUTANEOUS at 05:05

## 2017-05-07 RX ADMIN — ENOXAPARIN SODIUM 40 MG: 100 INJECTION SUBCUTANEOUS at 04:05

## 2017-05-07 RX ADMIN — MORPHINE SULFATE 2 MG: 10 INJECTION INTRAVENOUS at 05:05

## 2017-05-07 NOTE — ASSESSMENT & PLAN NOTE
Patient doesn't appear to be in acute heart failure; checked ECHO with EF=65% but with diastolic dysfunction.  Lasix held

## 2017-05-07 NOTE — PLAN OF CARE
Problem: Patient Care Overview  Goal: Plan of Care Review  Outcome: Ongoing (interventions implemented as appropriate)  Patient remains in ICU overnight on ventilator via tracheostomy, inability to wean, vent settings remain SIMV FIO2-40%, Rate-8, TV-400, PEEP-5. Propofol was turned off at 0541. Ativan given once. Morphine given twice. Levophed turned off at 0341. Large amount of oral secretions, Glycopyrrolate given once, full relief obtained. Patient still moves all 4 extremities spontaneously, but no purposeful movement. Patient has been less restless throughout shift. Does not follow commands, but withdraws to pain. Carter intact, 1050 urine output. Flexiseal removed. Tube feeding running at goal rate of 50 mL/hr, no residuals. Trach care done. No falls, injuries, or skin breakdown throughout shift. Fall and pressure ulcer precautions in place.

## 2017-05-07 NOTE — PLAN OF CARE
Problem: Patient Care Overview  Goal: Plan of Care Review  Outcome: Ongoing (interventions implemented as appropriate)  Patient remains on ventilator via tracheostomy, unable to wean. Ativan administered x2. Morphine administered x3. Large amount of oral secretions. Patient moves all 4 extremities spontaneously, but no purposeful movement. Patient restless throughout shift. Does not follow commands, but withdraws to pain. Carter intact, 550 urine output. Tube feeding running at goal rate of 50 mL/hr, no residuals. Trach care done.

## 2017-05-07 NOTE — PROGRESS NOTES
Progress Note  Pulmonology    Admit Date: 4/6/2017   LOS: 31 days     SUBJECTIVE: resp distress     Follow-up For:  Vent management. Patient on vent via trach. Off sedation. No change   Continuous Infusions:   fentanyl Stopped (05/02/17 0800)    norepinephrine bitartrate-D5W Stopped (05/07/17 0341)    propofol Stopped (05/07/17 0541)     Scheduled Meds:   albuterol-ipratropium 2.5mg-0.5mg/3mL  3 mL Nebulization Q6H    carvedilol  3.125 mg Oral BID    chlorhexidine  15 mL Mouth/Throat BID    enoxaparin  40 mg Subcutaneous Daily    insulin detemir  25 Units Subcutaneous QHS    midodrine  2.5 mg Oral TID    pantoprazole  40 mg Intravenous Daily    quetiapine  25 mg Oral BID    scopolamine  1 patch Transdermal Q3 Days     Review of Systems:  Review of systems not obtained due to patient agitated and not very responsive.    OBJECTIVE:     Vital Signs Range (Last 24H):  Temp:  [98.5 °F (36.9 °C)-99 °F (37.2 °C)]   Pulse:  []   Resp:  [8-73]   BP: ()/()   SpO2:  [97 %-100 %]     I & O (Last 24H):    Intake/Output Summary (Last 24 hours) at 05/07/17 0804  Last data filed at 05/07/17 0600   Gross per 24 hour   Intake          2318.81 ml   Output             1660 ml   Net           658.81 ml     Physical Exam:  General: no distress. No distress but agitated.   Neck: no carotid bruit- trach   Breast: left breast mass - hard, non-mobile  Lungs:  Shallow, decreased bs bilaterally rhonchi. No wheezing.   Heart: regular rate and rhythm and no murmur  Abdomen: soft, non-tender non-distended; bowel sounds diminished and right nasal feeding tube and rectal tube.  Extremities: no cyanosis  or clubbing but has pedal edema and SCDs to LE. RUE picc line.  Neurologic: minimally responsive    Laboratory:  CBC:     Recent Labs  Lab 05/01/17  0345   WBC 4.53   RBC 4.26   HGB 10.9*   HCT 35.1*      MCV 82   MCH 25.6*   MCHC 31.1*     BMP:     Recent Labs  Lab 05/07/17  0138   *      K 3.7   CL  94*   CO2 33*   BUN 48*   CREATININE 0.9   CALCIUM 11.2*        Vent Mode: PS/CPAP  Oxygen Concentration (%):  [] 40  Resp Rate Total:  [7 br/min-34 br/min] 9 br/min  Vt Set:  [400 mL] 400 mL  PEEP/CPAP:  [5 cmH20] 5 cmH20  Pressure Support:  [10 cmH20] 10 cmH20  Mean Airway Pressure:  [6.1 cmH20-9.3 cmH20] 7.2 cmH20    ABGs:     Recent Labs  Lab 05/05/17  0437   PH 7.558*   PCO2 40.7   PO2 87   HCO3 36.3*   POCSATURATED 98   BE 13     Microbiology Results (last 7 days)     Procedure Component Value Units Date/Time    Blood culture [[id number]] Collected:  05/03/17 1019    Order Status:  Completed Specimen:  Blood Updated:  05/06/17 1103     Blood Culture, Routine No Growth to date     Blood Culture, Routine No Growth to date     Blood Culture, Routine No Growth to date     Blood Culture, Routine No Growth to date    Blood culture [[id number]] Collected:  05/03/17 1000    Order Status:  Completed Specimen:  Blood Updated:  05/06/17 1103     Blood Culture, Routine No Growth to date     Blood Culture, Routine No Growth to date     Blood Culture, Routine No Growth to date     Blood Culture, Routine No Growth to date        Chest X-Ray:     No new CXR .      ASSESSMENT/PLAN:     1) Acute respiratory failure - presented with weakness of UE and pneumonia. Unable to wean off vent. S/p trach. Fair gas exchanged. No change - Currently on CPAP + 5 PS 10 -  - will see how she does on CPAP   2) Weakness - poorly responsive - no improvement   3) History of HTN  4) Anemia - stable.          Perico Solis

## 2017-05-07 NOTE — SUBJECTIVE & OBJECTIVE
Interval History: pt agitated and restless in bed but improves with prn ativan and morphine, moves BLE with no problem    Review of Systems   Unable to perform ROS: Intubated   Endocrine: Positive for heat intolerance.     Objective:     Vital Signs (Most Recent):  Temp: 99.1 °F (37.3 °C) (05/07/17 1500)  Pulse: 80 (05/07/17 1630)  Resp: (!) 27 (05/07/17 1630)  BP: (!) 85/55 (05/07/17 1630)  SpO2: 98 % (05/07/17 1630) Vital Signs (24h Range):  Temp:  [98.5 °F (36.9 °C)-99.1 °F (37.3 °C)] 99.1 °F (37.3 °C)  Pulse:  [] 80  Resp:  [8-73] 27  SpO2:  [94 %-100 %] 98 %  BP: ()/() 85/55     Weight: 76.2 kg (167 lb 15.9 oz)  Body mass index is 47.24 kg/(m^2).    Intake/Output Summary (Last 24 hours) at 05/07/17 1715  Last data filed at 05/07/17 1600   Gross per 24 hour   Intake          1966.02 ml   Output             1620 ml   Net           346.02 ml      Physical Exam   Constitutional: She appears well-developed. No distress.   Eyes: Conjunctivae are normal. Right eye exhibits no discharge. Left eye exhibits no discharge.   Neck:   Trach in place   Cardiovascular: Normal rate, regular rhythm and normal heart sounds.    Pulmonary/Chest: No stridor. No respiratory distress. She has no wheezes.   Mechanically ventilated per trach.     Abdominal: Soft. Bowel sounds are normal.   Musculoskeletal: She exhibits no deformity.   Neurological:   Very restless.  Poorly responsive to commands.   Skin: Skin is warm and dry.       Significant Labs:   BMP:   Recent Labs  Lab 05/07/17  0138   *      K 3.7   CL 94*   CO2 33*   BUN 48*   CREATININE 0.9   CALCIUM 11.2*       Significant Imaging: I have reviewed all pertinent imaging results/findings within the past 24 hours.

## 2017-05-07 NOTE — PROGRESS NOTES
[facility] Hospital Medicine  Progress Note    Patient Name: Radha Pritchett  MRN: [id number]  Patient Class: IP- Inpatient   Admission Date: 4/6/2017  Length of Stay: 31 days  Attending Physician: Ashley Zazueta MD  Primary Care Provider: Ferny Iglesias MD        Subjective:     Principal Problem:Acute respiratory failure with hypercapnia    HPI:  Ms. Radha Pritchett is a 46 y.o. female with essential hypertension, chronic diastolic heart failure (LVEF 55-60% Dec 2014), moderate protein malnutrition who presents to [facility] ED with complaints of dyspnea today.  She was scheduled to undergo removal of a breast mass today but was found to be hypoxic.  She does report that she has been short of breath but cannot specify how long.  She has also had some non-productive coughing and some chills without fevers.  Further history is otherwise limited at this time.    Hospital Course:   was admitted with pneumonia and acutely decompensated and was intubated and placed on vent. Pt with UE weakness prior to presentation on this admission which may be contributing to inability to extubate. Neurology following and considering possible MG and patient on therapeutic trial of prednisone and Modafinil. Pt s/p bronch that was unrevealing on 4/11. Pt was extubated on 4/15 but was re-intubated later the same day.  Was febrile and stared on Zyvox and zosyn.  CXR shows possible infiltrate. Urine culture +enterococcus.  Completed course and antibiotics dc'd. Blood culture negative  Trach on 4/19 without complications.      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 Started on acyclovir but dc'd  After review of previous CT imaging showed temporal abnormalities.    ID does not think it is HSV encephalitis.  Brief drop in SBP and IVF were given, now dc'd and was on lasix IV BID ,ofcv and on on levophed with changes to resp status and back on ventilator.    Pt does not tolerate being off  sedation, weaning difficult. IV steroids dc'd. Started insulin drip overnight and now off. Hyperglycemia with SQ insulin improved.     MRI c-spine 5/2/2017 show no lesions.MG Ab is negative. Repeat left breast ultrasound show same size of mass,  Need off continues sedation and  pressure support to qualify for LTAC placement.prn sedation are OK.  On prn IV lasix for fluid overload.  Updated the aunt Mylene leonard.    PT accepted and LTAC authorized - plan for transfer Monday       Interval History: pt agitated and restless in bed but improves with prn ativan and morphine, moves BLE with no problem    Review of Systems   Unable to perform ROS: Intubated   Endocrine: Positive for heat intolerance.     Objective:     Vital Signs (Most Recent):  Temp: 99.1 °F (37.3 °C) (05/07/17 1500)  Pulse: 80 (05/07/17 1630)  Resp: (!) 27 (05/07/17 1630)  BP: (!) 85/55 (05/07/17 1630)  SpO2: 98 % (05/07/17 1630) Vital Signs (24h Range):  Temp:  [98.5 °F (36.9 °C)-99.1 °F (37.3 °C)] 99.1 °F (37.3 °C)  Pulse:  [] 80  Resp:  [8-73] 27  SpO2:  [94 %-100 %] 98 %  BP: ()/() 85/55     Weight: 76.2 kg (167 lb 15.9 oz)  Body mass index is 47.24 kg/(m^2).    Intake/Output Summary (Last 24 hours) at 05/07/17 1715  Last data filed at 05/07/17 1600   Gross per 24 hour   Intake          1966.02 ml   Output             1620 ml   Net           346.02 ml      Physical Exam   Constitutional: She appears well-developed. No distress.   Eyes: Conjunctivae are normal. Right eye exhibits no discharge. Left eye exhibits no discharge.   Neck:   Trach in place   Cardiovascular: Normal rate, regular rhythm and normal heart sounds.    Pulmonary/Chest: No stridor. No respiratory distress. She has no wheezes.   Mechanically ventilated per trach.     Abdominal: Soft. Bowel sounds are normal.   Musculoskeletal: She exhibits no deformity.   Neurological:   Very restless.  Poorly responsive to commands.   Skin: Skin is warm and dry.       Significant  Labs:   BMP:   Recent Labs  Lab 05/07/17  0138   *      K 3.7   CL 94*   CO2 33*   BUN 48*   CREATININE 0.9   CALCIUM 11.2*       Significant Imaging: I have reviewed all pertinent imaging results/findings within the past 24 hours.    Assessment/Plan:      * Acute respiratory failure with hypercapnia  Mechanical ventilation  Weakness  Metabolic encephalopathy  Fever  Assessment and Plan:  Pt with respiratory failure that required intubation, possibly secondary to pneumonia vs edema, treated empirically with Rocephin/Azithromycin, ECHO with diastolic dysfunction but normal EF, and Pulm following for vent management. Family reported UE weakness for a few months prior to this admission and Neurology consulted, possible MG and patient undergoing trial of prednisone and pyridostigmine. This weakness likely limited extubation and necessitated reintubation; patient was extubated on 4/16 but had to be re-intubated that same day. Unclear source of encephalopathy and case discussed with Neurology, CT scan of the head was unrevealing. Also, patient completed treatment with Azithromycin and was on Rocephin alone, but she spiked a fever overnight and antibiotics changed to Zosyn/Zyvox.   Trach on 4/19 without complications.  Stopped all ABx's.  Remains on vent per trach.  Having trouble clearing secretions.  Added Glycopyrrolate.   SW consulted for LTAC - possible transfer on Monday 5/8  On SIMV  at this time.  Prn IV lasix.      Mass of breast, left  Follow up at later date   Repeat left breast ultrasound,show same size  finding breast mass.      Pneumonia of both lungs due to infectious organism  Probably bacterial, but unable to determine.  The patient has a CURB-65 score of 0, and does not meet criteria for healthcare-associated pneumonia initially. CTA chest with bilateral airspace opacification concerning for pneumonia.  Oxygen saturations are improved but requiring a non-rebreather mask; then BIPAP and s/p  intubation.  Blood culture NGTD.  Pt completed full course of Azithromycin, and was continued Rocephin until had spike of fever, Abx has been expanded  to Zosyn/Zyvox.  Completed course and Abx dc'd.      Shock circulatory  Coreg held with normal-low BP  IVF held, edema on CXR and lasix given - now improved  levophed if needed to maintain MAP >65    Chronic diastolic heart failure  Patient doesn't appear to be in acute heart failure; checked ECHO with EF=65% but with diastolic dysfunction.  Lasix held    Moderate protein malnutrition  Resume tube feedings      Breast mass, left  Planned for surgery, seen by Dr. Sandoval (his patient) and plan to reschedule as outpatient. It is reportedly a hematoma by biopsy, but suspicion for CA not completely ruled out given paraneoplastic syndrome a consideration.      Hypokalemia  Replace as necessary.      Counseling regarding advanced directives and goals of care  Extensive conversations done with family. Pt to remain full code. Pt is still  but estranged from the  and reportedly the grandmother is the major decision maker,but she is also hospitalized,spoke with aunt lydia leonard,she say she is right now decision maker,updated her.      Hyperglycemia  Hyperglycemia worsened with steroids as anticipated, titrate insulin   Levemir 25units Q hs SSI  HgA1c 7.3%.  No apparent history of DM prior to admission.  Off steroid,improved with SQ insulin.      Upper extremity weakness  Bilateral UE weakness with unknown etiology  IV steroids dc'd  D/w neurology - trial acyclovir started then stopped after review of previous head CT that showed chronic findings  MRI c/spine show no lesions.  unremarkable MG antibodies.      Hypotension    Cortisol  Pending  Stop coreg  Midodrine TID     VTE Risk Mitigation         Ordered     enoxaparin injection 40 mg  Daily     Route:  Subcutaneous        05/01/17 1520     Medium Risk of VTE  Once      04/06/17 2202          Ashley Zazueta,  MD  Department of Hospital Medicine   [facility]

## 2017-05-07 NOTE — PROGRESS NOTES
Received patient on a Servo I Ventilator with settings as follows:  SIMV 8/ 400/ +5 PEEP/ 10 PS/ 40%.  Patient has a # 8.0 Shiley Trach Tube in place.  Ventilator alarms are set and functional and AMBU bag is at the HOB.

## 2017-05-07 NOTE — PROGRESS NOTES
Patient received on Servo i via 8.0 trach on documented settings. All alarms are set and functioning. Ambu bag with mask at HOB with HOB elevated 30 degree.

## 2017-05-08 VITALS
HEART RATE: 90 BPM | BODY MASS INDEX: 38.88 KG/M2 | RESPIRATION RATE: 17 BRPM | HEIGHT: 55 IN | WEIGHT: 168 LBS | SYSTOLIC BLOOD PRESSURE: 107 MMHG | OXYGEN SATURATION: 98 % | DIASTOLIC BLOOD PRESSURE: 69 MMHG | TEMPERATURE: 98 F

## 2017-05-08 LAB
ANION GAP SERPL CALC-SCNC: 10 MMOL/L
BACTERIA BLD CULT: NORMAL
BACTERIA BLD CULT: NORMAL
BUN SERPL-MCNC: 48 MG/DL
CALCIUM SERPL-MCNC: 11.6 MG/DL
CHLORIDE SERPL-SCNC: 95 MMOL/L
CO2 SERPL-SCNC: 33 MMOL/L
CORTIS SERPL-MCNC: 2.2 UG/DL
CREAT SERPL-MCNC: 0.8 MG/DL
EST. GFR  (AFRICAN AMERICAN): >60 ML/MIN/1.73 M^2
EST. GFR  (NON AFRICAN AMERICAN): >60 ML/MIN/1.73 M^2
GLUCOSE SERPL-MCNC: 177 MG/DL
POCT GLUCOSE: 168 MG/DL (ref 70–110)
POCT GLUCOSE: 242 MG/DL (ref 70–110)
POCT GLUCOSE: 243 MG/DL (ref 70–110)
POTASSIUM SERPL-SCNC: 4 MMOL/L
SODIUM SERPL-SCNC: 138 MMOL/L

## 2017-05-08 PROCEDURE — 99900026 HC AIRWAY MAINTENANCE (STAT)

## 2017-05-08 PROCEDURE — 63600175 PHARM REV CODE 636 W HCPCS: Performed by: HOSPITALIST

## 2017-05-08 PROCEDURE — 80048 BASIC METABOLIC PNL TOTAL CA: CPT

## 2017-05-08 PROCEDURE — 25000242 PHARM REV CODE 250 ALT 637 W/ HCPCS: Performed by: INTERNAL MEDICINE

## 2017-05-08 PROCEDURE — C9113 INJ PANTOPRAZOLE SODIUM, VIA: HCPCS | Performed by: HOSPITALIST

## 2017-05-08 PROCEDURE — 94003 VENT MGMT INPAT SUBQ DAY: CPT

## 2017-05-08 PROCEDURE — 25000003 PHARM REV CODE 250: Performed by: INTERNAL MEDICINE

## 2017-05-08 PROCEDURE — 25000003 PHARM REV CODE 250: Performed by: HOSPITALIST

## 2017-05-08 PROCEDURE — 94640 AIRWAY INHALATION TREATMENT: CPT

## 2017-05-08 RX ORDER — IPRATROPIUM BROMIDE AND ALBUTEROL SULFATE 2.5; .5 MG/3ML; MG/3ML
3 SOLUTION RESPIRATORY (INHALATION) EVERY 4 HOURS PRN
Refills: 0
Start: 2017-05-08 | End: 2018-05-08

## 2017-05-08 RX ORDER — PANTOPRAZOLE SODIUM 40 MG/10ML
40 INJECTION, POWDER, LYOPHILIZED, FOR SOLUTION INTRAVENOUS DAILY
Start: 2017-05-08 | End: 2018-05-08

## 2017-05-08 RX ORDER — MIDODRINE HYDROCHLORIDE 5 MG/1
5 TABLET ORAL 3 TIMES DAILY
Start: 2017-05-08 | End: 2018-05-08

## 2017-05-08 RX ORDER — IPRATROPIUM BROMIDE AND ALBUTEROL SULFATE 2.5; .5 MG/3ML; MG/3ML
3 SOLUTION RESPIRATORY (INHALATION) EVERY 6 HOURS
Refills: 0
Start: 2017-05-08 | End: 2018-05-08

## 2017-05-08 RX ORDER — ENOXAPARIN SODIUM 100 MG/ML
40 INJECTION SUBCUTANEOUS DAILY
Start: 2017-05-08

## 2017-05-08 RX ORDER — ACETAMINOPHEN 500 MG
500 TABLET ORAL EVERY 6 HOURS PRN
Refills: 0
Start: 2017-05-08

## 2017-05-08 RX ADMIN — INSULIN ASPART 4 UNITS: 100 INJECTION, SOLUTION INTRAVENOUS; SUBCUTANEOUS at 01:05

## 2017-05-08 RX ADMIN — IPRATROPIUM BROMIDE AND ALBUTEROL SULFATE 3 ML: .5; 3 SOLUTION RESPIRATORY (INHALATION) at 01:05

## 2017-05-08 RX ADMIN — INSULIN ASPART 2 UNITS: 100 INJECTION, SOLUTION INTRAVENOUS; SUBCUTANEOUS at 06:05

## 2017-05-08 RX ADMIN — PANTOPRAZOLE SODIUM 40 MG: 40 INJECTION, POWDER, FOR SOLUTION INTRAVENOUS at 09:05

## 2017-05-08 RX ADMIN — LORAZEPAM 1 MG: 2 INJECTION INTRAMUSCULAR; INTRAVENOUS at 03:05

## 2017-05-08 RX ADMIN — MIDODRINE HYDROCHLORIDE 5 MG: 5 TABLET ORAL at 06:05

## 2017-05-08 RX ADMIN — MORPHINE SULFATE 2 MG: 10 INJECTION INTRAVENOUS at 11:05

## 2017-05-08 RX ADMIN — MIDODRINE HYDROCHLORIDE 5 MG: 5 TABLET ORAL at 02:05

## 2017-05-08 RX ADMIN — IPRATROPIUM BROMIDE AND ALBUTEROL SULFATE 3 ML: .5; 3 SOLUTION RESPIRATORY (INHALATION) at 08:05

## 2017-05-08 RX ADMIN — ALTEPLASE 2 MG: 2.2 INJECTION, POWDER, LYOPHILIZED, FOR SOLUTION INTRAVENOUS at 09:05

## 2017-05-08 RX ADMIN — LORAZEPAM 1 MG: 2 INJECTION INTRAMUSCULAR; INTRAVENOUS at 11:05

## 2017-05-08 RX ADMIN — LORAZEPAM 1 MG: 2 INJECTION INTRAMUSCULAR; INTRAVENOUS at 02:05

## 2017-05-08 RX ADMIN — INSULIN ASPART 2 UNITS: 100 INJECTION, SOLUTION INTRAVENOUS; SUBCUTANEOUS at 12:05

## 2017-05-08 RX ADMIN — LORAZEPAM 1 MG: 2 INJECTION INTRAMUSCULAR; INTRAVENOUS at 09:05

## 2017-05-08 RX ADMIN — MORPHINE SULFATE 2 MG: 10 INJECTION INTRAVENOUS at 02:05

## 2017-05-08 RX ADMIN — CHLORHEXIDINE GLUCONATE 15 ML: 1.2 RINSE ORAL at 09:05

## 2017-05-08 NOTE — CONSULTS
Consult Note  Infectious Disease    Consult Requested By: Antonieta Carpenter MD    Reason for Consult: does she have hsv encephalitis?    SUBJECTIVE:     History of Present Illness:  Patient is a 46 y.o. female presents with hypercapneic respiratory failure. She has had episodes of hypercapneic respiratory failure in 2014 as well. She was deemed to have a pneumonia and received antibiotics as well as antibiotics for a vse uti(e.faecium) has had trouble weaning form the ventilator and has a tracheostomy. Neurology has identified ul paresis and ct of the head has confirmed bilateral temporal lobe attenuation that was seen in 2014 as well. An mri confirms the same. The patient is now more awake when off sedation per nursing. She has received empirical steroids and ivig in case this was an encephalitis. The specter of hsv encephalitis was raised based on imaging. She has not had cognitive deficits coming in to be admitted and is agitated now when sedation is stopped.  She also has a left breast mass felt to be a hematoma.    Past Medical History:   Diagnosis Date    CHF (congestive heart failure)     Learning difficulty      Past Surgical History:   Procedure Laterality Date    BREAST SURGERY Left     biopsy    CYST REMOVAL      shoulder      Family History   Problem Relation Age of Onset    Diabetes      Coronary artery disease       Social History   Substance Use Topics    Smoking status: Never Smoker    Smokeless tobacco: Never Used    Alcohol use Yes      Comment: OCCASSIONAL       Review of patient's allergies indicates:  No Known Allergies     Antibiotics     None          Review of Systems:  Review of systems not obtained due to patient factors intubated.    OBJECTIVE:     Vital Signs (Most Recent)  Temp: 98.2 °F (36.8 °C) (05/08/17 1100)  Pulse: 81 (05/08/17 1425)  Resp: (!) 8 (05/08/17 1425)  BP: (!) 97/59 (05/08/17 1410)  SpO2: 100 % (05/08/17 1425)    Temperature Range Min/Max (Last 24H):  Temp:   [98.2 °F (36.8 °C)-99.1 °F (37.3 °C)]     Physical Exam:  General: well developed, well nourished  Eyes: conjunctivae/corneas clear. PERRL..  HENT: Head:normocephalic, atraumatic. Ears:bilateral TM's and external ear canals normal. Nose: Nares normal. Septum midline. Mucosa normal. No drainage or sinus tenderness., no discharge. Throat: lips, mucosa, and tongue normal; teeth and gums normal and no throat erythema.  Neck: supple, symmetrical, trachea midline, no JVD and thyroid not enlarged, symmetric, no tenderness/mass/nodules  Lungs:  clear to auscultation bilaterally and normal respiratory effort  Cardiovascular: Heart: regular rate and rhythm, S1, S2 normal, no murmur, click, rub or gallop. Chest Wall: no tenderness. Extremities: no cyanosis or edema, or clubbing. Pulses: 2+ and symmetric.  Abdomen/Rectal: Abdomen: soft, non-tender non-distented; bowel sounds normal; no masses,  no organomegaly. Rectal: not examined  Skin: Skin color, texture, turgor normal. No rashes or lesions  Musculoskeletal:no clubbing, cyanosis  Lymph Nodes: No cervical or supraclavicular adenopathy  Neurologic: Mental status: intubated and sedated. ul restrained as she is pulling at et tube etc which speaks against an ul paresis    Laboratory:  CBC  No results for input(s): WBC, RBC, HGB, HCT, PLT in the last 24 hours.  BMP    Recent Labs  Lab 05/08/17  0124   CO2 33*   BUN 48*   CREATININE 0.8   CALCIUM 11.6*     No results for input(s): COLORU, CLARITYU, SPECGRAV, PHUR, PROTEINUA, GLUCOSEU, BILIRUBINCON, BLOODU, WBCU, RBCU, BACTERIA, MUCUS, NITRITE, LEUKOCYTESUR, UROBILINOGEN, HYALINECASTS in the last 168 hours.  Microbiology Results (last 7 days)     Procedure Component Value Units Date/Time    Blood culture [[id number]] Collected:  05/03/17 1019    Order Status:  Completed Specimen:  Blood Updated:  05/08/17 1103     Blood Culture, Routine No growth after 5 days.    Blood culture [[id number]] Collected:  05/03/17 1000    Order Status:   Completed Specimen:  Blood Updated:  05/08/17 1103     Blood Culture, Routine No growth after 5 days.          Diagnostic Results:  Labs: Reviewed  X-Ray: Reviewed  CT: Reviewed  MRI: Reviewed    ASSESSMENT/PLAN:     Active Hospital Problems    Diagnosis  POA    *Acute respiratory failure with hypercapnia [J96.02]  Yes    Hypotension [I95.9]  No    Upper extremity weakness [R29.898]  Yes    Hyperglycemia [R73.9]  No    Counseling regarding advanced directives and goals of care [Z71.89]  Not Applicable    Hypokalemia [E87.6]  Yes    Breast mass, left [N63]  Yes    Pneumonia of both lungs due to infectious organism [J18.9]  Yes    Shock circulatory [R57.9]  No    Chronic diastolic heart failure [I50.32]  Yes     Chronic    Moderate protein malnutrition [E44.0]  Yes     Chronic    Mass of breast, left [N63]  Yes      Resolved Hospital Problems    Diagnosis Date Resolved POA    Sepsis [A41.9] 04/24/2017 Yes       1. i see no evidence of hsv encephalitis in that this ct abnormality was present in 2014 and patient is more alert when sedation stopped  Sounds like this is a chronic process and not acute  2. hypercapneic respiratory failure may be related to cns process, now alkalotic with ph of 7.55 and bicarb of 36  Defer diuretics, ? contraction alkalosis with rising bun  She does move all 4 limbs but is not following oral commands  Diarrhea and c diff negative 4/14/17  Blood cultures x2 negative    at high risk of nosocomial infections  as long as she needs iv access, is vent dependant and is bedbound  Note ltac transfer

## 2017-05-08 NOTE — ASSESSMENT & PLAN NOTE
Coreg held with normal-low BP  IVF held, edema on CXR and lasix given - now improved  Off levophed.

## 2017-05-08 NOTE — PROGRESS NOTES
LTAC update-call from Alyssa at [facility] at 10:55--patient to go to room ICU 6--request -patient accepted at [facility] ICU room 6. Requested call report at 2:30 and plan transport for 3 pm. At noon DENIS spoke with patient's aunt Yolanda Bull [phone] with update; provided room number and contact number. DENIS attempted to reach aunt Agueda Matute [phone], goes to voice mail. Updating REBECCA Elliott and Charge REBECCA Machado.

## 2017-05-08 NOTE — ASSESSMENT & PLAN NOTE
Mechanical ventilation  Weakness  Metabolic encephalopathy  Fever  Assessment and Plan:  Pt with respiratory failure that required intubation, possibly secondary to pneumonia vs edema, treated empirically with Rocephin/Azithromycin, ECHO with diastolic dysfunction but normal EF, and Pulm following for vent management. Family reported UE weakness for a few months prior to this admission and Neurology consulted, possible MG and patient undergoing trial of prednisone and pyridostigmine. This weakness likely limited extubation and necessitated reintubation; patient was extubated on 4/16 but had to be re-intubated that same day. Unclear source of encephalopathy and case discussed with Neurology, CT scan of the head was unrevealing. Also, patient completed treatment with Azithromycin and was on Rocephin alone, but she spiked a fever overnight and antibiotics changed to Zosyn/Zyvox.   Trach on 4/19 without complications.  Stopped all ABx's.  Remains on vent per trach.  Having trouble clearing secretions.  Added Glycopyrrolate.   SW consulted for LTAC - transferred to lTAC.  On SIMV  at this time.

## 2017-05-08 NOTE — PROGRESS NOTES
Progress Note  Pulmonology    Admit Date: 4/6/2017   LOS: 32 days     SUBJECTIVE: resp distress     Follow-up For:  Vent management. Patient on vent via trach. Off sedation. No change   Continuous Infusions:   propofol Stopped (05/07/17 0541)     Scheduled Meds:   albuterol-ipratropium 2.5mg-0.5mg/3mL  3 mL Nebulization Q6H    chlorhexidine  15 mL Mouth/Throat BID    enoxaparin  40 mg Subcutaneous Daily    insulin detemir  35 Units Subcutaneous QHS    midodrine  5 mg Oral TID    pantoprazole  40 mg Intravenous Daily    scopolamine  1 patch Transdermal Q3 Days     Review of Systems:  Review of systems not obtained due to patient agitated and not very responsive.    OBJECTIVE:     Vital Signs Range (Last 24H):  Temp:  [98.7 °F (37.1 °C)-99.1 °F (37.3 °C)]   Pulse:  []   Resp:  [0-36]   BP: ()/(45-87)   SpO2:  [93 %-100 %]     I & O (Last 24H):    Intake/Output Summary (Last 24 hours) at 05/08/17 0944  Last data filed at 05/08/17 0700   Gross per 24 hour   Intake             1850 ml   Output             1365 ml   Net              485 ml     Physical Exam:  General: no distress. No distress but agitated.   Neck: no carotid bruit- trach   Breast: left breast mass - hard, non-mobile  Lungs:  Shallow, decreased bs bilaterally rhonchi. No wheezing.   Heart: regular rate and rhythm and no murmur  Abdomen: soft, non-tender non-distended; bowel sounds diminished and right nasal feeding tube and rectal tube.  Extremities: no cyanosis  or clubbing but has pedal edema and SCDs to LE. E picc line.  Neurologic: minimally responsive    Laboratory:  CBC:   No results for input(s): WBC, RBC, HGB, HCT, PLT, MCV, MCH, MCHC in the last 168 hours.  BMP:     Recent Labs  Lab 05/08/17  0124   *      K 4.0   CL 95   CO2 33*   BUN 48*   CREATININE 0.8   CALCIUM 11.6*        Vent Mode: SIMV (PRVC) + PS  Oxygen Concentration (%):  [40] 40  Resp Rate Total:  [8 br/min-35 br/min] 11 br/min  Vt Set:  [400 mL] 400  mL  PEEP/CPAP:  [5 cmH20] 5 cmH20  Pressure Support:  [10 cmH20] 10 cmH20  Mean Airway Pressure:  [6.7 stV48-04.6 cmH20] 7 cmH20    ABGs:     Recent Labs  Lab 05/05/17  0437   PH 7.558*   PCO2 40.7   PO2 87   HCO3 36.3*   POCSATURATED 98   BE 13     Microbiology Results (last 7 days)     Procedure Component Value Units Date/Time    Blood culture [[id number]] Collected:  05/03/17 1019    Order Status:  Completed Specimen:  Blood Updated:  05/07/17 1103     Blood Culture, Routine No Growth to date     Blood Culture, Routine No Growth to date     Blood Culture, Routine No Growth to date     Blood Culture, Routine No Growth to date     Blood Culture, Routine No Growth to date    Blood culture [[id number]] Collected:  05/03/17 1000    Order Status:  Completed Specimen:  Blood Updated:  05/07/17 1103     Blood Culture, Routine No Growth to date     Blood Culture, Routine No Growth to date     Blood Culture, Routine No Growth to date     Blood Culture, Routine No Growth to date     Blood Culture, Routine No Growth to date        Chest X-Ray:     No new CXR .      ASSESSMENT/PLAN:     1) Acute respiratory failure - presented with weakness of UE and pneumonia. Unable to wean off vent. S/p trach. Fair gas exchanged. No change - Currently on CPAP + 5 PS 10 -  - will see how she does on CPAP   Apparently will be transferred to LTAC.Will wean  as tolerated.Will d/c f/u but please to see again at your request..Will d/c f/u but please to see again at your request.        Bird Vega

## 2017-05-08 NOTE — DISCHARGE SUMMARY
[facility] Hospital Medicine  Discharge Summary      Patient Name: Radha Pritchett  MRN: [id number]  Admission Date: 4/6/2017  Hospital Length of Stay: 32 days  Discharge Date and Time:  05/08/2017 7:03 AM  Attending Physician: Antonieta Carpenter MD   Discharging Provider: Antonieta Carpenter MD  Primary Care Provider: Ferny Iglesias MD      HPI:   Ms. Radha Pritchett is a 46 y.o. female with essential hypertension, chronic diastolic heart failure (LVEF 55-60% Dec 2014), moderate protein malnutrition who presents to [facility] ED with complaints of dyspnea today.  She was scheduled to undergo removal of a breast mass today but was found to be hypoxic.  She does report that she has been short of breath but cannot specify how long.  She has also had some non-productive coughing and some chills without fevers.  Further history is otherwise limited at this time.    Procedure(s) (LRB):  TRACHEOSTOMY (N/A)      Indwelling Lines/Drains at time of discharge:   Lines/Drains/Airways     Peripherally Inserted Central Catheter Line                 PICC Double Lumen 04/07/17 1520 right basilic 30 days          Drain                 NG/OG Tube 04/19/17 1410 nasogastric 14 Fr. Right nostril 18 days         Urethral Catheter 05/08/17 0303 Double-lumen 16 Fr. less than 1 day          Airway                 Surgical Airway 04/19/17 1233 Cuffed 18 days              Hospital Course:    was admitted with pneumonia and acutely decompensated and was intubated and placed on vent. Pt with UE weakness prior to presentation on this admission which may be contributing to inability to extubate. Neurology was following and considering possible MG and patient on therapeutic trial of prednisone and Modafinil. Pt s/p bronch that was unrevealing on 4/11. Pt was extubated on 4/15 but was re-intubated later the same day.  Was febrile and stared on Zyvox and zosyn.  CXR shows possible infiltrate. Urine culture  +enterococcus.  Completed course and antibiotics dc'd. Blood culture negative  Trach on 4/19 without complications.      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 Started on acyclovir but dc'd  After review of previous CT imaging showed temporal abnormalities.    ID does not think it is HSV encephalitis.all Abx was discontinued,  Patient had Brief drop in SBP and IVF were given, now dc'd and was on lasix IV BID ,on was on levophed with changes to resp status and was back on ventilator.on SIMV at D/C time,off Levophed at D/C time,Midodrin has been added.    Pt does was not tolerate being off sedation, weaning was difficult. IV steroids dc'd. Started insulin drip for hyperglycemia,improved,switched to SQ insulin.    MRI c-spine 5/2/2017 show no lesions.MG Ab is negative. Repeat left breast ultrasound show same size of mass,  BP was satble at D/C time.  Only required prn sedation,  Patient has been transferred to lTAC,need follow as out patient with surgery for left breast mass.         Consults:   Consults         Status Ordering Provider     Inpatient consult to Anesthesiology  Once     Specialty:  Anesthesiology  Provider:  Ricky Knutson MD    Acknowledged DONNA AVRGAS     Inpatient consult to Anesthesiology  Once     Provider:  Osvaldo Del Angel MD    Acknowledged BARBARA HAAS     Inpatient consult to Anesthesiology  Once     Provider:  (Not yet assigned)    Acknowledged BARBARA HAAS     Inpatient consult to ENT  Once     Provider:  Barbara Haas MD    Acknowledged BISHOP CHUNG     Inpatient consult to General Surgery  Once     Provider:  Fabio Sandoval MD    Completed JF JENNINGS     Inpatient consult to Infectious Diseases  Once     Provider:  Kristin Sadler MD    Acknowledged BISHOP CHUNG     Inpatient consult to LTAC  Once     Provider:  (Not yet assigned)    Completed BISHOP CHUNG     Inpatient consult to Neurology  Once     Provider:  Gus Phillips MD     Completed JF JENNINGS     Inpatient consult to PICC team (NIAS)  Once     Provider:  (Not yet assigned)    Acknowledged JF JENNINGS     Inpatient consult to Pulmonology  Once     Provider:  Perico Solis MD    Completed MODE OH     IP consult to dietary  Once     Provider:  (Not yet assigned)    Completed MODE OH          Significant Diagnostic Studies: Labs:   BMP:   Recent Labs  Lab 05/07/17  0138 05/08/17  0124   * 177*    138   K 3.7 4.0   CL 94* 95   CO2 33* 33*   BUN 48* 48*   CREATININE 0.9 0.8   CALCIUM 11.2* 11.6*    and CBC No results for input(s): WBC, HGB, HCT, PLT in the last 48 hours.  Microbiology:   Blood Culture   Lab Results   Component Value Date    LABBLOO No Growth to date 05/03/2017    LABBLOO No Growth to date 05/03/2017    LABBLOO No Growth to date 05/03/2017    LABBLOO No Growth to date 05/03/2017    LABBLOO No Growth to date 05/03/2017   , Sputum Culture   Lab Results   Component Value Date    GSRESP <10 epithelial cells per low power field. 04/16/2017    GSRESP Many WBC's 04/16/2017    GSRESP Few Gram positive cocci in clusters 04/16/2017    GSRESP Few Gram positive cocci in pairs 04/16/2017    RESPIRATORYC No significant isolate 04/16/2017    and Urine Culture    Lab Results   Component Value Date    LABURIN ENTEROCOCCUS FAECIUM  > 100,000 cfu/ml   04/16/2017     Radiology: X-Ray: CXR: X-Ray Chest 1 View (CXR):   Results for orders placed or performed during the hospital encounter of 04/06/17   X-Ray Chest 1 View    Narrative    Technique: Single AP chest radiograph.    Comparison: Radiograph 04/30/2017.    Findings:    Tracheostomy tube, right-sided PICC line and enteric catheter are in stable position. Cardiopulmonary status is unchanged noting persistent abnormal pulmonary parenchymal attenuation, left and right, suggesting edema, hemorrhage, pneumonia or a noninfectious inflammatory process.  Possible left sided layering pleural fluid. No  pneumothorax.    Impression    No interval detrimental change.      Electronically signed by: VIKTORIA ERICKSON MD  Date:     05/01/17  Time:    05:52      MRI: cervical spine   CT scan: head     Pending Diagnostic Studies:     Procedure Component Value Units Date/Time    Cortisol [[id number]] Collected:  05/07/17 0138    Order Status:  Sent Lab Status:  In process Updated:  05/07/17 0216    Specimen:  Blood from Blood         Final Active Diagnoses:    Diagnosis Date Noted POA    PRINCIPAL PROBLEM:  Acute respiratory failure with hypercapnia [J96.02] 04/07/2017 Yes    Hypotension [I95.9] 05/07/2017 No    Upper extremity weakness [R29.898] 04/26/2017 Yes    Hyperglycemia [R73.9] 04/24/2017 No    Counseling regarding advanced directives and goals of care [Z71.89] 04/14/2017 Not Applicable    Hypokalemia [E87.6] 04/09/2017 Yes    Breast mass, left [N63] 04/08/2017 Yes    Pneumonia of both lungs due to infectious organism [J18.9] 04/06/2017 Yes    Shock circulatory [R57.9] 04/06/2017 No    Chronic diastolic heart failure [I50.32] 04/06/2017 Yes     Chronic    Moderate protein malnutrition [E44.0] 04/06/2017 Yes     Chronic    Mass of breast, left [N63] 03/24/2017 Yes      Problems Resolved During this Admission:    Diagnosis Date Noted Date Resolved POA    Sepsis [A41.9] 04/06/2017 04/24/2017 Yes      * Acute respiratory failure with hypercapnia  Mechanical ventilation  Weakness  Metabolic encephalopathy  Fever  Assessment and Plan:  Pt with respiratory failure that required intubation, possibly secondary to pneumonia vs edema, treated empirically with Rocephin/Azithromycin, ECHO with diastolic dysfunction but normal EF, and Pulm following for vent management. Family reported UE weakness for a few months prior to this admission and Neurology consulted, possible MG and patient undergoing trial of prednisone and pyridostigmine. This weakness likely limited extubation and necessitated reintubation; patient was  extubated on 4/16 but had to be re-intubated that same day. Unclear source of encephalopathy and case discussed with Neurology, CT scan of the head was unrevealing. Also, patient completed treatment with Azithromycin and was on Rocephin alone, but she spiked a fever overnight and antibiotics changed to Zosyn/Zyvox.   Trach on 4/19 without complications.  Stopped all ABx's.  Remains on vent per trach.  Having trouble clearing secretions.  Added Glycopyrrolate.   SW consulted for LTAC - transferred to lTAC.  On SIMV  at this time.        Pneumonia of both lungs due to infectious organism  Probably bacterial, but unable to determine.  The patient has a CURB-65 score of 0, and does not meet criteria for healthcare-associated pneumonia initially. CTA chest with bilateral airspace opacification concerning for pneumonia.  Oxygen saturations are improved but requiring a non-rebreather mask; then BIPAP and s/p intubation.  Blood culture NGTD.  Pt completed full course of Azithromycin, and was continued Rocephin until had spike of fever, Abx has been expanded  to Zosyn/Zyvox.  Completed course and Abx dc'd.      Shock circulatory  Coreg held with normal-low BP  IVF held, edema on CXR and lasix given - now improved  Off levophed.    Chronic diastolic heart failure  Patient doesn't appear to be in acute heart failure; checked ECHO with EF=65% but with diastolic dysfunction.  Lasix held,can not tolerate ACE or ARB and BB duo to hypotension,    Moderate protein malnutrition  Resume tube feedings      Breast mass, left  Planned for surgery, seen by Dr. Sandoval (his patient) and plan to reschedule as outpatient. It is reportedly a hematoma by biopsy, but suspicion for CA not completely ruled out given paraneoplastic syndrome a consideration.need follow as out patient with surgery.      Hypokalemia  Replace as necessary.      Counseling regarding advanced directives and goals of care  Extensive conversations done with family. Pt to  remain full code. Pt is still  but estranged from the  and reportedly the grandmother is the major decision maker,but she is also hospitalized,spoke with aunt lydia leonard,she say she is right now decision maker,updated her.      Hyperglycemia  Hyperglycemia worsened with steroids as anticipated, titrate insulin   Levemir 25units Q hs SSI  HgA1c 7.3%.  No apparent history of DM prior to admission.  Off steroid,improved with SQ insulin.      Upper extremity weakness  Bilateral UE weakness with unknown etiology  IV steroids dc'd  D/w neurology - trial acyclovir started then stopped after review of previous head CT that showed chronic findings  MRI c/spine show no lesions.  unremarkable MG antibodies.  Need PT,OT      Hypotension      Stop coreg  Midodrine TID   Improved.      Discharged Condition: stable    Disposition: Long Term Care    Follow Up:  Follow-up Information     Follow up with Ferny Iglesias MD In 2 weeks.    Specialty:  Family Medicine    Contact information:    [address] [facility] [id number] [phone]          Follow up with Fabio Sandoval MD In 4 weeks.    Specialty:  General Surgery    Contact information:    [address] [place] [address]  [id number]  [place] [id number] [phone]          Patient Instructions:     Diet general     Activity as tolerated       Medications:  Reconciled Home Medications:   Current Discharge Medication List      START taking these medications    Details   acetaminophen (TYLENOL) 500 MG tablet Take 1 tablet (500 mg total) by mouth every 6 (six) hours as needed.  Refills: 0      !! albuterol-ipratropium 2.5mg-0.5mg/3mL (DUO-NEB) 0.5 mg-3 mg(2.5 mg base)/3 mL nebulizer solution Take 3 mLs by nebulization every 6 (six) hours. Rescue  Refills: 0      !! albuterol-ipratropium 2.5mg-0.5mg/3mL (DUO-NEB) 0.5 mg-3 mg(2.5 mg base)/3 mL nebulizer solution Take 3 mLs by nebulization every 4 (four) hours as needed for Wheezing. Rescue  Refills: 0       dextrose 5 % SolP 50 mL with promethazine 25 mg/mL Soln 12.5 mg Inject 12.5 mg into the vein every 6 (six) hours as needed.      enoxaparin (LOVENOX) 40 mg/0.4 mL Syrg Inject 0.4 mLs (40 mg total) into the skin once daily at 6am.      insulin detemir (LEVEMIR FLEXTOUCH) 100 unit/mL (3 mL) SubQ InPn pen Inject 25 Units into the skin every evening.  Refills: 0      midodrine (PROAMATINE) 5 MG Tab Take 1 tablet (5 mg total) by mouth 3 (three) times daily.      pantoprazole (PROTONIX) 40 mg injection Inject 40 mg into the vein once daily.       !! - Potential duplicate medications found. Please discuss with provider.      STOP taking these medications       albuterol 90 mcg/actuation inhaler Comments:   Reason for Stopping:         azithromycin (Z-REGULO) 250 MG tablet Comments:   Reason for Stopping:         carvedilol (COREG) 3.125 MG tablet Comments:   Reason for Stopping:         etodolac (LODINE) 200 MG Cap Comments:   Reason for Stopping:         hydrochlorothiazide (HYDRODIURIL) 12.5 MG Tab Comments:   Reason for Stopping:         lisinopril (PRINIVIL,ZESTRIL) 5 MG tablet Comments:   Reason for Stopping:         OXYGEN-AIR DELIVERY SYSTEMS MISC Comments:   Reason for Stopping:             Time spent on the discharge of patient: 30  minutes    Antonieta Carpenter MD  Department of Hospital Medicine  [facility]

## 2017-05-08 NOTE — ASSESSMENT & PLAN NOTE
Planned for surgery, seen by Dr. Sandoval (his patient) and plan to reschedule as outpatient. It is reportedly a hematoma by biopsy, but suspicion for CA not completely ruled out given paraneoplastic syndrome a consideration.need follow as out patient with surgery.

## 2017-05-08 NOTE — PLAN OF CARE
05/08/17 1619   Final Note   Assessment Type Final Discharge Note   Discharge Disposition Long Term   Discharge planning education complete? Yes   What phone number can be called within the next 1-3 days to see how you are doing after discharge? [phone]   Right Care Referral Info   Post Acute Recommendation Other   Referral Type LTAC   [place] [facility] [place]        05/08/17 1619   Final Note   Assessment Type Final Discharge Note   Discharge Disposition Long Term   Discharge planning education complete? Yes   What phone number can be called within the next 1-3 days to see how you are doing after discharge? [phone]   Right Care Referral Info   Post Acute Recommendation Other   Referral Type LTAC   Facility Name [facility]   [place], [place]        05/08/17 1619   Final Note   Assessment Type Final Discharge Note   Discharge Disposition Long Term   Discharge planning education complete? Yes   What phone number can be called within the next 1-3 days to see how you are doing after discharge? [phone]   Right Care Referral Info   Post Acute Recommendation Other   Referral Type LTAC   [facility] [place] [facility]

## 2017-05-08 NOTE — PROGRESS NOTES
Discharge plan:LTAC follow up--DEN[phone] this am. She reports that will review the orders, discharge summary. She will call back with bed assignment in next hour. DENIS will continue to assist as needed.

## 2017-05-08 NOTE — PLAN OF CARE
[facility]    FACILITY TRANSFER ORDERS      Patient Name: Radha Pritchett  YOB: 1970    PCP: Ferny Iglesias MD   PCP Address: [address] [place] [address]  PCP Phone Number: [phone]  PCP Fax: [phone]    Encounter Date: 05/08/2017    Admit to: LTAC    Vital Signs:  Routine    Diagnoses:   Active Hospital Problems    Diagnosis  POA    *Acute respiratory failure with hypercapnia [J96.02]  Yes    Hypotension [I95.9]  No    Upper extremity weakness [R29.898]  Yes    Hyperglycemia [R73.9]  No    Counseling regarding advanced directives and goals of care [Z71.89]  Not Applicable    Hypokalemia [E87.6]  Yes    Breast mass, left [N63]  Yes    Pneumonia of both lungs due to infectious organism [J18.9]  Yes    Shock circulatory [R57.9]  No    Chronic diastolic heart failure [I50.32]  Yes     Chronic    Moderate protein malnutrition [E44.0]  Yes     Chronic    Mass of breast, left [N63]  Yes      Resolved Hospital Problems    Diagnosis Date Resolved POA    Sepsis [A41.9] 04/24/2017 Yes       Allergies:Review of patient's allergies indicates:  No Known Allergies    Diet:per PEG  Impact Peptide at 50 ml/hr  Please add 1 packs beneprotein/day. Flush with 150 ml every 4 hours with free water. Hold for residuals >250 ml.    Activities: Bed rest    Nursing: frequent turning Q 2 hours,rotine ERG  and li care,    Labs: CBC and BMP Daily for 3 days     CONSULTS:    PT,OT  Pulmonology for trach management,  Setting,  SIMV  FiO2 at 40%,PEEP 5,rate 8,    MISCELLANEOUS CARE:  PEG Care: Clean site every 24 hours. , Li Care: Empty Li bag every shift. Change Li every month., Routine Skin for Bedridden Patients: Apply moisture barrier cream to all skin folds and wet areas in perineal area daily and after baths and all bowel movements. and Diabetes Care:   Fingerstick blood sugar every 6 hours if patient is unable to eat and Report CBG < 60 or > 350 to  physician.    WOUND CARE ORDERS  None    Medications: Review discharge medications with patient and family and provide education.      Current Discharge Medication List      START taking these medications    Details   acetaminophen (TYLENOL) 500 MG tablet Take 1 tablet (500 mg total) by mouth every 6 (six) hours as needed.  Refills: 0      !! albuterol-ipratropium 2.5mg-0.5mg/3mL (DUO-NEB) 0.5 mg-3 mg(2.5 mg base)/3 mL nebulizer solution Take 3 mLs by nebulization every 6 (six) hours. Rescue  Refills: 0      !! albuterol-ipratropium 2.5mg-0.5mg/3mL (DUO-NEB) 0.5 mg-3 mg(2.5 mg base)/3 mL nebulizer solution Take 3 mLs by nebulization every 4 (four) hours as needed for Wheezing. Rescue  Refills: 0      dextrose 5 % SolP 50 mL with promethazine 25 mg/mL Soln 12.5 mg Inject 12.5 mg into the vein every 6 (six) hours as needed.      enoxaparin (LOVENOX) 40 mg/0.4 mL Syrg Inject 0.4 mLs (40 mg total) into the skin once daily at 6am.      insulin detemir (LEVEMIR FLEXTOUCH) 100 unit/mL (3 mL) SubQ InPn pen Inject 25 Units into the skin every evening.  Refills: 0      midodrine (PROAMATINE) 5 MG Tab Take 1 tablet (5 mg total) by mouth 3 (three) times daily.      pantoprazole (PROTONIX) 40 mg injection Inject 40 mg into the vein once daily.       !! - Potential duplicate medications found. Please discuss with provider.      STOP taking these medications       albuterol 90 mcg/actuation inhaler Comments:   Reason for Stopping:         azithromycin (Z-REGULO) 250 MG tablet Comments:   Reason for Stopping:         carvedilol (COREG) 3.125 MG tablet Comments:   Reason for Stopping:         etodolac (LODINE) 200 MG Cap Comments:   Reason for Stopping:         hydrochlorothiazide (HYDRODIURIL) 12.5 MG Tab Comments:   Reason for Stopping:         lisinopril (PRINIVIL,ZESTRIL) 5 MG tablet Comments:   Reason for Stopping:         OXYGEN-AIR DELIVERY SYSTEMS MISC Comments:   Reason for Stopping:                     _________________________________  Antonieta Carpenter MD  05/08/2017

## 2017-05-08 NOTE — PROGRESS NOTES
Received patient on a Servo I Ventilator with settings as follows:  SIMV 8/ 400/ +5 PEEP/ 10 PS/ 40%.  Patient has a #8.0 Shiley Trach in place located midline.  Ventilator alarms are set and functional and AMBU bag is at the HOB.

## 2017-05-08 NOTE — ASSESSMENT & PLAN NOTE
Bilateral UE weakness with unknown etiology  IV steroids dc'd  D/w neurology - trial acyclovir started then stopped after review of previous head CT that showed chronic findings  MRI c/spine show no lesions.  unremarkable MG antibodies.  Need PT,OT

## 2017-05-08 NOTE — PLAN OF CARE
Problem: Patient Care Overview  Goal: Plan of Care Review  Outcome: Ongoing (interventions implemented as appropriate)  Patient remains in ICU overnight on ventilator via tracheostomy, inability to wean, vent settings remain SIMV FIO2-40%, Rate-8, TV-400, PEEP-5. Propofol and Levophed have remained off throughout shift. MAP has been kept greater than 65. Ativan given twice. Morphine given once. Large amount of oral secretions, Glycopyrrolate given once, full relief obtained. Patient still moves all 4 extremities spontaneously, but no purposeful movement. Patient was more restless during the shift. Does not follow commands, but withdraws to pain. Carter catheter changed, adequate urine output. 2 BM. Tube feeding running at goal rate of 50 mL/hr, no residuals. Trach care done. No falls, injuries, or skin breakdown throughout shift. Fall and pressure ulcer precautions in place. Plans to go to LTAC today.

## 2017-05-08 NOTE — ASSESSMENT & PLAN NOTE
Patient doesn't appear to be in acute heart failure; checked ECHO with EF=65% but with diastolic dysfunction.  Lasix held,can not tolerate ACE or ARB and BB duo to hypotension,

## 2017-05-08 NOTE — PLAN OF CARE
05/08/17 1400   Transport Information   Transport Diagnosis acute respiratory failure with hypercapnia   Transportation Date 05/08/17   Transported From [facility] (ICU)   Transported To [place] ([facility])   Supporting Clinical Information   Unable to sit or travel in a seated position because Bed Confined (unable to ambulate/sit);Postural Instability   Select all that would support previous selection Oxygen administration required;Ventilator dependent;ECG monitoring required en route;Requires suctioning/airway control en route;Severe Altered Mental Status   Insurance carrier notified  yes   Authorization ID no auth number-- Rona    spoke with Rimma at [facility], arranged fro 3 pm; provided REBECCA Elliott information to call report and packet for transport.

## 2017-05-15 NOTE — ADDENDUM NOTE
Addendum  created 05/15/17 0936 by Ludivina iYp MD    Anesthesia Attestations filed, Pend clinical note

## 2017-05-15 NOTE — ANESTHESIA POSTPROCEDURE EVALUATION
"Anesthesia Post Evaluation    Patient: Radha Pritchett    Procedure(s) Performed: Procedure(s) (LRB):  TRACHEOSTOMY (N/A)    Final Anesthesia Type: general  Patient location during evaluation: PACU  Patient participation: Yes- Able to Participate  Level of consciousness: awake and alert, oriented and awake  Post-procedure vital signs: reviewed and stable  Airway patency: patent  PONV status at discharge: No PONV  Anesthetic complications: no      Cardiovascular status: blood pressure returned to baseline  Respiratory status: unassisted, spontaneous ventilation and room air  Hydration status: euvolemic  Follow-up not needed.        Visit Vitals    /69    Pulse 90    Temp 36.8 °C (98.2 °F) (Axillary)    Resp 17    Ht 4' 2" (1.27 m)    Wt 76.2 kg (167 lb 15.9 oz)    SpO2 98%    Breastfeeding No    BMI 47.24 kg/m2       Pain/Shahla Score: No Data Recorded      "

## 2017-06-05 NOTE — NURSING
0750 spoke with MD Zazueta concerning critical glucose value of 624. Orders to change insulin drip rate to 7 units.hr and hold tube feedings.  0805 cbg >500. md zazueta aware  0918 cbg >500, md zazueta aware.  1000 spoke with MD Zazueta concerning CBG value > 500. Waiting on stat glucose results. Pending results will make a decision on titration of insulin drip.   1034 Notified MD Zazueta of Glucose result 577. Finger stick 470 at 1003. Orders to double the insulin drip rate and then proceed to follow the algorithm received.   1655 Spoke with MD Salcedo about suture removal for trach.  md informed rn to continue sutures do not d/c. Re-call if pt transferring out of facility.  1800 md zazueta at bedside. . Orders to stop insulin drip and restart tube feedings at previous rate received. Plans for ultrasound of left breast and MRI tomorrow.   1815 spoke with house supervisor concerning equipment for MRI tomorrow.   
1154 Spoke with MD Phillips. MD Peters ok with MRI tomorrow. Called house supervisor to inform patient on ventilator and will need special equipment from main campus for MRI procedure. Family aware of plans for procedure.   1800 MD Brock notified of patient's . Supplemental insulin given. New orders received.  
2002: Notified Dr. Burleson of pt's ABGs. Advised to page pulmonology team following pt.    2011: Spoke w/ Dr. Gunderson about pt's ABG results. MD ordered re-intubation w/ vent set at previous setting; chest x-ray to follow for placement verification; another chest x-ray in the morning, along w/ ABGs in the morning. MD also ordered a one-time dose of lasix 20mg IV.    2015: Anesthesia MD notified of intubation order.    2035: Pt intubated.     
2250: Patient in bed awake, alert, and extremely agitated. 1mg of Ativan IVP was adminstered.  @2307: Patient remain extremely agitated and restless. VS increased with SBP >140, Resp >30, and HR >105. A dose of Morphine 2mg IVP was administered. Will continue to monitor.    
Has The Growth Been Previously Biopsied?: has not been previously biopsied
MD notified of labile blood pressure and currently 64/41; orders to restart levophed that is already ordered.  
OGT repositioned after chest x-ray. Abdominal x-ray done after chest x-ray was done. Therefore abdominal x-ray is more accurate for OGT placement.  
PATIENT FLOW NURSE ENCOUNTERED PATIENT TO COLLECT ADMIT DATA; ADMIT TO TELEMETRY UNIT; PATIENT APPEARS AAO X4 WHILE ON O2 VIA NON RE-BREATHER; CNA AT BEDSIDE OBTAINING VITALS; PATIENT AGREED TO GO FORWARD TO ADMISSION AT THIS TIME. PATIENT IS ABLE TO COMMUNICATE FREELY W/O DISTRESS  
Patient c/o pain to abdomen, legs on palpation. +2 ble pitting edema. Coughing frequently, lungs coarse, on 15l NRB. Vicodin given per prn order. Patient states she is cold, room temp adjusted for patient comfort.  
Patient pCO2 109 on bipap, O2 sat per cont pulse ox 97%. Patient with increasing lethargy. Dr Aaron notified, order to transfer to ICU bed 265.  
Pt is able to follow commands but remains lethargic. HR tachy MD aware no new orders. Pt is off ventilator but remains on supplemental oxygen 10 L. Rectal tube D/c'd. ELTON scheduled for tomorrow, awaiting portable monitor to be delivered.   
Pt vasilates between alert and sedated. She is able to follow commands. BP labile MD aware, levo ordered at 1615 for SBP greater than 65. . Pt urine output below 30 cc/hr in a.m. MD rodriguez NS @75cc/hr ordered. 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 All other vitals stable. Trach care done and safety maintained throughout shift.   
Questions arose today based on which family member is able to provide legal consent for procedures. I Spoke with the patient's grandmother Kami; she is currently ill in the hospital and states pt is  and father is very ill. Verified pt is  with her aunt (Mandy) as well who verbalized she would like to make medical decisions for the patient. I contacted her father who stated he is very ill, has mental problems and has recently suffered from a heart attack. He stated he is not willing to make any decisions regarding his daughter's care but did state he is okay with Mandy being the decision maker. The patient has no children, her mother is .  
Report called to LTAC. Spoke with Lauren Wells RN. EMS arrives at the same time. Hand off report given to them. Patient discharged and transferred to LTAC via ems at approximately 1515. Patient sent with all documented lines.     
Body Location Override (Optional): Left central malar cheek

## 2017-08-30 NOTE — ASSESSMENT & PLAN NOTE
Probably bacterial, but unable to determine.  The patient has a CURB-65 score of 0, and does not meet criteria for healthcare-associated pneumonia initially. CTA chest with bilateral airspace opacification concerning for pneumonia.  Oxygen saturations are improved but requiring a non-rebreather mask; then BIPAP and s/p intubation.  Blood culture NGTD.  Pt completed full course of Azithromycin, and was continued Rocephin until had spike of fever, Abx has been expanded  to Zosyn/Zyvox.  Completed course and Abx dc'd.     Detail Level: Detailed Other (Free Text): No charge, touch up from 08/23/2017, additional 8 units in glabella Other (Free Text): no charge for touch up Note Text (......Xxx Chief Complaint.): This diagnosis correlates with the

## 2018-01-01 NOTE — SUBJECTIVE & OBJECTIVE
Interval History: pt with elevated blood sugars on insulin drip, now off and back to basal insulin and novolog SS. Resumed tube feeds. IV steroids off. Pt still quite agitated requiring ativan, fentanyl and propofol, PRN morphine.     Review of Systems   Unable to perform ROS: Intubated     Objective:     Vital Signs (Most Recent):  Temp: 98 °F (36.7 °C) (05/01/17 1530)  Pulse: 98 (05/01/17 1800)  Resp: (!) 22 (05/01/17 1800)  BP: (!) 149/84 (05/01/17 1800)  SpO2: 98 % (05/01/17 1800) Vital Signs (24h Range):  Temp:  [98 °F (36.7 °C)-98.9 °F (37.2 °C)] 98 °F (36.7 °C)  Pulse:  [] 98  Resp:  [8-30] 22  SpO2:  [95 %-100 %] 98 %  BP: ()/() 149/84     Weight: 73.9 kg (162 lb 14.7 oz)  Body mass index is 45.82 kg/(m^2).    Intake/Output Summary (Last 24 hours) at 05/01/17 1844  Last data filed at 05/01/17 1800   Gross per 24 hour   Intake          2776.37 ml   Output             2575 ml   Net           201.37 ml      Physical Exam   Constitutional: She appears well-developed. No distress.   Eyes: Conjunctivae are normal. Right eye exhibits no discharge. Left eye exhibits no discharge.   Neck:   Trach in place   Cardiovascular: Normal rate, regular rhythm and normal heart sounds.    Pulmonary/Chest: No stridor. No respiratory distress. She has no wheezes.   Mechanically ventilated per trach.     Abdominal: Soft. Bowel sounds are normal.   Musculoskeletal: She exhibits no deformity.   Neurological:   Very restless.  Poorly responsive to commands.   Skin: Skin is warm and dry.       Significant Labs:   BMP:   Recent Labs  Lab 05/01/17  0714 05/01/17  0942   * 577*   *  --    K 5.1  --    CL 90*  --    CO2 31*  --    BUN 97*  --    CREATININE 1.5*  --    CALCIUM 10.6*  --      CBC:   Recent Labs  Lab 05/01/17  0345   WBC 4.53   HGB 10.9*   HCT 35.1*        POCT Glucose:   Recent Labs  Lab 05/01/17  1534 05/01/17  1636 05/01/17  1735   POCTGLUCOSE 238* 217* 195*       Significant Imaging:  I have reviewed all pertinent imaging results/findings within the past 24 hours.   no

## 2018-04-19 ENCOUNTER — TELEPHONE (OUTPATIENT)
Dept: RADIOLOGY | Facility: HOSPITAL | Age: 48
End: 2018-04-19

## 2019-11-21 NOTE — ASSESSMENT & PLAN NOTE
Patient doesn't appear to be in acute heart failure; checked ECHO with EF=65% but with diastolic dysfunction.  Lasix held   Detail Level: Detailed

## 2020-01-30 NOTE — SUBJECTIVE & OBJECTIVE
Interval History: pt is off and on  Sedation,and  on levophed,at this time.    Review of Systems   Unable to perform ROS: Intubated     Objective:     Vital Signs (Most Recent):  Temp: 97.9 °F (36.6 °C) (05/05/17 0315)  Pulse: 73 (05/05/17 0757)  Resp: 18 (05/05/17 0757)  BP: 114/74 (05/05/17 0600)  SpO2: 100 % (05/05/17 0757) Vital Signs (24h Range):  Temp:  [97.7 °F (36.5 °C)-98.2 °F (36.8 °C)] 97.9 °F (36.6 °C)  Pulse:  [] 73  Resp:  [12-30] 18  SpO2:  [99 %-100 %] 100 %  BP: ()/(49-92) 114/74     Weight: 76.2 kg (167 lb 15.9 oz)  Body mass index is 47.24 kg/(m^2).    Intake/Output Summary (Last 24 hours) at 05/05/17 0838  Last data filed at 05/05/17 0700   Gross per 24 hour   Intake           1926.8 ml   Output             1675 ml   Net            251.8 ml      Physical Exam   Constitutional: She appears well-developed. No distress.   Eyes: Conjunctivae are normal. Right eye exhibits no discharge. Left eye exhibits no discharge.   Neck:   Trach in place   Cardiovascular: Normal rate, regular rhythm and normal heart sounds.    Pulmonary/Chest: No stridor. No respiratory distress. She has no wheezes.   Mechanically ventilated per trach.     Abdominal: Soft. Bowel sounds are normal.   Musculoskeletal: She exhibits no deformity.   Neurological:   Very restless.  Poorly responsive to commands.   Skin: Skin is warm and dry.       Significant Labs:   BMP:     Recent Labs  Lab 05/05/17  0143   *      K 4.0   CL 94*   CO2 36*   BUN 66*   CREATININE 1.1   CALCIUM 11.5*     CBC:   No results for input(s): WBC, HGB, HCT, PLT in the last 48 hours.  POCT Glucose:     Recent Labs  Lab 05/04/17 2023 05/04/17  2321 05/05/17  0614   POCTGLUCOSE 233* 224* 272*       Significant Imaging: I have reviewed all pertinent imaging results/findings within the past 24 hours.   Affective Stability/Residential stability

## 2020-04-21 NOTE — ASSESSMENT & PLAN NOTE
Planned for surgery, seen by Dr. Sandoval (his patient) and plan to reschedule as outpatient. It is reportedly a hematoma by biopsy, but suspicion for CA not completely ruled out given paraneoplastic syndrome a consideration.     Statement Selected

## 2022-10-27 NOTE — PLAN OF CARE
Problem: Restraint, Nonbehavioral (nonviolent)  Goal: Absence of Injury/Harm  Outcome: Ongoing (interventions implemented as appropriate)  Pt becomes agitated with interventions. Need still present. No signs of injury.       37.3

## 2025-07-07 NOTE — PLAN OF CARE
Problem: Pressure Ulcer Risk (Derrick Scale) (Adult,Obstetrics,Pediatric)  Goal: Skin Integrity  Patient will demonstrate the desired outcomes by discharge/transition of care.   Outcome: Ongoing (interventions implemented as appropriate)  Pt free of new skin breakdown.       No

## (undated) DEVICE — MATRIX HEMSTAT FLOSEAL 5ML

## (undated) DEVICE — JELLY LUBRICANT STERILE 4 OZ

## (undated) DEVICE — SUPPORT ULNA NERVE PROTECTOR

## (undated) DEVICE — ELECTRODE REM PLYHSV RETURN 9

## (undated) DEVICE — PACK HEAD & NECK

## (undated) DEVICE — SEE MEDLINE ITEM 157194

## (undated) DEVICE — GLOVE BIOGEL ECLIPSE SZ 7.5

## (undated) DEVICE — SUT SILK 3-0 RB-1 30IN BLK

## (undated) DEVICE — SUT SILK 2-0 PS 18IN BLACK

## (undated) DEVICE — SUT 3-0 12-18IN SILK

## (undated) DEVICE — SEE MEDLINE ITEM 157117

## (undated) DEVICE — TRACH TUBE CUFF FLEX DISP 8.5

## (undated) DEVICE — SYR 12CC CNTRL L-L NO NDL

## (undated) DEVICE — SEE L#120831

## (undated) DEVICE — ELECTRODE NEEDLE 1IN

## (undated) DEVICE — SEE MEDLINE ITEM 154981

## (undated) DEVICE — NDL 27G X 1 1/4

## (undated) DEVICE — Device

## (undated) DEVICE — BLANKET LOWER BODY 55.9X40.2IN

## (undated) DEVICE — POSITIONER HEAD DONUT 9IN FOAM